# Patient Record
Sex: MALE | Race: WHITE | NOT HISPANIC OR LATINO | Employment: OTHER | URBAN - METROPOLITAN AREA
[De-identification: names, ages, dates, MRNs, and addresses within clinical notes are randomized per-mention and may not be internally consistent; named-entity substitution may affect disease eponyms.]

---

## 2017-01-11 RX ORDER — LISINOPRIL 10 MG/1
10 TABLET ORAL DAILY
Qty: 90 TABLET | Refills: 2 | Status: SHIPPED | OUTPATIENT
Start: 2017-01-11 | End: 2017-10-24 | Stop reason: SDUPTHER

## 2017-01-17 ENCOUNTER — TRANSCRIBE ORDERS (OUTPATIENT)
Dept: ADMINISTRATIVE | Facility: HOSPITAL | Age: 77
End: 2017-01-17

## 2017-01-17 ENCOUNTER — HOSPITAL ENCOUNTER (OUTPATIENT)
Dept: CARDIOLOGY | Facility: HOSPITAL | Age: 77
Discharge: HOME | End: 2017-01-17
Attending: INTERNAL MEDICINE

## 2017-01-17 ENCOUNTER — HOSPITAL ENCOUNTER (OUTPATIENT)
Dept: CARDIOLOGY | Facility: HOSPITAL | Age: 77
Discharge: HOME | End: 2017-01-17
Attending: INTERNAL MEDICINE | Admitting: INTERNAL MEDICINE

## 2017-01-17 VITALS — BODY MASS INDEX: 22.35 KG/M2 | WEIGHT: 165 LBS | HEIGHT: 72 IN

## 2017-01-17 DIAGNOSIS — Z91.09: Primary | ICD-10-CM

## 2017-01-17 DIAGNOSIS — R07.2 PRECORDIAL PAIN: ICD-10-CM

## 2017-01-17 LAB
BH CV STRESS BP STAGE 1: NORMAL
BH CV STRESS BP STAGE 2: NORMAL
BH CV STRESS BP STAGE 4: NORMAL
BH CV STRESS COMMENTS STAGE 1: NORMAL
BH CV STRESS DOSE REGADENOSON STAGE 1: 0.4
BH CV STRESS DURATION MIN STAGE 1: 0
BH CV STRESS DURATION MIN STAGE 2: 1
BH CV STRESS DURATION MIN STAGE 3: 1
BH CV STRESS DURATION MIN STAGE 4: 1
BH CV STRESS DURATION SEC STAGE 1: 15
BH CV STRESS DURATION SEC STAGE 2: 0
BH CV STRESS HR STAGE 1: 46
BH CV STRESS HR STAGE 2: 64
BH CV STRESS HR STAGE 3: 62
BH CV STRESS PROTOCOL 1: NORMAL
BH CV STRESS RECOVERY BP: NORMAL MMHG
BH CV STRESS RECOVERY HR: 57 BPM
BH CV STRESS STAGE 1: 1
BH CV STRESS STAGE 2: 2
BH CV STRESS STAGE 3: 3
BH CV STRESS STAGE 4: 4
LV EF NUC BP: 58 %
MAXIMAL PREDICTED HEART RATE: 142 BPM
PERCENT MAX PREDICTED HR: 47.18 %
STRESS BASELINE BP: NORMAL MMHG
STRESS BASELINE HR: 45 BPM
STRESS PERCENT HR: 56 %
STRESS POST PEAK BP: NORMAL MMHG
STRESS POST PEAK HR: 67 BPM
STRESS TARGET HR: 121 BPM

## 2017-01-17 PROCEDURE — 0 TECHNETIUM SESTAMIBI: Performed by: INTERNAL MEDICINE

## 2017-01-17 PROCEDURE — A9500 TC99M SESTAMIBI: HCPCS | Performed by: INTERNAL MEDICINE

## 2017-01-17 PROCEDURE — 78452 HT MUSCLE IMAGE SPECT MULT: CPT

## 2017-01-17 PROCEDURE — 25010000002 REGADENOSON 0.4 MG/5ML SOLUTION: Performed by: INTERNAL MEDICINE

## 2017-01-17 PROCEDURE — 78452 HT MUSCLE IMAGE SPECT MULT: CPT | Performed by: INTERNAL MEDICINE

## 2017-01-17 PROCEDURE — 93017 CV STRESS TEST TRACING ONLY: CPT

## 2017-01-17 PROCEDURE — 93018 CV STRESS TEST I&R ONLY: CPT | Performed by: INTERNAL MEDICINE

## 2017-01-17 RX ADMIN — Medication 1 DOSE: at 11:00

## 2017-01-17 RX ADMIN — Medication 1 DOSE: at 12:35

## 2017-01-17 RX ADMIN — REGADENOSON 0.4 MG: 0.08 INJECTION, SOLUTION INTRAVENOUS at 12:37

## 2017-01-30 ENCOUNTER — TELEPHONE (OUTPATIENT)
Dept: CARDIOLOGY | Facility: CLINIC | Age: 77
End: 2017-01-30

## 2017-01-30 NOTE — TELEPHONE ENCOUNTER
----- Message from Yahaira Carson MD sent at 1/27/2017  4:23 PM EST -----  Please notify him that his stress test looks normal with normal function of the heart.  Please continue the same treatment and keep appointments for future follow-up.

## 2017-03-27 ENCOUNTER — OFFICE VISIT (OUTPATIENT)
Dept: INTERNAL MEDICINE | Facility: CLINIC | Age: 77
End: 2017-03-27

## 2017-03-27 VITALS
HEIGHT: 72 IN | HEART RATE: 56 BPM | BODY MASS INDEX: 23.03 KG/M2 | WEIGHT: 170 LBS | SYSTOLIC BLOOD PRESSURE: 140 MMHG | DIASTOLIC BLOOD PRESSURE: 60 MMHG

## 2017-03-27 DIAGNOSIS — I48.0 PAROXYSMAL ATRIAL FIBRILLATION (HCC): Primary | ICD-10-CM

## 2017-03-27 DIAGNOSIS — E78.2 MIXED HYPERLIPIDEMIA: ICD-10-CM

## 2017-03-27 DIAGNOSIS — N18.2 CKD (CHRONIC KIDNEY DISEASE), STAGE 2 (MILD): ICD-10-CM

## 2017-03-27 DIAGNOSIS — I48.92 ATRIAL FLUTTER, UNSPECIFIED TYPE (HCC): ICD-10-CM

## 2017-03-27 DIAGNOSIS — Z12.5 SCREENING FOR PROSTATE CANCER: ICD-10-CM

## 2017-03-27 DIAGNOSIS — I10 ESSENTIAL HYPERTENSION: ICD-10-CM

## 2017-03-27 LAB
ALBUMIN SERPL-MCNC: 4.3 G/DL (ref 3.2–4.8)
ALBUMIN/GLOB SERPL: 1.7 G/DL (ref 1.5–2.5)
ALP SERPL-CCNC: 98 U/L (ref 25–100)
ALT SERPL-CCNC: 23 U/L (ref 7–40)
AST SERPL-CCNC: 26 U/L (ref 0–33)
BILIRUB SERPL-MCNC: 0.7 MG/DL (ref 0.3–1.2)
BUN SERPL-MCNC: 24 MG/DL (ref 9–23)
BUN/CREAT SERPL: 20 (ref 7–25)
CALCIUM SERPL-MCNC: 9.9 MG/DL (ref 8.7–10.4)
CHLORIDE SERPL-SCNC: 106 MMOL/L (ref 99–109)
CHOLEST SERPL-MCNC: 150 MG/DL (ref 0–200)
CO2 SERPL-SCNC: 33 MMOL/L (ref 20–31)
CREAT SERPL-MCNC: 1.2 MG/DL (ref 0.6–1.3)
ERYTHROCYTE [DISTWIDTH] IN BLOOD BY AUTOMATED COUNT: 13.1 % (ref 11.3–14.5)
GLOBULIN SER CALC-MCNC: 2.5 GM/DL
GLUCOSE SERPL-MCNC: 87 MG/DL (ref 70–100)
HCT VFR BLD AUTO: 45.8 % (ref 38.9–50.9)
HDLC SERPL-MCNC: 57 MG/DL (ref 40–60)
HGB BLD-MCNC: 15.5 G/DL (ref 13.1–17.5)
LDLC SERPL CALC-MCNC: 77 MG/DL (ref 0–100)
MCH RBC QN AUTO: 31.8 PG (ref 27–31)
MCHC RBC AUTO-ENTMCNC: 33.8 G/DL (ref 32–36)
MCV RBC AUTO: 94 FL (ref 80–99)
PLATELET # BLD AUTO: 149 10*3/MM3 (ref 150–450)
POTASSIUM SERPL-SCNC: 4.5 MMOL/L (ref 3.5–5.5)
PROT SERPL-MCNC: 6.8 G/DL (ref 5.7–8.2)
PSA SERPL-MCNC: 1.07 NG/ML (ref 0–4)
RBC # BLD AUTO: 4.87 10*6/MM3 (ref 4.2–5.76)
SODIUM SERPL-SCNC: 142 MMOL/L (ref 132–146)
TRIGL SERPL-MCNC: 80 MG/DL (ref 0–150)
TSH SERPL DL<=0.005 MIU/L-ACNC: 0.74 MIU/ML (ref 0.35–5.35)
VLDLC SERPL CALC-MCNC: 16 MG/DL
WBC # BLD AUTO: 8.4 10*3/MM3 (ref 3.5–10.8)

## 2017-03-27 PROCEDURE — 36415 COLL VENOUS BLD VENIPUNCTURE: CPT | Performed by: INTERNAL MEDICINE

## 2017-03-27 PROCEDURE — 99214 OFFICE O/P EST MOD 30 MIN: CPT | Performed by: INTERNAL MEDICINE

## 2017-03-27 NOTE — PROGRESS NOTES
Patient is a 77 y.o. male who is here for a follow up of chronic conditions.  Chief Complaint   Patient presents with   • Hyperlipidemia   • Atrial Fibrillation         HPI:  Here for f/u.  Energy level is good.  No palpitations.  No excessive bruising.  No dizziness or lightheadedness.  No nausea or emesis.  No HA's.  No abdominal pains.      History:    Patient Active Problem List   Diagnosis   • Hx-TIA (transient ischemic attack)   • Essential hypertension   • PFO (patent foramen ovale)   • Hyperlipidemia   • Tobacco chew use   • CKD (chronic kidney disease)   • Atrial fibrillation   • Atrial flutter   • Stroke   • MVP (mitral valve prolapse)   • Mitral valve regurgitation   • Screening for prostate cancer       Past Medical History:   Diagnosis Date   • Chronic kidney disease    • History of migraine headaches    • Hyperlipidemia    • Hypertension    • Mitral valve regurgitation 12/1/2016   • PFO (patent foramen ovale)    • Stroke        Past Surgical History:   Procedure Laterality Date   • TONSILLECTOMY AND ADENOIDECTOMY         Current Outpatient Prescriptions on File Prior to Visit   Medication Sig   • apixaban (ELIQUIS) 5 MG tablet tablet Take 1 tablet by mouth 2 (two) times a day.   • atorvastatin (LIPITOR) 40 MG tablet Take 1 tablet by mouth daily.   • glucosamine sulfate 500 MG capsule capsule Take 500 mg by mouth daily.   • lisinopril (PRINIVIL,ZESTRIL) 10 MG tablet Take 1 tablet by mouth Daily.   • metoprolol tartrate (LOPRESSOR) 25 MG tablet Take 12.5 mg by mouth 2 (two) times a day.   • vitamin B-12 (CYANOCOBALAMIN) 100 MCG tablet Take 50 mcg by mouth daily.     No current facility-administered medications on file prior to visit.        Family History   Problem Relation Age of Onset   • Arthritis Other    • Hyperlipidemia Other    • Stroke Other        Social History     Social History   • Marital status:      Spouse name: N/A   • Number of children: N/A   • Years of education: N/A  "    Occupational History   • Not on file.     Social History Main Topics   • Smoking status: Never Smoker   • Smokeless tobacco: Current User   • Alcohol use Yes      Comment: 1-2 drinks a week   • Drug use: No   • Sexual activity: Not on file     Other Topics Concern   • Not on file     Social History Narrative         ROS:    Review of Systems   Constitutional: Negative for chills, fatigue, fever and unexpected weight change.   HENT: Negative for congestion, ear pain, hearing loss, rhinorrhea, sinus pressure, sore throat and trouble swallowing.    Eyes: Negative for discharge and itching.   Respiratory: Negative for cough, chest tightness and shortness of breath.    Cardiovascular: Negative for chest pain, palpitations and leg swelling.   Gastrointestinal: Negative for abdominal pain, blood in stool, constipation, diarrhea and vomiting.   Endocrine: Negative for polydipsia and polyuria.   Genitourinary: Negative for difficulty urinating, dysuria, enuresis, frequency, hematuria and urgency.   Musculoskeletal: Negative for arthralgias, back pain, gait problem and joint swelling.   Skin: Negative for rash and wound.   Allergic/Immunologic: Negative for immunocompromised state.   Neurological: Negative for dizziness, syncope, weakness, light-headedness, numbness and headaches.   Hematological: Bruises/bleeds easily.   Psychiatric/Behavioral: Negative for behavioral problems, dysphoric mood and sleep disturbance. The patient is not nervous/anxious.        /60  Pulse 56  Ht 72\" (182.9 cm)  Wt 170 lb (77.1 kg)  BMI 23.06 kg/m2    Physical Exam:    Physical Exam   Constitutional: He is oriented to person, place, and time. He appears well-developed and well-nourished.   HENT:   Head: Normocephalic and atraumatic.   Right Ear: External ear normal.   Left Ear: External ear normal.   Mouth/Throat: Oropharynx is clear and moist.   Right TM perforated   Eyes: Conjunctivae and EOM are normal.   Neck: Normal range of " motion. Neck supple.   Cardiovascular: Normal rate, regular rhythm and normal heart sounds.    Pulmonary/Chest: Effort normal and breath sounds normal.   Abdominal: Soft. Bowel sounds are normal.   Musculoskeletal: Normal range of motion.   Lymphadenopathy:     He has no cervical adenopathy.   Neurological: He is alert and oriented to person, place, and time.   Skin: Skin is warm and dry.   Easy bruising   Psychiatric: He has a normal mood and affect. His behavior is normal. Thought content normal.       Procedure:      Discussion/Summary:  htn-stable  paf-cont rate control and eliquis  Hyperlipidemia- labs today, counseled on diet  djd-stable  Thrombocytopenia-cbc today  TIA-cont rf mod  High risk meds-labs today  Other-check psa today     Refusing flu shot  Labs noted and dw patient      Current Outpatient Prescriptions:   •  apixaban (ELIQUIS) 5 MG tablet tablet, Take 1 tablet by mouth 2 (two) times a day., Disp: 180 tablet, Rfl: 3  •  atorvastatin (LIPITOR) 40 MG tablet, Take 1 tablet by mouth daily., Disp: 90 tablet, Rfl: 3  •  glucosamine sulfate 500 MG capsule capsule, Take 500 mg by mouth daily., Disp: , Rfl:   •  lisinopril (PRINIVIL,ZESTRIL) 10 MG tablet, Take 1 tablet by mouth Daily., Disp: 90 tablet, Rfl: 2  •  metoprolol tartrate (LOPRESSOR) 25 MG tablet, Take 12.5 mg by mouth 2 (two) times a day., Disp: , Rfl:   •  vitamin B-12 (CYANOCOBALAMIN) 100 MCG tablet, Take 50 mcg by mouth daily., Disp: , Rfl:         Pio was seen today for hyperlipidemia and atrial fibrillation.    Diagnoses and all orders for this visit:    Paroxysmal atrial fibrillation    Atrial flutter, unspecified type    Essential hypertension  -     CBC (No Diff)    Mixed hyperlipidemia  -     Comprehensive Metabolic Panel  -     Lipid Panel  -     TSH    CKD (chronic kidney disease), stage 2 (mild)    Screening for prostate cancer  -     PSA Screen

## 2017-06-30 ENCOUNTER — OFFICE VISIT (OUTPATIENT)
Dept: CARDIOLOGY | Facility: CLINIC | Age: 77
End: 2017-06-30

## 2017-06-30 VITALS
SYSTOLIC BLOOD PRESSURE: 144 MMHG | HEIGHT: 71 IN | HEART RATE: 49 BPM | WEIGHT: 174 LBS | BODY MASS INDEX: 24.36 KG/M2 | DIASTOLIC BLOOD PRESSURE: 80 MMHG

## 2017-06-30 DIAGNOSIS — I48.0 PAROXYSMAL ATRIAL FIBRILLATION (HCC): Primary | ICD-10-CM

## 2017-06-30 DIAGNOSIS — I10 ESSENTIAL HYPERTENSION: ICD-10-CM

## 2017-06-30 PROCEDURE — 99213 OFFICE O/P EST LOW 20 MIN: CPT | Performed by: INTERNAL MEDICINE

## 2017-06-30 RX ORDER — VITAMIN B COMPLEX
1 TABLET ORAL DAILY
Status: ON HOLD | COMMUNITY
End: 2020-08-24

## 2017-06-30 NOTE — PROGRESS NOTES
Encounter Date:06/30/2017      Patient ID: Pio Baum is a 77 y.o. male.    Chief Complaint: No chief complaint on file.      PROBLEM LIST:  Paroxysmal atrial fibrillation:   Chadsvasc asked score of 5  TIA/CVA   Carotid duplex April 20, 2016 no significant disease    Echocardiogram April 20, 2016 showed normal LV function, positive bubble study.  Closure not considered due to need for chronic anticoagulation therapy.   Cardiac event monitor showed atrial fibrillation/flutter with intermittent RVR, aberrancy, IVCD/sinus rhythm with PVCs.  Hypertension  Dyslipidemia  Oral tobacco abuse  History of migraine headaches  Chronic kidney disease stage II  Status post tonsillectomy/adenoidectomy    History of Present Illness  Patient presents today for follow-up of paroxysmal atrial fibrillation. He has been doing great from a cardiac standpoint. Recently returned from a trip to [place].  After returning he had an episode of dizziness lightheadedness after chewing tobacco, states that he had felt nauseous at that time and appears that he had a vasovagal episode.  Since then he has avoided chewing tobacco.  Otherwise he is quite active and works in his yard and goes for games of golf without any problems.     Allergies   Allergen Reactions   • Flonase [Fluticasone] Irritability     Gets a nose bleed as soon as used   • Penicillins Rash     Rash all over body including mouth/throat          Current Outpatient Prescriptions:   •  apixaban (ELIQUIS) 5 MG tablet tablet, Take 1 tablet by mouth 2 (two) times a day., Disp: 180 tablet, Rfl: 3  •  atorvastatin (LIPITOR) 40 MG tablet, Take 1 tablet by mouth daily., Disp: 90 tablet, Rfl: 3  •  Coenzyme Q10 (COQ10 PO), Take  by mouth Daily., Disp: , Rfl:   •  glucosamine sulfate 500 MG capsule capsule, Take 500 mg by mouth daily., Disp: , Rfl:   •  lisinopril (PRINIVIL,ZESTRIL) 10 MG tablet, Take 1 tablet by mouth Daily., Disp: 90 tablet, Rfl: 2  •  metoprolol tartrate  "(LOPRESSOR) 25 MG tablet, Take 12.5 mg by mouth 2 (two) times a day., Disp: , Rfl:   •  Multiple Vitamin (MULTI VITAMIN DAILY PO), Take  by mouth Daily., Disp: , Rfl:   •  vitamin B-12 (CYANOCOBALAMIN) 100 MCG tablet, Take 50 mcg by mouth daily., Disp: , Rfl:     The following portions of the patient's history were reviewed and updated as appropriate: allergies, current medications, past family history, past medical history, past social history, past surgical history and problem list.    ROS  Review of Systems   Constitution: Negative for chills, fever, weight gain and weight loss.   Cardiovascular: Negative for chest pain, claudication, dyspnea on exertion, leg swelling, orthopnea, palpitations, paroxysmal nocturnal dyspnea and syncope.        No dizziness   Gastrointestinal: Negative for abdominal pain, constipation, diarrhea, nausea and vomiting.   Genitourinary:        No urinary symptoms   Neurological:        No symptoms of stroke.   All other systems reviewed and are negative.    Objective:     Blood pressure 144/80, pulse (!) 49, height 71\" (180.3 cm), weight 174 lb (78.9 kg).      Physical Exam  Constitutional: She appears well-developed and well-nourished.   HENT:   HEENT exam unremarkable.   Neck: Neck supple. No JVD present.   No carotid bruits.   Cardiovascular: Normal rate, regular rhythm and normal heart sounds.    No murmur heard.  2 plus symmetric pulses.   Pulmonary/Chest: Breath sounds normal. Does not exhibit tenderness.   Abdominal:   Abdomen benign.   Musculoskeletal: Does not exhibit edema.   Neurological:   Neurological exam unremarkable.   Vitals reviewed.    Lab Review:   Procedures       Assessment:      Diagnosis Plan   1. Paroxysmal atrial fibrillation, Mostly maintaining sinus rhythm/bradycardia, on chronic anticoagulation therapy.      2. Essential hypertension , Well controlled       Plan:   Patient advised to avoid chewing tobacco.  Continue current medications.   FU in 6 MO, sooner " as needed.  Thank you for allowing us to participate in the care of your patient.     Scribed for Yahaira Carson MD by Nixon Carson. 6/30/2017  3:42 PM    I, Yahaira Carson MD, personally performed the services described in this documentation as scribed by the above named individual in my presence, and it is both accurate and complete.  6/30/2017  3:43 PM        Please note that portions of this note may have been completed with a voice recognition program. Efforts were made to edit the dictations, but occasionally words are mistranscribed.

## 2017-08-22 DIAGNOSIS — Z00.00 HEALTHCARE MAINTENANCE: ICD-10-CM

## 2017-08-22 RX ORDER — ATORVASTATIN CALCIUM 40 MG/1
TABLET, FILM COATED ORAL
Qty: 90 TABLET | Refills: 3 | Status: SHIPPED | OUTPATIENT
Start: 2017-08-22 | End: 2018-09-02 | Stop reason: SDUPTHER

## 2017-09-05 ENCOUNTER — OFFICE VISIT (OUTPATIENT)
Dept: INTERNAL MEDICINE | Facility: CLINIC | Age: 77
End: 2017-09-05

## 2017-09-05 VITALS
HEART RATE: 68 BPM | BODY MASS INDEX: 24.64 KG/M2 | SYSTOLIC BLOOD PRESSURE: 134 MMHG | HEIGHT: 71 IN | DIASTOLIC BLOOD PRESSURE: 80 MMHG | WEIGHT: 176 LBS

## 2017-09-05 DIAGNOSIS — I10 ESSENTIAL HYPERTENSION: ICD-10-CM

## 2017-09-05 DIAGNOSIS — E78.2 MIXED HYPERLIPIDEMIA: ICD-10-CM

## 2017-09-05 DIAGNOSIS — N18.2 CKD (CHRONIC KIDNEY DISEASE), STAGE 2 (MILD): ICD-10-CM

## 2017-09-05 DIAGNOSIS — I48.92 ATRIAL FLUTTER, UNSPECIFIED TYPE (HCC): ICD-10-CM

## 2017-09-05 DIAGNOSIS — I48.0 PAROXYSMAL ATRIAL FIBRILLATION (HCC): Primary | ICD-10-CM

## 2017-09-05 PROCEDURE — G0438 PPPS, INITIAL VISIT: HCPCS | Performed by: INTERNAL MEDICINE

## 2017-09-05 PROCEDURE — 96160 PT-FOCUSED HLTH RISK ASSMT: CPT | Performed by: INTERNAL MEDICINE

## 2017-09-05 RX ORDER — TAMSULOSIN HYDROCHLORIDE 0.4 MG/1
1 CAPSULE ORAL NIGHTLY
COMMUNITY
Start: 2017-08-01 | End: 2020-01-10

## 2017-09-05 NOTE — PROGRESS NOTES
QUICK REFERENCE INFORMATION:  The ABCs of the Annual Wellness Visit    Initial Medicare Wellness Visit    HEALTH RISK ASSESSMENT    1940    Recent Hospitalizations:  No hospitalization(s) within the last year..        Current Medical Providers:  Patient Care Team:  Lupillo Hill MD as PCP - General        Smoking Status:  History   Smoking Status   • Never Smoker   Smokeless Tobacco   • Current User   • Types: Chew       Alcohol Consumption:  History   Alcohol Use   • Yes     Comment: 1-2 drinks a week       Depression Screen:   PHQ-2/PHQ-9 Depression Screening 9/5/2017   Little interest or pleasure in doing things 0   Feeling down, depressed, or hopeless 0   Trouble falling or staying asleep, or sleeping too much 0   Feeling tired or having little energy 0   Poor appetite or overeating 0   Feeling bad about yourself - or that you are a failure or have let yourself or your family down 0   Trouble concentrating on things, such as reading the newspaper or watching television 0   Moving or speaking so slowly that other people could have noticed. Or the opposite - being so fidgety or restless that you have been moving around a lot more than usual 0   Thoughts that you would be better off dead, or of hurting yourself in some way 0   Total Score 0       Health Habits and Functional and Cognitive Screening:  Functional & Cognitive Status 9/5/2017   Do you have difficulty preparing food and eating? No   Do you have difficulty bathing yourself? No   Do you have difficulty getting dressed? No   Do you have difficulty using the toilet? No   Do you have difficulty moving around from place to place? No   In the past year have you fallen or experienced a near fall? No   Do you need help using the phone?  No   Are you deaf or do you have serious difficulty hearing?  No   Do you need help with transportation? No   Do you need help shopping? No   Do you need help preparing meals?  No   Do you need help with housework?  No    Do you need help with laundry? No   Do you need help taking your medications? No   Do you need help managing money? No   Do you have difficulty concentrating, remembering or making decisions? No       Health Habits  Current Diet: Well Balanced Diet  Dental Exam: Up to date  Eye Exam: Not up to date  Exercise (times per week): 4 times per week          Does the patient have evidence of cognitive impairment? No    Asiprin use counseling: Does not need ASA (and currently is not on it)      Recent Lab Results:    Visual Acuity:  No exam data present    Age-appropriate Screening Schedule:  Refer to the list below for future screening recommendations based on patient's age, sex and/or medical conditions. Orders for these recommended tests are listed in the plan section. The patient has been provided with a written plan.    Health Maintenance   Topic Date Due   • TDAP/TD VACCINES (1 - Tdap) 01/21/1959   • PNEUMOCOCCAL VACCINES (65+ LOW/MEDIUM RISK) (1 of 2 - PCV13) 01/21/2003   • ZOSTER VACCINE  04/27/2016   • INFLUENZA VACCINE  09/01/2017   • LIPID PANEL  03/27/2018        Subjective   History of Present Illness    Pio Baum is a 77 y.o. male who presents for an Annual Wellness Visit.    The following portions of the patient's history were reviewed and updated as appropriate: current medications, past family history, past medical history, past social history, past surgical history and problem list.    Outpatient Medications Prior to Visit   Medication Sig Dispense Refill   • apixaban (ELIQUIS) 5 MG tablet tablet Take 1 tablet by mouth 2 (two) times a day. 180 tablet 3   • atorvastatin (LIPITOR) 40 MG tablet TAKE 1 TABLET EVERY DAY 90 tablet 3   • Coenzyme Q10 (COQ10 PO) Take  by mouth Daily.     • glucosamine sulfate 500 MG capsule capsule Take 500 mg by mouth daily.     • lisinopril (PRINIVIL,ZESTRIL) 10 MG tablet Take 1 tablet by mouth Daily. 90 tablet 2   • metoprolol tartrate (LOPRESSOR) 25 MG tablet Take  12.5 mg by mouth 2 (two) times a day.     • Multiple Vitamin (MULTI VITAMIN DAILY PO) Take  by mouth Daily.     • vitamin B-12 (CYANOCOBALAMIN) 100 MCG tablet Take 50 mcg by mouth daily.       No facility-administered medications prior to visit.        Patient Active Problem List   Diagnosis   • Hx-TIA (transient ischemic attack)   • Essential hypertension   • PFO (patent foramen ovale)   • Hyperlipidemia   • Tobacco chew use   • CKD (chronic kidney disease)   • Atrial fibrillation   • Atrial flutter   • Stroke   • MVP (mitral valve prolapse)   • Mitral valve regurgitation   • Screening for prostate cancer       Advance Care Planning:  has NO advance directive - not interested in additional information    Identification of Risk Factors:  Risk factors include: weight , cardiovascular risk, inactivity, caretaker stress and hearing limitations.    Review of Systems   Constitutional: Negative for chills, fatigue, fever and unexpected weight change.   HENT: Negative for congestion, ear pain, hearing loss, rhinorrhea, sinus pressure, sore throat and trouble swallowing.    Eyes: Negative for discharge and itching.   Respiratory: Negative for cough, chest tightness and shortness of breath.    Cardiovascular: Negative for chest pain, palpitations and leg swelling.   Gastrointestinal: Negative for abdominal pain, blood in stool, constipation, diarrhea and vomiting.        Colonoscopy by Dr Schmid   Endocrine: Negative for polydipsia and polyuria.   Genitourinary: Positive for difficulty urinating. Negative for dysuria, enuresis, frequency, hematuria and urgency.        3/17 psa   Musculoskeletal: Positive for arthralgias. Negative for back pain, gait problem and joint swelling.   Skin: Negative for rash and wound.   Allergic/Immunologic: Negative for immunocompromised state.   Neurological: Negative for dizziness, syncope, weakness, light-headedness, numbness and headaches.   Hematological: Bruises/bleeds easily.  "  Psychiatric/Behavioral: Negative for behavioral problems, dysphoric mood and sleep disturbance. The patient is not nervous/anxious.        Compared to one year ago, the patient feels his physical health is the same.  Compared to one year ago, the patient feels his mental health is the same.    Objective     Physical Exam   Constitutional: He is oriented to person, place, and time. He appears well-developed and well-nourished.   HENT:   Head: Normocephalic and atraumatic.   Right Ear: External ear normal.   Left Ear: External ear normal.   Mouth/Throat: Oropharynx is clear and moist.   Right TM perforated   Eyes: Conjunctivae and EOM are normal.   Neck: Normal range of motion. Neck supple.   Cardiovascular: Normal rate, regular rhythm and normal heart sounds.    Pulmonary/Chest: Effort normal and breath sounds normal.   Abdominal: Soft. Bowel sounds are normal.   Musculoskeletal: Normal range of motion.   Lymphadenopathy:     He has no cervical adenopathy.   Neurological: He is alert and oriented to person, place, and time.   Skin: Skin is warm and dry.   Easy bruising   Psychiatric: He has a normal mood and affect. His behavior is normal. Thought content normal.       Vitals:    09/05/17 1101   BP: 134/80   BP Location: Left arm   Patient Position: Sitting   Pulse: 68   Weight: 176 lb (79.8 kg)   Height: 71\" (180.3 cm)   PainSc: 0-No pain       Body mass index is 24.55 kg/(m^2).  Discussed the patient's BMI with him. The BMI is above average; no BMI management plan is appropriate..    Assessment/Plan   Patient Self-Management and Personalized Health Advice  The patient has been provided with information about: diet, exercise, weight management and fall prevention and preventive services including:   · Exercise counseling provided, Nutrition counseling provided.    Visit Diagnoses:    ICD-10-CM ICD-9-CM   1. Paroxysmal atrial fibrillation I48.0 427.31   2. Atrial flutter, unspecified type I48.92 427.32   3. " Essential hypertension I10 401.9   4. Mixed hyperlipidemia E78.2 272.2   5. CKD (chronic kidney disease), stage 2 (mild) N18.2 585.2       No orders of the defined types were placed in this encounter.      Outpatient Encounter Prescriptions as of 9/5/2017   Medication Sig Dispense Refill   • apixaban (ELIQUIS) 5 MG tablet tablet Take 1 tablet by mouth 2 (two) times a day. 180 tablet 3   • atorvastatin (LIPITOR) 40 MG tablet TAKE 1 TABLET EVERY DAY 90 tablet 3   • Coenzyme Q10 (COQ10 PO) Take  by mouth Daily.     • glucosamine sulfate 500 MG capsule capsule Take 500 mg by mouth daily.     • lisinopril (PRINIVIL,ZESTRIL) 10 MG tablet Take 1 tablet by mouth Daily. 90 tablet 2   • metoprolol tartrate (LOPRESSOR) 25 MG tablet Take 12.5 mg by mouth 2 (two) times a day.     • Multiple Vitamin (MULTI VITAMIN DAILY PO) Take  by mouth Daily.     • tamsulosin (FLOMAX) 0.4 MG capsule 24 hr capsule Every Night.     • vitamin B-12 (CYANOCOBALAMIN) 100 MCG tablet Take 50 mcg by mouth daily.       No facility-administered encounter medications on file as of 9/5/2017.        Reviewed use of high risk medication in the elderly: yes  Reviewed for potential of harmful drug interactions in the elderly: yes    Follow Up:  Return in about 6 months (around 3/5/2018) for Recheck and labs.     An After Visit Summary and PPPS with all of these plans were given to the patient.        htn-stable  paf-cont rate control and eliquis  Hyperlipidemia- labs soon, counseled on diet  djd-stable  Thrombocytopenia-cbc soon  TIA-cont rf mod  High risk meds-labs soon    Labs noted and dw patient, counseled on diet and exercise and hunting safety

## 2017-10-03 DIAGNOSIS — I48.91 ATRIAL FIBRILLATION, CONTROLLED (HCC): ICD-10-CM

## 2017-10-04 RX ORDER — APIXABAN 5 MG/1
TABLET, FILM COATED ORAL
Qty: 180 TABLET | Refills: 3 | Status: SHIPPED | OUTPATIENT
Start: 2017-10-04 | End: 2019-05-10 | Stop reason: SDUPTHER

## 2017-10-24 RX ORDER — LISINOPRIL 10 MG/1
TABLET ORAL
Qty: 90 TABLET | Refills: 2 | Status: SHIPPED | OUTPATIENT
Start: 2017-10-24 | End: 2018-07-24 | Stop reason: SDUPTHER

## 2018-01-19 ENCOUNTER — OFFICE VISIT (OUTPATIENT)
Dept: CARDIOLOGY | Facility: CLINIC | Age: 78
End: 2018-01-19

## 2018-01-19 VITALS
DIASTOLIC BLOOD PRESSURE: 64 MMHG | HEIGHT: 71 IN | HEART RATE: 53 BPM | SYSTOLIC BLOOD PRESSURE: 130 MMHG | BODY MASS INDEX: 25.34 KG/M2 | WEIGHT: 181 LBS

## 2018-01-19 DIAGNOSIS — E78.2 MIXED HYPERLIPIDEMIA: ICD-10-CM

## 2018-01-19 DIAGNOSIS — I48.0 PAROXYSMAL ATRIAL FIBRILLATION (HCC): Primary | ICD-10-CM

## 2018-01-19 DIAGNOSIS — I10 ESSENTIAL HYPERTENSION: ICD-10-CM

## 2018-01-19 PROCEDURE — 99214 OFFICE O/P EST MOD 30 MIN: CPT | Performed by: INTERNAL MEDICINE

## 2018-01-19 NOTE — PROGRESS NOTES
Encounter Date:01/19/2018      Patient ID: Pio Baum is a 77 y.o. male.    Chief Complaint: Hypertension; Hyperlipidemia; and Atrial Fibrillation    PROBLEM LIST:  1. PAF  a. Chadsvasc score of 5  b. Stress test 1/17/2017: EF 58%, negative, low risk study  2. TIA/CVA  a. Carotid duplex April 20, 2016 no significant disease  b. Echocardiogram April 20, 2016 showed normal LV function, positive bubble study. Closure not considered due to need for chronic anticoagulation therapy.  c. Cardiac event monitor showed atrial fibrillation/flutter with intermittent RVR, aberrancy, IVCD/sinus rhythm with PVCs  3. Hypertension  4. Dyslipidemia  5. Oral tobacco abuse  6. History of migraine headaches  7. Chronic kidney disease stage II  8. Status post tonsillectomy/adenoictomy    History of Present Illness  Patient presents today for follow-up with a history of PAF, TIA/CVA, and cardiac risk factors. Has been doing well from a cardiac standpoint. Denies chest pain, shortness of breath, leg swelling, palpitations, and syncope. Remains busy and active went deer hunting recently gets plenty of exercise with no limitations.    Allergies   Allergen Reactions   • Flonase [Fluticasone] Irritability     Gets a nose bleed as soon as used   • Penicillins Rash     Rash all over body including mouth/throat          Current Outpatient Prescriptions:   •  atorvastatin (LIPITOR) 40 MG tablet, TAKE 1 TABLET EVERY DAY, Disp: 90 tablet, Rfl: 3  •  Coenzyme Q10 (COQ10 PO), Take  by mouth Daily., Disp: , Rfl:   •  ELIQUIS 5 MG tablet tablet, TAKE 1 TABLET BY MOUTH 2 (TWO) TIMES A DAY., Disp: 180 tablet, Rfl: 3  •  glucosamine sulfate 500 MG capsule capsule, Take 500 mg by mouth daily., Disp: , Rfl:   •  lisinopril (PRINIVIL,ZESTRIL) 10 MG tablet, TAKE 1 TABLET EVERY DAY, Disp: 90 tablet, Rfl: 2  •  metoprolol tartrate (LOPRESSOR) 25 MG tablet, Take 12.5 mg by mouth 2 (two) times a day., Disp: , Rfl:   •  Multiple Vitamin (MULTI  "VITAMIN DAILY PO), Take  by mouth Daily., Disp: , Rfl:   •  tamsulosin (FLOMAX) 0.4 MG capsule 24 hr capsule, Every Night., Disp: , Rfl:   •  vitamin B-12 (CYANOCOBALAMIN) 100 MCG tablet, Take 50 mcg by mouth daily., Disp: , Rfl:     The following portions of the patient's history were reviewed and updated as appropriate: allergies, current medications, past family history, past medical history, past social history, past surgical history and problem list.    ROS  Review of Systems   Constitution: Negative for chills, fever, weight gain and weight loss.   Cardiovascular: Negative for chest pain, claudication, dyspnea on exertion, leg swelling, orthopnea, palpitations, paroxysmal nocturnal dyspnea and syncope.        No dizziness   Gastrointestinal: Negative for abdominal pain, constipation, diarrhea, nausea and vomiting.   Genitourinary:        No urinary symptoms   Neurological:        No symptoms of stroke.   All other systems reviewed and are negative.    Objective:     Blood pressure 130/64, pulse 53, height 180.3 cm (71\"), weight 82.1 kg (181 lb).      Physical Exam  Constitutional: She appears well-developed and well-nourished.   HENT:   HEENT exam unremarkable.   Neck: Neck supple. No JVD present.   No carotid bruits.   Cardiovascular: Normal rate, regular rhythm and normal heart sounds.    No murmur heard.  2 plus symmetric pulses.   Pulmonary/Chest: Breath sounds normal. Does not exhibit tenderness.   Abdominal:   Abdomen benign.   Musculoskeletal: Does not exhibit edema.   Neurological:   Neurological exam unremarkable.   Vitals reviewed.    Lab Review:   Procedures       Assessment:      Diagnosis Plan   1. Paroxysmal atrial fibrillation Maintaining sinus rhythm, continue metoprolol and Eliquis     2. Essential hypertension Well controlled, continue current medications.      3. Mixed hyperlipidemia , Continue Lipitor, monitored by PCP.        Plan:   Stablecardiac status.  Continue current medications. "   FU in 12 MO, sooner as needed.  Thank you for allowing us to participate in the care of your patient.     Scribed for Yahaira Carson MD by Loco Carson. 1/19/2018  2:32 PM    I, Yahaira Carson MD, personally performed the services described in this documentation as scribed by the above named individual in my presence, and it is both accurate and complete.  1/19/2018  2:37 PM        Please note that portions of this note may have been completed with a voice recognition program. Efforts were made to edit the dictations, but occasionally words are mistranscribed.

## 2018-03-06 ENCOUNTER — PRIOR AUTHORIZATION (OUTPATIENT)
Dept: CARDIOLOGY | Facility: CLINIC | Age: 78
End: 2018-03-06

## 2018-03-06 NOTE — TELEPHONE ENCOUNTER
Patient requested tier exception.  I faxed forms to [facility], awaiting response.

## 2018-03-09 ENCOUNTER — OFFICE VISIT (OUTPATIENT)
Dept: INTERNAL MEDICINE | Facility: CLINIC | Age: 78
End: 2018-03-09

## 2018-03-09 VITALS
SYSTOLIC BLOOD PRESSURE: 140 MMHG | DIASTOLIC BLOOD PRESSURE: 64 MMHG | BODY MASS INDEX: 25.28 KG/M2 | HEIGHT: 71 IN | HEART RATE: 64 BPM | WEIGHT: 180.6 LBS

## 2018-03-09 DIAGNOSIS — N18.2 STAGE 2 CHRONIC KIDNEY DISEASE: ICD-10-CM

## 2018-03-09 DIAGNOSIS — E78.2 MIXED HYPERLIPIDEMIA: ICD-10-CM

## 2018-03-09 DIAGNOSIS — I48.92 ATRIAL FLUTTER, UNSPECIFIED TYPE (HCC): ICD-10-CM

## 2018-03-09 DIAGNOSIS — I10 ESSENTIAL HYPERTENSION: ICD-10-CM

## 2018-03-09 DIAGNOSIS — I48.0 PAROXYSMAL ATRIAL FIBRILLATION (HCC): Primary | ICD-10-CM

## 2018-03-09 LAB
ALBUMIN SERPL-MCNC: 4.3 G/DL (ref 3.2–4.8)
ALBUMIN/GLOB SERPL: 1.6 G/DL (ref 1.5–2.5)
ALP SERPL-CCNC: 116 U/L (ref 25–100)
ALT SERPL-CCNC: 13 U/L (ref 7–40)
AST SERPL-CCNC: 16 U/L (ref 0–33)
BILIRUB SERPL-MCNC: 0.3 MG/DL (ref 0.3–1.2)
BUN SERPL-MCNC: 16 MG/DL (ref 9–23)
BUN/CREAT SERPL: 22.9 (ref 7–25)
CALCIUM SERPL-MCNC: 9.2 MG/DL (ref 8.7–10.4)
CHLORIDE SERPL-SCNC: 109 MMOL/L (ref 99–109)
CHOLEST SERPL-MCNC: 181 MG/DL (ref 0–200)
CO2 SERPL-SCNC: 27 MMOL/L (ref 20–31)
CREAT SERPL-MCNC: 0.7 MG/DL (ref 0.6–1.3)
ERYTHROCYTE [DISTWIDTH] IN BLOOD BY AUTOMATED COUNT: 13.5 % (ref 11.3–14.5)
GFR SERPLBLD CREATININE-BSD FMLA CKD-EPI: 109 ML/MIN/1.73
GFR SERPLBLD CREATININE-BSD FMLA CKD-EPI: 132 ML/MIN/1.73
GLOBULIN SER CALC-MCNC: 2.7 GM/DL
GLUCOSE SERPL-MCNC: 86 MG/DL (ref 70–100)
HCT VFR BLD AUTO: 38.6 % (ref 38.9–50.9)
HDLC SERPL-MCNC: 62 MG/DL (ref 40–60)
HGB BLD-MCNC: 11.9 G/DL (ref 13.1–17.5)
LDLC SERPL CALC-MCNC: 92 MG/DL (ref 0–100)
MCH RBC QN AUTO: 27.6 PG (ref 27–31)
MCHC RBC AUTO-ENTMCNC: 30.8 G/DL (ref 32–36)
MCV RBC AUTO: 89.6 FL (ref 80–99)
PLATELET # BLD AUTO: 295 10*3/MM3 (ref 150–450)
POTASSIUM SERPL-SCNC: 4.1 MMOL/L (ref 3.5–5.5)
PROT SERPL-MCNC: 7 G/DL (ref 5.7–8.2)
RBC # BLD AUTO: 4.31 10*6/MM3 (ref 4.2–5.76)
SODIUM SERPL-SCNC: 144 MMOL/L (ref 132–146)
TRIGL SERPL-MCNC: 136 MG/DL (ref 0–150)
TSH SERPL DL<=0.005 MIU/L-ACNC: 0.32 MIU/ML (ref 0.35–5.35)
VLDLC SERPL CALC-MCNC: 27.2 MG/DL
WBC # BLD AUTO: 7.51 10*3/MM3 (ref 3.5–10.8)

## 2018-03-09 PROCEDURE — 99214 OFFICE O/P EST MOD 30 MIN: CPT | Performed by: INTERNAL MEDICINE

## 2018-03-09 NOTE — PROGRESS NOTES
Patient is a 78 y.o. male who is here for a follow up of chronic conditions.  Chief Complaint   Patient presents with   • Hypertension   • Hyperlipidemia         HPI:  Here for f/u.  Doing ok.  No palpitations.  Good uop.  No excessive bruising.  Continues on eliquis.  No dizziness or lightheadedness.  No HAs.  No abdominal pains.  No CP.     History:    Patient Active Problem List   Diagnosis   • Hx-TIA (transient ischemic attack)   • Essential hypertension   • PFO (patent foramen ovale)   • Hyperlipidemia   • Tobacco chew use   • CKD (chronic kidney disease)   • Atrial fibrillation   • Atrial flutter   • Stroke   • MVP (mitral valve prolapse)   • Mitral valve regurgitation   • Screening for prostate cancer       Past Medical History:   Diagnosis Date   • Chronic kidney disease    • History of migraine headaches    • Hyperlipidemia    • Hypertension    • Mitral valve regurgitation 12/1/2016   • PFO (patent foramen ovale)    • Stroke        Past Surgical History:   Procedure Laterality Date   • TONSILLECTOMY AND ADENOIDECTOMY         Current Outpatient Prescriptions on File Prior to Visit   Medication Sig   • atorvastatin (LIPITOR) 40 MG tablet TAKE 1 TABLET EVERY DAY   • Coenzyme Q10 (COQ10 PO) Take  by mouth Daily.   • ELIQUIS 5 MG tablet tablet TAKE 1 TABLET BY MOUTH 2 (TWO) TIMES A DAY.   • glucosamine sulfate 500 MG capsule capsule Take 500 mg by mouth daily.   • lisinopril (PRINIVIL,ZESTRIL) 10 MG tablet TAKE 1 TABLET EVERY DAY   • metoprolol tartrate (LOPRESSOR) 25 MG tablet Take 12.5 mg by mouth 2 (two) times a day.   • Multiple Vitamin (MULTI VITAMIN DAILY PO) Take  by mouth Daily.   • tamsulosin (FLOMAX) 0.4 MG capsule 24 hr capsule Every Night.   • vitamin B-12 (CYANOCOBALAMIN) 100 MCG tablet Take 50 mcg by mouth daily.     No current facility-administered medications on file prior to visit.        Family History   Problem Relation Age of Onset   • Arthritis Other    • Hyperlipidemia Other    • Stroke  "Other        Social History     Social History   • Marital status:      Spouse name: N/A   • Number of children: N/A   • Years of education: N/A     Occupational History   • Not on file.     Social History Main Topics   • Smoking status: Never Smoker   • Smokeless tobacco: Current User     Types: Chew   • Alcohol use Yes      Comment: 1-2 drinks a week   • Drug use: No   • Sexual activity: Defer     Other Topics Concern   • Not on file     Social History Narrative         ROS:    Review of Systems   Constitutional: Negative for chills, fatigue, fever and unexpected weight change.   HENT: Negative for congestion, ear pain, hearing loss, rhinorrhea, sinus pressure, sore throat and trouble swallowing.    Eyes: Negative for discharge and itching.   Respiratory: Negative for cough, chest tightness and shortness of breath.    Cardiovascular: Negative for chest pain, palpitations and leg swelling.   Gastrointestinal: Negative for abdominal pain, blood in stool, constipation, diarrhea and vomiting.        Colonoscopy by Dr Schmid   Endocrine: Negative for polydipsia and polyuria.   Genitourinary: Positive for difficulty urinating. Negative for dysuria, enuresis, frequency, hematuria and urgency.        Followed by Dr Segal   Musculoskeletal: Positive for arthralgias. Negative for back pain, gait problem and joint swelling.   Skin: Negative for rash and wound.   Allergic/Immunologic: Negative for immunocompromised state.   Neurological: Negative for dizziness, syncope, weakness, light-headedness, numbness and headaches.   Hematological: Bruises/bleeds easily.   Psychiatric/Behavioral: Negative for behavioral problems, dysphoric mood and sleep disturbance. The patient is not nervous/anxious.        /64  Pulse 64  Ht 180.3 cm (70.98\")  Wt 81.9 kg (180 lb 9.6 oz)  BMI 25.2 kg/m2    Physical Exam:    Physical Exam   Constitutional: He is oriented to person, place, and time. He appears well-developed and " well-nourished.   HENT:   Head: Normocephalic and atraumatic.   Right Ear: External ear normal.   Left Ear: External ear normal.   Mouth/Throat: Oropharynx is clear and moist.   Right TM perforated   Eyes: Conjunctivae and EOM are normal.   Neck: Normal range of motion. Neck supple.   Cardiovascular: Normal rate, regular rhythm and normal heart sounds.    Pulmonary/Chest: Effort normal and breath sounds normal.   Abdominal: Soft. Bowel sounds are normal.   Musculoskeletal: Normal range of motion.   Lymphadenopathy:     He has no cervical adenopathy.   Neurological: He is alert and oriented to person, place, and time.   Skin: Skin is warm and dry.   Easy bruising   Psychiatric: He has a normal mood and affect. His behavior is normal. Thought content normal.       Procedure:      Discussion/Summary:    htn-stable  paf-cont rate control and eliquis  Hyperlipidemia- labs today, counseled on diet  djd-stable  Thrombocytopenia-cbc today  TIA-cont rf mod  High risk meds-labs today     Labs noted and dw patient, counseled on diet and exercise and hunting safety    Recheck cbc on rtc    Current Outpatient Prescriptions:   •  atorvastatin (LIPITOR) 40 MG tablet, TAKE 1 TABLET EVERY DAY, Disp: 90 tablet, Rfl: 3  •  Coenzyme Q10 (COQ10 PO), Take  by mouth Daily., Disp: , Rfl:   •  ELIQUIS 5 MG tablet tablet, TAKE 1 TABLET BY MOUTH 2 (TWO) TIMES A DAY., Disp: 180 tablet, Rfl: 3  •  glucosamine sulfate 500 MG capsule capsule, Take 500 mg by mouth daily., Disp: , Rfl:   •  lisinopril (PRINIVIL,ZESTRIL) 10 MG tablet, TAKE 1 TABLET EVERY DAY, Disp: 90 tablet, Rfl: 2  •  metoprolol tartrate (LOPRESSOR) 25 MG tablet, Take 12.5 mg by mouth 2 (two) times a day., Disp: , Rfl:   •  Multiple Vitamin (MULTI VITAMIN DAILY PO), Take  by mouth Daily., Disp: , Rfl:   •  tamsulosin (FLOMAX) 0.4 MG capsule 24 hr capsule, Every Night., Disp: , Rfl:   •  vitamin B-12 (CYANOCOBALAMIN) 100 MCG tablet, Take 50 mcg by mouth daily., Disp: , Rfl:          Pio was seen today for hypertension and hyperlipidemia.    Diagnoses and all orders for this visit:    Paroxysmal atrial fibrillation    Atrial flutter, unspecified type    Essential hypertension  -     CBC (No Diff)    Mixed hyperlipidemia  -     Comprehensive Metabolic Panel  -     Lipid Panel  -     TSH    Stage 2 chronic kidney disease

## 2018-03-12 LAB
Lab: NORMAL
Lab: NORMAL

## 2018-03-13 LAB
FOLATE SERPL-MCNC: 11.33 NG/ML (ref 3.2–20)
IRON SATN MFR SERPL: 36 % (ref 20–50)
IRON SERPL-MCNC: 108 MCG/DL (ref 50–175)
TIBC SERPL-MCNC: 303 MCG/DL (ref 250–450)
UIBC SERPL-MCNC: 195 MCG/DL
VIT B12 SERPL-MCNC: 515 PG/ML (ref 211–911)
WRITTEN AUTHORIZATION: NORMAL

## 2018-07-24 RX ORDER — LISINOPRIL 10 MG/1
TABLET ORAL
Qty: 90 TABLET | Refills: 2 | Status: SHIPPED | OUTPATIENT
Start: 2018-07-24 | End: 2019-03-17 | Stop reason: SDUPTHER

## 2018-08-28 ENCOUNTER — OFFICE VISIT (OUTPATIENT)
Dept: INTERNAL MEDICINE | Facility: CLINIC | Age: 78
End: 2018-08-28

## 2018-08-28 VITALS
HEIGHT: 71 IN | WEIGHT: 183 LBS | SYSTOLIC BLOOD PRESSURE: 130 MMHG | BODY MASS INDEX: 25.62 KG/M2 | DIASTOLIC BLOOD PRESSURE: 70 MMHG | HEART RATE: 60 BPM

## 2018-08-28 DIAGNOSIS — I34.0 NON-RHEUMATIC MITRAL REGURGITATION: ICD-10-CM

## 2018-08-28 DIAGNOSIS — E78.2 MIXED HYPERLIPIDEMIA: ICD-10-CM

## 2018-08-28 DIAGNOSIS — Q21.12 PFO (PATENT FORAMEN OVALE): ICD-10-CM

## 2018-08-28 DIAGNOSIS — I48.0 PAROXYSMAL ATRIAL FIBRILLATION (HCC): Primary | ICD-10-CM

## 2018-08-28 DIAGNOSIS — I10 ESSENTIAL HYPERTENSION: ICD-10-CM

## 2018-08-28 DIAGNOSIS — I48.92 ATRIAL FLUTTER, UNSPECIFIED TYPE (HCC): ICD-10-CM

## 2018-08-28 DIAGNOSIS — N18.9 CHRONIC KIDNEY DISEASE, UNSPECIFIED CKD STAGE: ICD-10-CM

## 2018-08-28 DIAGNOSIS — I34.1 MVP (MITRAL VALVE PROLAPSE): ICD-10-CM

## 2018-08-28 PROCEDURE — 93000 ELECTROCARDIOGRAM COMPLETE: CPT | Performed by: INTERNAL MEDICINE

## 2018-08-28 PROCEDURE — 99214 OFFICE O/P EST MOD 30 MIN: CPT | Performed by: INTERNAL MEDICINE

## 2018-08-28 NOTE — PROGRESS NOTES
Patient is a 78 y.o. male who is here for a pre op clearance for cataract surgery.  Chief Complaint   Patient presents with   • Pre-op Exam     cataract         HPI:    Here for preop Bilateral cataract.  Will do the LEFT eye initially then RIGHT.  No problems with anesthesia.  Has easy bruising.  No CP.  No cough.  No PND or orthopnea.  No edema.    History:    Patient Active Problem List   Diagnosis   • Hx-TIA (transient ischemic attack)   • Essential hypertension   • PFO (patent foramen ovale)   • Hyperlipidemia   • Tobacco chew use   • CKD (chronic kidney disease)   • Atrial fibrillation (CMS/HCC)   • Atrial flutter (CMS/HCC)   • Stroke (CMS/HCC)   • MVP (mitral valve prolapse)   • Mitral valve regurgitation   • Screening for prostate cancer       Past Medical History:   Diagnosis Date   • Chronic kidney disease    • History of migraine headaches    • Hyperlipidemia    • Hypertension    • Mitral valve regurgitation 12/1/2016   • PFO (patent foramen ovale)    • Stroke (CMS/HCC)        Past Surgical History:   Procedure Laterality Date   • TONSILLECTOMY AND ADENOIDECTOMY         Current Outpatient Prescriptions on File Prior to Visit   Medication Sig   • atorvastatin (LIPITOR) 40 MG tablet TAKE 1 TABLET EVERY DAY   • Coenzyme Q10 (COQ10 PO) Take  by mouth Daily.   • ELIQUIS 5 MG tablet tablet TAKE 1 TABLET BY MOUTH 2 (TWO) TIMES A DAY.   • glucosamine sulfate 500 MG capsule capsule Take 500 mg by mouth daily.   • lisinopril (PRINIVIL,ZESTRIL) 10 MG tablet TAKE 1 TABLET EVERY DAY   • metoprolol tartrate (LOPRESSOR) 25 MG tablet Take 0.5 tablets by mouth Every 12 (Twelve) Hours.   • Multiple Vitamin (MULTI VITAMIN DAILY PO) Take  by mouth Daily.   • tamsulosin (FLOMAX) 0.4 MG capsule 24 hr capsule Every Night.   • vitamin B-12 (CYANOCOBALAMIN) 100 MCG tablet Take 50 mcg by mouth daily.     No current facility-administered medications on file prior to visit.        Family History   Problem Relation Age of Onset   •  "Arthritis Other    • Hyperlipidemia Other    • Stroke Other        Social History     Social History   • Marital status:      Spouse name: N/A   • Number of children: N/A   • Years of education: N/A     Occupational History   • Not on file.     Social History Main Topics   • Smoking status: Never Smoker   • Smokeless tobacco: Current User     Types: Chew   • Alcohol use Yes      Comment: 1-2 drinks a week   • Drug use: No   • Sexual activity: Defer     Other Topics Concern   • Not on file     Social History Narrative   • No narrative on file         Review of Systems   Constitutional: Negative for chills, fatigue, fever and unexpected weight change.   HENT: Negative for congestion, ear pain, hearing loss, rhinorrhea, sinus pressure, sore throat and trouble swallowing.    Eyes: Negative for discharge and itching.   Respiratory: Negative for cough, chest tightness and shortness of breath.    Cardiovascular: Negative for chest pain, palpitations and leg swelling.   Gastrointestinal: Negative for abdominal pain, blood in stool, constipation, diarrhea and vomiting.        Colonoscopy by Dr Schmid   Endocrine: Negative for polydipsia and polyuria.   Genitourinary: Positive for difficulty urinating. Negative for dysuria, enuresis, frequency, hematuria and urgency.        Followed by Dr Segal   Musculoskeletal: Positive for arthralgias. Negative for back pain, gait problem and joint swelling.   Skin: Negative for rash and wound.   Allergic/Immunologic: Negative for immunocompromised state.   Neurological: Negative for dizziness, syncope, weakness, light-headedness, numbness and headaches.   Hematological: Bruises/bleeds easily.   Psychiatric/Behavioral: Negative for behavioral problems, dysphoric mood and sleep disturbance. The patient is not nervous/anxious.        /70   Pulse 60   Ht 180.3 cm (70.98\")   Wt 83 kg (183 lb)   BMI 25.53 kg/m²       Physical Exam   Constitutional: He is oriented to person, " place, and time. He appears well-developed and well-nourished.   HENT:   Head: Normocephalic and atraumatic.   Right Ear: External ear normal.   Left Ear: External ear normal.   Mouth/Throat: Oropharynx is clear and moist.   Right TM perforated   Eyes: Conjunctivae and EOM are normal.   Neck: Normal range of motion. Neck supple.   Cardiovascular: Normal rate, regular rhythm and normal heart sounds.    Pulmonary/Chest: Effort normal and breath sounds normal.   Abdominal: Soft. Bowel sounds are normal.   Musculoskeletal: Normal range of motion.   Lymphadenopathy:     He has no cervical adenopathy.   Neurological: He is alert and oriented to person, place, and time.   Skin: Skin is warm and dry.   Easy bruising   Psychiatric: He has a normal mood and affect. His behavior is normal. Thought content normal.       Procedure:      ECG 12 Lead  Date/Time: 8/28/2018 9:57 AM  Performed by: OMAR CASEY  Authorized by: OMAR CASEY   Comparison: not compared with previous ECG   Rhythm: sinus bradycardia  Rate: bradycardic  ST Segments: ST segments normal  T Waves: T waves normal  QRS axis: normal  Other: no other findings  Clinical impression: non-specific ECG  Comments: Chronic bradycardia        septal Q waves are chronic    Discussion/Summary:    htn-stable  paf-cont rate control and eliquis  Hyperlipidemia- labs soon, counseled on diet  djd-stable  Thrombocytopenia-cbc soon  TIA-cont rf mod  High risk meds-labs soon     Labs noted and dw patient, counseled on diet and exercise and hunting safety     WILL NEED TO HOLD ELIQUIS FOR 4 DAYS PRIOR TO PLANNED SURGERY    Current Outpatient Prescriptions:   •  atorvastatin (LIPITOR) 40 MG tablet, TAKE 1 TABLET EVERY DAY, Disp: 90 tablet, Rfl: 3  •  Coenzyme Q10 (COQ10 PO), Take  by mouth Daily., Disp: , Rfl:   •  ELIQUIS 5 MG tablet tablet, TAKE 1 TABLET BY MOUTH 2 (TWO) TIMES A DAY., Disp: 180 tablet, Rfl: 3  •  glucosamine sulfate 500 MG capsule capsule, Take 500 mg by  mouth daily., Disp: , Rfl:   •  lisinopril (PRINIVIL,ZESTRIL) 10 MG tablet, TAKE 1 TABLET EVERY DAY, Disp: 90 tablet, Rfl: 2  •  metoprolol tartrate (LOPRESSOR) 25 MG tablet, Take 0.5 tablets by mouth Every 12 (Twelve) Hours., Disp: 90 tablet, Rfl: 3  •  Multiple Vitamin (MULTI VITAMIN DAILY PO), Take  by mouth Daily., Disp: , Rfl:   •  tamsulosin (FLOMAX) 0.4 MG capsule 24 hr capsule, Every Night., Disp: , Rfl:   •  vitamin B-12 (CYANOCOBALAMIN) 100 MCG tablet, Take 50 mcg by mouth daily., Disp: , Rfl:         Pio was seen today for pre-op exam.    Diagnoses and all orders for this visit:    Paroxysmal atrial fibrillation (CMS/HCC)    Atrial flutter, unspecified type (CMS/HCC)    Essential hypertension    Mixed hyperlipidemia    Non-rheumatic mitral regurgitation    MVP (mitral valve prolapse)    PFO (patent foramen ovale)    Chronic kidney disease, unspecified CKD stage    Other orders  -     ECG 12 Lead

## 2018-08-30 ENCOUNTER — TELEPHONE (OUTPATIENT)
Dept: INTERNAL MEDICINE | Facility: CLINIC | Age: 78
End: 2018-08-30

## 2018-08-30 DIAGNOSIS — I48.0 PAROXYSMAL ATRIAL FIBRILLATION (HCC): ICD-10-CM

## 2018-08-30 DIAGNOSIS — R05.9 COUGH: Primary | ICD-10-CM

## 2018-08-30 DIAGNOSIS — N18.9 CHRONIC KIDNEY DISEASE, UNSPECIFIED CKD STAGE: ICD-10-CM

## 2018-08-30 DIAGNOSIS — I10 ESSENTIAL HYPERTENSION: Primary | ICD-10-CM

## 2018-08-30 NOTE — TELEPHONE ENCOUNTER
PT WOULD LIKE FOR YOU TO GIVE HIM A CALL, FIRST HE MENTIONED IT BEING ABOUT A MEDICATION THAT DR CASEY PUT HIM ON, THEN HE SAID IT WAS ABOUT BLOOD WORK THAT DR CASEY WANTS HIM TO GET DONE.

## 2018-08-31 ENCOUNTER — LAB REQUISITION (OUTPATIENT)
Dept: LAB | Facility: HOSPITAL | Age: 78
End: 2018-08-31

## 2018-08-31 ENCOUNTER — HOSPITAL ENCOUNTER (OUTPATIENT)
Dept: GENERAL RADIOLOGY | Facility: HOSPITAL | Age: 78
Discharge: HOME | End: 2018-08-31
Attending: INTERNAL MEDICINE | Admitting: INTERNAL MEDICINE

## 2018-08-31 DIAGNOSIS — I48.0 PAROXYSMAL ATRIAL FIBRILLATION (HCC): ICD-10-CM

## 2018-08-31 DIAGNOSIS — I10 ESSENTIAL (PRIMARY) HYPERTENSION: ICD-10-CM

## 2018-08-31 DIAGNOSIS — N18.9 CHRONIC KIDNEY DISEASE: ICD-10-CM

## 2018-08-31 DIAGNOSIS — Z00.00 ROUTINE GENERAL MEDICAL EXAMINATION AT A HEALTH CARE FACILITY: ICD-10-CM

## 2018-08-31 LAB
ANION GAP SERPL CALCULATED.3IONS-SCNC: 7 MMOL/L (ref 3–11)
APTT PPP: 31.7 SECONDS (ref 24–31)
ARTICHOKE IGE QN: 76 MG/DL (ref 0–130)
BUN BLD-MCNC: 16 MG/DL (ref 9–23)
BUN/CREAT SERPL: 10.1 (ref 7–25)
CALCIUM SPEC-SCNC: 9.2 MG/DL (ref 8.7–10.4)
CHLORIDE SERPL-SCNC: 109 MMOL/L (ref 99–109)
CHOLEST SERPL-MCNC: 138 MG/DL (ref 0–200)
CO2 SERPL-SCNC: 25 MMOL/L (ref 20–31)
CREAT BLD-MCNC: 1.58 MG/DL (ref 0.6–1.3)
DEPRECATED RDW RBC AUTO: 45.9 FL (ref 37–54)
ERYTHROCYTE [DISTWIDTH] IN BLOOD BY AUTOMATED COUNT: 13.3 % (ref 11.3–14.5)
GFR SERPL CREATININE-BSD FRML MDRD: 42 ML/MIN/1.73
GLUCOSE BLD-MCNC: 99 MG/DL (ref 70–100)
HCT VFR BLD AUTO: 44.8 % (ref 38.9–50.9)
HDLC SERPL-MCNC: 44 MG/DL (ref 40–60)
HGB BLD-MCNC: 14.9 G/DL (ref 13.1–17.5)
INR PPP: 1.08 (ref 0.91–1.09)
MCH RBC QN AUTO: 31.2 PG (ref 27–31)
MCHC RBC AUTO-ENTMCNC: 33.3 G/DL (ref 32–36)
MCV RBC AUTO: 93.7 FL (ref 80–99)
PLATELET # BLD AUTO: 158 10*3/MM3 (ref 150–450)
PMV BLD AUTO: 12.2 FL (ref 6–12)
POTASSIUM BLD-SCNC: 4.3 MMOL/L (ref 3.5–5.5)
PROTHROMBIN TIME: 11.3 SECONDS (ref 9.6–11.5)
RBC # BLD AUTO: 4.78 10*6/MM3 (ref 4.2–5.76)
SODIUM BLD-SCNC: 141 MMOL/L (ref 132–146)
TRIGL SERPL-MCNC: 105 MG/DL (ref 0–150)
TSH SERPL DL<=0.05 MIU/L-ACNC: 1.25 MIU/ML (ref 0.35–5.35)
WBC NRBC COR # BLD: 9.07 10*3/MM3 (ref 3.5–10.8)

## 2018-08-31 PROCEDURE — 85027 COMPLETE CBC AUTOMATED: CPT | Performed by: INTERNAL MEDICINE

## 2018-08-31 PROCEDURE — 85730 THROMBOPLASTIN TIME PARTIAL: CPT | Performed by: INTERNAL MEDICINE

## 2018-08-31 PROCEDURE — 71046 X-RAY EXAM CHEST 2 VIEWS: CPT

## 2018-08-31 PROCEDURE — 80048 BASIC METABOLIC PNL TOTAL CA: CPT | Performed by: INTERNAL MEDICINE

## 2018-08-31 PROCEDURE — 85610 PROTHROMBIN TIME: CPT | Performed by: INTERNAL MEDICINE

## 2018-08-31 PROCEDURE — 80061 LIPID PANEL: CPT | Performed by: INTERNAL MEDICINE

## 2018-08-31 PROCEDURE — 84443 ASSAY THYROID STIM HORMONE: CPT | Performed by: INTERNAL MEDICINE

## 2018-09-02 DIAGNOSIS — Z00.00 HEALTHCARE MAINTENANCE: ICD-10-CM

## 2018-09-04 RX ORDER — ATORVASTATIN CALCIUM 40 MG/1
TABLET, FILM COATED ORAL
Qty: 90 TABLET | Refills: 3 | Status: SHIPPED | OUTPATIENT
Start: 2018-09-04 | End: 2019-09-16 | Stop reason: SDUPTHER

## 2018-09-06 LAB
APTT PPP: 31.7 SECONDS (ref 24–31)
BUN SERPL-MCNC: 16 MG/DL (ref 9–23)
BUN/CREAT SERPL: 10.1 (ref 7–25)
CALCIUM SERPL-MCNC: 9.2 MG/DL (ref 8.7–10.4)
CHLORIDE SERPL-SCNC: 109 MMOL/L (ref 99–109)
CO2 SERPL-SCNC: 25 MMOL/L (ref 20–31)
CREAT SERPL-MCNC: 1.58 MG/DL (ref 0.6–1.3)
ERYTHROCYTE [DISTWIDTH] IN BLOOD BY AUTOMATED COUNT: 13.3 % (ref 11.3–14.5)
GLUCOSE SERPL-MCNC: 99 MG/DL (ref 70–100)
HCT VFR BLD AUTO: 44.8 % (ref 38.9–50.9)
HGB BLD-MCNC: 14.9 G/DL (ref 13.1–17.5)
INR PPP: 1.08
MCH RBC QN AUTO: 31.2 PG (ref 27–31)
MCHC RBC AUTO-ENTMCNC: 33.3 G/DL (ref 32–36)
MCV RBC AUTO: 93.7 FL (ref 80–99)
PLATELET # BLD AUTO: 158 10E3/MM3 (ref 150–450)
POTASSIUM SERPL-SCNC: 4.3 MMOL/L (ref 3.5–5.5)
PROTHROMBIN TIME: 11.3 SECONDS (ref 9.6–11.5)
RBC # BLD AUTO: 4.78 10E6/MM3 (ref 4.2–5.76)
SODIUM SERPL-SCNC: 141 MMOL/L (ref 132–146)
WBC # BLD AUTO: 9.07 10E3/MM3 (ref 3.5–10.8)

## 2019-01-25 ENCOUNTER — OFFICE VISIT (OUTPATIENT)
Dept: INTERNAL MEDICINE | Facility: CLINIC | Age: 79
End: 2019-01-25

## 2019-01-25 VITALS
BODY MASS INDEX: 26.1 KG/M2 | SYSTOLIC BLOOD PRESSURE: 130 MMHG | HEART RATE: 60 BPM | DIASTOLIC BLOOD PRESSURE: 60 MMHG | HEIGHT: 71 IN | WEIGHT: 186.4 LBS

## 2019-01-25 DIAGNOSIS — Z12.5 SCREENING FOR PROSTATE CANCER: ICD-10-CM

## 2019-01-25 DIAGNOSIS — I48.92 ATRIAL FLUTTER, UNSPECIFIED TYPE (HCC): ICD-10-CM

## 2019-01-25 DIAGNOSIS — Z23 NEED FOR PROPHYLACTIC VACCINATION AGAINST STREPTOCOCCUS PNEUMONIAE (PNEUMOCOCCUS): ICD-10-CM

## 2019-01-25 DIAGNOSIS — I10 ESSENTIAL HYPERTENSION: Primary | ICD-10-CM

## 2019-01-25 DIAGNOSIS — E78.2 MIXED HYPERLIPIDEMIA: ICD-10-CM

## 2019-01-25 DIAGNOSIS — N18.9 CHRONIC KIDNEY DISEASE, UNSPECIFIED CKD STAGE: ICD-10-CM

## 2019-01-25 DIAGNOSIS — Q21.12 PFO (PATENT FORAMEN OVALE): ICD-10-CM

## 2019-01-25 DIAGNOSIS — Z00.00 ROUTINE GENERAL MEDICAL EXAMINATION AT A HEALTH CARE FACILITY: ICD-10-CM

## 2019-01-25 DIAGNOSIS — I48.0 PAROXYSMAL ATRIAL FIBRILLATION (HCC): ICD-10-CM

## 2019-01-25 LAB
ALBUMIN SERPL-MCNC: 4.18 G/DL (ref 3.2–4.8)
ALBUMIN/GLOB SERPL: 1.9 G/DL (ref 1.5–2.5)
ALP SERPL-CCNC: 94 U/L (ref 25–100)
ALT SERPL-CCNC: 24 U/L (ref 7–40)
AST SERPL-CCNC: 25 U/L (ref 0–33)
BILIRUB SERPL-MCNC: 0.8 MG/DL (ref 0.3–1.2)
BUN SERPL-MCNC: 27 MG/DL (ref 9–23)
BUN/CREAT SERPL: 19.1 (ref 7–25)
CALCIUM SERPL-MCNC: 9.1 MG/DL (ref 8.7–10.4)
CHLORIDE SERPL-SCNC: 109 MMOL/L (ref 99–109)
CHOLEST SERPL-MCNC: 139 MG/DL (ref 0–200)
CO2 SERPL-SCNC: 25 MMOL/L (ref 20–31)
CREAT SERPL-MCNC: 1.41 MG/DL (ref 0.6–1.3)
ERYTHROCYTE [DISTWIDTH] IN BLOOD BY AUTOMATED COUNT: 13.3 % (ref 11.3–14.5)
GLOBULIN SER CALC-MCNC: 2.2 GM/DL
GLUCOSE SERPL-MCNC: 87 MG/DL (ref 70–100)
HCT VFR BLD AUTO: 46.6 % (ref 38.9–50.9)
HDLC SERPL-MCNC: 50 MG/DL (ref 40–60)
HGB BLD-MCNC: 15.2 G/DL (ref 13.1–17.5)
LDLC SERPL CALC-MCNC: 75 MG/DL (ref 0–100)
MCH RBC QN AUTO: 30.8 PG (ref 27–31)
MCHC RBC AUTO-ENTMCNC: 32.6 G/DL (ref 32–36)
MCV RBC AUTO: 94.5 FL (ref 80–99)
PLATELET # BLD AUTO: 177 10*3/MM3 (ref 150–450)
POTASSIUM SERPL-SCNC: 4.5 MMOL/L (ref 3.5–5.5)
PROT SERPL-MCNC: 6.4 G/DL (ref 5.7–8.2)
PSA SERPL-MCNC: 1.29 NG/ML (ref 0–4)
RBC # BLD AUTO: 4.93 10*6/MM3 (ref 4.2–5.76)
SODIUM SERPL-SCNC: 143 MMOL/L (ref 132–146)
TRIGL SERPL-MCNC: 68 MG/DL (ref 0–150)
TSH SERPL DL<=0.005 MIU/L-ACNC: 1.06 MIU/ML (ref 0.35–5.35)
VIT B12 SERPL-MCNC: 584 PG/ML (ref 211–911)
VLDLC SERPL CALC-MCNC: 13.6 MG/DL
WBC # BLD AUTO: 8.49 10*3/MM3 (ref 3.5–10.8)

## 2019-01-25 PROCEDURE — 90670 PCV13 VACCINE IM: CPT | Performed by: INTERNAL MEDICINE

## 2019-01-25 PROCEDURE — G0009 ADMIN PNEUMOCOCCAL VACCINE: HCPCS | Performed by: INTERNAL MEDICINE

## 2019-01-25 PROCEDURE — 99214 OFFICE O/P EST MOD 30 MIN: CPT | Performed by: INTERNAL MEDICINE

## 2019-01-25 NOTE — PROGRESS NOTES
Patient is a 79 y.o. male who is here for a follow up of chronic conditions.  Chief Complaint   Patient presents with   • Hypertension   • Hyperlipidemia         HPI:    Here for f/u.  Doing ok.  No dizziness or lightheadedness.  No abdominal pains.  BP has been good.  No falls.  Sleeping well.  No HAs.     History:    Patient Active Problem List   Diagnosis   • Hx-TIA (transient ischemic attack)   • Essential hypertension   • PFO (patent foramen ovale)   • Hyperlipidemia   • Tobacco chew use   • CKD (chronic kidney disease)   • Atrial fibrillation (CMS/HCC)   • Atrial flutter (CMS/HCC)   • Stroke (CMS/HCC)   • MVP (mitral valve prolapse)   • Mitral valve regurgitation   • Screening for prostate cancer       Past Medical History:   Diagnosis Date   • Chronic kidney disease    • History of migraine headaches    • Hyperlipidemia    • Hypertension    • Mitral valve regurgitation 12/1/2016   • PFO (patent foramen ovale)    • Stroke (CMS/HCC)        Past Surgical History:   Procedure Laterality Date   • TONSILLECTOMY AND ADENOIDECTOMY         Current Outpatient Medications on File Prior to Visit   Medication Sig   • atorvastatin (LIPITOR) 40 MG tablet TAKE 1 TABLET EVERY DAY   • Coenzyme Q10 (COQ10 PO) Take  by mouth Daily.   • ELIQUIS 5 MG tablet tablet TAKE 1 TABLET BY MOUTH 2 (TWO) TIMES A DAY.   • glucosamine sulfate 500 MG capsule capsule Take 500 mg by mouth daily.   • lisinopril (PRINIVIL,ZESTRIL) 10 MG tablet TAKE 1 TABLET EVERY DAY   • metoprolol tartrate (LOPRESSOR) 25 MG tablet Take 0.5 tablets by mouth Every 12 (Twelve) Hours.   • Multiple Vitamin (MULTI VITAMIN DAILY PO) Take  by mouth Daily.   • tamsulosin (FLOMAX) 0.4 MG capsule 24 hr capsule Every Night.   • vitamin B-12 (CYANOCOBALAMIN) 100 MCG tablet Take 50 mcg by mouth daily.     No current facility-administered medications on file prior to visit.        Family History   Problem Relation Age of Onset   • Arthritis Other    • Hyperlipidemia Other    •  Stroke Other        Social History     Socioeconomic History   • Marital status:      Spouse name: Not on file   • Number of children: Not on file   • Years of education: Not on file   • Highest education level: Not on file   Social Needs   • Financial resource strain: Not on file   • Food insecurity - worry: Not on file   • Food insecurity - inability: Not on file   • Transportation needs - medical: Not on file   • Transportation needs - non-medical: Not on file   Occupational History   • Not on file   Tobacco Use   • Smoking status: Never Smoker   • Smokeless tobacco: Current User     Types: Chew   Substance and Sexual Activity   • Alcohol use: Yes     Comment: 1-2 drinks a week   • Drug use: No   • Sexual activity: Defer   Other Topics Concern   • Not on file   Social History Narrative   • Not on file         Review of Systems   Constitutional: Negative for chills, fatigue, fever and unexpected weight change.   HENT: Negative for congestion, ear pain, hearing loss, rhinorrhea, sinus pressure, sore throat and trouble swallowing.    Eyes: Negative for discharge and itching.   Respiratory: Negative for cough, chest tightness and shortness of breath.    Cardiovascular: Negative for chest pain, palpitations and leg swelling.   Gastrointestinal: Negative for abdominal pain, blood in stool, constipation, diarrhea and vomiting.        Colonoscopy by Dr Schmid   Endocrine: Negative for polydipsia and polyuria.   Genitourinary: Positive for difficulty urinating. Negative for dysuria, enuresis, frequency, hematuria and urgency.        Followed by Dr Segal   Musculoskeletal: Positive for arthralgias. Negative for back pain, gait problem and joint swelling.   Skin: Negative for rash and wound.   Allergic/Immunologic: Negative for immunocompromised state.   Neurological: Negative for dizziness, syncope, weakness, light-headedness, numbness and headaches.   Hematological: Bruises/bleeds easily.   Psychiatric/Behavioral:  "Negative for behavioral problems, dysphoric mood and sleep disturbance. The patient is not nervous/anxious.        /60   Pulse 60   Ht 180.3 cm (70.98\")   Wt 84.6 kg (186 lb 6.4 oz)   BMI 26.01 kg/m²       Physical Exam   Constitutional: He is oriented to person, place, and time. He appears well-developed and well-nourished.   HENT:   Head: Normocephalic and atraumatic.   Right Ear: External ear normal.   Left Ear: External ear normal.   Mouth/Throat: Oropharynx is clear and moist.   Right TM perforated   Eyes: Conjunctivae and EOM are normal.   Neck: Normal range of motion. Neck supple.   Cardiovascular: Normal rate, regular rhythm and normal heart sounds.   Pulmonary/Chest: Effort normal and breath sounds normal.   Abdominal: Soft. Bowel sounds are normal.   Musculoskeletal: Normal range of motion.   Lymphadenopathy:     He has no cervical adenopathy.   Neurological: He is alert and oriented to person, place, and time.   Skin: Skin is warm and dry.   Easy bruising   Psychiatric: He has a normal mood and affect. His behavior is normal. Thought content normal.       Procedure:      Discussion/Summary:    htn-stable  paf-cont rate control and eliquis  Hyperlipidemia- labs today, counseled on diet  djd-stable  Thrombocytopenia-cbc today  TIA-cont rf mod  High risk meds-labs today     Labs noted and dw patient, counseled on diet and exercise and hunting safety, advised to increase fluids    prevnar 13 today        Current Outpatient Medications:   •  atorvastatin (LIPITOR) 40 MG tablet, TAKE 1 TABLET EVERY DAY, Disp: 90 tablet, Rfl: 3  •  Coenzyme Q10 (COQ10 PO), Take  by mouth Daily., Disp: , Rfl:   •  ELIQUIS 5 MG tablet tablet, TAKE 1 TABLET BY MOUTH 2 (TWO) TIMES A DAY., Disp: 180 tablet, Rfl: 3  •  glucosamine sulfate 500 MG capsule capsule, Take 500 mg by mouth daily., Disp: , Rfl:   •  lisinopril (PRINIVIL,ZESTRIL) 10 MG tablet, TAKE 1 TABLET EVERY DAY, Disp: 90 tablet, Rfl: 2  •  metoprolol tartrate " (LOPRESSOR) 25 MG tablet, Take 0.5 tablets by mouth Every 12 (Twelve) Hours., Disp: 90 tablet, Rfl: 3  •  Multiple Vitamin (MULTI VITAMIN DAILY PO), Take  by mouth Daily., Disp: , Rfl:   •  tamsulosin (FLOMAX) 0.4 MG capsule 24 hr capsule, Every Night., Disp: , Rfl:   •  vitamin B-12 (CYANOCOBALAMIN) 100 MCG tablet, Take 50 mcg by mouth daily., Disp: , Rfl:         Pio was seen today for hypertension and hyperlipidemia.    Diagnoses and all orders for this visit:    Essential hypertension  -     CBC (No Diff)  -     Comprehensive Metabolic Panel  -     Lipid Panel  -     TSH  -     PSA SCREENING  -     Vitamin B12    Mixed hyperlipidemia  -     CBC (No Diff)  -     Comprehensive Metabolic Panel  -     Lipid Panel  -     TSH  -     PSA SCREENING  -     Vitamin B12    Screening for prostate cancer  -     CBC (No Diff)  -     Comprehensive Metabolic Panel  -     Lipid Panel  -     TSH  -     PSA SCREENING  -     Vitamin B12    Routine general medical examination at a health care facility  -     CBC (No Diff)  -     Comprehensive Metabolic Panel  -     Lipid Panel  -     TSH  -     PSA SCREENING  -     Vitamin B12    PFO (patent foramen ovale)    Atrial flutter, unspecified type (CMS/HCC)    Paroxysmal atrial fibrillation (CMS/HCC)    Chronic kidney disease, unspecified CKD stage    Need for prophylactic vaccination against Streptococcus pneumoniae (pneumococcus)  -     Pneumococcal Conjugate Vaccine 13-Valent All

## 2019-02-01 ENCOUNTER — OFFICE VISIT (OUTPATIENT)
Dept: CARDIOLOGY | Facility: CLINIC | Age: 79
End: 2019-02-01

## 2019-02-01 VITALS
HEIGHT: 71 IN | BODY MASS INDEX: 26.18 KG/M2 | SYSTOLIC BLOOD PRESSURE: 140 MMHG | HEART RATE: 52 BPM | WEIGHT: 187 LBS | OXYGEN SATURATION: 98 % | DIASTOLIC BLOOD PRESSURE: 78 MMHG

## 2019-02-01 DIAGNOSIS — I48.0 PAROXYSMAL ATRIAL FIBRILLATION (HCC): Primary | ICD-10-CM

## 2019-02-01 DIAGNOSIS — I10 ESSENTIAL HYPERTENSION: ICD-10-CM

## 2019-02-01 DIAGNOSIS — E78.2 MIXED HYPERLIPIDEMIA: ICD-10-CM

## 2019-02-01 PROCEDURE — 99214 OFFICE O/P EST MOD 30 MIN: CPT | Performed by: INTERNAL MEDICINE

## 2019-02-01 NOTE — PROGRESS NOTES
"        Encounter Date:02/01/2019      Patient ID: Pio Baum is a 79 y.o. male.    Chief Complaint: Atrial Fibrillation and Hypertension      PROBLEM LIST:  1. Paroxysmal atrial fibrillation:  a. CHADS-VASc = 5 (HTN, Age > 75, h/o Stroke)  b. MPS, 01/17/2017: EF 58%, negative, low risk study  2. TIA/CVA:  a. Carotid duplex 04/20/2016 no significant disease  b. Echocardiogram 04/20/2016 showed normal LV function, positive bubble study. Closure not considered due to need for chronic anticoagulation therapy.  c. Cardiac event monitor showed atrial fibrillation/flutter with intermittent RVR, aberrancy, IVCD/sinus rhythm with PVCs  3. Hypertension  4. Dyslipidemia  5. Oral tobacco abuse  6. History of migraine headaches  7. Chronic kidney disease stage II  8. Surgical history:  a. Tonsillectomy/adenoictomy    History of Present Illness  Patient presents today for annual follow-up with a history of PAF, TIA, and cardiac risk factors. Since last visit, he has had \"maybe two\" episodes of fluttering, the last one about 3 months ago. These were self-limiting, and did not last long. Denies chest pain, shortness of breath, leg swelling, and syncope. Remains busy and active with walking, golf, and exercising with 20-lb weights.    Allergies   Allergen Reactions   • Flonase [Fluticasone] Irritability     Gets a nose bleed as soon as used   • Penicillins Rash     Rash all over body including mouth/throat          Current Outpatient Medications:   •  atorvastatin (LIPITOR) 40 MG tablet, TAKE 1 TABLET EVERY DAY, Disp: 90 tablet, Rfl: 3  •  Coenzyme Q10 (COQ10 PO), Take 1 tablet by mouth Daily., Disp: , Rfl:   •  ELIQUIS 5 MG tablet tablet, TAKE 1 TABLET BY MOUTH 2 (TWO) TIMES A DAY., Disp: 180 tablet, Rfl: 3  •  glucosamine sulfate 500 MG capsule capsule, Take 500 mg by mouth daily., Disp: , Rfl:   •  lisinopril (PRINIVIL,ZESTRIL) 10 MG tablet, TAKE 1 TABLET EVERY DAY, Disp: 90 tablet, Rfl: 2  •  metoprolol tartrate " "(LOPRESSOR) 25 MG tablet, Take 0.5 tablets by mouth Every 12 (Twelve) Hours., Disp: 90 tablet, Rfl: 3  •  Multiple Vitamin (MULTI VITAMIN DAILY PO), Take 1 tablet by mouth Daily., Disp: , Rfl:   •  tamsulosin (FLOMAX) 0.4 MG capsule 24 hr capsule, Take 1 capsule by mouth Every Night., Disp: , Rfl:     The following portions of the patient's history were reviewed and updated as appropriate: allergies, current medications, past family history, past medical history, past social history, past surgical history and problem list.    ROS  Review of Systems   Constitution: Negative for chills, fever, weight gain and weight loss.   Cardiovascular: Negative for chest pain, claudication, dyspnea on exertion, leg swelling, orthopnea, palpitations, paroxysmal nocturnal dyspnea and syncope.        No dizziness   Gastrointestinal: Negative for abdominal pain, constipation, diarrhea, nausea and vomiting.   Genitourinary:        No urinary symptoms   Neurological:        No symptoms of stroke.   All other systems reviewed and are negative.    Objective:     Blood pressure 140/78, pulse 52, height 180.3 cm (70.98\"), weight 84.8 kg (187 lb), SpO2 98 %.      Physical Exam  Constitutional: She appears well-developed and well-nourished.   HENT:   HEENT exam unremarkable.   Neck: Neck supple. No JVD present.   No carotid bruits.   Cardiovascular: Normal rate, regular rhythm and normal heart sounds.    No murmur heard.  2 plus symmetric pulses.   Pulmonary/Chest: Breath sounds normal. Does not exhibit tenderness.   Abdominal:   Abdomen benign.   Musculoskeletal: Does not exhibit edema.   Neurological:   Neurological exam unremarkable.   Vitals reviewed.    Lab Review:   Lab Results   Component Value Date    GLUCOSE 99 08/31/2018    BUN 27 (H) 01/25/2019    CREATININE 1.41 (H) 01/25/2019    EGFRIFNONA 48 (L) 01/25/2019    EGFRIFAFRI 58 (L) 01/25/2019    BCR 19.1 01/25/2019    K 4.5 01/25/2019    CO2 25.0 01/25/2019    CALCIUM 9.1 01/25/2019 "    PROTENTOTREF 6.4 01/25/2019    ALBUMIN 4.18 01/25/2019    LABIL2 1.9 01/25/2019    AST 25 01/25/2019    ALT 24 01/25/2019     Lab Results   Component Value Date    CHOL 138 08/31/2018    TRIG 68 01/25/2019    HDL 50 01/25/2019    LDL 75 01/25/2019     Lab Results   Component Value Date    WBC 8.49 01/25/2019    HGB 15.2 01/25/2019    HCT 46.6 01/25/2019    MCV 94.5 01/25/2019     01/25/2019     Lab Results   Component Value Date    TSH 1.061 01/25/2019       Procedures       Assessment:      Diagnosis Plan   1. Paroxysmal atrial fibrillation (CMS/HCC)  Stable, continue Eliquis 5 mg for stroke prophylaxis.    2. Essential hypertension  Well-controlled, continue current medications.   3. Mixed hyperlipidemia  Well-controlled, continue atorvastatin 40 mg.     Plan:   Stable cardiac status.  Continue current medications.   FU in 12 MO, sooner as needed.  Thank you for allowing us to participate in the care of your patient.     Scribed for Yahaira Carson MD by Erna Benson. 2/1/2019  2:11 PM      I, Yahaira Carson MD, personally performed the services described in this documentation as scribed by the above named individual in my presence, and it is both accurate and complete.  2/1/2019  3:19 PM        Please note that portions of this note may have been completed with a voice recognition program. Efforts were made to edit the dictations, but occasionally words are mistranscribed.

## 2019-02-19 ENCOUNTER — TRANSCRIBE ORDERS (OUTPATIENT)
Dept: ADMINISTRATIVE | Facility: HOSPITAL | Age: 79
End: 2019-02-19

## 2019-02-19 DIAGNOSIS — N28.9 GENERAL CONDITIONS OF THE KIDNEY AND URETER: Primary | ICD-10-CM

## 2019-02-26 ENCOUNTER — HOSPITAL ENCOUNTER (OUTPATIENT)
Dept: ULTRASOUND IMAGING | Facility: HOSPITAL | Age: 79
Discharge: HOME | End: 2019-02-26
Admitting: UROLOGY

## 2019-02-26 DIAGNOSIS — N28.9 GENERAL CONDITIONS OF THE KIDNEY AND URETER: ICD-10-CM

## 2019-02-26 PROCEDURE — 76775 US EXAM ABDO BACK WALL LIM: CPT

## 2019-03-18 RX ORDER — LISINOPRIL 10 MG/1
TABLET ORAL
Qty: 90 TABLET | Refills: 2 | Status: SHIPPED | OUTPATIENT
Start: 2019-03-18 | End: 2019-12-16 | Stop reason: SDUPTHER

## 2019-05-03 RX ORDER — DOXYCYCLINE HYCLATE 100 MG/1
100 CAPSULE ORAL 2 TIMES DAILY
Qty: 14 CAPSULE | Refills: 0 | Status: SHIPPED | OUTPATIENT
Start: 2019-05-03 | End: 2019-05-15

## 2019-05-07 ENCOUNTER — HOSPITAL ENCOUNTER (OUTPATIENT)
Dept: GENERAL RADIOLOGY | Facility: HOSPITAL | Age: 79
Discharge: HOME | End: 2019-05-07
Admitting: INTERNAL MEDICINE

## 2019-05-07 DIAGNOSIS — R05.9 COUGH: Primary | ICD-10-CM

## 2019-05-07 PROCEDURE — 71046 X-RAY EXAM CHEST 2 VIEWS: CPT

## 2019-05-10 DIAGNOSIS — I48.91 ATRIAL FIBRILLATION, CONTROLLED (HCC): ICD-10-CM

## 2019-05-15 ENCOUNTER — HOSPITAL ENCOUNTER (OUTPATIENT)
Facility: HOSPITAL | Age: 79
Setting detail: OBSERVATION
Discharge: HOME | End: 2019-05-16
Attending: EMERGENCY MEDICINE | Admitting: EMERGENCY MEDICINE

## 2019-05-15 ENCOUNTER — APPOINTMENT (OUTPATIENT)
Dept: CT IMAGING | Facility: HOSPITAL | Age: 79
End: 2019-05-15

## 2019-05-15 DIAGNOSIS — I48.0 PAROXYSMAL A-FIB (HCC): ICD-10-CM

## 2019-05-15 DIAGNOSIS — S09.90XA INJURY OF HEAD, INITIAL ENCOUNTER: ICD-10-CM

## 2019-05-15 DIAGNOSIS — Z74.09 IMPAIRED FUNCTIONAL MOBILITY, BALANCE, GAIT, AND ENDURANCE: ICD-10-CM

## 2019-05-15 DIAGNOSIS — R55 SYNCOPE AND COLLAPSE: Primary | ICD-10-CM

## 2019-05-15 DIAGNOSIS — S01.01XA SCALP LACERATION, INITIAL ENCOUNTER: ICD-10-CM

## 2019-05-15 PROBLEM — D72.829 LEUKOCYTOSIS: Status: ACTIVE | Noted: 2019-05-15

## 2019-05-15 LAB
ALBUMIN SERPL-MCNC: 4.2 G/DL (ref 3.5–5.2)
ALBUMIN/GLOB SERPL: 1.3 G/DL
ALP SERPL-CCNC: 91 U/L (ref 39–117)
ALT SERPL W P-5'-P-CCNC: 18 U/L (ref 1–41)
ANION GAP SERPL CALCULATED.3IONS-SCNC: 11 MMOL/L
AST SERPL-CCNC: 22 U/L (ref 1–40)
BASOPHILS # BLD AUTO: 0.04 10*3/MM3 (ref 0–0.2)
BASOPHILS NFR BLD AUTO: 0.3 % (ref 0–1.5)
BILIRUB SERPL-MCNC: 0.7 MG/DL (ref 0.2–1.2)
BUN BLD-MCNC: 18 MG/DL (ref 8–23)
BUN/CREAT SERPL: 11.5 (ref 7–25)
CALCIUM SPEC-SCNC: 9.2 MG/DL (ref 8.6–10.5)
CHLORIDE SERPL-SCNC: 103 MMOL/L (ref 98–107)
CO2 SERPL-SCNC: 24 MMOL/L (ref 22–29)
CREAT BLD-MCNC: 1.57 MG/DL (ref 0.76–1.27)
DEPRECATED RDW RBC AUTO: 44.1 FL (ref 37–54)
EOSINOPHIL # BLD AUTO: 0.07 10*3/MM3 (ref 0–0.4)
EOSINOPHIL NFR BLD AUTO: 0.6 % (ref 0.3–6.2)
ERYTHROCYTE [DISTWIDTH] IN BLOOD BY AUTOMATED COUNT: 12.9 % (ref 12.3–15.4)
GFR SERPL CREATININE-BSD FRML MDRD: 43 ML/MIN/1.73
GLOBULIN UR ELPH-MCNC: 3.2 GM/DL
GLUCOSE BLD-MCNC: 104 MG/DL (ref 65–99)
GLUCOSE BLDC GLUCOMTR-MCNC: 111 MG/DL (ref 70–130)
HCT VFR BLD AUTO: 43 % (ref 37.5–51)
HGB BLD-MCNC: 14.5 G/DL (ref 13–17.7)
HOLD SPECIMEN: NORMAL
HOLD SPECIMEN: NORMAL
IMM GRANULOCYTES # BLD AUTO: 0.03 10*3/MM3 (ref 0–0.05)
IMM GRANULOCYTES NFR BLD AUTO: 0.2 % (ref 0–0.5)
LYMPHOCYTES # BLD AUTO: 1.44 10*3/MM3 (ref 0.7–3.1)
LYMPHOCYTES NFR BLD AUTO: 11.3 % (ref 19.6–45.3)
MAGNESIUM SERPL-MCNC: 2.1 MG/DL (ref 1.6–2.4)
MCH RBC QN AUTO: 31.5 PG (ref 26.6–33)
MCHC RBC AUTO-ENTMCNC: 33.7 G/DL (ref 31.5–35.7)
MCV RBC AUTO: 93.3 FL (ref 79–97)
MONOCYTES # BLD AUTO: 0.76 10*3/MM3 (ref 0.1–0.9)
MONOCYTES NFR BLD AUTO: 6 % (ref 5–12)
NEUTROPHILS # BLD AUTO: 10.38 10*3/MM3 (ref 1.7–7)
NEUTROPHILS NFR BLD AUTO: 81.8 % (ref 42.7–76)
NT-PROBNP SERPL-MCNC: 222.8 PG/ML (ref 5–1800)
PLATELET # BLD AUTO: 174 10*3/MM3 (ref 140–450)
PMV BLD AUTO: 11.9 FL (ref 6–12)
POTASSIUM BLD-SCNC: 4.3 MMOL/L (ref 3.5–5.2)
PROT SERPL-MCNC: 7.4 G/DL (ref 6–8.5)
RBC # BLD AUTO: 4.61 10*6/MM3 (ref 4.14–5.8)
SODIUM BLD-SCNC: 138 MMOL/L (ref 136–145)
TROPONIN T SERPL-MCNC: <0.01 NG/ML (ref 0–0.03)
TROPONIN T SERPL-MCNC: <0.01 NG/ML (ref 0–0.03)
WBC NRBC COR # BLD: 12.69 10*3/MM3 (ref 3.4–10.8)
WHOLE BLOOD HOLD SPECIMEN: NORMAL
WHOLE BLOOD HOLD SPECIMEN: NORMAL

## 2019-05-15 PROCEDURE — 83880 ASSAY OF NATRIURETIC PEPTIDE: CPT | Performed by: NURSE PRACTITIONER

## 2019-05-15 PROCEDURE — G0378 HOSPITAL OBSERVATION PER HR: HCPCS

## 2019-05-15 PROCEDURE — 96360 HYDRATION IV INFUSION INIT: CPT

## 2019-05-15 PROCEDURE — 99284 EMERGENCY DEPT VISIT MOD MDM: CPT

## 2019-05-15 PROCEDURE — 84484 ASSAY OF TROPONIN QUANT: CPT | Performed by: EMERGENCY MEDICINE

## 2019-05-15 PROCEDURE — 96361 HYDRATE IV INFUSION ADD-ON: CPT

## 2019-05-15 PROCEDURE — 99220 PR INITIAL OBSERVATION CARE/DAY 70 MINUTES: CPT | Performed by: INTERNAL MEDICINE

## 2019-05-15 PROCEDURE — 82962 GLUCOSE BLOOD TEST: CPT

## 2019-05-15 PROCEDURE — 83735 ASSAY OF MAGNESIUM: CPT | Performed by: EMERGENCY MEDICINE

## 2019-05-15 PROCEDURE — 70450 CT HEAD/BRAIN W/O DYE: CPT

## 2019-05-15 PROCEDURE — 80053 COMPREHEN METABOLIC PANEL: CPT | Performed by: EMERGENCY MEDICINE

## 2019-05-15 PROCEDURE — 93005 ELECTROCARDIOGRAM TRACING: CPT | Performed by: EMERGENCY MEDICINE

## 2019-05-15 PROCEDURE — 85025 COMPLETE CBC W/AUTO DIFF WBC: CPT | Performed by: EMERGENCY MEDICINE

## 2019-05-15 RX ORDER — SODIUM CHLORIDE 9 MG/ML
100 INJECTION, SOLUTION INTRAVENOUS CONTINUOUS
Status: ACTIVE | OUTPATIENT
Start: 2019-05-15 | End: 2019-05-16

## 2019-05-15 RX ORDER — SODIUM CHLORIDE 0.9 % (FLUSH) 0.9 %
10 SYRINGE (ML) INJECTION AS NEEDED
Status: DISCONTINUED | OUTPATIENT
Start: 2019-05-15 | End: 2019-05-15

## 2019-05-15 RX ORDER — SODIUM CHLORIDE 0.9 % (FLUSH) 0.9 %
3 SYRINGE (ML) INJECTION EVERY 12 HOURS SCHEDULED
Status: DISCONTINUED | OUTPATIENT
Start: 2019-05-15 | End: 2019-05-16 | Stop reason: HOSPADM

## 2019-05-15 RX ORDER — LIDOCAINE HYDROCHLORIDE 10 MG/ML
INJECTION, SOLUTION EPIDURAL; INFILTRATION; INTRACAUDAL; PERINEURAL
Status: COMPLETED
Start: 2019-05-15 | End: 2019-05-15

## 2019-05-15 RX ORDER — LIDOCAINE HYDROCHLORIDE 10 MG/ML
10 INJECTION, SOLUTION EPIDURAL; INFILTRATION; INTRACAUDAL; PERINEURAL ONCE
Status: COMPLETED | OUTPATIENT
Start: 2019-05-15 | End: 2019-05-15

## 2019-05-15 RX ORDER — CETIRIZINE HYDROCHLORIDE 10 MG/1
10 TABLET ORAL DAILY
COMMUNITY
End: 2020-01-10

## 2019-05-15 RX ORDER — GUAIFENESIN 600 MG/1
1200 TABLET, EXTENDED RELEASE ORAL AS NEEDED
Status: ON HOLD | COMMUNITY
End: 2020-08-24

## 2019-05-15 RX ORDER — ACETAMINOPHEN 325 MG/1
650 TABLET ORAL EVERY 4 HOURS PRN
Status: DISCONTINUED | OUTPATIENT
Start: 2019-05-15 | End: 2019-05-16 | Stop reason: HOSPADM

## 2019-05-15 RX ORDER — SODIUM CHLORIDE 0.9 % (FLUSH) 0.9 %
3-10 SYRINGE (ML) INJECTION AS NEEDED
Status: DISCONTINUED | OUTPATIENT
Start: 2019-05-15 | End: 2019-05-16 | Stop reason: HOSPADM

## 2019-05-15 RX ORDER — ATORVASTATIN CALCIUM 40 MG/1
40 TABLET, FILM COATED ORAL NIGHTLY
Status: DISCONTINUED | OUTPATIENT
Start: 2019-05-15 | End: 2019-05-16 | Stop reason: HOSPADM

## 2019-05-15 RX ORDER — ONDANSETRON 4 MG/1
4 TABLET, FILM COATED ORAL EVERY 6 HOURS PRN
Status: DISCONTINUED | OUTPATIENT
Start: 2019-05-15 | End: 2019-05-16 | Stop reason: HOSPADM

## 2019-05-15 RX ORDER — TAMSULOSIN HYDROCHLORIDE 0.4 MG/1
0.4 CAPSULE ORAL NIGHTLY
Status: DISCONTINUED | OUTPATIENT
Start: 2019-05-15 | End: 2019-05-16 | Stop reason: HOSPADM

## 2019-05-15 RX ORDER — BISACODYL 5 MG/1
5 TABLET, DELAYED RELEASE ORAL DAILY PRN
Status: DISCONTINUED | OUTPATIENT
Start: 2019-05-15 | End: 2019-05-16 | Stop reason: HOSPADM

## 2019-05-15 RX ORDER — LISINOPRIL 10 MG/1
10 TABLET ORAL DAILY
Status: DISCONTINUED | OUTPATIENT
Start: 2019-05-16 | End: 2019-05-16 | Stop reason: HOSPADM

## 2019-05-15 RX ADMIN — SODIUM CHLORIDE 100 ML/HR: 9 INJECTION, SOLUTION INTRAVENOUS at 18:48

## 2019-05-15 RX ADMIN — TAMSULOSIN HYDROCHLORIDE 0.4 MG: 0.4 CAPSULE ORAL at 21:02

## 2019-05-15 RX ADMIN — LIDOCAINE HYDROCHLORIDE 10 ML: 10 INJECTION, SOLUTION EPIDURAL; INFILTRATION; INTRACAUDAL; PERINEURAL at 13:45

## 2019-05-15 RX ADMIN — SODIUM CHLORIDE 1000 ML: 9 INJECTION, SOLUTION INTRAVENOUS at 13:43

## 2019-05-15 RX ADMIN — SODIUM CHLORIDE, PRESERVATIVE FREE 3 ML: 5 INJECTION INTRAVENOUS at 21:02

## 2019-05-15 RX ADMIN — METOPROLOL TARTRATE 12.5 MG: 25 TABLET, FILM COATED ORAL at 21:02

## 2019-05-15 RX ADMIN — APIXABAN 5 MG: 5 TABLET, FILM COATED ORAL at 21:02

## 2019-05-15 RX ADMIN — ATORVASTATIN CALCIUM 40 MG: 40 TABLET, FILM COATED ORAL at 21:02

## 2019-05-15 NOTE — H&P
[facility] Medicine Services  HISTORY AND PHYSICAL    Patient Name: Pio Baum  : 1940  MRN: [id number]  Primary Care Physician: Lupillo Hill MD  Date of admission: 5/15/2019      Subjective   Subjective     Chief Complaint:  Syncope     HPI:  Pio Baum is a 79 y.o. male with history of afib, TIA, PFO, HTN, HLD, CKD who presents after a syncopal episode today. States he was working in his garden today and became hot and felt weak. Walked about 1/4 mile to his barn to sit down and drink water. He then came to in the drive way and was bleeding from his head. Denies CP/palpitations/SOA prior to the event. Denied HA, vision changes. He did lose his bladder during episode. Denied post ictal confusion. States he is currently feeling improved but is hungry.     Recent URI and completed doxycycline on  and had been feeling better. States he had felt more weak and fatigued over the last few months. CT head with no acute abnormalities. Laceration was repaired in the ED.       Review of Systems   Gen- No fevers, chills  CV- No chest pain, palpitations  Resp- No cough, dyspnea  GI- No N/V/D, abd pain    Otherwise complete ROS reviewed and is negative except as mentioned in the HPI.    Personal History     Past Medical History:   Diagnosis Date   • A-fib (CMS/HCC)    • Chronic kidney disease    • History of migraine headaches    • Hyperlipidemia    • Hypertension    • Mitral valve regurgitation 2016   • PFO (patent foramen ovale)    • Stroke (CMS/HCC)        Past Surgical History:   Procedure Laterality Date   • TONSILLECTOMY AND ADENOIDECTOMY         Family History: family history includes Arthritis in his other; Hyperlipidemia in his other; Stroke in his other. Otherwise pertinent FHx was reviewed and unremarkable.     Social History:  reports that he has never smoked. His smokeless tobacco use includes chew. He reports that he drinks alcohol. He reports that  he does not use drugs.  Social History     Social History Narrative   • Not on file       Medications:    Available home medication information reviewed.    (Not in a hospital admission)    Allergies   Allergen Reactions   • Flonase [Fluticasone] Irritability     Gets a nose bleed as soon as used   • Penicillins Rash     Rash all over body including mouth/throat        Objective   Objective     Vital Signs:   Temp:  [97.5 °F (36.4 °C)-97.7 °F (36.5 °C)] 97.7 °F (36.5 °C)  Heart Rate:  [48-54] 53  Resp:  [16] 16  BP: (121-177)/() 141/74        Physical Exam   Constitutional: Awake, alert  Eyes: PERRLA, sclerae anicteric, no conjunctival injection  HENT: NCAT, mucous membranes moist  Neck: Supple, no thyromegaly, no lymphadenopathy, trachea midline  Respiratory: Clear to auscultation bilaterally, nonlabored respirations   Cardiovascular: RRR, II/VI murmurs, no rubs, or gallops, palpable pedal pulses bilaterally  Gastrointestinal: Positive bowel sounds, soft, nontender, nondistended  Musculoskeletal: No bilateral ankle edema, no clubbing or cyanosis to extremities  Psychiatric: Appropriate affect, cooperative  Neurologic: Oriented x 3, strength symmetric in all extremities, Cranial Nerves grossly intact to confrontation, speech clear  Skin: No rashes      Results Reviewed:  I have personally reviewed current lab, radiology, and data and agree.    Results from last 7 days   Lab Units 05/15/19  1302   WBC 10*3/mm3 12.69*   HEMOGLOBIN g/dL 14.5   HEMATOCRIT % 43.0   PLATELETS 10*3/mm3 174     Results from last 7 days   Lab Units 05/15/19  1517 05/15/19  1302   SODIUM mmol/L  --  138   POTASSIUM mmol/L  --  4.3   CHLORIDE mmol/L  --  103   CO2 mmol/L  --  24.0   BUN mg/dL  --  18   CREATININE mg/dL  --  1.57*   GLUCOSE mg/dL  --  104*   CALCIUM mg/dL  --  9.2   ALT (SGPT) U/L  --  18   AST (SGOT) U/L  --  22   TROPONIN T ng/mL <0.010 <0.010   PROBNP pg/mL  --  222.8     Estimated Creatinine Clearance: 40.2 mL/min (A)  (by C-G formula based on SCr of 1.57 mg/dL (H)).  Brief Urine Lab Results     None        Imaging Results (last 24 hours)     Procedure Component Value Units Date/Time    CT Head Without Contrast [[id number]] Collected:  05/15/19 1324     Updated:  05/15/19 1412    Narrative:       EXAMINATION: CT HEAD WO CONTRAST- 05/15/2019      INDICATION: Fall, on blood thinners; head injury     TECHNIQUE: Multiple axial CT imaging was obtained of the head from skull  base to skull vertex without the administration of intravenous contrast.     The radiation dose reduction device was turned on for each scan per the  ALARA (As Low as Reasonably Achievable) protocol.     COMPARISON: MRI 04/20/2016     FINDINGS: There is mild atrophy identified of the brain with some  low-density area seen in the periventricular and subcortical white  matter. Physiologic calcifications seen within the basal ganglia. There  is an old insult seen within the right basal ganglia. There is no  hemorrhage or hydrocephalus. No mass, mass effect, or midline shift. No  abnormal extra-axial fluid collection is identified. The bony structures  reveal air-fluid level seen in the maxillary sinuses bilaterally  suggesting an acute on chronic sinusitis. The mastoid air cells are  patent.       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 There is no  acute intracranial abnormality identified.     D:  05/15/2019  E:  05/15/2019        This report was finalized on 5/15/2019 2:09 PM by Dr. Lauren Diez MD.           Results for orders placed in visit on 04/20/16   SCANNED - ECHOCARDIOGRAM       Assessment/Plan   Assessment / Plan     Active Hospital Problems    Diagnosis POA   • Syncope [R55] Yes   • Leukocytosis [D72.829] Unknown   • Atrial fibrillation (CMS/HCC) [I48.91] Yes   • PFO (patent foramen ovale) [Q21.1] Not Applicable     Echocardiogram 04/20/2016: Normal LV systolic function, left atrium 3.6 cm, positive bubble study. Mild  mitral valve prolapse, Mild MR.     • CKD (chronic kidney disease) [N18.9] Yes     Stage 2     • Essential hypertension [I10] Yes       Mr. Baum is an 79 year old man with history of afib, PFO, TIA, HTN, CKD who presents after a syncopal episode.      Syncope   - Likely vasovagal   - EKG reviewed; no acute changes, kavon; trop x 2 negative.  - Obtain orthostatics   - Gentle fluids   - PT/OT consult  - Consider cards consult     Scalp laceration  - Repaired in the ER 5/15 with 5 sutures     Leukocytosis   - Likely reactive; monitor     Afib   - Currently sinus kavon  - Follows with Dr. Carson   - Continue eliquis and metoprolol - consider decrease     History of TIA/PFO   - continue statin - was taken off ASA when eliquis started     HTN   - Continue metoprolol and lisinopril     CKD   - Cr stable; avoid nephrotoxins     DVT prophylaxis:  eliquis     CODE STATUS:    Code Status and Medical Interventions:   Ordered at: 05/15/19 1610     Level Of Support Discussed With:    Patient     Code Status:    CPR     Medical Interventions (Level of Support Prior to Arrest):    Full       Admission Status:  I believe this patient meets OBSERVATION status, however if further evaluation or treatment plans warrant, status may change.  Based upon current information, I predict patient's care encounter to be less than or equal to 2 midnights.      Electronically signed by Mary Lee DO, 05/15/19, 4:10 PM.

## 2019-05-15 NOTE — ED PROVIDER NOTES
"Subjective   Patient presents to the emergency department in the care of his wife who drove him here today after the patient apparently had a syncopal event accompanied with head injury and scalp laceration.  The patient is now an excellent historian and conveys that he recalls very well going outdoors around 11:00 this morning to attend to his flower garden recall specifically feeling weak and very hot.  Patient states this prompted him to walk 1 acre across the property and take his seat in his garage where he began to sip water.  He has no other recollection after this except to find himself getting off of the concrete driveway where he was lying and a bleeding laceration on his head.  \"I think I passed out\".  He approached his wife who was indoors, seeking help and his wife describes him as \"being very ashen colored for a long time\".  The patient denies any chest pain or shortness of breath or palpitations.  He has no neurosensory complaints or focal weakness.  Bleeding is controlled to the laceration of his right parietal region.    Patient has a past medical history of paroxysmal atrial fibrillation.  He states that the last time he recalls going into atrial fib was about 2 weeks ago.  He states he was outdoors and felt his heart rate suddenly go to 130 bpm.  This was temporary and he was otherwise asymptomatic.  He states that today's event was not like that.  Patient's last visit with Dr. Ford was in December 2018 to his recollection.        Syncope   Episode history:  Single  Most recent episode:  Today  Duration: unknown.  Chronicity:  New  Context: normal activity    Context: not blood draw, not bowel movement and not dehydration    Witnessed: no    Relieved by:  Nothing (First-aid, administered by his wife to the scalp laceration.)  Worsened by:  Nothing  Ineffective treatments:  None tried  Associated symptoms: diaphoresis, dizziness, malaise/fatigue and nausea    Associated symptoms: no chest pain, no " confusion, no fever, no focal sensory loss, no focal weakness, no headaches, no palpitations, no rectal bleeding, no seizures and no vomiting    Risk factors: coronary artery disease        Review of Systems   Constitutional: Positive for diaphoresis and malaise/fatigue. Negative for fever.   Eyes: Negative.    Respiratory: Negative.    Cardiovascular: Positive for syncope. Negative for chest pain and palpitations.   Gastrointestinal: Positive for nausea. Negative for vomiting.   Endocrine: Negative.    Genitourinary: Negative.    Musculoskeletal: Negative.    Skin: Positive for color change (description by his wife prior to arrival ) and pallor.   Neurological: Positive for dizziness. Negative for focal weakness, seizures, speech difficulty, numbness and headaches.   Hematological: Bruises/bleeds easily (on eliquis and aspirin).   Psychiatric/Behavioral: Negative for confusion.   All other systems reviewed and are negative.      Past Medical History:   Diagnosis Date   • A-fib (CMS/[facility])    • Chronic kidney disease    • History of migraine headaches    • Hyperlipidemia    • Hypertension    • Mitral valve regurgitation 12/1/2016   • PFO (patent foramen ovale)    • Stroke (CMS/[facility])        Allergies   Allergen Reactions   • Flonase [Fluticasone] Irritability     Gets a nose bleed as soon as used   • Penicillins Rash     Rash all over body including mouth/throat        Past Surgical History:   Procedure Laterality Date   • TONSILLECTOMY AND ADENOIDECTOMY         Family History   Problem Relation Age of Onset   • Arthritis Other    • Hyperlipidemia Other    • Stroke Other        Social History     Socioeconomic History   • Marital status:      Spouse name: Not on file   • Number of children: Not on file   • Years of education: Not on file   • Highest education level: Not on file   Tobacco Use   • Smoking status: Never Smoker   • Smokeless tobacco: Current User     Types: Chew   • Tobacco comment: chews once every  4-5 months   Substance and Sexual Activity   • Alcohol use: Yes     Comment: 1-2 drinks a week   • Drug use: No   • Sexual activity: Defer           Objective   Physical Exam   Constitutional: He is oriented to person, place, and time. He appears well-developed and well-nourished. No distress.   HENT:   Head: Normocephalic.   Patient has a 4 cm stellate laceration to the right parietal area.  Bleeding is controlled.   Eyes: Conjunctivae are normal. Pupils are equal, round, and reactive to light. No scleral icterus.   Neck: Normal range of motion. No JVD present.   Cardiovascular: Normal rate and regular rhythm.   Pulmonary/Chest: Effort normal and breath sounds normal. No respiratory distress. He has no wheezes. He has no rales.   Abdominal: Soft. Bowel sounds are normal. He exhibits no distension. There is no rebound and no guarding.   Musculoskeletal: Normal range of motion. He exhibits no edema.   Neurological: He is alert and oriented to person, place, and time. No cranial nerve deficit or sensory deficit. Coordination normal.   Skin: Skin is warm and dry. Capillary refill takes less than 2 seconds. No rash noted. He is not diaphoretic. No erythema. No pallor.   Psychiatric: He has a normal mood and affect. His behavior is normal. Judgment and thought content normal.   Nursing note and vitals reviewed.      Laceration Repair  Date/Time: 5/15/2019 3:37 PM  Performed by: Mariajose Garnett APRN  Authorized by: Mary Lee DO     Consent:     Consent obtained:  Verbal    Consent given by:  Patient  Anesthesia (see MAR for exact dosages):     Anesthesia method:  Local infiltration    Local anesthetic:  Lidocaine 1% w/o epi  Laceration details:     Location:  Scalp    Scalp location:  R parietal    Length (cm):  4  Repair type:     Repair type:  Simple  Pre-procedure details:     Preparation:  Patient was prepped and draped in usual sterile fashion  Exploration:     Hemostasis achieved with:  Direct pressure     Contaminated: no    Treatment:     Area cleansed with:  Betadine    Amount of cleaning:  Standard    Irrigation solution:  Sterile saline    Irrigation volume:  500  Skin repair:     Repair method:  Sutures    Suture size:  3-0    Suture material:  Nylon    Suture technique:  Simple interrupted    Number of sutures:  4  Approximation:     Approximation:  Close    Vermilion border: well-aligned    Post-procedure details:     Dressing:  Sterile dressing               ED Course  ED Course as of May 15 1539   Wed May 15, 2019   1323 WBC: (!) 12.69 [ML]   1404 Creatinine: (!) 1.57 [ML]   1526 I have conferred with Dr. Waite as well as Dr. Arcos, hospitalist, who concurs with hospitalization.  Patient understands and concurs.    [ML]      ED Course User Index  [ML] Mariajose Garnett, HIMA      Recent Results (from the past 24 hour(s))   Comprehensive Metabolic Panel    Collection Time: 05/15/19  1:02 PM   Result Value Ref Range    Glucose 104 (H) 65 - 99 mg/dL    BUN 18 8 - 23 mg/dL    Creatinine 1.57 (H) 0.76 - 1.27 mg/dL    Sodium 138 136 - 145 mmol/L    Potassium 4.3 3.5 - 5.2 mmol/L    Chloride 103 98 - 107 mmol/L    CO2 24.0 22.0 - 29.0 mmol/L    Calcium 9.2 8.6 - 10.5 mg/dL    Total Protein 7.4 6.0 - 8.5 g/dL    Albumin 4.20 3.50 - 5.20 g/dL    ALT (SGPT) 18 1 - 41 U/L    AST (SGOT) 22 1 - 40 U/L    Alkaline Phosphatase 91 39 - 117 U/L    Total Bilirubin 0.7 0.2 - 1.2 mg/dL    eGFR Non African Amer 43 (L) >60 mL/min/1.73    Globulin 3.2 gm/dL    A/G Ratio 1.3 g/dL    BUN/Creatinine Ratio 11.5 7.0 - 25.0    Anion Gap 11.0 mmol/L   Magnesium    Collection Time: 05/15/19  1:02 PM   Result Value Ref Range    Magnesium 2.1 1.6 - 2.4 mg/dL   Troponin    Collection Time: 05/15/19  1:02 PM   Result Value Ref Range    Troponin T <0.010 0.000 - 0.030 ng/mL   Light Blue Top    Collection Time: 05/15/19  1:02 PM   Result Value Ref Range    Extra Tube hold for add-on    Green Top (Gel)    Collection Time: 05/15/19  1:02 PM    Result Value Ref Range    Extra Tube Hold for add-ons.    Lavender Top    Collection Time: 05/15/19  1:02 PM   Result Value Ref Range    Extra Tube hold for add-on    Gold Top - SST    Collection Time: 05/15/19  1:02 PM   Result Value Ref Range    Extra Tube Hold for add-ons.    CBC Auto Differential    Collection Time: 05/15/19  1:02 PM   Result Value Ref Range    WBC 12.69 (H) 3.40 - 10.80 10*3/mm3    RBC 4.61 4.14 - 5.80 10*6/mm3    Hemoglobin 14.5 13.0 - 17.7 g/dL    Hematocrit 43.0 37.5 - 51.0 %    MCV 93.3 79.0 - 97.0 fL    MCH 31.5 26.6 - 33.0 pg    MCHC 33.7 31.5 - 35.7 g/dL    RDW 12.9 12.3 - 15.4 %    RDW-SD 44.1 37.0 - 54.0 fl    MPV 11.9 6.0 - 12.0 fL    Platelets 174 140 - 450 10*3/mm3    Neutrophil % 81.8 (H) 42.7 - 76.0 %    Lymphocyte % 11.3 (L) 19.6 - 45.3 %    Monocyte % 6.0 5.0 - 12.0 %    Eosinophil % 0.6 0.3 - 6.2 %    Basophil % 0.3 0.0 - 1.5 %    Immature Grans % 0.2 0.0 - 0.5 %    Neutrophils, Absolute 10.38 (H) 1.70 - 7.00 10*3/mm3    Lymphocytes, Absolute 1.44 0.70 - 3.10 10*3/mm3    Monocytes, Absolute 0.76 0.10 - 0.90 10*3/mm3    Eosinophils, Absolute 0.07 0.00 - 0.40 10*3/mm3    Basophils, Absolute 0.04 0.00 - 0.20 10*3/mm3    Immature Grans, Absolute 0.03 0.00 - 0.05 10*3/mm3   BNP    Collection Time: 05/15/19  1:02 PM   Result Value Ref Range    proBNP 222.8 5.0-1,800.0 pg/mL     Note: In addition to lab results from this visit, the labs listed above may include labs taken at another facility or during a different encounter within the last 24 hours. Please correlate lab times with ED admission and discharge times for further clarification of the services performed during this visit.    CT Head Without Contrast   Final Result   Acute on chronic bilateral maxillary sinusitis. There is no   acute intracranial abnormality identified.       D:  05/15/2019   E:  05/15/2019           This report was finalized on 5/15/2019 2:09 PM by Dr. Lauren Diez MD.            Vitals:     05/15/19 1338 05/15/19 1345 05/15/19 1429 05/15/19 1430   BP: 121/59 177/74  141/74   BP Location:  Left arm     Patient Position: Standing Sitting     Pulse: (!) 48 51 53    Resp:       Temp:       TempSrc:       SpO2:  100% 99%    Weight:       Height:         Medications   sodium chloride 0.9 % flush 10 mL (not administered)   sodium chloride 0.9 % flush 10 mL (not administered)   sodium chloride 0.9 % bolus 1,000 mL (0 mL Intravenous Stopped 5/15/19 1436)   lidocaine PF 1% (XYLOCAINE) injection 10 mL (10 mL Infiltration Given 5/15/19 1345)     ECG/EMG Results (last 24 hours)     Procedure Component Value Units Date/Time    ECG 12 Lead [[id number]] Collected:  05/15/19 1250     Updated:  05/15/19 1302    ECG 12 Lead [[id number]] Collected:  05/15/19 1512     Updated:  05/15/19 1529        ECG 12 Lead         ECG 12 Lead                       MDM      Final diagnoses:   Syncope and collapse   Injury of head, initial encounter   Scalp laceration, initial encounter   Paroxysmal A-fib (CMS/[facility])            Mariajose Garnett, HIMA  05/15/19 6548

## 2019-05-16 ENCOUNTER — APPOINTMENT (OUTPATIENT)
Dept: CARDIOLOGY | Facility: HOSPITAL | Age: 79
End: 2019-05-16

## 2019-05-16 PROBLEM — E86.0 DEHYDRATION: Status: RESOLVED | Noted: 2019-05-16 | Resolved: 2019-05-16

## 2019-05-16 PROBLEM — E86.0 DEHYDRATION: Status: ACTIVE | Noted: 2019-05-16

## 2019-05-16 PROBLEM — D72.829 LEUKOCYTOSIS: Status: RESOLVED | Noted: 2019-05-15 | Resolved: 2019-05-16

## 2019-05-16 PROBLEM — S00.03XA SCALP CONTUSION: Status: ACTIVE | Noted: 2019-05-16

## 2019-05-16 LAB
ANION GAP SERPL CALCULATED.3IONS-SCNC: 10 MMOL/L
BUN BLD-MCNC: 20 MG/DL (ref 8–23)
BUN/CREAT SERPL: 15.7 (ref 7–25)
CALCIUM SPEC-SCNC: 8.4 MG/DL (ref 8.6–10.5)
CHLORIDE SERPL-SCNC: 109 MMOL/L (ref 98–107)
CO2 SERPL-SCNC: 21 MMOL/L (ref 22–29)
CREAT BLD-MCNC: 1.27 MG/DL (ref 0.76–1.27)
DEPRECATED RDW RBC AUTO: 43.7 FL (ref 37–54)
ERYTHROCYTE [DISTWIDTH] IN BLOOD BY AUTOMATED COUNT: 12.9 % (ref 12.3–15.4)
GFR SERPL CREATININE-BSD FRML MDRD: 54 ML/MIN/1.73
GLUCOSE BLD-MCNC: 97 MG/DL (ref 65–99)
GLUCOSE BLDC GLUCOMTR-MCNC: 159 MG/DL (ref 70–130)
GLUCOSE BLDC GLUCOMTR-MCNC: 86 MG/DL (ref 70–130)
HCT VFR BLD AUTO: 38.8 % (ref 37.5–51)
HGB BLD-MCNC: 12.9 G/DL (ref 13–17.7)
MAGNESIUM SERPL-MCNC: 1.8 MG/DL (ref 1.6–2.4)
MCH RBC QN AUTO: 30.9 PG (ref 26.6–33)
MCHC RBC AUTO-ENTMCNC: 33.2 G/DL (ref 31.5–35.7)
MCV RBC AUTO: 93 FL (ref 79–97)
PLATELET # BLD AUTO: 151 10*3/MM3 (ref 140–450)
PMV BLD AUTO: 11.6 FL (ref 6–12)
POTASSIUM BLD-SCNC: 4.2 MMOL/L (ref 3.5–5.2)
RBC # BLD AUTO: 4.17 10*6/MM3 (ref 4.14–5.8)
SODIUM BLD-SCNC: 140 MMOL/L (ref 136–145)
WBC NRBC COR # BLD: 9.8 10*3/MM3 (ref 3.4–10.8)

## 2019-05-16 PROCEDURE — 85027 COMPLETE CBC AUTOMATED: CPT | Performed by: INTERNAL MEDICINE

## 2019-05-16 PROCEDURE — 93306 TTE W/DOPPLER COMPLETE: CPT

## 2019-05-16 PROCEDURE — 96361 HYDRATE IV INFUSION ADD-ON: CPT

## 2019-05-16 PROCEDURE — 99217 PR OBSERVATION CARE DISCHARGE MANAGEMENT: CPT | Performed by: INTERNAL MEDICINE

## 2019-05-16 PROCEDURE — 97530 THERAPEUTIC ACTIVITIES: CPT

## 2019-05-16 PROCEDURE — 97161 PT EVAL LOW COMPLEX 20 MIN: CPT

## 2019-05-16 PROCEDURE — G0378 HOSPITAL OBSERVATION PER HR: HCPCS

## 2019-05-16 PROCEDURE — 93306 TTE W/DOPPLER COMPLETE: CPT | Performed by: INTERNAL MEDICINE

## 2019-05-16 PROCEDURE — 97535 SELF CARE MNGMENT TRAINING: CPT

## 2019-05-16 PROCEDURE — 97165 OT EVAL LOW COMPLEX 30 MIN: CPT

## 2019-05-16 PROCEDURE — 80048 BASIC METABOLIC PNL TOTAL CA: CPT | Performed by: INTERNAL MEDICINE

## 2019-05-16 PROCEDURE — 99214 OFFICE O/P EST MOD 30 MIN: CPT | Performed by: INTERNAL MEDICINE

## 2019-05-16 PROCEDURE — 82962 GLUCOSE BLOOD TEST: CPT

## 2019-05-16 PROCEDURE — 83735 ASSAY OF MAGNESIUM: CPT | Performed by: INTERNAL MEDICINE

## 2019-05-16 RX ADMIN — LISINOPRIL 10 MG: 10 TABLET ORAL at 09:33

## 2019-05-16 RX ADMIN — APIXABAN 5 MG: 5 TABLET, FILM COATED ORAL at 09:33

## 2019-05-16 RX ADMIN — SODIUM CHLORIDE, PRESERVATIVE FREE 3 ML: 5 INJECTION INTRAVENOUS at 09:34

## 2019-05-16 RX ADMIN — METOPROLOL TARTRATE 12.5 MG: 25 TABLET, FILM COATED ORAL at 09:33

## 2019-05-16 NOTE — DISCHARGE SUMMARY
[facility] Medicine Services  DISCHARGE SUMMARY    Patient Name: Pio Baum  : 1940  MRN: [id number]    Date of Admission: 5/15/2019  Date of Discharge:  2019  Primary Care Physician: Lupillo Hill MD    Consults     Date and Time Order Name Status Description    2019 0030 Inpatient Cardiology Consult Completed           Hospital Course     Presenting Problem:   Syncope [R55]    Active Hospital Problems    Diagnosis  POA   • Scalp contusion [S00.03XA]  Yes   • Syncope [R55]  Yes   • Scalp laceration [S01.01XA]  Yes   • Atrial fibrillation (CMS/HCC) [I48.91]  Yes   • PFO (patent foramen ovale) [Q21.1]  Not Applicable   • Stage 2 chronic kidney disease [N18.2]  Yes   • Essential hypertension [I10]  Yes      Resolved Hospital Problems    Diagnosis Date Resolved POA   • Dehydration [E86.0] 2019 Yes   • Leukocytosis [D72.829] 2019 Yes          Hospital Course:  Pio Baum is a 79 y.o. male with past medical history of atrial fibrillation on chronic anticoagulation and PFO who was working in his garden and feels he got dehydrated and overheated.  He also states he was squatting and standing frequently and he had syncopal event with scalp laceration and scalp contusion.  He was placed in observation for further medical management.     Syncope: Story appears consistent with dehydration causing either orthostatic syncope or vasovagal syncope.  Symptoms now resolved with IV fluid hydration.  No current concerning findings.  Cardiology performed an echocardiogram that was essentially normal, see below.  They have also ordered a ZIO patch.     Recent bronchitis: Patient notes he just completed a course of doxycycline.  Leukocytosis now normalized.  Initial minimal leukocytosis probably reactive and resolved.  Afebrile.     Dehydration: Resolved.     Chronic kidney disease borderline stage II to stage III: Currently at baseline.     Scalp laceration  and contusion: Received stitches in the emergency department.  Plan is to follow-up with primary care for stitch removal.  Pain control.  CT of the head reviewed and not abnormal.     Paroxysmal atrial fibrillation: Currently sinus rhythm.  Continue beta-blocker and anticoagulation.     From my standpoint patient can may be discharged and cardiology agrees with this plan.  They will follow-up ZIO Patch and arrange for follow-up they stated per my conversation with their team over the phone.    ECHO___  · Left ventricular systolic function is normal. Estimated EF = 55%.  · Borderline right-sided chamber enlargement.  · Mild mitral valve regurgitation is present  · Mild tricuspid valve regurgitation is present. RVSP within normal limits.       Zio patch    At the time of discharge patient was told to take all medications as prescribed, keep all follow-up appointments, and call their doctor or return to the hospital with any worsening or concerning symptoms.    Please note that dragon voice recognition software was used to create this note and that transcription errors are possible.        Day of Discharge     HPI:  Denies any further lightheadedness today.  States he has been up out of bed and feels back to normal.  He believes he was dehydrated yesterday and thinks the IV fluid helped significantly.  He also notes he was squatting and standing quite frequently when he was working in the garden and thinks this contributed to his syncope.  He says head pain is well controlled.  Patient eager to go home.     Review of Systems  No current fevers or chills  No current shortness of breath or cough  No current nausea, vomiting, or diarrhea  No current chest pain or palpitations      Vital Signs:   Temp:  [97.4 °F (36.3 °C)-98.6 °F (37 °C)] 97.4 °F (36.3 °C)  Heart Rate:  [48-61] 61  Resp:  [16-18] 16  BP: (121-177)/() 135/71     Physical Exam:  Constitutional:Awake, alert  HENT: NCAT, mucous membranes moist, neck  supple  Respiratory: Clear to auscultation bilaterally, respiratory effort normal, nonlabored breathing   Cardiovascular: RRR, S1, S2, normal radial pulses  Gastrointestinal: Positive bowel sounds, soft, nontender, nondistended  Musculoskeletal: Normal musculature for age, no lower extremity edema, BMI  Psychiatric: Appropriate affect, cooperative, conversational  Neurologic: No slurred speech or facial droop, follows commands, not confused   Skin: No rashes or jaundice, warm    Pertinent  and/or Most Recent Results     Results from last 7 days   Lab Units 05/16/19  0441 05/15/19  1302   WBC 10*3/mm3 9.80 12.69*   HEMOGLOBIN g/dL 12.9* 14.5   HEMATOCRIT % 38.8 43.0   PLATELETS 10*3/mm3 151 174   SODIUM mmol/L 140 138   POTASSIUM mmol/L 4.2 4.3   CHLORIDE mmol/L 109* 103   CO2 mmol/L 21.0* 24.0   BUN mg/dL 20 18   CREATININE mg/dL 1.27 1.57*   GLUCOSE mg/dL 97 104*   CALCIUM mg/dL 8.4* 9.2     Results from last 7 days   Lab Units 05/15/19  1302   BILIRUBIN mg/dL 0.7   ALK PHOS U/L 91   ALT (SGPT) U/L 18   AST (SGOT) U/L 22           Invalid input(s): TG, LDLCALC, LDLREALC  Results from last 7 days   Lab Units 05/15/19  1517 05/15/19  1302   PROBNP pg/mL  --  222.8   TROPONIN T ng/mL <0.010 <0.010       Brief Urine Lab Results     None          Microbiology Results Abnormal     None          Imaging Results (all)     Procedure Component Value Units Date/Time    CT Head Without Contrast [[id number]] Collected:  05/15/19 1324     Updated:  05/15/19 1412    Narrative:       EXAMINATION: CT HEAD WO CONTRAST- 05/15/2019      INDICATION: Fall, on blood thinners; head injury     TECHNIQUE: Multiple axial CT imaging was obtained of the head from skull  base to skull vertex without the administration of intravenous contrast.     The radiation dose reduction device was turned on for each scan per the  ALARA (As Low as Reasonably Achievable) protocol.     COMPARISON: MRI 04/20/2016     FINDINGS: There is mild atrophy identified  of the brain with some  low-density area seen in the periventricular and subcortical white  matter. Physiologic calcifications seen within the basal ganglia. There  is an old insult seen within the right basal ganglia. There is no  hemorrhage or hydrocephalus. No mass, mass effect, or midline shift. No  abnormal extra-axial fluid collection is identified. The bony structures  reveal air-fluid level seen in the maxillary sinuses bilaterally  suggesting an acute on chronic sinusitis. The mastoid air cells are  patent.       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 There is no  acute intracranial abnormality identified.     D:  05/15/2019  E:  05/15/2019        This report was finalized on 5/15/2019 2:09 PM by Dr. Lauren Diez MD.                       Results for orders placed in visit on 04/20/16   SCANNED - ECHOCARDIOGRAM         Discharge Details        Discharge Medications      Continue These Medications      Instructions Start Date   apixaban 5 MG tablet tablet  Commonly known as:  ELIQUIS   5 mg, Oral, Every 12 Hours Scheduled      atorvastatin 40 MG tablet  Commonly known as:  LIPITOR   TAKE 1 TABLET EVERY DAY      cetirizine 10 MG tablet  Commonly known as:  zyrTEC   10 mg, Oral, Daily      COQ10 PO   1 tablet, Oral, Daily      glucosamine sulfate 500 MG capsule capsule   500 mg, Oral, Daily      guaiFENesin 600 MG 12 hr tablet  Commonly known as:  MUCINEX   1,200 mg, Oral, 2 Times Daily      lisinopril 10 MG tablet  Commonly known as:  PRINIVIL,ZESTRIL   TAKE 1 TABLET EVERY DAY      metoprolol tartrate 25 MG tablet  Commonly known as:  LOPRESSOR   12.5 mg, Oral, Every 12 Hours Scheduled      MULTI VITAMIN DAILY PO   1 tablet, Oral, Daily      tamsulosin 0.4 MG capsule 24 hr capsule  Commonly known as:  FLOMAX   1 capsule, Oral, Nightly             Allergies   Allergen Reactions   • Flonase [Fluticasone] Irritability     Gets a nose bleed as soon as used   • Penicillins Rash      Rash all over body including mouth/throat          Discharge Disposition:  Home or Self Care    Discharge Diet:  Diet Order   Procedures   • Diet Regular; Cardiac         Discharge Activity:   Activity Instructions     Activity as Tolerated              CODE STATUS:    Code Status and Medical Interventions:   Ordered at: 05/15/19 1610     Level Of Support Discussed With:    Patient     Code Status:    CPR     Medical Interventions (Level of Support Prior to Arrest):    Full         Future Appointments   Date Time Provider Department Center   6/13/2019 11:15 AM Lupillo Hill MD [facility] None   2/7/2020 11:30 AM Yahaira Carson MD [facility] None       Additional Instructions for the Follow-ups that You Need to Schedule     Discharge Follow-up with PCP   As directed       Currently Documented PCP:    Lupillo Hill MD    PCP Phone Number:    [phone]     Follow Up Details:  1 week for follow up and removal of stiches         Discharge Follow-up with Specified Provider: Cardiology per their recs for Zio results.   As directed      To:  Cardiology per their recs for Zio results.               Time Spent on Discharge: 28 minutes    Electronically signed by Chase Rodriguez MD, 05/16/19, 12:07 PM.

## 2019-05-16 NOTE — CONSULTS
[facility] at [facility]        Date of Hospital Visit: 19      Place of Service: [facility]    Patient Name: Pio Baum  :1940    Referral Provider: Mary Lee DO  Primary Care Provider: Lupillo Hill MD    Chief complaint/Reason for Consultation:  atrial fibrillation and syncope    Problem List:  Patient Active Problem List    Diagnosis    • Syncope      Priority: High   • Atrial fibrillation (CMS/HCC)      Priority: High     Note Last Updated: 2019     a. CHADS-VASc = 5 (HTN, Age > 75, h/o Stroke)  b. MPS, 2017: EF 58%, negative, low risk study   • PFO (patent foramen ovale)      Priority: Medium     Note Last Updated: 2016     Echocardiogram 2016: Normal LV systolic function, left atrium 3.6 cm, positive bubble study. Mild mitral valve prolapse, Mild MR.   • Hyperlipidemia      Priority: Medium   • Essential hypertension      Priority: Medium   • History of TIA (transient ischemic attack) and stroke      Priority: Low     Note Last Updated: 2019     a. Carotid duplex 2016 no significant disease  b. Echocardiogram 2016 showed normal LV function, positive bubble study. Closure not considered due to need for chronic anticoagulation therapy.  c. Cardiac event monitor showed atrial fibrillation/flutter with intermittent RVR, aberrancy, IVCD/sinus rhythm with PVCs   • Tobacco chew use      Priority: Low   • Leukocytosis    • Scalp laceration    • Stage 2 chronic kidney disease      Note Last Updated: 2016     Stage 2     History of Present Illness:  This is an 79-year-old hypertensive dyslipidemic male with a history of PFO, CVA and paroxysmal atrial fibrillation.  He is chronically anticoagulated.  Yesterday he was working in his garden when he felt ill.  He went to his shed to sit down and drink water.  He woke up sometime later on his driveway with a head laceration.  He is brought to the [facility] emergency department for evaluation.  His head laceration was closed in the emergency department.  He is admitted to the hospitalist service for further evaluation.  He reported to have orthostasis and was treated with fluid replacement.  States he had no awareness of tachyarrhythmias prior to his syncopal episode.  States he drinks plenty of water daily.  Blood pressure has been well managed.  He has been on his current dose of tamsulosin for approximately 4 months.  He has had no recent exertional chest pain or dyspnea no orthopnea no PND no claudication no lower extremity edema.  He has no prior history of syncope.  A CT of the head without contrast shows acute on chronic bilateral maxillary sinusitis with no acute intracranial defects.    Past Medical History:   Diagnosis Date   • A-fib (CMS/[facility])    • Chronic kidney disease    • History of migraine headaches    • Hyperlipidemia    • Hypertension    • Mitral valve regurgitation 12/1/2016   • PFO (patent foramen ovale)    • Stroke (CMS/[facility])        Past Surgical History:   Procedure Laterality Date   • TONSILLECTOMY AND ADENOIDECTOMY         Allergies   Allergen Reactions   • Flonase [Fluticasone] Irritability     Gets a nose bleed as soon as used   • Penicillins Rash     Rash all over body including mouth/throat        Medications Prior to Admission   Medication Sig Dispense Refill Last Dose   • cetirizine (zyrTEC) 10 MG tablet Take 10 mg by mouth Daily.      • guaiFENesin (MUCINEX) 600 MG 12 hr tablet Take 1,200 mg by mouth 2 (Two) Times a Day.      • apixaban (ELIQUIS) 5 MG tablet tablet Take 1 tablet by mouth Every 12 (Twelve) Hours. 180 tablet 3    • atorvastatin (LIPITOR) 40 MG tablet TAKE 1 TABLET EVERY DAY 90 tablet 3 Taking   • Coenzyme Q10 (COQ10 PO) Take 1 tablet by mouth Daily.   Taking   • glucosamine sulfate 500 MG capsule capsule Take 500 mg by mouth daily.   Taking   • lisinopril (PRINIVIL,ZESTRIL) 10 MG tablet TAKE 1 TABLET EVERY  DAY 90 tablet 2    • metoprolol tartrate (LOPRESSOR) 25 MG tablet Take 0.5 tablets by mouth Every 12 (Twelve) Hours. 90 tablet 3 Taking   • Multiple Vitamin (MULTI VITAMIN DAILY PO) Take 1 tablet by mouth Daily.   Taking   • tamsulosin (FLOMAX) 0.4 MG capsule 24 hr capsule Take 1 capsule by mouth Every Night.   Taking         Current Facility-Administered Medications:   •  acetaminophen (TYLENOL) tablet 650 mg, 650 mg, Oral, Q4H PRN, Mary Lee, DO  •  apixaban (ELIQUIS) tablet 5 mg, 5 mg, Oral, Q12H, Mary Lee, DO, 5 mg at 05/16/19 0933  •  atorvastatin (LIPITOR) tablet 40 mg, 40 mg, Oral, Nightly, Mary Lee, DO, 40 mg at 05/15/19 2102  •  bisacodyl (DULCOLAX) EC tablet 5 mg, 5 mg, Oral, Daily PRN, Mary Lee, DO  •  lisinopril (PRINIVIL,ZESTRIL) tablet 10 mg, 10 mg, Oral, Daily, Mary Lee, DO, 10 mg at 05/16/19 0933  •  metoprolol tartrate (LOPRESSOR) half tablet 12.5 mg, 12.5 mg, Oral, Q12H, Mary Lee, DO, 12.5 mg at 05/16/19 0933  •  ondansetron (ZOFRAN) tablet 4 mg, 4 mg, Oral, Q6H PRN, Mary Lee, DO  •  sodium chloride 0.9 % flush 3 mL, 3 mL, Intravenous, Q12H, Mary Lee, DO, 3 mL at 05/15/19 2102  •  sodium chloride 0.9 % flush 3-10 mL, 3-10 mL, Intravenous, PRN, Mary Lee, DO  •  tamsulosin (FLOMAX) 24 hr capsule 0.4 mg, 0.4 mg, Oral, Nightly, Mary Lee, DO, 0.4 mg at 05/15/19 2102      Social History     Socioeconomic History   • Marital status:      Spouse name: Not on file   • Number of children: Not on file   • Years of education: Not on file   • Highest education level: Not on file   Tobacco Use   • Smoking status: Never Smoker   • Smokeless tobacco: Current User     Types: Chew   • Tobacco comment: chews once every 4-5 months   Substance and Sexual Activity   • Alcohol use: Yes     Comment: 1-2 drinks a week   • Drug use: No   • Sexual activity: Defer       Family History   Problem Relation Age of Onset   • Arthritis Other    •  "Hyperlipidemia Other    • Stroke Other        REVIEW OF SYSTEMS:   Review of Systems   Constitution: Negative.   HENT: Negative.    Eyes: Negative.    Cardiovascular: Positive for syncope.   Respiratory: Negative.    Endocrine: Negative.    Hematologic/Lymphatic: Negative.    Skin: Negative.    Musculoskeletal: Negative.    Gastrointestinal: Negative.    Genitourinary: Negative.    Psychiatric/Behavioral: Negative.    Allergic/Immunologic: Negative.    All other systems reviewed and are negative.           Objective:  Vitals:    05/15/19 2102 05/15/19 2350 05/16/19 0642 05/16/19 0930   BP: 157/83 136/68 135/63 139/67   BP Location:  Left arm Left arm Left arm   Patient Position:  Lying Lying Lying   Pulse: 60 58 57 61   Resp:  16 16 16   Temp:  98.6 °F (37 °C) 98.4 °F (36.9 °C) 97.4 °F (36.3 °C)   TempSrc:  Oral Oral Oral   SpO2:  96% 96% 96%   Weight:       Height:         Orthostatic blood pressures today at 10 AM:    Lying 135/65  Sitting 135/72  Standing 135/61    Body mass index is 23.63 kg/m².  Flowsheet Rows      First Filed Value   Admission Height  182.9 cm (72\") Documented at 05/15/2019 1243   Admission Weight  77.1 kg (170 lb) Documented at 05/15/2019 1243          Intake/Output Summary (Last 24 hours) at 5/16/2019 0934  Last data filed at 5/16/2019 0454  Gross per 24 hour   Intake 2224 ml   Output 200 ml   Net 2024 ml       Physical Exam   Constitutional: He is oriented to person, place, and time. He appears well-developed and well-nourished.   HENT:   Head: Normocephalic and atraumatic.   Mouth/Throat: Oropharynx is clear and moist.   Eyes: EOM are normal. Pupils are equal, round, and reactive to light. No scleral icterus.   Neck: Neck supple. No JVD present. No thyromegaly present.   Cardiovascular: Normal rate, regular rhythm and normal heart sounds. Exam reveals no gallop and no friction rub.   No murmur heard.  Pulmonary/Chest: Breath sounds normal. No stridor. He has no wheezes. He has no rales. "   Abdominal: Soft. Bowel sounds are normal. He exhibits no distension and no mass. There is no tenderness.   Musculoskeletal: Normal range of motion. He exhibits no edema, tenderness or deformity.   Lymphadenopathy:     He has no cervical adenopathy.   Neurological: He is alert and oriented to person, place, and time. No cranial nerve deficit. Coordination normal.   Skin: Skin is warm and dry. No rash noted. No erythema.   Psychiatric: He has a normal mood and affect.   Vitals reviewed.                 Lab Review:                Results from last 7 days   Lab Units 05/16/19  0441   SODIUM mmol/L 140   POTASSIUM mmol/L 4.2   CHLORIDE mmol/L 109*   CO2 mmol/L 21.0*   BUN mg/dL 20   CREATININE mg/dL 1.27   GLUCOSE mg/dL 97   CALCIUM mg/dL 8.4*     Results from last 7 days   Lab Units 05/15/19  1517 05/15/19  1302   TROPONIN T ng/mL <0.010 <0.010     proBNP 5.0-1,800.0 pg/mL 222.8        Results from last 7 days   Lab Units 05/16/19  0441   WBC 10*3/mm3 9.80   HEMOGLOBIN g/dL 12.9*   HEMATOCRIT % 38.8   PLATELETS 10*3/mm3 151         Results from last 7 days   Lab Units 05/16/19  0441   MAGNESIUM mg/dL 1.8     Lab Results   Component Value Date    CHOL 138 08/31/2018    CHLPL 139 01/25/2019    CHLPL 181 03/09/2018    CHLPL 150 03/27/2017     Lab Results   Component Value Date    TRIG 68 01/25/2019    TRIG 105 08/31/2018    TRIG 136 03/09/2018     Lab Results   Component Value Date    HDL 50 01/25/2019    HDL 44 08/31/2018    HDL 62 (H) 03/09/2018     Lab Results   Component Value Date    LDL 75 01/25/2019    LDL 76 08/31/2018    LDL 92 03/09/2018         Lab Results   Component Value Date    HGBA1C 5.3 04/20/2016         EKG: Sinus bradycardia, no acute changes.        Assessment:   1. Syncope, likely vasovagal triggered by working in warm weather, possibly dehydration with superadded component of his underlying bradycardia.  His orthostasis were negative.  2. Paroxysmal atrial fibrillation, currently maintaining sinus  rhythm and chronically anticoagulated  3. Hypertension well managed  4. Dyslipidemia on statin therapy  5. PFO, stable, chronically anticoagulated    Plan:   1. Echocardiogram ordered and will be reviewed.  2. Discontinue low-dose metoprolol due to bradycardia.  3. Patient advised to exercise caution and in the event of future episodes of dizziness he is to sit down to avoid fall and injury.  4. Discharge to home with 2-week long cardiac monitor.  5. Follow-up with me in 4 to 6 weeks.  6. Thank you for this consultation.    Scribed for Yahaira Carson MD. by Electronically signed by KENN Mccann, 05/16/19, 10:10 AM.    I, Yahaira Carson MD, personally performed the services described in this documentation as scribed by the above named individual in my presence, and it is both accurate and complete.  5/16/2019  11:16 AM

## 2019-05-16 NOTE — PROGRESS NOTES
Discharge Planning Assessment  [facility]     Patient Name: Pio Baum  MRN: [id number]  Today's Date: 5/16/2019    Admit Date: 5/15/2019    Discharge Needs Assessment     Row Name 05/16/19 0939       Living Environment    Lives With  spouse    Current Living Arrangements  home/apartment/condo    Primary Care Provided by  self    Provides Primary Care For  no one    Family Caregiver if Needed  spouse    Quality of Family Relationships  helpful;involved;supportive    Able to Return to Prior Arrangements  yes       Resource/Environmental Concerns    Resource/Environmental Concerns  none    Transportation Concerns  car, none       Transition Planning    Patient/Family Anticipates Transition to  home with family    Patient/Family Anticipated Services at Transition  none    Transportation Anticipated  family or friend will provide       Discharge Needs Assessment    Readmission Within the Last 30 Days  no previous admission in last 30 days    Concerns to be Addressed  discharge planning    Equipment Currently Used at Home  none    Anticipated Changes Related to Illness  none    Equipment Needed After Discharge  walker, rolling        Discharge Plan     Row Name 05/16/19 0940       Plan    Plan  IDP    Patient/Family in Agreement with Plan  yes    Plan Comments  Pt lives in [facility] w wife who will transport him home at d/c. Pt uses no DME, HH, or PT/OT. Pt may need a walker at d/c and HH (based on diagnosis) - has not worked w PT/OT yet during this stay.  Will wait to see what is recommended before ordering. There is not a company preference for DME.  Pt PCP is Lupillo Hill and his insurance covers his medications through [employer].  Pt goal is to return home at d/c w wife and poss DME as needed. CM will continue to follow.    Final Discharge Disposition Code  01 - home or self-care        Destination      No service coordination in this encounter.      Durable Medical Equipment      No service  coordination in this encounter.      Dialysis/Infusion      No service coordination in this encounter.      Home Medical Care      No service coordination in this encounter.      Therapy      No service coordination in this encounter.      Community Resources      No service coordination in this encounter.          Demographic Summary     Row Name 05/16/19 0938       General Information    Admission Type  inpatient    Arrived From  emergency department    Referral Source  admission list    Reason for Consult  discharge planning    Preferred Language  English     Used During This Interaction  no       Contact Information    Permission Granted to Share Info With  ;family/designee    Contact Information Comments  [place] (sp/EC) [phone]        Functional Status     Row Name 05/16/19 0939       Functional Status    Usual Activity Tolerance  good       Functional Status, IADL    Medications  independent    Meal Preparation  independent    Housekeeping  independent    Laundry  independent    Shopping  independent        Psychosocial    No documentation.       Abuse/Neglect    No documentation.       Legal    No documentation.       Substance Abuse    No documentation.       Patient Forms    No documentation.           Amy Peraza RN

## 2019-05-16 NOTE — PROGRESS NOTES
[facility] Medicine Services  PROGRESS NOTE    Patient Name: Pio Baum  : 1940  MRN: [id number]    Date of Admission: 5/15/2019  Length of Stay: 0  Primary Care Physician: Lupillo Hill MD    Subjective   Subjective     CC:  Follow-up syncope    HPI:  Denies any further lightheadedness today.  States he has been up out of bed and feels back to normal.  He believes he was dehydrated yesterday and thinks the IV fluid helped significantly.  He also notes he was squatting and standing quite frequently when he was working in the garden and thinks this contributed to his syncope.  He says head pain is well controlled.    Review of Systems  No current fevers or chills  No current shortness of breath or cough  No current nausea, vomiting, or diarrhea  No current chest pain or palpitations    Objective   Objective     Vital Signs:   Temp:  [97.4 °F (36.3 °C)-98.6 °F (37 °C)] 97.4 °F (36.3 °C)  Heart Rate:  [48-61] 61  Resp:  [16-18] 16  BP: (121-177)/() 139/67        Physical Exam:  Constitutional:Awake, alert  HENT: NCAT, mucous membranes moist, neck supple  Respiratory: Clear to auscultation bilaterally, respiratory effort normal, nonlabored breathing   Cardiovascular: RRR, S1, S2, normal radial pulses  Gastrointestinal: Positive bowel sounds, soft, nontender, nondistended  Musculoskeletal: Normal musculature for age, no lower extremity edema, BMI  Psychiatric: Appropriate affect, cooperative, conversational  Neurologic: No slurred speech or facial droop, follows commands, not confused   Skin: No rashes or jaundice, warm    Results Reviewed:  I have personally reviewed current lab, radiology, and data and agree.    Results from last 7 days   Lab Units 19  0441 05/15/19  1302   WBC 10*3/mm3 9.80 12.69*   HEMOGLOBIN g/dL 12.9* 14.5   HEMATOCRIT % 38.8 43.0   PLATELETS 10*3/mm3 151 174     Results from last 7 days   Lab Units 19  0441 05/15/19  1280  05/15/19  1302   SODIUM mmol/L 140  --  138   POTASSIUM mmol/L 4.2  --  4.3   CHLORIDE mmol/L 109*  --  103   CO2 mmol/L 21.0*  --  24.0   BUN mg/dL 20  --  18   CREATININE mg/dL 1.27  --  1.57*   GLUCOSE mg/dL 97  --  104*   CALCIUM mg/dL 8.4*  --  9.2   ALT (SGPT) U/L  --   --  18   AST (SGOT) U/L  --   --  22   TROPONIN T ng/mL  --  <0.010 <0.010   PROBNP pg/mL  --   --  222.8     Estimated Creatinine Clearance: 51 mL/min (by C-G formula based on SCr of 1.27 mg/dL).    Microbiology Results Abnormal     None          Imaging Results (last 24 hours)     Procedure Component Value Units Date/Time    CT Head Without Contrast [[id number]] Collected:  05/15/19 1324     Updated:  05/15/19 1412    Narrative:       EXAMINATION: CT HEAD WO CONTRAST- 05/15/2019      INDICATION: Fall, on blood thinners; head injury     TECHNIQUE: Multiple axial CT imaging was obtained of the head from skull  base to skull vertex without the administration of intravenous contrast.     The radiation dose reduction device was turned on for each scan per the  ALARA (As Low as Reasonably Achievable) protocol.     COMPARISON: MRI 04/20/2016     FINDINGS: There is mild atrophy identified of the brain with some  low-density area seen in the periventricular and subcortical white  matter. Physiologic calcifications seen within the basal ganglia. There  is an old insult seen within the right basal ganglia. There is no  hemorrhage or hydrocephalus. No mass, mass effect, or midline shift. No  abnormal extra-axial fluid collection is identified. The bony structures  reveal air-fluid level seen in the maxillary sinuses bilaterally  suggesting an acute on chronic sinusitis. The mastoid air cells are  patent.       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 There is no  acute intracranial abnormality identified.     D:  05/15/2019  E:  05/15/2019        This report was finalized on 5/15/2019 2:09 PM by Dr. Lauren Diez MD.              Results for orders placed in visit on 04/20/16   SCANNED - ECHOCARDIOGRAM       I have reviewed the medications:    Current Facility-Administered Medications:   •  acetaminophen (TYLENOL) tablet 650 mg, 650 mg, Oral, Q4H PRN, Mary Lee DO  •  apixaban (ELIQUIS) tablet 5 mg, 5 mg, Oral, Q12H, Mary Lee, DO, 5 mg at 05/16/19 0933  •  atorvastatin (LIPITOR) tablet 40 mg, 40 mg, Oral, Nightly, Mary Lee, DO, 40 mg at 05/15/19 2102  •  bisacodyl (DULCOLAX) EC tablet 5 mg, 5 mg, Oral, Daily PRN, Mary Lee DO  •  lisinopril (PRINIVIL,ZESTRIL) tablet 10 mg, 10 mg, Oral, Daily, Mary Lee, , 10 mg at 05/16/19 0933  •  metoprolol tartrate (LOPRESSOR) half tablet 12.5 mg, 12.5 mg, Oral, Q12H, Mary Lee, DO, 12.5 mg at 05/16/19 0933  •  ondansetron (ZOFRAN) tablet 4 mg, 4 mg, Oral, Q6H PRN, Mary Lee,   •  sodium chloride 0.9 % flush 3 mL, 3 mL, Intravenous, Q12H, Mary Lee, DO, 3 mL at 05/16/19 0934  •  sodium chloride 0.9 % flush 3-10 mL, 3-10 mL, Intravenous, PRN, Mary Lee, DO  •  tamsulosin (FLOMAX) 24 hr capsule 0.4 mg, 0.4 mg, Oral, Nightly, Mary Lee, DO, 0.4 mg at 05/15/19 2102      Assessment/Plan   Assessment / Plan     Active Hospital Problems    Diagnosis POA   • Scalp contusion [S00.03XA] Yes   • Dehydration [E86.0] Yes   • Syncope [R55] Yes   • Scalp laceration [S01.01XA] Yes   • Atrial fibrillation (CMS/HCC) [I48.91] Yes     a. CHADS-VASc = 5 (HTN, Age > 75, h/o Stroke)  janelle MPS, 01/17/2017: EF 58%, negative, low risk study           • PFO (patent foramen ovale) [Q21.1] Not Applicable     Echocardiogram 04/20/2016: Normal LV systolic function, left atrium 3.6 cm, positive bubble study. Mild mitral valve prolapse, Mild MR.     • Stage 2 chronic kidney disease [N18.2] Yes     Stage 2     • Essential hypertension [I10] Yes          Brief Hospital Course to date:  Pio Baum is a 79 y.o. male with past medical history of atrial  fibrillation on chronic anticoagulation and PFO who was working in his garden and feels he got dehydrated and overheated.  He also states he was squatting and standing frequently and he had syncopal event with scalp laceration and scalp contusion.  He was placed in observation for further medical management.    Syncope: Story appears consistent with dehydration causing either orthostatic syncope or vasovagal syncope.  Symptoms now resolved with IV fluid hydration.  No current concerning findings.    Recent bronchitis: Patient notes he just completed a course of doxycycline.  Leukocytosis now normalized.  Initial minimal leukocytosis probably reactive.  Afebrile.    Dehydration: Resolved.    Chronic kidney disease borderline stage II to stage III: Currently at baseline.    Scalp laceration and contusion: Received stitches in the emergency department.  Plan is to follow-up with primary care for stitch removal.  Pain control.  CT of the head reviewed and not abnormal.    Paroxysmal atrial fibrillation: Currently sinus rhythm.  Continue beta-blocker and anticoagulation.    From my standpoint patient can may be discharged later today pending cardiology recommendations    DVT Prophylaxis:  AC    Disposition: I expect the patient to be discharged home.    CODE STATUS:   Code Status and Medical Interventions:   Ordered at: 05/15/19 1610     Level Of Support Discussed With:    Patient     Code Status:    CPR     Medical Interventions (Level of Support Prior to Arrest):    Full         Electronically signed by Chase Rodriguez MD, 05/16/19, 9:42 AM.

## 2019-05-16 NOTE — THERAPY DISCHARGE NOTE
Acute Care - Occupational Therapy Initial Eval/Discharge  [facility]     Patient Name: Pio Baum  : 1940  MRN: [id number]  Today's Date: 2019  Onset of Illness/Injury or Date of Surgery: 05/15/19  Date of Referral to OT: 05/15/19  Referring Physician: DO Jesus      Admit Date: 5/15/2019       ICD-10-CM ICD-9-CM   1. Syncope and collapse R55 780.2   2. Injury of head, initial encounter S09.90XA 959.01   3. Scalp laceration, initial encounter S01.01XA 873.0   4. Paroxysmal A-fib (CMS/HCC) I48.0 427.31     Patient Active Problem List   Diagnosis   • Essential hypertension   • PFO (patent foramen ovale)   • Hyperlipidemia   • Tobacco chew use   • Stage 2 chronic kidney disease   • Atrial fibrillation (CMS/HCC)   • History of TIA (transient ischemic attack) and stroke   • Syncope   • Scalp laceration   • Scalp contusion   • Dehydration     Past Medical History:   Diagnosis Date   • A-fib (CMS/HCC)    • Chronic kidney disease    • History of migraine headaches    • Hyperlipidemia    • Hypertension    • Mitral valve regurgitation 2016   • PFO (patent foramen ovale)    • Stroke (CMS/HCC)      Past Surgical History:   Procedure Laterality Date   • TONSILLECTOMY AND ADENOIDECTOMY            OT ASSESSMENT FLOWSHEET (last 12 hours)      Occupational Therapy Evaluation     Row Name 19 1100                   OT Evaluation Time/Intention    Subjective Information  no complaints  -KF        Document Type  evaluation;discharge evaluation/summary  -KF        Mode of Treatment  individual therapy;occupational therapy  -KF        Patient Effort  excellent  -KF        Symptoms Noted During/After Treatment  none  -KF        Comment  vitals stable throughout   -KF           General Information    Patient Profile Reviewed?  yes  -KF        Onset of Illness/Injury or Date of Surgery  05/15/19  -KF        Referring Physician  DO Jesus  -KF        Patient Observations  alert;cooperative;agree to therapy   -KF        Patient/Family Observations  wife present throughout   -KF        General Observations of Patient  pt standing in room upon entry, preparing to get dressed, tele, heplocked IV R UE  -KF        Prior Level of Function  independent:;all household mobility;community mobility;gait;transfer;bed mobility;ADL's;home management;using stairs  -KF        Equipment Currently Used at Home  none  -KF        Existing Precautions/Restrictions  no known precautions/restrictions  -KF        Risks Reviewed  patient and family:;LOB;nausea/vomiting;dizziness;increased discomfort;change in vital signs  -KF        Benefits Reviewed  patient and family:;improve function;increase independence;increase strength;increase balance;improve skin integrity;increase knowledge  -KF        Barriers to Rehab  none identified  -KF           Relationship/Environment    Primary Source of Support/Comfort  spouse  -KF        Lives With  spouse  -KF        Family Caregiver if Needed  spouse  -KF           Resource/Environmental Concerns    Current Living Arrangements  home/apartment/condo  -KF           Home Main Entrance    Number of Stairs, Main Entrance  three;one 3 garage, 1 from front  -KF        Stair Railings, Main Entrance  railings on both sides of stairs;other (see comments) grab bar on L   -KF        Stairs Comment, Main Entrance  primarily uses garage entrance   -KF           Cognitive Assessment/Interventions    Additional Documentation  Cognitive Assessment/Intervention (Group)  -KF           Cognitive Assessment/Intervention- PT/OT    Affect/Mental Status (Cognitive)  WNL  -KF        Orientation Status (Cognition)  oriented x 4  -KF        Follows Commands (Cognition)  WNL  -KF        Cognitive Function (Cognitive)  WNL  -KF        Safety Deficit (Cognitive)  mild deficit;safety precautions awareness  -KF        Cognitive Interventions (Cognitive)  occupation/activity based interventions  -KF        Personal Safety Interventions   fall prevention program maintained;muscle strengthening facilitated;nonskid shoes/slippers when out of bed;gait belt  -KF        Cognitive Assessment/Intervention Comment  Pt no concerns, ed on safer LBD with incorporated sitting instead of donning pants standing   -KF           Safety Issues, Functional Mobility    Safety Issues Affecting Function (Mobility)  safety precaution awareness  -KF        Impairments Affecting Function (Mobility)  balance  -KF        Comment, Safety Issues/Impairments (Mobility)  no LOB without AD no LOB with turns or challenges in standing   -KF           Bed Mobility Assessment/Treatment    Comment (Bed Mobility)  OOB upon arrival   -KF           Functional Mobility    Functional Mobility- Ind. Level  independent  -KF        Functional Mobility-Distance (Feet)  120  -KF        Functional Mobility- Safety Issues  other (see comments) none  -KF        Functional Mobility- Comment  no LOB, no AD required good safety   -KF           Transfer Assessment/Treatment    Transfer Assessment/Treatment  sit-stand transfer;stand-sit transfer  -KF        Comment (Transfers)  good safety  -KF           Sit-Stand Transfer    Sit-Stand [place] (Transfers)  independent  -KF           Stand-Sit Transfer    Stand-Sit [place] (Transfers)  independent  -KF           ADL Assessment/Intervention    BADL Assessment/Intervention  grooming;upper body dressing;lower body dressing;clothing fastener management  -KF           Upper Body Dressing Assessment/Training    Upper Body Dressing [place] Level  doff;front opening garment;don;pull-over garment;supervision;verbal cues  -KF        Upper Body Dressing Position  unsupported standing  -KF        Comment (Upper Body Dressing)  cues for safety with sitting during tasks to improve safety awareness  -KF           Lower Body Dressing Assessment/Training    Lower Body Dressing [place] Level  don;doff;socks;independent;pants/bottoms;set up  -KF         Lower Body Dressing Position  unsupported standing  -KF        Comment (Lower Body Dressing)  able to don pants standing, education provided on safer seq from sitting, no LOB wife present as well  -KF           Clothes Fastener Management    [place] Level (Clothes Fastener Management)  clothes fastener management;independent belt   -KF        Position (Clothes Fastener Management)  unsupported standing  -KF        Comment (Clothes Fastener Management)  able to insert belt into pant loops and fasten belt no assist required  -KF           Grooming Assessment/Training    [place] Level (Grooming)  oral care regimen;independent  -KF        Grooming Position  sink side;unsupported standing  -KF        Comment (Grooming)  able to brush teeth no LOB, sink side no required use of AD for balance or cues   -KF           BADL Safety/Performance    Impairments, BADL Safety/Performance  balance  -KF        Skilled BADL Treatment/Intervention  BADL process/adaptation training;cognitive/safety deficit modifications  -KF        Progress in BADL Status  improvement noted  -KF           General ROM    GENERAL ROM COMMENTS  BUE WNL  -KF           MMT (Manual Muscle Testing)    General MMT Comments  BUE grossly 4+/5  -KF           Motor Assessment/Interventions    Additional Documentation  Balance (Group);Balance Interventions (Group);Fine Motor Testing & Training (Group);Gross Motor Coordination (Group)  -KF           Gross Motor Coordination    Gross Motor Skill, Impairments Detail  BUE gross motor WNL  -KF           Balance    Balance  static sitting balance;static standing balance;dynamic sitting balance;dynamic standing balance  -KF           Static Sitting Balance    Level of [place] (Unsupported Sitting, Static Balance)  independent  -KF        Sitting Position (Unsupported Sitting, Static Balance)  sitting in chair  -KF           Dynamic Sitting Balance    Level of [place], Reaches Outside Midline (Sitting,  Dynamic Balance)  independent  -KF        Sitting Position, Reaches Outside Midline (Sitting, Dynamic Balance)  sitting in chair  -KF        Comment, Reaches Outside Midline (Sitting, Dynamic Balance)  LBD  -KF           Static Standing Balance    Level of [place] (Unsupported Standing, Static Balance)  independent  -KF        Time Able to Maintain Position (Unsupported Standing, Static Balance)  more than 5 minutes  -KF        Comment (Unsupported Standing, Static Balance)  no LOB  -KF           Dynamic Standing Balance    Level of [place], Reaches Outside Midline (Standing, Dynamic Balance)  independent  -KF        Time Able to Maintain Position, Reaches Outside Midline (Standing, Dynamic Balance)  more than 5 minutes  -KF        Assistive Device Utilized (Supported Standing, Dynamic Balance)  other (see comments) none  -KF        Comment, Reaches Outside Midline (Standing, Dynamic Balance)  no LOB LBD, UBD in standing   -KF           Fine Motor Testing & Training    Training Activity, Fine Motor Coordination  manipulation of zippers and rachelle;manipulation of grooming tools  -KF        Comment, Fine Motor Coordination  fine motor WNL  -KF           Sensory Assessment/Intervention    Sensory General Assessment  no sensation deficits identified  -KF           Positioning and Restraints    Pre-Treatment Position  standing in room  -KF        Post Treatment Position  chair  -KF        In Chair  with PT;with family/caregiver;sitting  -KF           Pain Assessment    Additional Documentation  Pain Scale: Numbers Pre/Post-Treatment (Group)  -KF           Pain Scale: Numbers Pre/Post-Treatment    Pain Scale: Numbers, Pretreatment  0/10 - no pain  -KF        Pain Scale: Numbers, Post-Treatment  0/10 - no pain  -KF        Pain Location - Side  Right  -KF        Pain Location - Orientation  lower  -KF        Pain Location  extremity  -KF        Pre/Post Treatment Pain Comment  soreness reported as well in R  shoulder, tolerated   -KF        Pain Intervention(s)  Repositioned;Ambulation/increased activity  -KF           Wound 05/15/19 2102 Right lateral;upper scalp unspecified    Wound - Properties Group Date first assessed: 05/15/19  -SH Time first assessed: 2102  -SH Present On Admission : yes  -SH Side: Right  -SH Orientation: lateral;upper  -SH Location: scalp  -SH Type: unspecified  -SH Stage, Pressure Injury: other (see comments)  -SH, injury from fall        Plan of Care Review    Plan of Care Reviewed With  patient;spouse  -KF           Clinical Impression (OT)    Date of Referral to OT  05/15/19  -KF        OT Diagnosis  ADL decline  -KF        Patient/Family Goals Statement (OT Eval)  return home, PLOF  -KF        Criteria for Skilled Therapeutic Interventions Met (OT Eval)  no;treatment indicated;current level of function same as previous level of function;no problems identified which require skilled intervention  -KF        Therapy Frequency (OT Eval)  evaluation only  -KF        Care Plan Review (OT)  evaluation/treatment results reviewed;care plan/treatment goals reviewed;risks/benefits reviewed;current/potential barriers reviewed;patient/other agree to care plan  -KF        Care Plan Review, Other Participant (OT Eval)  spouse  -KF        Anticipated Equipment Needs at Discharge (OT)  -- none  -KF           Vital Signs    Pre Systolic BP Rehab  133  -KF        Pre Treatment Diastolic BP  69 no dizziness, RN cleared   -KF        Pretreatment Heart Rate (beats/min)  59  -KF        Posttreatment Heart Rate (beats/min)  60  -KF        O2 Delivery Pre Treatment  room air  -KF        Pre Patient Position  Standing  -KF        Intra Patient Position  Standing  -KF        Post Patient Position  Sitting  -KF           OT Goals    Dressing Goal Selection (OT)  dressing, OT goal 1  -KF        Balance Goal Selection (OT)  balance, OT goal 1  -KF        Additional Documentation  Balance Goal Selection (OT) (Row)  -KF            Dressing Goal 1 (OT)    Activity/Assistive Device (Dressing Goal 1, OT)  upper body dressing;lower body dressing  -KF        [place]/Cues Needed (Dressing Goal 1, OT)  set-up required  -KF        Time Frame (Dressing Goal 1, OT)  long term goal (LTG);by discharge  -KF        Progress/Outcome (Dressing Goal 1, OT)  goal met  -KF           Balance Goal 1 (OT)    Activity/Assistive Device (Balance Goal 1, OT)  standing, dynamic  -KF        [place] Level/Cues Needed (Balance Goal 1, OT)  independent  -KF        Time Frame (Balance Goal 1, OT)  long term goal (LTG);by discharge  -KF        Progress/Outcomes (Balance Goal 1, OT)  goal met  -KF           Discharge Summary (Occupational Therapy)    Additional Documentation  Discharge Summary, OT Eval (Group)  -KF           Discharge Summary, OT Eval    Reason for Discharge (OT Discharge Summary)  no further needs identified  -KF           Living Environment    Home Accessibility  stairs to enter home walk in shower  -KF          User Key  (r) = Recorded By, (t) = Taken By, (c) = Cosigned By    Initials Name Effective Dates     Iza Fish RN 03/16/18 -     Barbi Aleman OT 04/03/18 -           Occupational Therapy Education     Title: PT OT SLP Therapies (Done)     Topic: Occupational Therapy (Done)     Point: ADL training (Done)     Description: Instruct learner(s) on proper safety adaptation and remediation techniques during self care or transfers.   Instruct in proper use of assistive devices.    Learning Progress Summary           Patient Acceptance, E,TB,D, VU,DU by  at 5/16/2019 11:00 AM    Comment:  safer LBD and UBD techniques to prevent falls, purpose of skilled OT services   Significant Other Acceptance, E,TB,D, VU,DU by  at 5/16/2019 11:00 AM    Comment:  safer LBD and UBD techniques to prevent falls, purpose of skilled OT services                   Point: Precautions (Done)     Description: Instruct learner(s) on prescribed  precautions during self-care and functional transfers.    Learning Progress Summary           Patient Acceptance, E,TB,D, VU,DU by  at 5/16/2019 11:00 AM    Comment:  safer LBD and UBD techniques to prevent falls, purpose of skilled OT services   Significant Other Acceptance, E,TB,D, VU,DU by KF at 5/16/2019 11:00 AM    Comment:  safer LBD and UBD techniques to prevent falls, purpose of skilled OT services                   Point: Body mechanics (Done)     Description: Instruct learner(s) on proper positioning and spine alignment during self-care, functional mobility activities and/or exercises.    Learning Progress Summary           Patient Acceptance, E,TB,D, VU,DU by KF at 5/16/2019 11:00 AM    Comment:  safer LBD and UBD techniques to prevent falls, purpose of skilled OT services   Significant Other Acceptance, E,TB,D, VU,DU by KF at 5/16/2019 11:00 AM    Comment:  safer LBD and UBD techniques to prevent falls, purpose of skilled OT services                               User Key     Initials Effective Dates Name Provider Type Discipline     04/03/18 -  Barbi Woo, OT Occupational Therapist OT                OT Recommendation and Plan  Outcome Summary/Treatment Plan (OT)  Anticipated Equipment Needs at Discharge (OT): (none)  Anticipated Discharge Disposition (OT): home with assist  Reason for Discharge (OT Discharge Summary): no further needs identified  Therapy Frequency (OT Eval): evaluation only  Plan of Care Review  Plan of Care Reviewed With: patient, spouse  Plan of Care Reviewed With: patient, spouse  Outcome Summary: OT eval completed Pt presents at baseline I in ADLs, no dizziness or LOB in standing, education provided on safer ADL completion, able to don/doff LBD with setup and UBD don/doff with set up, I grooming tasks and functional mobility demonstrated; no further IPOT recom at this time d/c home with assist      Rehab Goal Summary     Row Name 05/16/19 1100             Occupational  Therapy Goals    Dressing Goal Selection (OT)  dressing, OT goal 1  -KF      Balance Goal Selection (OT)  balance, OT goal 1  -KF         Dressing Goal 1 (OT)    Activity/Assistive Device (Dressing Goal 1, OT)  upper body dressing;lower body dressing  -KF      [place]/Cues Needed (Dressing Goal 1, OT)  set-up required  -KF      Time Frame (Dressing Goal 1, OT)  long term goal (LTG);by discharge  -KF      Progress/Outcome (Dressing Goal 1, OT)  goal met  -KF         Balance Goal 1 (OT)    Activity/Assistive Device (Balance Goal 1, OT)  standing, dynamic  -KF      [place] Level/Cues Needed (Balance Goal 1, OT)  independent  -KF      Time Frame (Balance Goal 1, OT)  long term goal (LTG);by discharge  -KF      Progress/Outcomes (Balance Goal 1, OT)  goal met  -KF        User Key  (r) = Recorded By, (t) = Taken By, (c) = Cosigned By    Initials Name Provider Type Discipline    Barbi Aleman OT Occupational Therapist OT          Outcome Measures     Row Name 05/16/19 1100             How much help from another is currently needed...    Putting on and taking off regular lower body clothing?  4  -KF      Bathing (including washing, rinsing, and drying)  4  -KF      Toileting (which includes using toilet bed pan or urinal)  4  -KF      Putting on and taking off regular upper body clothing  4  -KF      Taking care of personal grooming (such as brushing teeth)  4  -KF      Eating meals  4  -KF      Score  24  -KF         Functional Assessment    Outcome Measure Options  AM-PAC 6 Clicks Daily Activity (OT)  -KF        User Key  (r) = Recorded By, (t) = Taken By, (c) = Cosigned By    Initials Name Provider Type    Barbi Aleman OT Occupational Therapist          Time Calculation:   Time Calculation- OT     Row Name 05/16/19 1100             Time Calculation- OT    OT Start Time  1100  -KF      Total Timed Code Minutes- OT  10 minute(s)  -KF      OT Received On  05/16/19  -KF         Timed Charges     24816 - OT Self Care/Mgmt Minutes  10  -KF        User Key  (r) = Recorded By, (t) = Taken By, (c) = Cosigned By    Initials Name Provider Type    KF Barbi Woo, OT Occupational Therapist        Therapy Suggested Charges     Code   Minutes Charges    67664 (CPT®) Hc Ot Neuromusc Re Education Ea 15 Min      21338 (CPT®) Hc Ot Ther Proc Ea 15 Min      35250 (CPT®) Hc Ot Therapeutic Act Ea 15 Min      53208 (CPT®) Hc Ot Manual Therapy Ea 15 Min      54661 (CPT®) Hc Ot Iontophoresis Ea 15 Min      01424 (CPT®) Hc Ot Elec Stim Ea-Per 15 Min      79223 (CPT®) Hc Ot Ultrasound Ea 15 Min      34053 (CPT®) Hc Ot Self Care/Mgmt/Train Ea 15 Min 10 1    Total  10 1        Therapy Charges for Today     Code Description Service Date Service Provider Modifiers Qty    76590238395 HC OT EVAL LOW COMPLEXITY 3 5/16/2019 Barbi Woo, OT GO 1    22455538637 HC OT SELF CARE/MGMT/TRAIN EA 15 MIN 5/16/2019 Barbi Woo OT GO 1               OT Discharge Summary  Anticipated Discharge Disposition (OT): home with assist  Reason for Discharge: At baseline function, Independent  Outcomes Achieved: Refer to plan of care for updates on goals achieved  Discharge Destination: Home with assist    Barbi Woo OT  5/16/2019

## 2019-05-16 NOTE — THERAPY DISCHARGE NOTE
Acute Care - Physical Therapy Initial Eval/Discharge  [facility]     Patient Name: Pio Baum  : 1940  MRN: [id number]  Today's Date: 2019   Onset of Illness/Injury or Date of Surgery: 05/15/19  Date of Referral to PT: 05/15/19  Referring Physician: DO Jesus      Admit Date: 5/15/2019    Visit Dx:    ICD-10-CM ICD-9-CM   1. Syncope and collapse R55 780.2   2. Injury of head, initial encounter S09.90XA 959.01   3. Scalp laceration, initial encounter S01.01XA 873.0   4. Paroxysmal A-fib (CMS/HCC) I48.0 427.31   5. Impaired functional mobility, balance, gait, and endurance Z74.09 V49.89     Patient Active Problem List   Diagnosis   • Essential hypertension   • PFO (patent foramen ovale)   • Hyperlipidemia   • Tobacco chew use   • Stage 2 chronic kidney disease   • Atrial fibrillation (CMS/HCC)   • History of TIA (transient ischemic attack) and stroke   • Syncope   • Scalp laceration   • Scalp contusion     Past Medical History:   Diagnosis Date   • A-fib (CMS/HCC)    • Chronic kidney disease    • History of migraine headaches    • Hyperlipidemia    • Hypertension    • Mitral valve regurgitation 2016   • PFO (patent foramen ovale)    • Stroke (CMS/HCC)      Past Surgical History:   Procedure Laterality Date   • TONSILLECTOMY AND ADENOIDECTOMY            PT ASSESSMENT (last 12 hours)      Physical Therapy Evaluation     Row Name 19 1130          PT Evaluation Time/Intention    Subjective Information  no complaints  -EJ     Document Type  evaluation;discharge evaluation/summary  -EJ     Mode of Treatment  individual therapy;physical therapy  -EJ     Patient Effort  excellent  -EJ     Symptoms Noted During/After Treatment  none  -EJ     Comment  vitals stable  -EJ     Row Name 19 1130          General Information    Patient Profile Reviewed?  yes  -EJ     Onset of Illness/Injury or Date of Surgery  05/15/19  -EJ     Referring Physician  DO Jesus  -EJ     Patient Observations   alert;cooperative;agree to therapy  -EJ     Patient/Family Observations  wife present throughout  -EJ     General Observations of Patient  pt standing in hunt with OT, OT reports pt has stairs, PT starts assessment with stairs  -EJ     Prior Level of Function  independent:;all household mobility;community mobility;gait;transfer;bed mobility;ADL's;home management;using stairs  -EJ     Equipment Currently Used at Home  none  -EJ     Pertinent History of Current Functional Problem  Pt had fall at home with laceration on head, now bandaged. Pt reports it was in [place] out side in cool weather. Pt sat down in chair with water 2/2 slight dizziness, then awoke to find self in driveway away from chair,  -EJ     Existing Precautions/Restrictions  no known precautions/restrictions  -EJ     Risks Reviewed  patient and family:;LOB;increased discomfort;dizziness;change in vital signs  -EJ     Benefits Reviewed  patient and family:;improve function;increase strength;increase independence;increase balance;decrease risk of DVT;decrease pain;improve skin integrity  -EJ     Barriers to Rehab  none identified  -EJ     Row Name 05/16/19 1130          Relationship/Environment    Primary Source of Support/Comfort  spouse  -EJ     Row Name 05/16/19 1130          Resource/Environmental Concerns    Current Living Arrangements  home/apartment/condo  -EJ     Row Name 05/16/19 1130          Home Main Entrance    Number of Stairs, Main Entrance  three  -EJ     Stair Railings, Main Entrance  railing on left side (ascending)  -EJ     Row Name 05/16/19 1130          Cognitive Assessment/Intervention- PT/OT    Affect/Mental Status (Cognitive)  WNL  -EJ     Orientation Status (Cognition)  oriented x 4  -EJ     Follows Commands (Cognition)  WNL  -EJ     Cognitive Function (Cognitive)  WNL  -EJ     Personal Safety Interventions  fall prevention program maintained;nonskid shoes/slippers when out of bed  -EJ     Row Name 05/16/19 1130          Safety  Issues, Functional Mobility    Safety Issues Affecting Function (Mobility)  safety precautions follow-through/compliance;safety precaution awareness;insight into deficits/self awareness;awareness of need for assistance  -     Impairments Affecting Function (Mobility)  balance  -EJ     Comment, Safety Issues/Impairments (Mobility)  no LOB, no AD needeed  -EJ     Row Name 05/16/19 1130          Bed Mobility Assessment/Treatment    Bed Mobility Assessment/Treatment  --  -EJ     Row Name 05/16/19 1130          Transfer Assessment/Treatment    Transfer Assessment/Treatment  toilet transfer  -EJ     [place] Level (Toilet Transfer)  independent  -EJ     Row Name 05/16/19 1130          Gait/Stairs Assessment/Training    [place] Level (Gait)  independent  -     Distance in Feet (Gait)  300  -EJ     Pattern (Gait)  step-through  -EJ     Handrail Location (Stairs)  none;left side (ascending)  -EJ     Number of Steps (Stairs)  13  -EJ     Ascending Technique (Stairs)  step-over-step  -EJ     Descending Technique (Stairs)  step-over-step  -EJ     Comment (Gait/Stairs)  no LOB.  -EJ     Row Name 05/16/19 1130          General ROM    GENERAL ROM COMMENTS  BLE WNL  -EJ     Row Name 05/16/19 1130          MMT (Manual Muscle Testing)    General MMT Comments  BLE hip flexion 4+/5, remaining 5/5.  -EJ     Row Name 05/16/19 1130          Static Standing Balance    Level of [place] (Unsupported Standing, Static Balance)  independent  -EJ     Row Name 05/16/19 1130          Dynamic Standing Balance    Level of [place], Reaches Outside Midline (Standing, Dynamic Balance)  independent  -EJ     Row Name 05/16/19 1130          Pain Scale: Numbers Pre/Post-Treatment    Pain Scale: Numbers, Pretreatment  0/10 - no pain  -EJ     Pain Scale: Numbers, Post-Treatment  0/10 - no pain  -EJ     Pain Intervention(s)  Repositioned;Ambulation/increased activity  -     Row Name             Wound 05/15/19 2102 Right lateral;upper  scalp unspecified    Wound - Properties Group Date first assessed: 05/15/19  -SH Time first assessed: 2102  -SH Present On Admission : yes  -SH Side: Right  -SH Orientation: lateral;upper  -SH Location: scalp  -SH Type: unspecified  -SH Stage, Pressure Injury: other (see comments)  -SH, injury from fall     Row Name 05/16/19 1130          Coping    Observed Emotional State  accepting;calm;cooperative  -EJ     Verbalized Emotional State  acceptance  -EJ     Row Name 05/16/19 1130          Plan of Care Review    Plan of Care Reviewed With  patient;spouse  -     Row Name 05/16/19 1130          Physical Therapy Clinical Impression    Date of Referral to PT  05/15/19  -EJ     PT Diagnosis (PT Clinical Impression)  pt with continued independence  -EJ     Criteria for Skilled Interventions Met (PT Clinical Impression)  no  -EJ     Row Name 05/16/19 1130          Physical Therapy Goals    Bed Mobility Goal Selection (PT)  --  -EJ     Transfer Goal Selection (PT)  --  -EJ     Gait Training Goal Selection (PT)  --  -EJ     Row Name 05/16/19 1130          Positioning and Restraints    Pre-Treatment Position  other (comment)  -EJ     Post Treatment Position  chair  -EJ     In Chair  with family/caregiver  -     Row Name 05/16/19 1130          Physical Therapy Discharge Summary    Additional Documentation  Discharge Summary, PT Eval (Group)  -     Row Name 05/16/19 1130          Discharge Summary, PT Eval    Outcomes Achieved Upon Discharge (PT Discharge Summary)  able to achieve all goals within established timeline  -     Row Name 05/16/19 1130          Living Environment    Home Accessibility  stairs to enter home  -EJ       User Key  (r) = Recorded By, (t) = Taken By, (c) = Cosigned By    Initials Name Provider Type    Yarelis Koehler PT Physical Therapist     Iza Fish RN Registered Nurse          Physical Therapy Education     Title: PT OT SLP Therapies (Done)     Topic: Physical Therapy (Done)      Point: Mobility training (Done)     Learning Progress Summary           Patient Acceptance, E,TB, VU,DU by  at 5/16/2019 11:30 AM   Significant Other Acceptance, E,TB, VU,DU by  at 5/16/2019 11:30 AM                   Point: Precautions (Done)     Learning Progress Summary           Patient Acceptance, E,TB, VU,DU by  at 5/16/2019 11:30 AM   Significant Other Acceptance, E,TB, VU,DU by  at 5/16/2019 11:30 AM                               User Key     Initials Effective Dates Name Provider Type Discipline     11/20/18 -  Yarelis Ayala, PT Physical Therapist PT                PT Recommendation and Plan  Anticipated Discharge Disposition (PT): home with assist  Therapy Frequency (PT Clinical Impression): evaluation only  Outcome Summary/Treatment Plan (PT)  Anticipated Discharge Disposition (PT): home with assist  Plan of Care Reviewed With: patient, spouse  Progress: improving  Outcome Summary: Pt ambulates independently in hunt and on stairs x 13. Pt has no LOB. Education performed on safety. Pt and wife report understanding.    Outcome Measures     Row Name 05/16/19 1130 05/16/19 1100          How much help from another person do you currently need...    Turning from your back to your side while in flat bed without using bedrails?  4  -EJ  --     Moving from lying on back to sitting on the side of a flat bed without bedrails?  4  -EJ  --     Moving to and from a bed to a chair (including a wheelchair)?  4  -EJ  --     Standing up from a chair using your arms (e.g., wheelchair, bedside chair)?  4  -EJ  --     Climbing 3-5 steps with a railing?  4  -EJ  --     To walk in hospital room?  4  -EJ  --     AM-PAC 6 Clicks Score  24  -EJ  --        How much help from another is currently needed...    Putting on and taking off regular lower body clothing?  --  4  -KF     Bathing (including washing, rinsing, and drying)  --  4  -KF     Toileting (which includes using toilet bed pan or urinal)  --  4  -KF      Putting on and taking off regular upper body clothing  --  4  -KF     Taking care of personal grooming (such as brushing teeth)  --  4  -KF     Eating meals  --  4  -KF     Score  --  24  -KF        Functional Assessment    Outcome Measure Options  AM-PAC 6 Clicks Basic Mobility (PT)  -EJ  AM-PAC 6 Clicks Daily Activity (OT)  -KF       User Key  (r) = Recorded By, (t) = Taken By, (c) = Cosigned By    Initials Name Provider Type    EJ Yarelis Ayala, PT Physical Therapist    KF Barbi Woo, OT Occupational Therapist           Time Calculation:     Therapy Charges for Today     Code Description Service Date Service Provider Modifiers Qty    72693817452 HC PT EVAL LOW COMPLEXITY 2 5/16/2019 Yarelis Ayala, PT GP 1          PT G-Codes  Outcome Measure Options: AM-PAC 6 Clicks Basic Mobility (PT)  AM-PAC 6 Clicks Score: 24  Score: 24    PT Discharge Summary  Anticipated Discharge Disposition (PT): home with assist    Yarelis Clark, PT  5/16/2019

## 2019-05-16 NOTE — PLAN OF CARE
Problem: Patient Care Overview  Goal: Plan of Care Review  Outcome: Ongoing (interventions implemented as appropriate)   05/16/19 0540   Coping/Psychosocial   Plan of Care Reviewed With patient   Plan of Care Review   Progress improving   OTHER   Outcome Summary VSS, A&o. No c/o pain. Up w/standby. Spouse at bedside. No c/o dizziness. RA. Head bandage dry and intact. Continue to montior

## 2019-05-16 NOTE — PLAN OF CARE
Problem: Patient Care Overview  Goal: Plan of Care Review  Outcome: Ongoing (interventions implemented as appropriate)   05/16/19 1100 05/16/19 3499   Coping/Psychosocial   Plan of Care Reviewed With patient;spouse --    Plan of Care Review   Progress --  improving   OTHER   Outcome Summary --  OT eval completed Pt presents at baseline I in ADLs, no dizziness or LOB in standing, education provided on safer ADL completion, able to don/doff LBD with setup and UBD don/doff with set up, I grooming tasks and functional mobility demonstrated; no further IPOT recom at this time d/c home with assist

## 2019-05-16 NOTE — PLAN OF CARE
Problem: Patient Care Overview  Goal: Plan of Care Review  Outcome: Outcome(s) achieved Date Met: 05/16/19 05/16/19 1130   Coping/Psychosocial   Plan of Care Reviewed With patient;spouse   Plan of Care Review   Progress improving   OTHER   Outcome Summary Pt ambulates independently in hunt and on stairs x 13. Pt has no LOB. Education performed on safety. Pt and wife report understanding.

## 2019-05-17 ENCOUNTER — TRANSITIONAL CARE MANAGEMENT TELEPHONE ENCOUNTER (OUTPATIENT)
Dept: INTERNAL MEDICINE | Facility: CLINIC | Age: 79
End: 2019-05-17

## 2019-05-17 VITALS
OXYGEN SATURATION: 96 % | TEMPERATURE: 97.4 F | SYSTOLIC BLOOD PRESSURE: 135 MMHG | WEIGHT: 174 LBS | DIASTOLIC BLOOD PRESSURE: 71 MMHG | HEIGHT: 72 IN | BODY MASS INDEX: 23.57 KG/M2 | HEART RATE: 61 BPM | RESPIRATION RATE: 16 BRPM

## 2019-05-17 LAB
BH CV ECHO MEAS - AO MAX PG (FULL): 3.4 MMHG
BH CV ECHO MEAS - AO MAX PG: 7.6 MMHG
BH CV ECHO MEAS - AO ROOT AREA (BSA CORRECTED): 1.8
BH CV ECHO MEAS - AO ROOT AREA: 10.1 CM^2
BH CV ECHO MEAS - AO ROOT DIAM: 3.6 CM
BH CV ECHO MEAS - AO V2 MAX: 137.9 CM/SEC
BH CV ECHO MEAS - AVA(V,A): 2.8 CM^2
BH CV ECHO MEAS - AVA(V,D): 2.8 CM^2
BH CV ECHO MEAS - BSA(HAYCOCK): 2 M^2
BH CV ECHO MEAS - BSA: 2 M^2
BH CV ECHO MEAS - BZI_BMI: 23.6 KILOGRAMS/M^2
BH CV ECHO MEAS - BZI_METRIC_HEIGHT: 182.9 CM
BH CV ECHO MEAS - BZI_METRIC_WEIGHT: 78.9 KG
BH CV ECHO MEAS - EDV(CUBED): 142.2 ML
BH CV ECHO MEAS - EDV(TEICH): 130.6 ML
BH CV ECHO MEAS - EF(CUBED): 84.6 %
BH CV ECHO MEAS - EF(TEICH): 77.5 %
BH CV ECHO MEAS - ESV(CUBED): 21.8 ML
BH CV ECHO MEAS - ESV(TEICH): 29.4 ML
BH CV ECHO MEAS - FS: 46.5 %
BH CV ECHO MEAS - IVS/LVPW: 1.3
BH CV ECHO MEAS - IVSD: 0.98 CM
BH CV ECHO MEAS - LA DIMENSION: 3.4 CM
BH CV ECHO MEAS - LA/AO: 0.94
BH CV ECHO MEAS - LAD MAJOR: 6.5 CM
BH CV ECHO MEAS - LAT PEAK E' VEL: 12 CM/SEC
BH CV ECHO MEAS - LATERAL E/E' RATIO: 5.6
BH CV ECHO MEAS - LV MASS(C)D: 166 GRAMS
BH CV ECHO MEAS - LV MASS(C)DI: 82.7 GRAMS/M^2
BH CV ECHO MEAS - LV MAX PG: 4.2 MMHG
BH CV ECHO MEAS - LV MEAN PG: 2.1 MMHG
BH CV ECHO MEAS - LV V1 MAX: 102.7 CM/SEC
BH CV ECHO MEAS - LV V1 MEAN: 66 CM/SEC
BH CV ECHO MEAS - LV V1 VTI: 19.5 CM
BH CV ECHO MEAS - LVIDD: 5.2 CM
BH CV ECHO MEAS - LVIDS: 2.8 CM
BH CV ECHO MEAS - LVOT AREA (M): 3.8 CM^2
BH CV ECHO MEAS - LVOT AREA: 3.7 CM^2
BH CV ECHO MEAS - LVOT DIAM: 2.2 CM
BH CV ECHO MEAS - LVPWD: 0.78 CM
BH CV ECHO MEAS - MED PEAK E' VEL: 9.1 CM/SEC
BH CV ECHO MEAS - MEDIAL E/E' RATIO: 7.3
BH CV ECHO MEAS - MV A MAX VEL: 47.4 CM/SEC
BH CV ECHO MEAS - MV DEC TIME: 0.38 SEC
BH CV ECHO MEAS - MV E MAX VEL: 67.7 CM/SEC
BH CV ECHO MEAS - MV E/A: 1.4
BH CV ECHO MEAS - MV MAX PG: 1.8 MMHG
BH CV ECHO MEAS - MV MEAN PG: 0.74 MMHG
BH CV ECHO MEAS - MV V2 MAX: 66.2 CM/SEC
BH CV ECHO MEAS - MV V2 MEAN: 39.3 CM/SEC
BH CV ECHO MEAS - MV V2 VTI: 27.7 CM
BH CV ECHO MEAS - MVA(VTI): 2.6 CM^2
BH CV ECHO MEAS - PA ACC SLOPE: 313.3 CM/SEC^2
BH CV ECHO MEAS - PA ACC TIME: 0.17 SEC
BH CV ECHO MEAS - PA MAX PG: 3.2 MMHG
BH CV ECHO MEAS - PA PR(ACCEL): 4.5 MMHG
BH CV ECHO MEAS - PA V2 MAX: 89.6 CM/SEC
BH CV ECHO MEAS - RAP SYSTOLE: 3 MMHG
BH CV ECHO MEAS - RVSP: 26 MMHG
BH CV ECHO MEAS - SI(CUBED): 59.9 ML/M^2
BH CV ECHO MEAS - SI(LVOT): 36.1 ML/M^2
BH CV ECHO MEAS - SI(TEICH): 50.4 ML/M^2
BH CV ECHO MEAS - SV(CUBED): 120.4 ML
BH CV ECHO MEAS - SV(LVOT): 72.5 ML
BH CV ECHO MEAS - SV(TEICH): 101.2 ML
BH CV ECHO MEAS - TAPSE (>1.6): 3 CM2
BH CV ECHO MEAS - TR MAX PG: 23 MMHG
BH CV ECHO MEAS - TR MAX VEL: 238.4 CM/SEC
BH CV ECHO MEASUREMENTS AVERAGE E/E' RATIO: 6.42
BH CV VAS BP LEFT ARM: NORMAL MMHG
BH CV XLRA - RV BASE: 4.2 CM
BH CV XLRA - RV LENGTH: 8.1 CM
BH CV XLRA - RV MID: 3.1 CM
LV EF 2D ECHO EST: 55 %
MAXIMAL PREDICTED HEART RATE: 139 BPM
STRESS TARGET HR: 118 BPM

## 2019-05-20 NOTE — OUTREACH NOTE
FABI[facility]N call completed.  Please refer to TCM call flowsheet for call documentation.    The pt states he is feeling better. States scalp laceration sore but otherwise doing well. States rec'd 5 stitches which he needs removed at PCP appt. Eating well and trying to stay well hydrated. Pt denies any chest pain, SOB, dizziness, lightheadedness, tachycardia, or palps. He is wearing zio patch x 2 weeks. PCP f/u 5/23/19. Cardiology f/u next month. Pt denies any questions, concerns, or needs at time of call.

## 2019-05-23 ENCOUNTER — OFFICE VISIT (OUTPATIENT)
Dept: INTERNAL MEDICINE | Facility: CLINIC | Age: 79
End: 2019-05-23

## 2019-05-23 VITALS
HEART RATE: 50 BPM | OXYGEN SATURATION: 99 % | DIASTOLIC BLOOD PRESSURE: 70 MMHG | SYSTOLIC BLOOD PRESSURE: 122 MMHG | TEMPERATURE: 98.2 F | BODY MASS INDEX: 23.57 KG/M2 | WEIGHT: 174 LBS | HEIGHT: 72 IN | RESPIRATION RATE: 16 BRPM

## 2019-05-23 DIAGNOSIS — R55 SYNCOPE, UNSPECIFIED SYNCOPE TYPE: Primary | ICD-10-CM

## 2019-05-23 DIAGNOSIS — S01.01XA LACERATION OF SCALP, INITIAL ENCOUNTER: ICD-10-CM

## 2019-05-23 DIAGNOSIS — S00.03XA CONTUSION OF SCALP, INITIAL ENCOUNTER: ICD-10-CM

## 2019-05-23 PROCEDURE — 99213 OFFICE O/P EST LOW 20 MIN: CPT | Performed by: NURSE PRACTITIONER

## 2019-05-23 RX ORDER — TAMSULOSIN HYDROCHLORIDE 0.4 MG/1
CAPSULE ORAL EVERY 12 HOURS SCHEDULED
COMMUNITY
End: 2019-05-23

## 2019-05-23 NOTE — PROGRESS NOTES
Transitional Care Follow Up Visit  Subjective     Pio Baum is a 79 y.o. male who presents for a transitional care management visit.    Within 48 business hours after discharge our office contacted him via telephone to coordinate his care and needs.      I reviewed and discussed the details of that call along with the discharge summary, hospital problems, inpatient lab results, inpatient diagnostic studies, and consultation reports with Pio.     Current outpatient and discharge medications have been reconciled for the patient.    Date of TCM Phone Call 5/20/2019   [facility]   Date of Admission 5/15/2019   Date of Discharge 5/16/2019   Discharge Disposition Home or Self Care     Risk for Readmission (LACE) Score: 3 (5/16/2019  6:00 AM)      History of Present Illness   Course During Hospital Stay: Pio presented to [facility] the ER after having been working in his garden and had a syncopal event with scalp laceration and scalp contusion.  He was felt to be overheated and dehydrated.  His symptoms resolved with IV hydration.  A JOLIE patch was ordered-she does have a history of A. fib.  CT of head was performed.    Interim history: He reports he is feeling much better.  He denies any further episodes.  He denies any headache or dizziness.  He is staying hydrated.   He needs his stitches removed.  He has been putting Vaseline on it.  There is no oozing or drainage from the area.  He denies any fever or chills.     The following portions of the patient's history were reviewed and updated as appropriate: allergies, current medications, past family history, past medical history, past social history, past surgical history and problem list.    Review of Systems   Constitutional: Negative for activity change, appetite change, fatigue, fever and unexpected weight change.   HENT: Negative for rhinorrhea, sinus pain and trouble swallowing.    Eyes: Negative for photophobia and pain.    Respiratory: Negative for chest tightness, shortness of breath and wheezing.    Cardiovascular: Negative for chest pain, palpitations and leg swelling.   Gastrointestinal: Negative for abdominal distention, abdominal pain, blood in stool, constipation and diarrhea.   Endocrine: Negative for cold intolerance, heat intolerance, polydipsia, polyphagia and polyuria.   Genitourinary: Negative for difficulty urinating, enuresis, frequency, genital sores and urgency.   Musculoskeletal: Positive for arthralgias. Negative for back pain, gait problem, joint swelling and myalgias.   Skin: Negative for pallor, rash and wound.   Allergic/Immunologic: Negative for immunocompromised state.   Neurological: Negative for dizziness, tremors, seizures, syncope, speech difficulty, weakness and headaches.   Hematological: Negative for adenopathy.   Psychiatric/Behavioral: Negative for dysphoric mood, sleep disturbance and suicidal ideas. The patient is not nervous/anxious.        Objective     Physical Exam   Constitutional: He is oriented to person, place, and time. He appears well-developed and well-nourished. No distress.   HENT:   Head: Normocephalic.   Eyes: Pupils are equal, round, and reactive to light.   Neck: Neck supple. No thyromegaly present.   Cardiovascular: Normal rate, regular rhythm and intact distal pulses.   Pulmonary/Chest: Effort normal and breath sounds normal.   Abdominal: Soft. He exhibits no distension.   Musculoskeletal: He exhibits no edema or deformity.   Neurological: He is alert and oriented to person, place, and time.   Skin: Skin is warm and dry. Capillary refill takes 2 to 3 seconds. He is not diaphoretic.        Psychiatric: He has a normal mood and affect. His behavior is normal. Judgment and thought content normal.   Nursing note and vitals reviewed.    Suture Removal  Date/Time: 5/23/2019 3:23 PM  Performed by: Elizabet Easley APRN  Authorized by: Elizabet Easley APRN   Consent: Verbal consent  obtained.  Consent given by: patient  Patient understanding: patient states understanding of the procedure being performed  Patient identity confirmed: verbally with patient and provided demographic data  Body area: head/neck  Location details: scalp  Wound Appearance: clean  Sutures Removed: 4  Patient tolerance: Patient tolerated the procedure well with no immediate complications        Assessment/Plan   Pio was seen today for suture / staple removal.    Diagnoses and all orders for this visit:    Syncope, unspecified syncope type  Patient has not had any more episodes.  He is staying hydrated.  Contusion of scalp, initial encounter    Laceration of scalp, initial encounter  Sutures removed, area clean dry and intact.  Advised to leave open to air.  Let warm soapy water wash over once daily.  Keep area clean and dry.  Other orders  -     Suture Removal    Follow up at next scheduled FU with cardiology and Dr. Hill.      Elizabet RABAGO

## 2019-06-13 ENCOUNTER — OFFICE VISIT (OUTPATIENT)
Dept: INTERNAL MEDICINE | Facility: CLINIC | Age: 79
End: 2019-06-13

## 2019-06-13 VITALS
SYSTOLIC BLOOD PRESSURE: 110 MMHG | HEIGHT: 72 IN | OXYGEN SATURATION: 99 % | HEART RATE: 44 BPM | RESPIRATION RATE: 16 BRPM | WEIGHT: 178 LBS | DIASTOLIC BLOOD PRESSURE: 60 MMHG | BODY MASS INDEX: 24.11 KG/M2

## 2019-06-13 DIAGNOSIS — I48.0 PAROXYSMAL ATRIAL FIBRILLATION (HCC): ICD-10-CM

## 2019-06-13 DIAGNOSIS — E78.2 MIXED HYPERLIPIDEMIA: ICD-10-CM

## 2019-06-13 DIAGNOSIS — I10 ESSENTIAL HYPERTENSION: ICD-10-CM

## 2019-06-13 DIAGNOSIS — Q21.12 PFO (PATENT FORAMEN OVALE): Primary | ICD-10-CM

## 2019-06-13 DIAGNOSIS — Z86.73 HISTORY OF TIA (TRANSIENT ISCHEMIC ATTACK) AND STROKE: ICD-10-CM

## 2019-06-13 PROBLEM — S00.03XA SCALP CONTUSION: Status: RESOLVED | Noted: 2019-05-16 | Resolved: 2019-06-13

## 2019-06-13 PROCEDURE — 99214 OFFICE O/P EST MOD 30 MIN: CPT | Performed by: INTERNAL MEDICINE

## 2019-06-13 NOTE — PROGRESS NOTES
Patient is a 79 y.o. male who is here for a follow up of   Chief Complaint   Patient presents with   • Hypertension   • Back Pain     pt has ruptured disc in back and bent over in yard the other day and now has strained something.         HPI:    Here for f/u.  Patient was recently in hospital for syncope, felt to be sec to dehydration.  No further recurrent episodes.  Occasional palpitations.  No HAs.  Some nasal congestion.  His back has been bothering him.     History:    Patient Active Problem List   Diagnosis   • Essential hypertension   • PFO (patent foramen ovale)   • Hyperlipidemia   • Tobacco chew use   • Stage 2 chronic kidney disease   • Atrial fibrillation (CMS/HCC)   • History of TIA (transient ischemic attack) and stroke   • Syncope   • Scalp laceration       Past Medical History:   Diagnosis Date   • A-fib (CMS/HCC)    • Chronic kidney disease    • History of migraine headaches    • Hyperlipidemia    • Hypertension    • Mitral valve regurgitation 12/1/2016   • PFO (patent foramen ovale)    • Stroke (CMS/HCC)        Past Surgical History:   Procedure Laterality Date   • TONSILLECTOMY AND ADENOIDECTOMY         Current Outpatient Medications on File Prior to Visit   Medication Sig   • apixaban (ELIQUIS) 5 MG tablet tablet Take 1 tablet by mouth Every 12 (Twelve) Hours.   • atorvastatin (LIPITOR) 40 MG tablet TAKE 1 TABLET EVERY DAY   • cetirizine (zyrTEC) 10 MG tablet Take 10 mg by mouth Daily.   • Coenzyme Q10 (COQ10 PO) Take 1 tablet by mouth Daily.   • glucosamine sulfate 500 MG capsule capsule Take 500 mg by mouth daily.   • guaiFENesin (MUCINEX) 600 MG 12 hr tablet Take 1,200 mg by mouth 2 (Two) Times a Day.   • lisinopril (PRINIVIL,ZESTRIL) 10 MG tablet TAKE 1 TABLET EVERY DAY   • metoprolol tartrate (LOPRESSOR) 25 MG tablet Take 0.5 tablets by mouth Every 12 (Twelve) Hours.   • Multiple Vitamin (MULTI VITAMIN DAILY PO) Take 1 tablet by mouth Daily.   • tamsulosin (FLOMAX) 0.4 MG capsule 24 hr  capsule Take 1 capsule by mouth Every Night.     No current facility-administered medications on file prior to visit.        Family History   Problem Relation Age of Onset   • Arthritis Other    • Hyperlipidemia Other    • Stroke Other        Social History     Socioeconomic History   • Marital status:      Spouse name: Not on file   • Number of children: Not on file   • Years of education: Not on file   • Highest education level: Not on file   Tobacco Use   • Smoking status: Never Smoker   • Smokeless tobacco: Current User     Types: Chew   • Tobacco comment: chews once every 4-5 months   Substance and Sexual Activity   • Alcohol use: Yes     Comment: 1-2 drinks a week   • Drug use: No   • Sexual activity: Defer         Review of Systems   Constitutional: Negative for activity change, appetite change, chills, fatigue, fever and unexpected weight change.   HENT: Negative for congestion, ear pain, hearing loss, rhinorrhea, sinus pressure, sinus pain, sore throat and trouble swallowing.    Eyes: Negative for photophobia, pain, discharge and itching.   Respiratory: Negative for cough, chest tightness, shortness of breath and wheezing.    Cardiovascular: Negative for chest pain, palpitations and leg swelling.   Gastrointestinal: Negative for abdominal distention, abdominal pain, blood in stool, constipation, diarrhea and vomiting.        Colonoscopy by Dr Schmid   Endocrine: Negative for cold intolerance, heat intolerance, polydipsia, polyphagia and polyuria.   Genitourinary: Negative for difficulty urinating, dysuria, enuresis, frequency, genital sores, hematuria and urgency.        Followed by Dr Segal   Musculoskeletal: Positive for arthralgias and back pain. Negative for gait problem, joint swelling and myalgias.   Skin: Negative for pallor, rash and wound.   Allergic/Immunologic: Negative for immunocompromised state.   Neurological: Negative for dizziness, tremors, seizures, syncope, speech difficulty,  "weakness, light-headedness, numbness and headaches.   Hematological: Negative for adenopathy. Bruises/bleeds easily.   Psychiatric/Behavioral: Negative for behavioral problems, dysphoric mood, sleep disturbance and suicidal ideas. The patient is not nervous/anxious.        /60   Pulse (!) 44   Resp 16   Ht 182.9 cm (72\")   Wt 80.7 kg (178 lb)   SpO2 99%   BMI 24.14 kg/m²       Physical Exam   Constitutional: He is oriented to person, place, and time. He appears well-developed and well-nourished.   HENT:   Head: Normocephalic and atraumatic.   Right Ear: External ear normal.   Left Ear: External ear normal.   Mouth/Throat: Oropharynx is clear and moist.   Right TM perforated   Eyes: Conjunctivae and EOM are normal.   Neck: Normal range of motion. Neck supple.   Cardiovascular: Normal rate, regular rhythm and normal heart sounds.   Pulmonary/Chest: Effort normal and breath sounds normal.   Abdominal: Soft. Bowel sounds are normal.   Musculoskeletal: Normal range of motion.   Lymphadenopathy:     He has no cervical adenopathy.   Neurological: He is alert and oriented to person, place, and time.   Skin: Skin is warm and dry.   Easy bruising   Psychiatric: He has a normal mood and affect. His behavior is normal. Thought content normal.       Procedure:      Discussion/Summary:    htn-stable  paf-cont rate control and eliquis  Hyperlipidemia- labs today, counseled on diet  djd-stable  Thrombocytopenia-cbc today  TIA-cont rf mod  Syncope-advised slow position changes and adequate hydration  High risk meds-labs today     Labs noted and dw patient, advised to increase fluids     Pneumovax 23 on 1/20        Current Outpatient Medications:   •  apixaban (ELIQUIS) 5 MG tablet tablet, Take 1 tablet by mouth Every 12 (Twelve) Hours., Disp: 180 tablet, Rfl: 3  •  atorvastatin (LIPITOR) 40 MG tablet, TAKE 1 TABLET EVERY DAY, Disp: 90 tablet, Rfl: 3  •  cetirizine (zyrTEC) 10 MG tablet, Take 10 mg by mouth Daily., Disp: " , Rfl:   •  Coenzyme Q10 (COQ10 PO), Take 1 tablet by mouth Daily., Disp: , Rfl:   •  glucosamine sulfate 500 MG capsule capsule, Take 500 mg by mouth daily., Disp: , Rfl:   •  guaiFENesin (MUCINEX) 600 MG 12 hr tablet, Take 1,200 mg by mouth 2 (Two) Times a Day., Disp: , Rfl:   •  lisinopril (PRINIVIL,ZESTRIL) 10 MG tablet, TAKE 1 TABLET EVERY DAY, Disp: 90 tablet, Rfl: 2  •  metoprolol tartrate (LOPRESSOR) 25 MG tablet, Take 0.5 tablets by mouth Every 12 (Twelve) Hours., Disp: 90 tablet, Rfl: 3  •  Multiple Vitamin (MULTI VITAMIN DAILY PO), Take 1 tablet by mouth Daily., Disp: , Rfl:   •  tamsulosin (FLOMAX) 0.4 MG capsule 24 hr capsule, Take 1 capsule by mouth Every Night., Disp: , Rfl:

## 2019-06-14 LAB
ALBUMIN SERPL-MCNC: 4.3 G/DL (ref 3.5–5.2)
ALBUMIN/GLOB SERPL: 1.7 G/DL
ALP SERPL-CCNC: 91 U/L (ref 39–117)
ALT SERPL-CCNC: 10 U/L (ref 1–41)
AST SERPL-CCNC: 15 U/L (ref 1–40)
BILIRUB SERPL-MCNC: 0.8 MG/DL (ref 0.2–1.2)
BUN SERPL-MCNC: 14 MG/DL (ref 8–23)
BUN/CREAT SERPL: 10 (ref 7–25)
CALCIUM SERPL-MCNC: 9.5 MG/DL (ref 8.6–10.5)
CHLORIDE SERPL-SCNC: 105 MMOL/L (ref 98–107)
CHOLEST SERPL-MCNC: 115 MG/DL (ref 0–200)
CO2 SERPL-SCNC: 27.8 MMOL/L (ref 22–29)
CREAT SERPL-MCNC: 1.4 MG/DL (ref 0.76–1.27)
ERYTHROCYTE [DISTWIDTH] IN BLOOD BY AUTOMATED COUNT: 13.1 % (ref 12.3–15.4)
GLOBULIN SER CALC-MCNC: 2.6 GM/DL
GLUCOSE SERPL-MCNC: 94 MG/DL (ref 65–99)
HCT VFR BLD AUTO: 45 % (ref 37.5–51)
HDLC SERPL-MCNC: 50 MG/DL (ref 40–60)
HGB BLD-MCNC: 14.4 G/DL (ref 13–17.7)
LDLC SERPL CALC-MCNC: 51 MG/DL (ref 0–100)
MCH RBC QN AUTO: 31.4 PG (ref 26.6–33)
MCHC RBC AUTO-ENTMCNC: 32 G/DL (ref 31.5–35.7)
MCV RBC AUTO: 98 FL (ref 79–97)
PLATELET # BLD AUTO: 161 10*3/MM3 (ref 140–450)
POTASSIUM SERPL-SCNC: 4.7 MMOL/L (ref 3.5–5.2)
PROT SERPL-MCNC: 6.9 G/DL (ref 6–8.5)
RBC # BLD AUTO: 4.59 10*6/MM3 (ref 4.14–5.8)
SODIUM SERPL-SCNC: 143 MMOL/L (ref 136–145)
TRIGL SERPL-MCNC: 70 MG/DL (ref 0–150)
VLDLC SERPL CALC-MCNC: 14 MG/DL
WBC # BLD AUTO: 9.08 10*3/MM3 (ref 3.4–10.8)

## 2019-06-21 ENCOUNTER — OFFICE VISIT (OUTPATIENT)
Dept: CARDIOLOGY | Facility: CLINIC | Age: 79
End: 2019-06-21

## 2019-06-21 VITALS
SYSTOLIC BLOOD PRESSURE: 134 MMHG | WEIGHT: 172.4 LBS | HEIGHT: 72 IN | BODY MASS INDEX: 23.35 KG/M2 | HEART RATE: 51 BPM | DIASTOLIC BLOOD PRESSURE: 68 MMHG

## 2019-06-21 DIAGNOSIS — I48.0 PAROXYSMAL ATRIAL FIBRILLATION (HCC): ICD-10-CM

## 2019-06-21 DIAGNOSIS — I10 ESSENTIAL HYPERTENSION: ICD-10-CM

## 2019-06-21 DIAGNOSIS — E78.2 MIXED HYPERLIPIDEMIA: ICD-10-CM

## 2019-06-21 DIAGNOSIS — R55 SYNCOPE, UNSPECIFIED SYNCOPE TYPE: Primary | ICD-10-CM

## 2019-06-21 PROCEDURE — 99214 OFFICE O/P EST MOD 30 MIN: CPT | Performed by: INTERNAL MEDICINE

## 2019-06-21 NOTE — PROGRESS NOTES
Encounter Date:06/21/2019      Patient ID: Pio Baum is a 79 y.o. male.    PCP: Lupillo Hill MD     Chief Complaint: Atrial Fibrillation      PROBLEM LIST:  1. Paroxysmal atrial fibrillation:  a. CHADS-VASc = 5 (HTN, Age > 75, h/o Stroke), on Eliquis 5 mg BID.   b. MPS, 01/17/2017: EF 58%, negative, low risk study  2. TIA/CVA:  a. Carotid duplex, 04/20/2016: No significant disease  b. Echocardiogram, 04/20/2016: Normal LV function, positive bubble study. Closure not considered due to need for chronic anticoagulation therapy.  c. Cardiac event monitor showed atrial fibrillation/flutter with intermittent RVR, aberrancy, IVCD/sinus rhythm with PVCs  3. Syncope:  a. 2 week Zio, 05/16/2019: SR, occasional PAC's and PVC's. Occasional short SVT/PAT, 21 runs at 3-20 beats.  b. Echocardiogram, 05/16/2019: EF 55%. Borderline right-sided chamber enlargement. Mild MR/TR, normal RVSP.  4. Hypertension  5. Dyslipidemia  6. Oral tobacco abuse  7. History of migraine headaches  8. Chronic kidney disease stage II  9. Surgical history:  a. Tonsillectomy/adenoictomy    History of Present Illness  Patient presents today for hospital follow-up of syncope. He has a history of PAF, TIA, and cardiac risk factors. Since last visit he was hospitalized for syncope which was felt to be vasovagal in the setting of dehydration, he has not had any recurrent syncopal episodes. He has been doing well overall from a cardiovascular standpoint, and denies chest pain, shortness of breath, leg swelling, palpitations, and syncope. Remains busy and active with no significant cardiac limitations.    Allergies   Allergen Reactions   • Flonase [Fluticasone] Irritability     Gets a nose bleed as soon as used   • Penicillins Rash     Rash all over body including mouth/throat          Current Outpatient Medications:   •  apixaban (ELIQUIS) 5 MG tablet tablet, Take 1 tablet by mouth Every 12 (Twelve) Hours., Disp: 180 tablet, Rfl: 3  •   atorvastatin (LIPITOR) 40 MG tablet, TAKE 1 TABLET EVERY DAY, Disp: 90 tablet, Rfl: 3  •  cetirizine (zyrTEC) 10 MG tablet, Take 10 mg by mouth Daily., Disp: , Rfl:   •  Coenzyme Q10 (COQ10 PO), Take 1 tablet by mouth Daily., Disp: , Rfl:   •  glucosamine sulfate 500 MG capsule capsule, Take 500 mg by mouth daily., Disp: , Rfl:   •  guaiFENesin (MUCINEX) 600 MG 12 hr tablet, Take 1,200 mg by mouth As Needed., Disp: , Rfl:   •  lisinopril (PRINIVIL,ZESTRIL) 10 MG tablet, TAKE 1 TABLET EVERY DAY, Disp: 90 tablet, Rfl: 2  •  metoprolol tartrate (LOPRESSOR) 25 MG tablet, Take 0.5 tablets by mouth Every 12 (Twelve) Hours., Disp: 90 tablet, Rfl: 3  •  Multiple Vitamin (MULTI VITAMIN DAILY PO), Take 1 tablet by mouth Daily., Disp: , Rfl:   •  tamsulosin (FLOMAX) 0.4 MG capsule 24 hr capsule, Take 1 capsule by mouth Every Night., Disp: , Rfl:     The following portions of the patient's history were reviewed and updated as appropriate: allergies, current medications, past family history, past medical history, past social history, past surgical history and problem list.    Review of Systems   Eyes: Positive for vision loss in left eye.     Review of Systems   Constitution: Negative for chills, fatigue, fever, generalized weakness, weight gain and weight loss.   Cardiovascular: Negative for chest pain, claudication, dyspnea on exertion, leg swelling, orthopnea, palpitations, paroxysmal nocturnal dyspnea and syncope.   Respiratory: Negative for cough, shortness of breath, snoring, and wheezing.  HENT: Negative for ear pain, nosebleeds, and tinnitus.  Gastrointestinal: Negative for abdominal pain, constipation, diarrhea, nausea and vomiting.   Genitourinary: No urinary symptoms   Neurological: Negative for dizziness, headaches, loss of balance, numbness, and symptoms of stroke.  Psychiatric: Normal mental status.     All other systems reviewed and are negative.    Objective:     /68 (BP Location: Right arm, Patient  "Position: Sitting)   Pulse 51   Ht 182.9 cm (72\")   Wt 78.2 kg (172 lb 6.4 oz)   BMI 23.38 kg/m²        Physical Exam  Constitutional: Patient appears well-developed and well-nourished.   HENT: HEENT exam unremarkable.   Neck: Neck supple. No JVD present. No carotid bruits.   Cardiovascular: Normal rate, regular rhythm and normal heart sounds. No murmur heard.   2+ symmetric pulses.   Pulmonary/Chest: Breath sounds normal. Does not exhibit tenderness.   Abdominal: Abdomen benign.   Musculoskeletal: Does not exhibit edema.   Neurological: Neurological exam unremarkable.   Vitals reviewed.    Lab Review:   Lab Results   Component Value Date    GLUCOSE 97 05/16/2019    BUN 14 06/13/2019    CREATININE 1.40 (H) 06/13/2019    EGFRIFNONA 49 (L) 06/13/2019    EGFRIFAFRI 59 (L) 06/13/2019    BCR 10.0 06/13/2019    K 4.7 06/13/2019    CO2 27.8 06/13/2019    CALCIUM 9.5 06/13/2019    PROTENTOTREF 6.9 06/13/2019    ALBUMIN 4.30 06/13/2019    LABIL2 1.7 06/13/2019    AST 15 06/13/2019    ALT 10 06/13/2019     Lab Results   Component Value Date    CHOL 138 08/31/2018    CHLPL 115 06/13/2019    TRIG 70 06/13/2019    HDL 50 06/13/2019    LDL 51 06/13/2019      Lab Results   Component Value Date    WBC 9.08 06/13/2019    HGB 14.4 06/13/2019    HCT 45.0 06/13/2019    MCV 98.0 (H) 06/13/2019     06/13/2019     Lab Results   Component Value Date    TSH 1.061 01/25/2019     Procedures       Assessment:      Diagnosis Plan   1. Syncope, unspecified syncope type, most probably vasovagal and related to dehydration No recurrence since discharge. Pt advised to remain hydrated, and to sit down if he begins to feel dizzy.   2. Paroxysmal atrial fibrillation (CMS/HCC)  Continue Eliquis 5 mg BID.   3. Mixed hyperlipidemia  Well-controlled, continue atorvastatin 40 mg.   4. Essential hypertension  Well-controlled, continue current medications.     Plan:   Stable cardiac status.  Continue current medications.   FU in 6 MO, sooner as " needed.  Thank you for allowing us to participate in the care of your patient.     Scribed for Yahaira Carson MD by Erna Benson. 6/21/2019  1:36 PM      I, Yahaira Carson MD, personally performed the services described in this documentation as scribed by the above named individual in my presence, and it is both accurate and complete.  6/21/2019  1:53 PM        Please note that portions of this note may have been completed with a voice recognition program. Efforts were made to edit the dictations, but occasionally words are mistranscribed.

## 2019-07-19 ENCOUNTER — TELEPHONE (OUTPATIENT)
Dept: CARDIOLOGY | Facility: CLINIC | Age: 79
End: 2019-07-19

## 2019-07-19 NOTE — TELEPHONE ENCOUNTER
Patient needs to hold eliquis for dental extraction and implant.  Procedure needs to be scheduled.    Per Dr. Carson - may hold two days prior.  Take lovenox 80mg subQ daily two day prior.  Nothing morning of.  Restart eliquis after the procedure.    Left a message with the dentist office.      Jacquelyn Price  P: [phone]  F: [phone]

## 2019-09-16 DIAGNOSIS — Z00.00 HEALTHCARE MAINTENANCE: ICD-10-CM

## 2019-09-17 RX ORDER — ATORVASTATIN CALCIUM 40 MG/1
TABLET, FILM COATED ORAL
Qty: 90 TABLET | Refills: 3 | Status: ON HOLD | OUTPATIENT
Start: 2019-09-17 | End: 2020-08-24

## 2019-10-16 ENCOUNTER — TELEPHONE (OUTPATIENT)
Dept: CASE MANAGEMENT | Facility: OTHER | Age: 79
End: 2019-10-16

## 2019-10-16 ENCOUNTER — OFFICE VISIT (OUTPATIENT)
Dept: INTERNAL MEDICINE | Facility: CLINIC | Age: 79
End: 2019-10-16

## 2019-10-16 VITALS
DIASTOLIC BLOOD PRESSURE: 70 MMHG | HEIGHT: 72 IN | SYSTOLIC BLOOD PRESSURE: 140 MMHG | WEIGHT: 172 LBS | BODY MASS INDEX: 23.3 KG/M2 | HEART RATE: 64 BPM

## 2019-10-16 DIAGNOSIS — E78.2 MIXED HYPERLIPIDEMIA: Primary | ICD-10-CM

## 2019-10-16 DIAGNOSIS — N18.2 STAGE 2 CHRONIC KIDNEY DISEASE: ICD-10-CM

## 2019-10-16 DIAGNOSIS — I10 ESSENTIAL HYPERTENSION: ICD-10-CM

## 2019-10-16 DIAGNOSIS — I48.0 PAROXYSMAL ATRIAL FIBRILLATION (HCC): ICD-10-CM

## 2019-10-16 PROBLEM — R55 SYNCOPE: Status: RESOLVED | Noted: 2019-05-15 | Resolved: 2019-10-16

## 2019-10-16 PROCEDURE — 99214 OFFICE O/P EST MOD 30 MIN: CPT | Performed by: INTERNAL MEDICINE

## 2019-10-16 NOTE — PROGRESS NOTES
Patient is a 79 y.o. male who is here for a follow up of hyperlipidemia,hypertension and atrial fib.  Chief Complaint   Patient presents with   • Hyperlipidemia   • Hypertension   • Atrial Fibrillation         HPI:    Here for mgmt of HTN.  Onset years.  No dizziness or lightheadedness.  No palpitations.  Energy level is good.  No presyncopal episodes.  Back pain is stable with PT.  No excessive bruising.  No CP.    History:     Patient Active Problem List   Diagnosis   • Essential hypertension   • PFO (patent foramen ovale)   • Hyperlipidemia   • Tobacco chew use   • Stage 2 chronic kidney disease   • Atrial fibrillation (CMS/HCC)   • History of TIA (transient ischemic attack) and stroke   • Scalp laceration       Past Medical History:   Diagnosis Date   • A-fib (CMS/HCC)    • Chronic kidney disease    • History of migraine headaches    • Hyperlipidemia    • Hypertension    • Mitral valve regurgitation 12/1/2016   • PFO (patent foramen ovale)    • Stroke (CMS/HCC)        Past Surgical History:   Procedure Laterality Date   • TONSILLECTOMY AND ADENOIDECTOMY         Current Outpatient Medications on File Prior to Visit   Medication Sig   • apixaban (ELIQUIS) 5 MG tablet tablet Take 1 tablet by mouth Every 12 (Twelve) Hours.   • atorvastatin (LIPITOR) 40 MG tablet TAKE 1 TABLET EVERY DAY   • cetirizine (zyrTEC) 10 MG tablet Take 10 mg by mouth Daily.   • Coenzyme Q10 (COQ10 PO) Take 1 tablet by mouth Daily.   • glucosamine sulfate 500 MG capsule capsule Take 500 mg by mouth daily.   • guaiFENesin (MUCINEX) 600 MG 12 hr tablet Take 1,200 mg by mouth As Needed.   • lisinopril (PRINIVIL,ZESTRIL) 10 MG tablet TAKE 1 TABLET EVERY DAY   • metoprolol tartrate (LOPRESSOR) 25 MG tablet TAKE 1/2 TABLET EVERY 12 HOURS   • Multiple Vitamin (MULTI VITAMIN DAILY PO) Take 1 tablet by mouth Daily.   • tamsulosin (FLOMAX) 0.4 MG capsule 24 hr capsule Take 1 capsule by mouth Every Night.     No current facility-administered medications  on file prior to visit.        Family History   Problem Relation Age of Onset   • Arthritis Other    • Hyperlipidemia Other    • Stroke Other        Social History     Socioeconomic History   • Marital status:      Spouse name: Not on file   • Number of children: Not on file   • Years of education: Not on file   • Highest education level: Not on file   Tobacco Use   • Smoking status: Never Smoker   • Smokeless tobacco: Current User     Types: Chew   • Tobacco comment: chews once every 4-5 months   Substance and Sexual Activity   • Alcohol use: Yes     Comment: 1-2 drinks a week   • Drug use: No   • Sexual activity: Defer         Review of Systems   Constitutional: Negative for activity change, appetite change, chills, fatigue, fever and unexpected weight change.   HENT: Negative for congestion, ear pain, hearing loss, rhinorrhea, sinus pressure, sinus pain, sore throat and trouble swallowing.    Eyes: Negative for photophobia, pain, discharge and itching.   Respiratory: Negative for cough, chest tightness, shortness of breath and wheezing.    Cardiovascular: Negative for chest pain, palpitations and leg swelling.   Gastrointestinal: Negative for abdominal distention, abdominal pain, blood in stool, constipation, diarrhea and vomiting.        Colonoscopy by Dr Schmid   Endocrine: Negative for cold intolerance, heat intolerance, polydipsia, polyphagia and polyuria.   Genitourinary: Negative for difficulty urinating, dysuria, enuresis, frequency, genital sores, hematuria and urgency.        Followed by Dr Segal   Musculoskeletal: Positive for arthralgias and back pain (better). Negative for gait problem, joint swelling and myalgias.   Skin: Negative for pallor, rash and wound.   Allergic/Immunologic: Negative for immunocompromised state.   Neurological: Negative for dizziness, tremors, seizures, syncope, speech difficulty, weakness, light-headedness, numbness and headaches.   Hematological: Negative for  "adenopathy.   Psychiatric/Behavioral: Negative for behavioral problems, dysphoric mood, sleep disturbance and suicidal ideas. The patient is not nervous/anxious.        /70   Pulse 64   Ht 182.9 cm (72.01\")   Wt 78 kg (172 lb)   BMI 23.32 kg/m²       Physical Exam   Constitutional: He is oriented to person, place, and time. He appears well-developed and well-nourished.   HENT:   Head: Normocephalic and atraumatic.   Right Ear: External ear normal.   Left Ear: External ear normal.   Mouth/Throat: Oropharynx is clear and moist.   Right TM perforated   Eyes: Conjunctivae and EOM are normal.   Neck: Normal range of motion. Neck supple.   Cardiovascular: Normal rate, regular rhythm and normal heart sounds.   Pulmonary/Chest: Effort normal and breath sounds normal.   Abdominal: Soft. Bowel sounds are normal.   Musculoskeletal: Normal range of motion.   Lymphadenopathy:     He has no cervical adenopathy.   Neurological: He is alert and oriented to person, place, and time.   Skin: Skin is warm and dry.   Easy bruising   Psychiatric: He has a normal mood and affect. His behavior is normal. Thought content normal.       Procedure:      Discussion/Summary:    htn-stable on ACE  paf-cont rate control and eliquis  Hyperlipidemia- labs 6/13 at goal, counseled on diet  djd-stable  Thrombocytopenia-cbc today, at goal  TIA-cont rf mod     10/16 Labs noted and dw patient, advised to increase fluids, refusing flu shot     Pneumovax 23 on 1/20    Current Outpatient Medications:   •  apixaban (ELIQUIS) 5 MG tablet tablet, Take 1 tablet by mouth Every 12 (Twelve) Hours., Disp: 180 tablet, Rfl: 3  •  atorvastatin (LIPITOR) 40 MG tablet, TAKE 1 TABLET EVERY DAY, Disp: 90 tablet, Rfl: 3  •  cetirizine (zyrTEC) 10 MG tablet, Take 10 mg by mouth Daily., Disp: , Rfl:   •  Coenzyme Q10 (COQ10 PO), Take 1 tablet by mouth Daily., Disp: , Rfl:   •  glucosamine sulfate 500 MG capsule capsule, Take 500 mg by mouth daily., Disp: , Rfl:   •  " guaiFENesin (MUCINEX) 600 MG 12 hr tablet, Take 1,200 mg by mouth As Needed., Disp: , Rfl:   •  lisinopril (PRINIVIL,ZESTRIL) 10 MG tablet, TAKE 1 TABLET EVERY DAY, Disp: 90 tablet, Rfl: 2  •  metoprolol tartrate (LOPRESSOR) 25 MG tablet, TAKE 1/2 TABLET EVERY 12 HOURS, Disp: 90 tablet, Rfl: 3  •  Multiple Vitamin (MULTI VITAMIN DAILY PO), Take 1 tablet by mouth Daily., Disp: , Rfl:   •  tamsulosin (FLOMAX) 0.4 MG capsule 24 hr capsule, Take 1 capsule by mouth Every Night., Disp: , Rfl:         Pio was seen today for hyperlipidemia, hypertension and atrial fibrillation.    Diagnoses and all orders for this visit:    Mixed hyperlipidemia    Essential hypertension  -     CBC (No Diff)  -     Basic Metabolic Panel    Paroxysmal atrial fibrillation (CMS/HCC)    Stage 2 chronic kidney disease

## 2019-10-17 LAB
BUN SERPL-MCNC: 23 MG/DL (ref 8–27)
BUN/CREAT SERPL: 16 (ref 10–24)
CALCIUM SERPL-MCNC: 9.5 MG/DL (ref 8.6–10.2)
CHLORIDE SERPL-SCNC: 106 MMOL/L (ref 96–106)
CO2 SERPL-SCNC: 22 MMOL/L (ref 20–29)
CREAT SERPL-MCNC: 1.46 MG/DL (ref 0.76–1.27)
ERYTHROCYTE [DISTWIDTH] IN BLOOD BY AUTOMATED COUNT: 13.6 % (ref 12.3–15.4)
GLUCOSE SERPL-MCNC: 91 MG/DL (ref 65–99)
HCT VFR BLD AUTO: 44.3 % (ref 37.5–51)
HGB BLD-MCNC: 15.4 G/DL (ref 13–17.7)
MCH RBC QN AUTO: 31.8 PG (ref 26.6–33)
MCHC RBC AUTO-ENTMCNC: 34.8 G/DL (ref 31.5–35.7)
MCV RBC AUTO: 92 FL (ref 79–97)
PLATELET # BLD AUTO: 179 X10E3/UL (ref 150–450)
POTASSIUM SERPL-SCNC: 4.6 MMOL/L (ref 3.5–5.2)
RBC # BLD AUTO: 4.84 X10E6/UL (ref 4.14–5.8)
SODIUM SERPL-SCNC: 143 MMOL/L (ref 134–144)
WBC # BLD AUTO: 10.6 X10E3/UL (ref 3.4–10.8)

## 2019-11-25 ENCOUNTER — TELEPHONE (OUTPATIENT)
Dept: CARDIOLOGY | Facility: CLINIC | Age: 79
End: 2019-11-25

## 2019-11-25 NOTE — TELEPHONE ENCOUNTER
"PT requesting \"ok\" to hold Eliquis 2-3 days prior to upcoming colonoscopy- he has Afib and a Hx TIA/CVA  "

## 2019-12-05 PROBLEM — K57.90 DIVERTICULOSIS: Status: ACTIVE | Noted: 2019-12-05

## 2019-12-17 RX ORDER — LISINOPRIL 10 MG/1
TABLET ORAL
Qty: 90 TABLET | Refills: 0 | Status: SHIPPED | OUTPATIENT
Start: 2019-12-17 | End: 2020-05-15

## 2020-01-10 ENCOUNTER — OFFICE VISIT (OUTPATIENT)
Dept: CARDIOLOGY | Facility: CLINIC | Age: 80
End: 2020-01-10

## 2020-01-10 VITALS
BODY MASS INDEX: 23.84 KG/M2 | OXYGEN SATURATION: 99 % | HEART RATE: 49 BPM | WEIGHT: 176 LBS | SYSTOLIC BLOOD PRESSURE: 146 MMHG | DIASTOLIC BLOOD PRESSURE: 68 MMHG | HEIGHT: 72 IN

## 2020-01-10 DIAGNOSIS — E78.2 MIXED HYPERLIPIDEMIA: ICD-10-CM

## 2020-01-10 DIAGNOSIS — Q21.12 PFO (PATENT FORAMEN OVALE): ICD-10-CM

## 2020-01-10 DIAGNOSIS — I10 ESSENTIAL HYPERTENSION: ICD-10-CM

## 2020-01-10 DIAGNOSIS — I48.0 PAROXYSMAL ATRIAL FIBRILLATION (HCC): Primary | ICD-10-CM

## 2020-01-10 PROCEDURE — 99214 OFFICE O/P EST MOD 30 MIN: CPT | Performed by: INTERNAL MEDICINE

## 2020-01-10 RX ORDER — TAMSULOSIN HYDROCHLORIDE 0.4 MG/1
1 CAPSULE ORAL 2 TIMES DAILY
Status: ON HOLD | COMMUNITY
End: 2020-08-24

## 2020-01-10 NOTE — PROGRESS NOTES
[facility] Cardiology    Encounter Date:01/10/2020        Patient ID: Pio Baum is a 79 y.o. male.  : 1940     PCP: Lupillo Hill MD     Chief Complaint: Atrial Fibrillation      PROBLEM LIST:  1. Paroxysmal atrial fibrillation:  a. CHADS-VASc = 5 (HTN, Age > 75, h/o Stroke), on Eliquis 5 mg BID.   b. MPS, 2017: EF 58%, negative, low risk study  2. TIA/CVA:  a. Carotid duplex, 2016: No significant disease  b. Echocardiogram, 2016: Normal LV function, positive bubble study. Closure not considered due to need for chronic anticoagulation therapy.  c. Cardiac event monitor showed atrial fibrillation/flutter with intermittent RVR, aberrancy, IVCD/sinus rhythm with PVCs  3. Syncope:  a. 2 week Zio, 2019: SR, occasional PAC's and PVC's. Occasional short SVT/PAT, 21 runs at 3-20 beats.  b. Echocardiogram, 2019: EF 55%. Borderline right-sided chamber enlargement. Mild MR/TR, normal RVSP.  4. Hypertension  5. Dyslipidemia  6. Oral tobacco abuse  7. History of migraine headaches  8. Chronic kidney disease stage II  9. Surgical history:  a. Tonsillectomy/adenoictomy    History of Present Illness  Patient presents today for 6 month follow-up with a history of PAF and cardiac risk factors. Since last visit, he has stopped taking his nighttime dose of Eliquis due to persistent nosebleeds for 2 weeks. This was in December. He has otherwise been compliant with his medications, and has been feeling well overall from a cardiovascular standpoint. Patient denies chest pain, palpitations, shortness of breath, edema, dizziness, and syncope.      Allergies   Allergen Reactions   • Flonase [Fluticasone] Irritability     Gets a nose bleed as soon as used   • Penicillins Rash     Rash all over body including mouth/throat          Current Outpatient Medications:   •  apixaban (ELIQUIS) 5 MG tablet tablet, Take 1 tablet by mouth Every 12 (Twelve) Hours. (Patient  "taking differently: Take 5 mg by mouth Daily.), Disp: 180 tablet, Rfl: 3  •  atorvastatin (LIPITOR) 40 MG tablet, TAKE 1 TABLET EVERY DAY, Disp: 90 tablet, Rfl: 3  •  Coenzyme Q10 (COQ10 PO), Take 1 tablet by mouth Daily., Disp: , Rfl:   •  glucosamine sulfate 500 MG capsule capsule, Take 500 mg by mouth daily., Disp: , Rfl:   •  guaiFENesin (MUCINEX) 600 MG 12 hr tablet, Take 1,200 mg by mouth As Needed., Disp: , Rfl:   •  lisinopril (PRINIVIL,ZESTRIL) 10 MG tablet, TAKE 1 TABLET EVERY DAY, Disp: 90 tablet, Rfl: 0  •  metoprolol tartrate (LOPRESSOR) 25 MG tablet, TAKE 1/2 TABLET EVERY 12 HOURS, Disp: 90 tablet, Rfl: 3  •  Multiple Vitamin (MULTI VITAMIN DAILY PO), Take 1 tablet by mouth Daily., Disp: , Rfl:   •  tamsulosin (FLOMAX) 0.4 MG capsule 24 hr capsule, Take 1 capsule by mouth Daily., Disp: , Rfl:     The following portions of the patient's history were reviewed and updated as appropriate: allergies, current medications, past family history, past medical history, past social history, past surgical history and problem list.    ROS  Review of Systems   Constitution: Negative for chills, fatigue, fever, generalized weakness, weight gain and weight loss.   Cardiovascular: Negative for chest pain, claudication, dyspnea on exertion, leg swelling, orthopnea, palpitations, paroxysmal nocturnal dyspnea and syncope.   Respiratory: Negative for cough, shortness of breath, and wheezing.  HENT: Negative for ear pain, nosebleeds, and tinnitus.  Gastrointestinal: Negative for abdominal pain, constipation, diarrhea, nausea and vomiting.   Genitourinary: No urinary symptoms   Musculoskeletal: Negative for muscle cramps.  Neurological: Negative for dizziness, headaches, loss of balance, numbness, and symptoms of stroke.  Psychiatric: Normal mental status.     All other systems reviewed and are negative.    Objective:     /68 (BP Location: Right arm, Patient Position: Sitting)   Pulse (!) 49   Ht 182.9 cm (72\")   Wt " 79.8 kg (176 lb)   SpO2 99%   BMI 23.87 kg/m²        Physical Exam  Constitutional: Patient appears well-developed and well-nourished.   HENT: HEENT exam unremarkable.   Neck: Neck supple. No JVD present. No carotid bruits.   Cardiovascular: Normal rate, regular rhythm and normal heart sounds. No murmur heard.   2+ symmetric pulses.   Pulmonary/Chest: Breath sounds normal. Does not exhibit tenderness.   Abdominal: Abdomen benign.   Musculoskeletal: Does not exhibit edema.   Neurological: Neurological exam unremarkable.   Vitals reviewed.    Lab Review:   Lab Results   Component Value Date    GLUCOSE 97 05/16/2019    BUN 23 10/16/2019    CREATININE 1.46 (H) 10/16/2019    EGFRIFNONA 44 (L) 10/16/2019    EGFRIFAFRI 51 (L) 10/16/2019    BCR 16 10/16/2019    K 4.6 10/16/2019    CO2 22 10/16/2019    CALCIUM 9.5 10/16/2019    PROTENTOTREF 6.9 06/13/2019    ALBUMIN 4.30 06/13/2019    LABIL2 1.7 06/13/2019    AST 15 06/13/2019    ALT 10 06/13/2019     Lab Results   Component Value Date    CHOL 138 08/31/2018    CHLPL 115 06/13/2019    TRIG 70 06/13/2019    HDL 50 06/13/2019    LDL 51 06/13/2019      Lab Results   Component Value Date    WBC 10.6 10/16/2019    HGB 15.4 10/16/2019    HCT 44.3 10/16/2019    MCV 92 10/16/2019     10/16/2019     Lab Results   Component Value Date    TSH 1.061 01/25/2019        Procedures       Assessment:      Diagnosis Plan   1. Paroxysmal atrial fibrillation (CMS/HCC)  Get back on Eliquis 5 mg BID for stroke prophylaxis.    2. Essential hypertension  Well-controlled, continue lisinopril and metoprolol. Pt was reassured that a HR of 45-50 is fine if this does not make him dizzy. If he begins to feel dizzy, he can cut back his metoprolol to once a day.   3. Mixed hyperlipidemia  Well-controlled, continue atorvastatin 40 mg.   4. PFO (patent foramen ovale)  Get back on Eliquis 5 mg BID for stroke prophylaxis.      Plan:   Get back on Eliquis 5 mg BID for stroke prophylaxis.   He is  concerned about his low heart rate however on cardiac monitor his average heart rate was in 50s, he has no current symptoms of dizziness lightheadedness or syncope.  I explained to him that in the absence of symptoms there is no need to adjust his medications.  Continue current medications.   FU in 6 MO, sooner as needed.  Thank you for allowing us to participate in the care of your patient.     Scribed for Yahaira Carson MD by Erna Benson. 1/10/2020  1:19 PM      I, Yahaira Carson MD, personally performed the services described in this documentation as scribed by the above named individual in my presence, and it is both accurate and complete.  1/10/2020  1:28 PM        Please note that portions of this note may have been completed with a voice recognition program. Efforts were made to edit the dictations, but occasionally words are mistranscribed.

## 2020-03-12 ENCOUNTER — TELEPHONE (OUTPATIENT)
Dept: INTERNAL MEDICINE | Facility: CLINIC | Age: 80
End: 2020-03-12

## 2020-03-12 DIAGNOSIS — I10 ESSENTIAL HYPERTENSION, MALIGNANT: Primary | ICD-10-CM

## 2020-03-12 DIAGNOSIS — E78.2 MIXED HYPERLIPIDEMIA: ICD-10-CM

## 2020-03-12 NOTE — TELEPHONE ENCOUNTER
PT. CALLED AND CANCELED APPT. BECAUSE HE DID NOT WANT TO COME IN WITH THE VIRUS.     HOWEVER PT. IS REQUESTING THAT HE CAN STILL GET HIS BLOOD DRAWN AT [facility].    PLEASE CONTACT PT. [phone]

## 2020-03-13 ENCOUNTER — LAB REQUISITION (OUTPATIENT)
Dept: LAB | Facility: HOSPITAL | Age: 80
End: 2020-03-13

## 2020-03-13 DIAGNOSIS — Z00.00 ROUTINE GENERAL MEDICAL EXAMINATION AT A HEALTH CARE FACILITY: ICD-10-CM

## 2020-03-13 LAB
ALBUMIN SERPL-MCNC: 4.2 G/DL (ref 3.5–5.2)
ALBUMIN/GLOB SERPL: 1.6 G/DL
ALP SERPL-CCNC: 99 U/L (ref 39–117)
ALT SERPL-CCNC: 14 U/L (ref 1–41)
AST SERPL-CCNC: 8 U/L (ref 1–40)
BASOPHILS # BLD AUTO: 0.09 10*3/MM3 (ref 0–0.2)
BASOPHILS NFR BLD AUTO: 1.1 % (ref 0–1.5)
BILIRUB SERPL-MCNC: 0.7 MG/DL (ref 0.2–1.2)
BUN SERPL-MCNC: 21 MG/DL (ref 8–23)
BUN/CREAT SERPL: 14.5 (ref 7–25)
CALCIUM SERPL-MCNC: 9.1 MG/DL (ref 8.6–10.5)
CHLORIDE SERPL-SCNC: 104 MMOL/L (ref 98–107)
CHOLEST SERPL-MCNC: 125 MG/DL (ref 0–200)
CO2 SERPL-SCNC: 23.4 MMOL/L (ref 22–29)
CREAT SERPL-MCNC: 1.45 MG/DL (ref 0.76–1.27)
EOSINOPHIL # BLD AUTO: 0.11 10*3/MM3 (ref 0–0.4)
EOSINOPHIL NFR BLD AUTO: 1.4 % (ref 0.3–6.2)
ERYTHROCYTE [DISTWIDTH] IN BLOOD BY AUTOMATED COUNT: 12.7 % (ref 12.3–15.4)
GLOBULIN SER CALC-MCNC: 2.6 GM/DL
GLUCOSE SERPL-MCNC: 95 MG/DL (ref 65–99)
HCT VFR BLD AUTO: 44.2 % (ref 37.5–51)
HDLC SERPL-MCNC: 53 MG/DL (ref 40–60)
HGB BLD-MCNC: 14.7 G/DL (ref 13–17.7)
IMM GRANULOCYTES # BLD AUTO: 0.01 10*3/MM3 (ref 0–0.05)
IMM GRANULOCYTES NFR BLD AUTO: 0.1 % (ref 0–0.5)
LDLC SERPL CALC-MCNC: 59 MG/DL (ref 0–100)
LYMPHOCYTES # BLD AUTO: 2.46 10*3/MM3 (ref 0.7–3.1)
LYMPHOCYTES NFR BLD AUTO: 30.5 % (ref 19.6–45.3)
MCH RBC QN AUTO: 31.1 PG (ref 26.6–33)
MCHC RBC AUTO-ENTMCNC: 33.3 G/DL (ref 31.5–35.7)
MCV RBC AUTO: 93.6 FL (ref 79–97)
MONOCYTES # BLD AUTO: 0.49 10*3/MM3 (ref 0.1–0.9)
MONOCYTES NFR BLD AUTO: 6.1 % (ref 5–12)
NEUTROPHILS # BLD AUTO: 4.91 10*3/MM3 (ref 1.7–7)
NEUTROPHILS NFR BLD AUTO: 60.8 % (ref 42.7–76)
NRBC BLD AUTO-RTO: 0 /100 WBC (ref 0–0.2)
PLATELET # BLD AUTO: 152 10*3/MM3 (ref 140–450)
POTASSIUM SERPL-SCNC: 4.1 MMOL/L (ref 3.5–5.2)
PROT SERPL-MCNC: 6.8 G/DL (ref 6–8.5)
RBC # BLD AUTO: 4.72 10*6/MM3 (ref 4.14–5.8)
SODIUM SERPL-SCNC: 138 MMOL/L (ref 136–145)
TRIGL SERPL-MCNC: 67 MG/DL (ref 0–150)
TSH SERPL DL<=0.005 MIU/L-ACNC: 1.67 UIU/ML (ref 0.27–4.2)
VLDLC SERPL CALC-MCNC: 13.4 MG/DL
WBC # BLD AUTO: 8.07 10*3/MM3 (ref 3.4–10.8)

## 2020-03-13 PROCEDURE — 36415 COLL VENOUS BLD VENIPUNCTURE: CPT | Performed by: INTERNAL MEDICINE

## 2020-05-15 RX ORDER — LISINOPRIL 10 MG/1
TABLET ORAL
Qty: 90 TABLET | Refills: 0 | Status: SHIPPED | OUTPATIENT
Start: 2020-05-15 | End: 2020-08-07 | Stop reason: SDUPTHER

## 2020-06-02 DIAGNOSIS — I48.91 ATRIAL FIBRILLATION, CONTROLLED (HCC): ICD-10-CM

## 2020-06-02 RX ORDER — APIXABAN 5 MG/1
TABLET, FILM COATED ORAL
Qty: 180 TABLET | Refills: 3 | Status: ON HOLD | OUTPATIENT
Start: 2020-06-02 | End: 2020-08-24

## 2020-08-05 ENCOUNTER — TELEPHONE (OUTPATIENT)
Dept: INTERNAL MEDICINE | Facility: CLINIC | Age: 80
End: 2020-08-05

## 2020-08-05 DIAGNOSIS — I10 ESSENTIAL HYPERTENSION, MALIGNANT: ICD-10-CM

## 2020-08-05 DIAGNOSIS — E78.2 MIXED HYPERLIPIDEMIA: Primary | ICD-10-CM

## 2020-08-05 NOTE — TELEPHONE ENCOUNTER
WIFE IS REQUESTING AN ORDER FOR LABS FOR PT.    WIFE STATES THAT HE WILL GO TO [facility].    PLEASE CALL WHEN AVAILABLE.      PLEASE ADVISE  [phone]

## 2020-08-07 ENCOUNTER — OFFICE VISIT (OUTPATIENT)
Dept: CARDIOLOGY | Facility: CLINIC | Age: 80
End: 2020-08-07

## 2020-08-07 VITALS
BODY MASS INDEX: 24 KG/M2 | TEMPERATURE: 98.2 F | HEART RATE: 56 BPM | HEIGHT: 72 IN | WEIGHT: 177.2 LBS | OXYGEN SATURATION: 99 % | DIASTOLIC BLOOD PRESSURE: 78 MMHG | SYSTOLIC BLOOD PRESSURE: 122 MMHG

## 2020-08-07 DIAGNOSIS — I10 ESSENTIAL HYPERTENSION: ICD-10-CM

## 2020-08-07 DIAGNOSIS — E78.2 MIXED HYPERLIPIDEMIA: ICD-10-CM

## 2020-08-07 DIAGNOSIS — R55 PRE-SYNCOPE: ICD-10-CM

## 2020-08-07 DIAGNOSIS — I48.0 PAROXYSMAL ATRIAL FIBRILLATION (HCC): Primary | ICD-10-CM

## 2020-08-07 PROCEDURE — 99214 OFFICE O/P EST MOD 30 MIN: CPT | Performed by: INTERNAL MEDICINE

## 2020-08-07 RX ORDER — LISINOPRIL 10 MG/1
10 TABLET ORAL 2 TIMES DAILY
Qty: 180 TABLET | Refills: 3 | Status: ON HOLD | OUTPATIENT
Start: 2020-08-07 | End: 2020-08-24

## 2020-08-07 NOTE — PROGRESS NOTES
[facility] Cardiology    Encounter Date: 2020    Patient ID: Pio Baum is a 80 y.o. male.  : 1940     PCP: Lupillo Hill MD       Chief Complaint: Atrial Fibrillation      PROBLEM LIST:  1. Paroxysmal atrial fibrillation:  a. CHADS-VASc = 5 (HTN, Age > 75, h/o Stroke), on Eliquis 5 mg BID.   b. MPS, 2017: EF 58%, negative, low risk study  2. TIA/CVA:  a. Carotid duplex, 2016: No significant disease  b. Echocardiogram, 2016: Normal LV function, positive bubble study. Closure not considered due to need for chronic anticoagulation therapy.  c. Cardiac event monitor showed atrial fibrillation/flutter with intermittent RVR, aberrancy, IVCD/sinus rhythm with PVCs  3. Syncope:  a. 2 week Zio, 2019: SR, occasional PAC's and PVC's. Occasional short SVT/PAT, 21 runs at 3-20 beats.  b. Echocardiogram, 2019: EF 55%. Borderline right-sided chamber enlargement. Mild MR/TR, normal RVSP.  4. Hypertension  5. Dyslipidemia  6. Oral tobacco abuse  7. History of migraine headaches  8. Chronic kidney disease stage II  9. Surgical history:  a. Tonsillectomy/adenoictomy       History of Present Illness  Patient presents today for a 6 month follow-up with a history of PAF, syncope, TIA, and cardiac risk factors. Since last visit, he has had one episode of pre-syncope. This was on 07/15/2020 and occurred after he had been standing in the sun talking with friends. He started feeling dizzy and sat down. His dizziness persisted, and patient drank water and lied down. Patient reports his wife talked to an RN here at the office who told him to decrease his metoprolol from 25 to 12.5 mg BID due to a heart rate in the 40s. He subsequently stopped taking metoprolol altogether due to a HR to the 50's on this lower dose. Patient reports he was also instructed to increase his lisinopril to 10 mg BID to continue managing his blood pressure. His BP has been  well-controlled on this new medication regimen.       Allergies   Allergen Reactions   • Flonase [Fluticasone] Irritability     Gets a nose bleed as soon as used   • Penicillins Rash     Rash all over body including mouth/throat          Current Outpatient Medications:   •  atorvastatin (LIPITOR) 40 MG tablet, TAKE 1 TABLET EVERY DAY, Disp: 90 tablet, Rfl: 3  •  Coenzyme Q10 (COQ10 PO), Take 1 tablet by mouth Daily., Disp: , Rfl:   •  ELIQUIS 5 MG tablet tablet, TAKE 1 TABLET EVERY 12 HOURS, Disp: 180 tablet, Rfl: 3  •  glucosamine sulfate 500 MG capsule capsule, Take 500 mg by mouth daily., Disp: , Rfl:   •  guaiFENesin (MUCINEX) 600 MG 12 hr tablet, Take 1,200 mg by mouth As Needed., Disp: , Rfl:   •  lisinopril (PRINIVIL,ZESTRIL) 10 MG tablet, TAKE 1 TABLET EVERY DAY, Disp: 90 tablet, Rfl: 0  •  Multiple Vitamin (MULTI VITAMIN DAILY PO), Take 1 tablet by mouth Daily., Disp: , Rfl:   •  tamsulosin (FLOMAX) 0.4 MG capsule 24 hr capsule, Take 1 capsule by mouth Daily., Disp: , Rfl:   •  metoprolol tartrate (LOPRESSOR) 25 MG tablet, TAKE 1/2 TABLET EVERY 12 HOURS (Patient taking differently: Stopped on 7/24/2020), Disp: 90 tablet, Rfl: 3    The following portions of the patient's history were reviewed and updated as appropriate: allergies, current medications, past family history, past medical history, past social history, past surgical history and problem list.    ROS  Review of Systems   Constitution: Negative for chills, fever, fatigue, generalized weakness.   Cardiovascular: Negative for chest pain, dyspnea on exertion, leg swelling, palpitations, orthopnea, and syncope.   Respiratory: Negative for cough, shortness of breath, and wheezing.  HENT: Negative for ear pain, nosebleeds, and tinnitus.  Gastrointestinal: Negative for abdominal pain, constipation, diarrhea, nausea and vomiting.   Genitourinary: No urinary symptoms.  Musculoskeletal: Negative for muscle cramps.  Neurological: Negative for headaches, loss  "of balance, numbness, and symptoms of stroke. Positive for dizziness.  Psychiatric: Normal mental status.     All other systems reviewed and are negative.        Objective:     /78 (BP Location: Left arm, Patient Position: Sitting)   Pulse 56   Temp 98.2 °F (36.8 °C)   Ht 182.9 cm (72\")   Wt 80.4 kg (177 lb 3.2 oz)   SpO2 99%   BMI 24.03 kg/m²      Physical Exam  Constitutional: Patient appears well-developed and well-nourished.   HENT: HEENT exam unremarkable.   Neck: Neck supple. No JVD present. No carotid bruits.   Cardiovascular: Normal rate, regular rhythm and normal heart sounds. No murmur heard.   2+ symmetric pulses.   Pulmonary/Chest: Breath sounds normal. Does not exhibit tenderness.   Abdominal: Abdomen benign.   Musculoskeletal: Does not exhibit edema.   Neurological: Neurological exam unremarkable.   Vitals reviewed.    Data Review:   Lab Results   Component Value Date    GLUCOSE 97 05/16/2019    BUN 21 03/13/2020    CREATININE 1.45 (H) 03/13/2020    EGFRIFNONA 47 (L) 03/13/2020    EGFRIFAFRI 56 (L) 03/13/2020    BCR 14.5 03/13/2020    K 4.1 03/13/2020    CO2 23.4 03/13/2020    CALCIUM 9.1 03/13/2020    ALBUMIN 4.20 03/13/2020    AST 8 03/13/2020    ALT 14 03/13/2020     Lab Results   Component Value Date    CHOL 138 08/31/2018    CHLPL 125 03/13/2020    TRIG 67 03/13/2020    HDL 53 03/13/2020    LDL 59 03/13/2020      Lab Results   Component Value Date    WBC 8.07 03/13/2020    RBC 4.72 03/13/2020    HGB 14.7 03/13/2020    HCT 44.2 03/13/2020    MCV 93.6 03/13/2020     03/13/2020     Lab Results   Component Value Date    TSH 1.670 03/13/2020     Lab Results   Component Value Date    HGBA1C 5.3 04/20/2016        Procedures       Assessment:      Diagnosis Plan   1. Paroxysmal atrial fibrillation (CMS/HCC)  Discontinue metoprolol due to symptomatic bradycardia. Continue Eliquis for stroke prophylaxis.   2. Pre-syncope  Discontinue metoprolol due to symptomatic bradycardia. Will place a " heart monitor if patient continues to experience pre-syncope.   3. Essential hypertension  Discontinue metoprolol due to symptomatic bradycardia. New prescription sent for lisinopril 10 mg BID.    4. Mixed hyperlipidemia  Well-controlled, continue atorvastatin 40 mg daily.     Plan:   Discontinue metoprolol due to symptomatic bradycardia.  In case of recurrence of dizziness or near syncope he is advised to contact us as he may need repeat heart rhythm assessment and consideration of pacemaker implant.  New prescription sent for lisinopril 10 mg BID.  Pressure control is improved.  Continue all other current medications.   FU in 3 MO, sooner as needed.  Thank you for allowing us to participate in the care of your patient.     Scribed for Yahaira Carson MD by Erna Benson. 8/7/2020  10:35     I, Yahaira Carson MD, personally performed the services described in this documentation as scribed by the above named individual in my presence, and it is both accurate and complete.  8/7/2020  13:06        Please note that portions of this note may have been completed with a voice recognition program. Efforts were made to edit the dictations, but occasionally words are mistranscribed.

## 2020-08-11 ENCOUNTER — LAB REQUISITION (OUTPATIENT)
Dept: LAB | Facility: HOSPITAL | Age: 80
End: 2020-08-11

## 2020-08-11 DIAGNOSIS — R53.83 FATIGUE, UNSPECIFIED TYPE: Primary | ICD-10-CM

## 2020-08-11 DIAGNOSIS — E55.9 VITAMIN D DEFICIENCY, UNSPECIFIED: ICD-10-CM

## 2020-08-11 DIAGNOSIS — Z00.00 ROUTINE GENERAL MEDICAL EXAMINATION AT A HEALTH CARE FACILITY: ICD-10-CM

## 2020-08-11 PROCEDURE — 36415 COLL VENOUS BLD VENIPUNCTURE: CPT | Performed by: INTERNAL MEDICINE

## 2020-08-12 LAB
ALBUMIN SERPL-MCNC: 4.2 G/DL (ref 3.5–5.2)
ALBUMIN/GLOB SERPL: 2.5 G/DL
ALP SERPL-CCNC: 86 U/L (ref 39–117)
ALT SERPL-CCNC: 12 U/L (ref 1–41)
AST SERPL-CCNC: 10 U/L (ref 1–40)
BILIRUB SERPL-MCNC: 0.7 MG/DL (ref 0–1.2)
BUN SERPL-MCNC: 23 MG/DL (ref 8–23)
BUN/CREAT SERPL: 16.5 (ref 7–25)
CALCIUM SERPL-MCNC: 8.8 MG/DL (ref 8.6–10.5)
CHLORIDE SERPL-SCNC: 105 MMOL/L (ref 98–107)
CHOLEST SERPL-MCNC: 110 MG/DL (ref 0–200)
CO2 SERPL-SCNC: 24.7 MMOL/L (ref 22–29)
CREAT SERPL-MCNC: 1.39 MG/DL (ref 0.76–1.27)
ERYTHROCYTE [DISTWIDTH] IN BLOOD BY AUTOMATED COUNT: 12.7 % (ref 12.3–15.4)
GLOBULIN SER CALC-MCNC: 1.7 GM/DL
GLUCOSE SERPL-MCNC: 91 MG/DL (ref 65–99)
HCT VFR BLD AUTO: 41.2 % (ref 37.5–51)
HDLC SERPL-MCNC: 49 MG/DL (ref 40–60)
HGB BLD-MCNC: 14.1 G/DL (ref 13–17.7)
LDLC SERPL CALC-MCNC: 47 MG/DL (ref 0–100)
MCH RBC QN AUTO: 31.9 PG (ref 26.6–33)
MCHC RBC AUTO-ENTMCNC: 34.2 G/DL (ref 31.5–35.7)
MCV RBC AUTO: 93.2 FL (ref 79–97)
PLATELET # BLD AUTO: 152 10*3/MM3 (ref 140–450)
POTASSIUM SERPL-SCNC: 4.1 MMOL/L (ref 3.5–5.2)
PROT SERPL-MCNC: 5.9 G/DL (ref 6–8.5)
RBC # BLD AUTO: 4.42 10*6/MM3 (ref 4.14–5.8)
SODIUM SERPL-SCNC: 139 MMOL/L (ref 136–145)
TRIGL SERPL-MCNC: 70 MG/DL (ref 0–150)
VLDLC SERPL CALC-MCNC: 14 MG/DL
WBC # BLD AUTO: 7.57 10*3/MM3 (ref 3.4–10.8)

## 2020-08-21 ENCOUNTER — APPOINTMENT (OUTPATIENT)
Dept: GENERAL RADIOLOGY | Facility: HOSPITAL | Age: 80
End: 2020-08-21

## 2020-08-21 ENCOUNTER — HOSPITAL ENCOUNTER (INPATIENT)
Facility: HOSPITAL | Age: 80
LOS: 5 days | Discharge: HOME | End: 2020-08-26
Attending: EMERGENCY MEDICINE | Admitting: INTERNAL MEDICINE

## 2020-08-21 DIAGNOSIS — Z86.79 HISTORY OF ATRIAL FIBRILLATION: ICD-10-CM

## 2020-08-21 DIAGNOSIS — E86.0 DEHYDRATION: ICD-10-CM

## 2020-08-21 DIAGNOSIS — I31.39 PERICARDIAL EFFUSION: ICD-10-CM

## 2020-08-21 DIAGNOSIS — I48.0 PAROXYSMAL ATRIAL FIBRILLATION (HCC): ICD-10-CM

## 2020-08-21 DIAGNOSIS — N18.2 STAGE 2 CHRONIC KIDNEY DISEASE: ICD-10-CM

## 2020-08-21 DIAGNOSIS — R00.1 SYMPTOMATIC BRADYCARDIA: ICD-10-CM

## 2020-08-21 DIAGNOSIS — I49.5 SICK SINUS SYNDROME (HCC): ICD-10-CM

## 2020-08-21 DIAGNOSIS — R55 SYNCOPE AND COLLAPSE: Primary | ICD-10-CM

## 2020-08-21 LAB
ALBUMIN SERPL-MCNC: 3.8 G/DL (ref 3.5–5.2)
ALBUMIN/GLOB SERPL: 1.7 G/DL
ALP SERPL-CCNC: 81 U/L (ref 39–117)
ALT SERPL W P-5'-P-CCNC: 13 U/L (ref 1–41)
ANION GAP SERPL CALCULATED.3IONS-SCNC: 7 MMOL/L (ref 5–15)
AST SERPL-CCNC: 16 U/L (ref 1–40)
BASOPHILS # BLD AUTO: 0.1 10*3/MM3 (ref 0–0.2)
BASOPHILS NFR BLD AUTO: 1 % (ref 0–1.5)
BILIRUB SERPL-MCNC: 0.5 MG/DL (ref 0–1.2)
BILIRUB UR QL STRIP: NEGATIVE
BUN SERPL-MCNC: 21 MG/DL (ref 8–23)
BUN/CREAT SERPL: 13.8 (ref 7–25)
CALCIUM SPEC-SCNC: 8.7 MG/DL (ref 8.6–10.5)
CHLORIDE SERPL-SCNC: 108 MMOL/L (ref 98–107)
CLARITY UR: CLEAR
CO2 SERPL-SCNC: 23 MMOL/L (ref 22–29)
COLOR UR: YELLOW
CREAT SERPL-MCNC: 1.52 MG/DL (ref 0.76–1.27)
DEPRECATED RDW RBC AUTO: 44.3 FL (ref 37–54)
EOSINOPHIL # BLD AUTO: 0.05 10*3/MM3 (ref 0–0.4)
EOSINOPHIL NFR BLD AUTO: 0.5 % (ref 0.3–6.2)
ERYTHROCYTE [DISTWIDTH] IN BLOOD BY AUTOMATED COUNT: 12.8 % (ref 12.3–15.4)
GFR SERPL CREATININE-BSD FRML MDRD: 44 ML/MIN/1.73
GLOBULIN UR ELPH-MCNC: 2.3 GM/DL
GLUCOSE BLDC GLUCOMTR-MCNC: 142 MG/DL (ref 70–130)
GLUCOSE SERPL-MCNC: 114 MG/DL (ref 65–99)
GLUCOSE UR STRIP-MCNC: NEGATIVE MG/DL
HCT VFR BLD AUTO: 40.3 % (ref 37.5–51)
HGB BLD-MCNC: 13.2 G/DL (ref 13–17.7)
HGB UR QL STRIP.AUTO: NEGATIVE
HOLD SPECIMEN: NORMAL
HOLD SPECIMEN: NORMAL
IMM GRANULOCYTES # BLD AUTO: 0.03 10*3/MM3 (ref 0–0.05)
IMM GRANULOCYTES NFR BLD AUTO: 0.3 % (ref 0–0.5)
KETONES UR QL STRIP: NEGATIVE
LEUKOCYTE ESTERASE UR QL STRIP.AUTO: NEGATIVE
LYMPHOCYTES # BLD AUTO: 1.25 10*3/MM3 (ref 0.7–3.1)
LYMPHOCYTES NFR BLD AUTO: 12.8 % (ref 19.6–45.3)
MAGNESIUM SERPL-MCNC: 2.2 MG/DL (ref 1.6–2.4)
MCH RBC QN AUTO: 31.1 PG (ref 26.6–33)
MCHC RBC AUTO-ENTMCNC: 32.8 G/DL (ref 31.5–35.7)
MCV RBC AUTO: 95 FL (ref 79–97)
MONOCYTES # BLD AUTO: 0.59 10*3/MM3 (ref 0.1–0.9)
MONOCYTES NFR BLD AUTO: 6.1 % (ref 5–12)
NEUTROPHILS NFR BLD AUTO: 7.71 10*3/MM3 (ref 1.7–7)
NEUTROPHILS NFR BLD AUTO: 79.3 % (ref 42.7–76)
NITRITE UR QL STRIP: NEGATIVE
NRBC BLD AUTO-RTO: 0 /100 WBC (ref 0–0.2)
PH UR STRIP.AUTO: <=5 [PH] (ref 5–8)
PLATELET # BLD AUTO: 139 10*3/MM3 (ref 140–450)
PMV BLD AUTO: 11.6 FL (ref 6–12)
POTASSIUM SERPL-SCNC: 4.7 MMOL/L (ref 3.5–5.2)
PROT SERPL-MCNC: 6.1 G/DL (ref 6–8.5)
PROT UR QL STRIP: NEGATIVE
RBC # BLD AUTO: 4.24 10*6/MM3 (ref 4.14–5.8)
SODIUM SERPL-SCNC: 138 MMOL/L (ref 136–145)
SP GR UR STRIP: 1.01 (ref 1–1.03)
TROPONIN T SERPL-MCNC: <0.01 NG/ML (ref 0–0.03)
UROBILINOGEN UR QL STRIP: NORMAL
WBC # BLD AUTO: 9.73 10*3/MM3 (ref 3.4–10.8)
WHOLE BLOOD HOLD SPECIMEN: NORMAL
WHOLE BLOOD HOLD SPECIMEN: NORMAL

## 2020-08-21 PROCEDURE — 99285 EMERGENCY DEPT VISIT HI MDM: CPT

## 2020-08-21 PROCEDURE — 80053 COMPREHEN METABOLIC PANEL: CPT | Performed by: EMERGENCY MEDICINE

## 2020-08-21 PROCEDURE — G0378 HOSPITAL OBSERVATION PER HR: HCPCS

## 2020-08-21 PROCEDURE — 83735 ASSAY OF MAGNESIUM: CPT | Performed by: EMERGENCY MEDICINE

## 2020-08-21 PROCEDURE — 93005 ELECTROCARDIOGRAM TRACING: CPT | Performed by: EMERGENCY MEDICINE

## 2020-08-21 PROCEDURE — 71045 X-RAY EXAM CHEST 1 VIEW: CPT

## 2020-08-21 PROCEDURE — 85025 COMPLETE CBC W/AUTO DIFF WBC: CPT | Performed by: EMERGENCY MEDICINE

## 2020-08-21 PROCEDURE — 99222 1ST HOSP IP/OBS MODERATE 55: CPT | Performed by: INTERNAL MEDICINE

## 2020-08-21 PROCEDURE — 84484 ASSAY OF TROPONIN QUANT: CPT | Performed by: EMERGENCY MEDICINE

## 2020-08-21 PROCEDURE — 82962 GLUCOSE BLOOD TEST: CPT

## 2020-08-21 PROCEDURE — 81003 URINALYSIS AUTO W/O SCOPE: CPT | Performed by: EMERGENCY MEDICINE

## 2020-08-21 RX ORDER — GUAIFENESIN 600 MG/1
600 TABLET, EXTENDED RELEASE ORAL EVERY 12 HOURS SCHEDULED
Status: DISCONTINUED | OUTPATIENT
Start: 2020-08-21 | End: 2020-08-24

## 2020-08-21 RX ORDER — SODIUM CHLORIDE 450 MG/100ML
75 INJECTION, SOLUTION INTRAVENOUS CONTINUOUS
Status: DISCONTINUED | OUTPATIENT
Start: 2020-08-21 | End: 2020-08-22

## 2020-08-21 RX ORDER — CHOLECALCIFEROL (VITAMIN D3) 125 MCG
5 CAPSULE ORAL NIGHTLY PRN
Status: DISCONTINUED | OUTPATIENT
Start: 2020-08-21 | End: 2020-08-26 | Stop reason: HOSPADM

## 2020-08-21 RX ORDER — METOPROLOL SUCCINATE 25 MG/1
12.5 TABLET, EXTENDED RELEASE ORAL EVERY 12 HOURS
Status: ON HOLD | COMMUNITY
End: 2020-08-21

## 2020-08-21 RX ORDER — SODIUM CHLORIDE 0.9 % (FLUSH) 0.9 %
10 SYRINGE (ML) INJECTION AS NEEDED
Status: DISCONTINUED | OUTPATIENT
Start: 2020-08-21 | End: 2020-08-24

## 2020-08-21 RX ORDER — ONDANSETRON 2 MG/ML
2 INJECTION INTRAMUSCULAR; INTRAVENOUS EVERY 4 HOURS PRN
Status: DISCONTINUED | OUTPATIENT
Start: 2020-08-21 | End: 2020-08-24

## 2020-08-21 RX ORDER — ATORVASTATIN CALCIUM 40 MG/1
40 TABLET, FILM COATED ORAL NIGHTLY
Status: DISCONTINUED | OUTPATIENT
Start: 2020-08-21 | End: 2020-08-26 | Stop reason: HOSPADM

## 2020-08-21 RX ORDER — TAMSULOSIN HYDROCHLORIDE 0.4 MG/1
0.4 CAPSULE ORAL DAILY
Status: DISCONTINUED | OUTPATIENT
Start: 2020-08-21 | End: 2020-08-25

## 2020-08-21 RX ADMIN — SODIUM CHLORIDE 75 ML/HR: 4.5 INJECTION, SOLUTION INTRAVENOUS at 16:44

## 2020-08-21 RX ADMIN — ATORVASTATIN CALCIUM 40 MG: 40 TABLET, FILM COATED ORAL at 21:58

## 2020-08-21 NOTE — PROGRESS NOTES
Discharge Planning Assessment  [facility]     Patient Name: Pio Baum  MRN: [id number]  Today's Date: 8/21/2020    Admit Date: 8/21/2020    Discharge Needs Assessment     Row Name 08/21/20 1432       Living Environment    Lives With  spouse    Name(s) of Who Lives With Patient  Spouse Luther Sloan [phone]    Current Living Arrangements  home/apartment/condo    Primary Care Provided by  self    Provides Primary Care For  no one    Family Caregiver if Needed  spouse    Family Caregiver Names  Spouse - Willow Sloan [phone]    Quality of Family Relationships  helpful;involved;supportive    Able to Return to Prior Arrangements  yes       Resource/Environmental Concerns    Resource/Environmental Concerns  none    Transportation Concerns  car, none       Transition Planning    Patient/Family Anticipates Transition to  home with family    Transportation Anticipated  family or friend will provide       Discharge Needs Assessment    Readmission Within the Last 30 Days  no previous admission in last 30 days    Concerns to be Addressed  denies needs/concerns at this time    Equipment Currently Used at Home  none    Anticipated Changes Related to Illness  none        Discharge Plan     Row Name 08/21/20 1437       Plan    Plan  Initial    Plan Comments  CM spoke with patient via phone. Patient resides in [place] [facility] with his spouse. Patient states he is independent with ADL's. Patient states he uses no DME. Patient denies any current HH or OP services. Current discharge plan is to return home with spouse. CM following.     Final Discharge Disposition Code  30 - still a patient               Demographic Summary     Row Name 08/21/20 1430       General Information    Arrived From  home    Referral Source  emergency department    Reason for Consult  discharge planning    Preferred Language  English     Used During This Interaction  no    General Information Comments  PCP: Lupillo  Liz       Contact Information    Contact Information Comments  Spouse - Willow Baum - [phone]        Functional Status     Row Name 08/21/20 1432       Functional Status    Usual Activity Tolerance  good    Current Activity Tolerance  good       Functional Status, IADL    Medications  independent    Meal Preparation  independent    Housekeeping  independent    Laundry  independent    Shopping  independent       Mental Status    General Appearance WDL  WDL       Mental Status Summary    Recent Changes in Mental Status/Cognitive Functioning  no changes       Employment/    Employment Status  retired    Employment/ Comments  Patient states he is able to afford/obtain his medications without difficulty                   Vivian Mora RN

## 2020-08-21 NOTE — H&P
[facility] Consult    Chief Complaint: Syncope    HPI: 80-year-old male with a history of prior CVA, PAF, hyperlipidemia and hypertension presents after an episode of syncope.  In 2017 he had a negative nuclear perfusion scan.  He had prior echocardiogram 2019 that was negative.  He had a Holter monitor which showed short rounds PAF.  The patient has had 2 episodes of near syncope and an episode of syncope today in the last 6 weeks.  He states he has this feeling received dots in his vision his wife says he looks very gray when that happens.  The patient was out working in his yard today and he got him a bottle of water while talking to his neighbor again this came on him and he went out.  He was also in the hospital several weeks ago with stitches after falling and hitting his head.  He has had no dyspnea, no tightness having squeezing pressure chest jaw throat arms no palpitations no edema no claudication    ROS:     Cardiac: History of PAF.  Negative stress test 2017.  No chest pain no dyspnea and no edema     Respiratory: No hemoptysis no wheezing no COPD     Lower Extremities: No lesions     GI: No nausea vomiting diarrhea bright red blood per rectum or melena     Neuro: Prior CVA with complete recovery.  No radicular pain     Hematology: Takes anticoagulation therapy but no ecchymosis or petechia      atorvastatin 40 mg Oral Nightly   guaiFENesin 600 mg Oral Q12H   tamsulosin 0.4 mg Oral Daily           Cardiac risk factors #1 male sex #2 advanced age #3 hypertension #4 hyperlipidemia     History:  Family History   Problem Relation Age of Onset   • Arthritis Other    • Hyperlipidemia Other    • Stroke Other    • Heart attack Mother    • Heart attack Father      Past Surgical History:   Procedure Laterality Date   • TONSILLECTOMY AND ADENOIDECTOMY        Past Medical History:   Diagnosis Date   • A-fib (CMS/[facility])    • Chronic kidney disease    • History of migraine headaches    •  Hyperlipidemia    • Hypertension    • Mitral valve regurgitation 12/1/2016   • PFO (patent foramen ovale)    • Stroke (CMS/HCC)      Social History     Tobacco Use   Smoking Status Never Smoker   Smokeless Tobacco Current User   • Types: Chew   Tobacco Comment    chews once every 4-5 months     Social History     Substance and Sexual Activity   Alcohol Use Yes    Comment: 1-2 drinks a week          Physical Exam: General well-developed well-nourished male who looks younger than stated age not dyspneic not tachypneic current heart rate 48 systolic blood pressure 120       HEENT: No JVP or bruits, tongue midline, mucous membranes are are dry.  There is no icterus       Respiratory: Equal bilateral symmetrical expansion clear auscultation bilaterally       Cardiovascular: Bradycardic but regular without murmur gallop or click and no edema 2+ PT pulses bilaterally       GI: Positive bowel sounds nontender to palpation       Lower Extremities: No lesions       Neuro: Facial expressions are symmetrical moves all 4 extremities handgrip strength equal bilaterally 4/5       Skin: Warm and dry no edema to palpation       Psych: Pleasant affect oriented x3    Results Review: Troponin is negative.  GFR is 44 but it was 59 recently.    Cardiographics  ECG: Sinus bradycardia no ischemia  Echocardiogram: Last year showed EF 35%    Radiology Reports:  Imaging Results (Last 72 Hours)     Procedure Component Value Units Date/Time    XR Chest 1 View [[id number]] Collected:  08/21/20 1353     Updated:  08/21/20 1357    Narrative:       EXAMINATION: XR CHEST 1 VW-      INDICATION: Weak/Dizzy/AMS triage protocol      COMPARISON: May 7, 2019     FINDINGS: Mild cardiac enlargement. No pleural effusion or pneumothorax.  No focal consolidation.       Impression:       Mild cardiac enlargement without evidence of additional  acute cardiopulmonary abnormality.     This report was finalized on 8/21/2020 1:54 PM by Rex Covington.              Lab Results:  Lab Results   Component Value Date    TROPONINT <0.010 08/21/2020    TROPONINT <0.010 05/15/2019    TROPONINT <0.010 05/15/2019             Results from last 7 days   Lab Units 08/21/20  1252   SODIUM mmol/L 138   POTASSIUM mmol/L 4.7   CHLORIDE mmol/L 108*   CO2 mmol/L 23.0   BUN mg/dL 21   CREATININE mg/dL 1.52*   GLUCOSE mg/dL 114*   CALCIUM mg/dL 8.7           Assessment/Plan:  1 this patient presents with an episode of syncope.  He has had 2 other episodes of near syncope.  He saw Dr. Carson 2 weeks ago and he stopped his metoprolol.  The wife reports that when EMS arrived his heart rate was in the 30s.  Here his heart rates been 48-52.. Of note his GFR dropped from 59-44.  He was out in the heat doing a lot of yard work.  He is only urinated so far once today.  Dehydration is probably playing some role here.  I will admit the patient, give him IV fluids, and monitor him on telemetry watching for any significant bradycardia.  Will DC his Eliquis for now in case he needs to have a pacemaker placed.  We will do COVID testing  Fall precautions  I discussed the case with his primary cardiologist he is in agreement with this plan    Chad Moya MD  08/21/20  14:25

## 2020-08-21 NOTE — ED PROVIDER NOTES
EMERGENCY DEPARTMENT ENCOUNTER    Room Number:  03/03  Date of encounter:  8/21/2020  PCP: Lupillo Hill MD  Historian: Patient      HPI:  Chief Complaint: Syncopal episode    A complete HPI/ROS/PMH/PSH/SH/FH are unobtainable due to: NA    Context: Pio Baum is a 80 y.o. male who presents to the ED c/o syncopal episode while working in his yard this morning.  Patient states he was working on his landscaping, shoveling raking and carrying mulch, when he sat down with his neighbor, states he saw black spots before his eyes and then passed out for a few seconds.  EMS was called, and while they were on scene they noted a heart rate in the 30s.  According to EMS records, patient was given atropine.  He arrives with heart rate in the 50's.  patient states he was sweating quite profusely while he was working, but he has been performing the same activity almost every morning for the past 2 weeks.  He ate a normal breakfast, cup of coffee, 2 fig bars, and while working he had a 12 ounce bottle of water to 12 ounce bottle of boost.  Is only urinated once today at around 9 AM.  He has no prior history of hypoglycemic episodes, but does have a history of atrial fibrillation, PFO, hypertension, history of TIA, and a history of presyncope secondary to symptomatic bradycardia.  He is followed by Dr. Carson, whom he saw approximately 11/2 weeks ago, and is being considered for pacemaker placement.  He denies thunderclap headache, blurred vision, loss of visual field, photophobia, stiff neck, irregular heartbeat or palpitations, chest arm neck jaw shoulder pain, shortness of breath, cough chest congestion sputum hemoptysis.  No abdominal pain or back pain.  No hematuria pyuria, flank pain.  No history of seizures.  No unilateral weakness paresis paresthesias.  Other than feeling thirsty, he states he feels well.    PAST MEDICAL HISTORY  Active Ambulatory Problems     Diagnosis Date Noted   • Essential hypertension  04/27/2016   • PFO (patent foramen ovale) 06/03/2016   • Hyperlipidemia 06/03/2016   • Tobacco chew use 06/03/2016   • Stage 2 chronic kidney disease 06/03/2016   • Atrial fibrillation (CMS/HCC) 07/14/2016   • History of TIA (transient ischemic attack) and stroke    • Scalp laceration 05/15/2019   • Diverticulosis 12/05/2019   • Pre-syncope 08/07/2020     Resolved Ambulatory Problems     Diagnosis Date Noted   • Syncope 05/15/2019   • Leukocytosis 05/15/2019   • Scalp contusion 05/16/2019   • Dehydration 05/16/2019     Past Medical History:   Diagnosis Date   • A-fib (CMS/[facility])    • Chronic kidney disease    • History of migraine headaches    • Hypertension    • Mitral valve regurgitation 12/1/2016   • Stroke (CMS/[facility])          PAST SURGICAL HISTORY  Past Surgical History:   Procedure Laterality Date   • TONSILLECTOMY AND ADENOIDECTOMY           FAMILY HISTORY  Family History   Problem Relation Age of Onset   • Arthritis Other    • Hyperlipidemia Other    • Stroke Other    • Heart attack Mother    • Heart attack Father          SOCIAL HISTORY  Social History     Socioeconomic History   • Marital status:      Spouse name: Not on file   • Number of children: Not on file   • Years of education: Not on file   • Highest education level: Not on file   Tobacco Use   • Smoking status: Never Smoker   • Smokeless tobacco: Current User     Types: Chew   • Tobacco comment: chews once every 4-5 months   Substance and Sexual Activity   • Alcohol use: Yes     Comment: 1-2 drinks a week   • Drug use: No   • Sexual activity: Defer         ALLERGIES  Flonase [fluticasone] and Penicillins        REVIEW OF SYSTEMS  Review of Systems     All systems reviewed and negative except for those discussed in HPI.       PHYSICAL EXAM    I have reviewed the triage vital signs and nursing notes.    ED Triage Vitals [08/21/20 1223]   Temp Heart Rate Resp BP SpO2   98.1 °F (36.7 °C) 54 18 128/72 98 %      Temp src Heart Rate Source Patient  Position BP Location FiO2 (%)   Oral Monitor Lying Right arm --       Physical Exam  GENERAL: Pleasant awake alert and oriented, not toxic appearing, resting comfortably, blood pressure 128/72, heart rate 50-55 with normal sinus rhythm on the monitor.  Respirations clear and equal, 18/min, O2 sats 98% on room air.  Well developed.  Appears in no acute distress.  HENT: Nares patent.  Neck is supple with no bruits.  EYES: No scleral icterus  CV: Regular rhythm, rate 50-55.  No murmurs rubs or gallops.  RESPIRATORY: Normal effort.  No audible wheezes, rales or rhonchi.  No tachypnea or respiratory distress or accessory muscle use.  ABDOMEN: Soft, nontender, no guarding or rebound tenderness.  No palpable organomegaly or bruits.  Sounds are normal.  MUSCULOSKELETAL: No deformities.   NEURO: Alert, moves all extremities, follows commands  SKIN: Warm, dry, no rash visualized.  Poor turgor noted.        LAB RESULTS  Recent Results (from the past 24 hour(s))   Comprehensive Metabolic Panel    Collection Time: 08/21/20 12:52 PM   Result Value Ref Range    Glucose 114 (H) 65 - 99 mg/dL    BUN 21 8 - 23 mg/dL    Creatinine 1.52 (H) 0.76 - 1.27 mg/dL    Sodium 138 136 - 145 mmol/L    Potassium 4.7 3.5 - 5.2 mmol/L    Chloride 108 (H) 98 - 107 mmol/L    CO2 23.0 22.0 - 29.0 mmol/L    Calcium 8.7 8.6 - 10.5 mg/dL    Total Protein 6.1 6.0 - 8.5 g/dL    Albumin 3.80 3.50 - 5.20 g/dL    ALT (SGPT) 13 1 - 41 U/L    AST (SGOT) 16 1 - 40 U/L    Alkaline Phosphatase 81 39 - 117 U/L    Total Bilirubin 0.5 0.0 - 1.2 mg/dL    eGFR Non African Amer 44 (L) >60 mL/min/1.73    Globulin 2.3 gm/dL    A/G Ratio 1.7 g/dL    BUN/Creatinine Ratio 13.8 7.0 - 25.0    Anion Gap 7.0 5.0 - 15.0 mmol/L   Troponin    Collection Time: 08/21/20 12:52 PM   Result Value Ref Range    Troponin T <0.010 0.000 - 0.030 ng/mL   Magnesium    Collection Time: 08/21/20 12:52 PM   Result Value Ref Range    Magnesium 2.2 1.6 - 2.4 mg/dL   Light Blue Top    Collection  Time: 08/21/20 12:52 PM   Result Value Ref Range    Extra Tube hold for add-on    Green Top (Gel)    Collection Time: 08/21/20 12:52 PM   Result Value Ref Range    Extra Tube Hold for add-ons.    Gold Top - SST    Collection Time: 08/21/20 12:52 PM   Result Value Ref Range    Extra Tube Hold for add-ons.    Lavender Top    Collection Time: 08/21/20 12:53 PM   Result Value Ref Range    Extra Tube hold for add-on    CBC Auto Differential    Collection Time: 08/21/20 12:53 PM   Result Value Ref Range    WBC 9.73 3.40 - 10.80 10*3/mm3    RBC 4.24 4.14 - 5.80 10*6/mm3    Hemoglobin 13.2 13.0 - 17.7 g/dL    Hematocrit 40.3 37.5 - 51.0 %    MCV 95.0 79.0 - 97.0 fL    MCH 31.1 26.6 - 33.0 pg    MCHC 32.8 31.5 - 35.7 g/dL    RDW 12.8 12.3 - 15.4 %    RDW-SD 44.3 37.0 - 54.0 fl    MPV 11.6 6.0 - 12.0 fL    Platelets 139 (L) 140 - 450 10*3/mm3    Neutrophil % 79.3 (H) 42.7 - 76.0 %    Lymphocyte % 12.8 (L) 19.6 - 45.3 %    Monocyte % 6.1 5.0 - 12.0 %    Eosinophil % 0.5 0.3 - 6.2 %    Basophil % 1.0 0.0 - 1.5 %    Immature Grans % 0.3 0.0 - 0.5 %    Neutrophils, Absolute 7.71 (H) 1.70 - 7.00 10*3/mm3    Lymphocytes, Absolute 1.25 0.70 - 3.10 10*3/mm3    Monocytes, Absolute 0.59 0.10 - 0.90 10*3/mm3    Eosinophils, Absolute 0.05 0.00 - 0.40 10*3/mm3    Basophils, Absolute 0.10 0.00 - 0.20 10*3/mm3    Immature Grans, Absolute 0.03 0.00 - 0.05 10*3/mm3    nRBC 0.0 0.0 - 0.2 /100 WBC       Ordered the above labs and independently reviewed the results.        RADIOLOGY  Xr Chest 1 View    Result Date: 8/21/2020  EXAMINATION: XR CHEST 1 VW-  INDICATION: Weak/Dizzy/AMS triage protocol  COMPARISON: May 7, 2019  FINDINGS: Mild cardiac enlargement. No pleural effusion or pneumothorax. No focal consolidation.      Mild cardiac enlargement without evidence of additional acute cardiopulmonary abnormality.  This report was finalized on 8/21/2020 1:54 PM by Rex Covington.              PROCEDURES    Procedures      MEDICATIONS GIVEN IN  ER    Medications   sodium chloride 0.9 % flush 10 mL (has no administration in time range)   sodium chloride 0.45 % infusion (has no administration in time range)   atorvastatin (LIPITOR) tablet 40 mg (has no administration in time range)   guaiFENesin (MUCINEX) 12 hr tablet 600 mg (has no administration in time range)   tamsulosin (FLOMAX) 24 hr capsule 0.4 mg (has no administration in time range)   ondansetron (ZOFRAN) injection 2 mg (has no administration in time range)   melatonin tablet 5 mg (has no administration in time range)         PROGRESS, DATA ANALYSIS, CONSULTS, AND MEDICAL DECISION MAKING    All labs have been independently reviewed by me.  All radiology studies have been reviewed by me and the radiologist dictating the report.   EKG's have been independently viewed and interpreted by me.            ED Course as of Aug 21 1454   Fri Aug 21, 2020   1449 Dr. Rosas evaluated patient here in the emergency department, admitted to his service for further evaluation and consideration of pacer.    [TG]   1449 Creatinine(!): 1.52 [TG]   1449 eGFR Non  Am(!): 44 [TG]      ED Course User Index  [TG] Sundar Santos PA-C       Patient remained stable throughout course of stay in emergency department.  His GFR has dropped and his creatinine has increased since his last blood work here.  Dehydration may play a role, however his symptomatic bradycardia combined with his recent falls and syncopal episodes warrant cardiology evaluation.  Dr. Moya evaluated patient here in the emergency department, and will be admitting the patient to his service.      AS OF 14:54 VITALS:    BP - 128/72  HR - 54  TEMP - 98.1 °F (36.7 °C) (Oral)  O2 SATS - 98%        DIAGNOSIS  Final diagnoses:   Syncope and collapse   Symptomatic bradycardia   Dehydration   History of atrial fibrillation   Stage 2 chronic kidney disease         DISPOSITION  Admit to cardiology    FOLLOW-UP  No follow-up provider specified.        Medication List      No changes were made to your prescriptions during this visit.                Sundar Santos PA-C  08/21/20 4233

## 2020-08-22 LAB
ANION GAP SERPL CALCULATED.3IONS-SCNC: 6 MMOL/L (ref 5–15)
BUN SERPL-MCNC: 21 MG/DL (ref 8–23)
BUN/CREAT SERPL: 14.8 (ref 7–25)
CALCIUM SPEC-SCNC: 8.6 MG/DL (ref 8.6–10.5)
CHLORIDE SERPL-SCNC: 109 MMOL/L (ref 98–107)
CO2 SERPL-SCNC: 25 MMOL/L (ref 22–29)
CREAT SERPL-MCNC: 1.42 MG/DL (ref 0.76–1.27)
GFR SERPL CREATININE-BSD FRML MDRD: 48 ML/MIN/1.73
GLUCOSE SERPL-MCNC: 91 MG/DL (ref 65–99)
POTASSIUM SERPL-SCNC: 4.2 MMOL/L (ref 3.5–5.2)
SODIUM SERPL-SCNC: 140 MMOL/L (ref 136–145)

## 2020-08-22 PROCEDURE — G0378 HOSPITAL OBSERVATION PER HR: HCPCS

## 2020-08-22 PROCEDURE — 99232 SBSQ HOSP IP/OBS MODERATE 35: CPT | Performed by: INTERNAL MEDICINE

## 2020-08-22 PROCEDURE — 25010000002 HEPARIN (PORCINE) PER 1000 UNITS: Performed by: INTERNAL MEDICINE

## 2020-08-22 PROCEDURE — 80048 BASIC METABOLIC PNL TOTAL CA: CPT | Performed by: INTERNAL MEDICINE

## 2020-08-22 RX ORDER — HEPARIN SODIUM 5000 [USP'U]/ML
5000 INJECTION, SOLUTION INTRAVENOUS; SUBCUTANEOUS EVERY 8 HOURS SCHEDULED
Status: DISCONTINUED | OUTPATIENT
Start: 2020-08-22 | End: 2020-08-24

## 2020-08-22 RX ADMIN — SODIUM CHLORIDE 75 ML/HR: 4.5 INJECTION, SOLUTION INTRAVENOUS at 06:40

## 2020-08-22 RX ADMIN — SODIUM CHLORIDE, PRESERVATIVE FREE 10 ML: 5 INJECTION INTRAVENOUS at 23:37

## 2020-08-22 RX ADMIN — ATORVASTATIN CALCIUM 40 MG: 40 TABLET, FILM COATED ORAL at 23:36

## 2020-08-22 RX ADMIN — HEPARIN SODIUM 5000 UNITS: 5000 INJECTION, SOLUTION INTRAVENOUS; SUBCUTANEOUS at 10:36

## 2020-08-22 RX ADMIN — HEPARIN SODIUM 5000 UNITS: 5000 INJECTION, SOLUTION INTRAVENOUS; SUBCUTANEOUS at 23:36

## 2020-08-22 RX ADMIN — TAMSULOSIN HYDROCHLORIDE 0.4 MG: 0.4 CAPSULE ORAL at 09:18

## 2020-08-22 NOTE — SIGNIFICANT NOTE
08/22/20 1344 08/22/20 1345 08/22/20 1347   Vital Signs   Heart Rate 54 50 57      08/22/20 1348   Vital Signs   Heart Rate 55       Walking heart rate(s)  (Patient walked in the hallway with standby assistance for 10 minutes)

## 2020-08-22 NOTE — PROGRESS NOTES
[facility] at [facility]  IP Progress Note      Chief Complaint/Reason for service: Syncope and collapse #2 bradycardia    Subjective   Subjective: The patient states he feels better today.  He has had a history of stroke and has a known PFO.  Again the patient reiterates the 2 episodes of near syncope and one episode of syncope    Past medical, surgical, social and family history reviewed in the patient's electronic medical record.    Objective     Vital Sign Min/Max for last 24 hours  Temp  Min: 97.6 °F (36.4 °C)  Max: 98.4 °F (36.9 °C)   BP  Min: 128/72  Max: 164/70   Pulse  Min: 42  Max: 58   Resp  Min: 14  Max: 18   SpO2  Min: 94 %  Max: 100 %   No data recorded      Intake/Output Summary (Last 24 hours) at 8/22/2020 0959  Last data filed at 8/22/2020 0600  Gross per 24 hour   Intake 740 ml   Output 525 ml   Net 215 ml             Current Facility-Administered Medications:   •  atorvastatin (LIPITOR) tablet 40 mg, 40 mg, Oral, Nightly, Chad Moya MD, 40 mg at 08/21/20 2158  •  guaiFENesin (MUCINEX) 12 hr tablet 600 mg, 600 mg, Oral, Q12H, Chad Moya MD, Stopped at 08/21/20 1628  •  melatonin tablet 5 mg, 5 mg, Oral, Nightly PRN, Chad Moya MD  •  ondansetron (ZOFRAN) injection 2 mg, 2 mg, Intravenous, Q4H PRN, Chad Moya MD  •  sodium chloride 0.45 % infusion, 75 mL/hr, Intravenous, Continuous, Chad Moya MD, Last Rate: 75 mL/hr at 08/22/20 0918, 75 mL/hr at 08/22/20 0918  •  sodium chloride 0.9 % flush 10 mL, 10 mL, Intravenous, PRN, Sourav Velasco MD  •  tamsulosin (FLOMAX) 24 hr capsule 0.4 mg, 0.4 mg, Oral, Daily, Chad Moya MD, 0.4 mg at 08/22/20 0918    Physical Exam: General well-developed well-nourished male looks younger than stated age current heart rate 44        HEENT: No JVP       Respiratory: Equal bilateral symmetrical expansion clear auscultation bilaterally         Cardiovascular: Bradycardic with a  soft systolic murmur no edema         Neuro: Facial expressions are symmetrical       Skin: Warm and dry no edema to palpation       Psych: Pleasant affect    Results Review: Overnight intake exceeds output.  Heart rates in the 40s.  Blood pressure is normal.  Creatinine is 1.4.  The GFR is 48 today was 44 yesterday.    Radiology Results:  Imaging Results (Last 72 Hours)     Procedure Component Value Units Date/Time    XR Chest 1 View [[id number]] Collected:  08/21/20 1353     Updated:  08/21/20 1357    Narrative:       EXAMINATION: XR CHEST 1 VW-      INDICATION: Weak/Dizzy/AMS triage protocol      COMPARISON: May 7, 2019     FINDINGS: Mild cardiac enlargement. No pleural effusion or pneumothorax.  No focal consolidation.       Impression:       Mild cardiac enlargement without evidence of additional  acute cardiopulmonary abnormality.     This report was finalized on 8/21/2020 1:54 PM by Rex Covington.             EKG: Sinus bradycardia no ischemia    ECHO: Previously known normal EF    Tele: Sinus bradycardia with heart rates in the 40s.  One short run of PSVT.  No ventricular arrhythmias    Assessment   Assessment/Plan: 1 syncope and collapse with bradycardia-the patient had 2 prior episodes of near syncope and in this recent syncopal episode.  I discussed the case with our EP service who recommend the patient go ahead and get a pacemaker implanted.  Pacemaker will be placed on Monday.  I will leave the patient off his Eliquis for the pacemaker insertion but I will go ahead and put him on subcutaneous heparin  We will DC IV fluids    Chad Moya MD  08/22/20  09:59

## 2020-08-23 LAB — SARS-COV-2 RDRP RESP QL NAA+PROBE: NOT DETECTED

## 2020-08-23 PROCEDURE — 99231 SBSQ HOSP IP/OBS SF/LOW 25: CPT | Performed by: INTERNAL MEDICINE

## 2020-08-23 PROCEDURE — G0378 HOSPITAL OBSERVATION PER HR: HCPCS

## 2020-08-23 PROCEDURE — 87635 SARS-COV-2 COVID-19 AMP PRB: CPT | Performed by: INTERNAL MEDICINE

## 2020-08-23 PROCEDURE — 25010000002 HEPARIN (PORCINE) PER 1000 UNITS: Performed by: INTERNAL MEDICINE

## 2020-08-23 RX ORDER — SODIUM CHLORIDE 0.9 % (FLUSH) 0.9 %
10 SYRINGE (ML) INJECTION EVERY 12 HOURS SCHEDULED
Status: DISCONTINUED | OUTPATIENT
Start: 2020-08-23 | End: 2020-08-26 | Stop reason: HOSPADM

## 2020-08-23 RX ORDER — CEFAZOLIN SODIUM 2 G/100ML
2 INJECTION, SOLUTION INTRAVENOUS ONCE
Status: DISCONTINUED | OUTPATIENT
Start: 2020-08-24 | End: 2020-08-24

## 2020-08-23 RX ORDER — SODIUM CHLORIDE 0.9 % (FLUSH) 0.9 %
10 SYRINGE (ML) INJECTION AS NEEDED
Status: DISCONTINUED | OUTPATIENT
Start: 2020-08-23 | End: 2020-08-24

## 2020-08-23 RX ADMIN — HEPARIN SODIUM 5000 UNITS: 5000 INJECTION, SOLUTION INTRAVENOUS; SUBCUTANEOUS at 22:43

## 2020-08-23 RX ADMIN — HEPARIN SODIUM 5000 UNITS: 5000 INJECTION, SOLUTION INTRAVENOUS; SUBCUTANEOUS at 05:49

## 2020-08-23 RX ADMIN — SODIUM CHLORIDE, PRESERVATIVE FREE 10 ML: 5 INJECTION INTRAVENOUS at 09:26

## 2020-08-23 RX ADMIN — HEPARIN SODIUM 5000 UNITS: 5000 INJECTION, SOLUTION INTRAVENOUS; SUBCUTANEOUS at 15:06

## 2020-08-23 RX ADMIN — SODIUM CHLORIDE, PRESERVATIVE FREE 10 ML: 5 INJECTION INTRAVENOUS at 22:43

## 2020-08-23 RX ADMIN — ATORVASTATIN CALCIUM 40 MG: 40 TABLET, FILM COATED ORAL at 22:43

## 2020-08-23 RX ADMIN — TAMSULOSIN HYDROCHLORIDE 0.4 MG: 0.4 CAPSULE ORAL at 09:26

## 2020-08-23 NOTE — PLAN OF CARE
Pt A&Ox4, VSS on RA. Lowest HR this shift 39, mostly in 40's, remains asymptomatic from bradycardia. Ambulates very well, slept well throughout shift. Plans for potential pacemaker placement on Monday. Will continue to monitor.

## 2020-08-24 ENCOUNTER — APPOINTMENT (OUTPATIENT)
Dept: CARDIOLOGY | Facility: HOSPITAL | Age: 80
End: 2020-08-24

## 2020-08-24 PROBLEM — I31.39 PERICARDIAL EFFUSION: Status: ACTIVE | Noted: 2020-08-24

## 2020-08-24 PROBLEM — I49.5 SICK SINUS SYNDROME (HCC): Status: ACTIVE | Noted: 2020-08-24

## 2020-08-24 LAB
ANION GAP SERPL CALCULATED.3IONS-SCNC: 9 MMOL/L (ref 5–15)
BASOPHILS # BLD AUTO: 0.08 10*3/MM3 (ref 0–0.2)
BASOPHILS NFR BLD AUTO: 0.5 % (ref 0–1.5)
BH CV ECHO MEAS - AO ROOT DIAM: 3.2 CM
BH CV ECHO MEAS - EDV(CUBED): 85.6 ML
BH CV ECHO MEAS - EDV(MOD-SP2): 53.5 ML
BH CV ECHO MEAS - EDV(MOD-SP4): 88.3 ML
BH CV ECHO MEAS - EDV(TEICH): 88.1 ML
BH CV ECHO MEAS - EF(CUBED): 81.8 %
BH CV ECHO MEAS - EF(MOD-BP): 60 %
BH CV ECHO MEAS - EF(MOD-SP2): 59.1 %
BH CV ECHO MEAS - EF(MOD-SP4): 62.7 %
BH CV ECHO MEAS - EF(TEICH): 74.7 %
BH CV ECHO MEAS - ESV(CUBED): 15.6 ML
BH CV ECHO MEAS - ESV(MOD-SP2): 21.9 ML
BH CV ECHO MEAS - ESV(MOD-SP4): 32.9 ML
BH CV ECHO MEAS - ESV(TEICH): 22.3 ML
BH CV ECHO MEAS - FS: 43.3 %
BH CV ECHO MEAS - IVS/LVPW: 1
BH CV ECHO MEAS - IVSD: 1.2 CM
BH CV ECHO MEAS - LA DIMENSION: 2.8 CM
BH CV ECHO MEAS - LV MASS(C)D: 193.9 GRAMS
BH CV ECHO MEAS - LVIDD: 4.4 CM
BH CV ECHO MEAS - LVIDS: 2.5 CM
BH CV ECHO MEAS - LVLD AP2: 7.4 CM
BH CV ECHO MEAS - LVLD AP4: 7.5 CM
BH CV ECHO MEAS - LVLS AP2: 5.9 CM
BH CV ECHO MEAS - LVLS AP4: 6.4 CM
BH CV ECHO MEAS - LVOT AREA: 3.4 CM^2
BH CV ECHO MEAS - LVOT DIAM: 2.1 CM
BH CV ECHO MEAS - LVPWD: 1.2 CM
BH CV ECHO MEAS - RAP SYSTOLE: 8 MMHG
BH CV ECHO MEAS - RVSP: 32 MMHG
BH CV ECHO MEAS - SV(CUBED): 70.1 ML
BH CV ECHO MEAS - SV(MOD-SP2): 31.6 ML
BH CV ECHO MEAS - SV(MOD-SP4): 55.4 ML
BH CV ECHO MEAS - SV(TEICH): 65.8 ML
BH CV ECHO MEAS - TR MAX PG: 24 MMHG
BH CV ECHO MEAS - TR MAX VEL: 244.3 CM/SEC
BH CV VAS BP LEFT ARM: NORMAL MMHG
BH CV VAS BP LEFT ARM: NORMAL MMHG
BUN SERPL-MCNC: 23 MG/DL (ref 8–23)
BUN/CREAT SERPL: 12.8 (ref 7–25)
CALCIUM SPEC-SCNC: 8.4 MG/DL (ref 8.6–10.5)
CHLORIDE SERPL-SCNC: 108 MMOL/L (ref 98–107)
CO2 SERPL-SCNC: 20 MMOL/L (ref 22–29)
CREAT SERPL-MCNC: 1.8 MG/DL (ref 0.76–1.27)
DEPRECATED RDW RBC AUTO: 46.3 FL (ref 37–54)
EOSINOPHIL # BLD AUTO: 0.04 10*3/MM3 (ref 0–0.4)
EOSINOPHIL NFR BLD AUTO: 0.2 % (ref 0.3–6.2)
ERYTHROCYTE [DISTWIDTH] IN BLOOD BY AUTOMATED COUNT: 12.6 % (ref 12.3–15.4)
GFR SERPL CREATININE-BSD FRML MDRD: 36 ML/MIN/1.73
GLUCOSE SERPL-MCNC: 142 MG/DL (ref 65–99)
HCT VFR BLD AUTO: 44.3 % (ref 37.5–51)
HGB BLD-MCNC: 13.8 G/DL (ref 13–17.7)
IMM GRANULOCYTES # BLD AUTO: 0.08 10*3/MM3 (ref 0–0.05)
IMM GRANULOCYTES NFR BLD AUTO: 0.5 % (ref 0–0.5)
LYMPHOCYTES # BLD AUTO: 1.33 10*3/MM3 (ref 0.7–3.1)
LYMPHOCYTES NFR BLD AUTO: 7.9 % (ref 19.6–45.3)
MCH RBC QN AUTO: 31 PG (ref 26.6–33)
MCHC RBC AUTO-ENTMCNC: 31.2 G/DL (ref 31.5–35.7)
MCV RBC AUTO: 99.6 FL (ref 79–97)
MONOCYTES # BLD AUTO: 1.01 10*3/MM3 (ref 0.1–0.9)
MONOCYTES NFR BLD AUTO: 6 % (ref 5–12)
NEUTROPHILS NFR BLD AUTO: 14.36 10*3/MM3 (ref 1.7–7)
NEUTROPHILS NFR BLD AUTO: 84.9 % (ref 42.7–76)
NRBC BLD AUTO-RTO: 0 /100 WBC (ref 0–0.2)
PLATELET # BLD AUTO: 149 10*3/MM3 (ref 140–450)
PMV BLD AUTO: 11.4 FL (ref 6–12)
POTASSIUM SERPL-SCNC: 4.3 MMOL/L (ref 3.5–5.2)
RBC # BLD AUTO: 4.45 10*6/MM3 (ref 4.14–5.8)
SODIUM SERPL-SCNC: 137 MMOL/L (ref 136–145)
WBC # BLD AUTO: 16.9 10*3/MM3 (ref 3.4–10.8)

## 2020-08-24 PROCEDURE — 25010000003 CEFAZOLIN IN DEXTROSE 2-4 GM/100ML-% SOLUTION: Performed by: INTERNAL MEDICINE

## 2020-08-24 PROCEDURE — 25010000002 MIDAZOLAM PER 1 MG: Performed by: INTERNAL MEDICINE

## 2020-08-24 PROCEDURE — 33208 INSRT HEART PM ATRIAL & VENT: CPT | Performed by: INTERNAL MEDICINE

## 2020-08-24 PROCEDURE — 93308 TTE F-UP OR LMTD: CPT | Performed by: INTERNAL MEDICINE

## 2020-08-24 PROCEDURE — 25010000002 VANCOMYCIN PER 500 MG: Performed by: INTERNAL MEDICINE

## 2020-08-24 PROCEDURE — 02HK3JZ INSERTION OF PACEMAKER LEAD INTO RIGHT VENTRICLE, PERCUTANEOUS APPROACH: ICD-10-PCS | Performed by: INTERNAL MEDICINE

## 2020-08-24 PROCEDURE — C1898 LEAD, PMKR, OTHER THAN TRANS: HCPCS | Performed by: INTERNAL MEDICINE

## 2020-08-24 PROCEDURE — 92960 CARDIOVERSION ELECTRIC EXT: CPT | Performed by: INTERNAL MEDICINE

## 2020-08-24 PROCEDURE — 85025 COMPLETE CBC W/AUTO DIFF WBC: CPT | Performed by: INTERNAL MEDICINE

## 2020-08-24 PROCEDURE — 02H63JZ INSERTION OF PACEMAKER LEAD INTO RIGHT ATRIUM, PERCUTANEOUS APPROACH: ICD-10-PCS | Performed by: INTERNAL MEDICINE

## 2020-08-24 PROCEDURE — 99153 MOD SED SAME PHYS/QHP EA: CPT | Performed by: INTERNAL MEDICINE

## 2020-08-24 PROCEDURE — C1785 PMKR, DUAL, RATE-RESP: HCPCS | Performed by: INTERNAL MEDICINE

## 2020-08-24 PROCEDURE — 25010000002 DOPAMINE PER 40 MG: Performed by: INTERNAL MEDICINE

## 2020-08-24 PROCEDURE — 93308 TTE F-UP OR LMTD: CPT

## 2020-08-24 PROCEDURE — C1892 INTRO/SHEATH,FIXED,PEEL-AWAY: HCPCS | Performed by: INTERNAL MEDICINE

## 2020-08-24 PROCEDURE — 99152 MOD SED SAME PHYS/QHP 5/>YRS: CPT | Performed by: INTERNAL MEDICINE

## 2020-08-24 PROCEDURE — 93005 ELECTROCARDIOGRAM TRACING: CPT

## 2020-08-24 PROCEDURE — 80048 BASIC METABOLIC PNL TOTAL CA: CPT | Performed by: INTERNAL MEDICINE

## 2020-08-24 PROCEDURE — 0JH606Z INSERTION OF PACEMAKER, DUAL CHAMBER INTO CHEST SUBCUTANEOUS TISSUE AND FASCIA, OPEN APPROACH: ICD-10-PCS | Performed by: INTERNAL MEDICINE

## 2020-08-24 PROCEDURE — 25010000002 FENTANYL CITRATE (PF) 100 MCG/2ML SOLUTION: Performed by: INTERNAL MEDICINE

## 2020-08-24 PROCEDURE — C1769 GUIDE WIRE: HCPCS | Performed by: INTERNAL MEDICINE

## 2020-08-24 PROCEDURE — 25010000003 LIDOCAINE 1 % SOLUTION: Performed by: INTERNAL MEDICINE

## 2020-08-24 PROCEDURE — 99232 SBSQ HOSP IP/OBS MODERATE 35: CPT | Performed by: INTERNAL MEDICINE

## 2020-08-24 PROCEDURE — 93321 DOPPLER ECHO F-UP/LMTD STD: CPT | Performed by: INTERNAL MEDICINE

## 2020-08-24 PROCEDURE — C1729 CATH, DRAINAGE: HCPCS | Performed by: INTERNAL MEDICINE

## 2020-08-24 PROCEDURE — 93325 DOPPLER ECHO COLOR FLOW MAPG: CPT

## 2020-08-24 PROCEDURE — 33016 PERICARDIOCENTESIS W/IMAGING: CPT | Performed by: INTERNAL MEDICINE

## 2020-08-24 PROCEDURE — 93010 ELECTROCARDIOGRAM REPORT: CPT | Performed by: INTERNAL MEDICINE

## 2020-08-24 PROCEDURE — 93325 DOPPLER ECHO COLOR FLOW MAPG: CPT | Performed by: INTERNAL MEDICINE

## 2020-08-24 PROCEDURE — 93321 DOPPLER ECHO F-UP/LMTD STD: CPT

## 2020-08-24 PROCEDURE — 99291 CRITICAL CARE FIRST HOUR: CPT | Performed by: INTERNAL MEDICINE

## 2020-08-24 DEVICE — PACE/SENSE LEAD
Type: IMPLANTABLE DEVICE | Status: FUNCTIONAL
Brand: INGEVITY™+

## 2020-08-24 DEVICE — PACEMAKER
Type: IMPLANTABLE DEVICE | Status: FUNCTIONAL
Brand: ACCOLADE™ MRI DR

## 2020-08-24 RX ORDER — CEFAZOLIN SODIUM 2 G/100ML
2 INJECTION, SOLUTION INTRAVENOUS
Status: DISCONTINUED | OUTPATIENT
Start: 2020-08-24 | End: 2020-08-24

## 2020-08-24 RX ORDER — DOPAMINE HYDROCHLORIDE 160 MG/100ML
INJECTION, SOLUTION INTRAVENOUS CONTINUOUS PRN
Status: DISCONTINUED | OUTPATIENT
Start: 2020-08-24 | End: 2020-08-24 | Stop reason: HOSPADM

## 2020-08-24 RX ORDER — NOREPINEPHRINE BIT/0.9 % NACL 8 MG/250ML
.02-.3 INFUSION BOTTLE (ML) INTRAVENOUS
Status: DISCONTINUED | OUTPATIENT
Start: 2020-08-24 | End: 2020-08-25

## 2020-08-24 RX ORDER — VANCOMYCIN HYDROCHLORIDE 1 G/200ML
1 INJECTION, SOLUTION INTRAVENOUS ONCE
Status: COMPLETED | OUTPATIENT
Start: 2020-08-24 | End: 2020-08-24

## 2020-08-24 RX ORDER — ACETAMINOPHEN 650 MG/1
650 SUPPOSITORY RECTAL EVERY 4 HOURS PRN
Status: DISCONTINUED | OUTPATIENT
Start: 2020-08-24 | End: 2020-08-26 | Stop reason: HOSPADM

## 2020-08-24 RX ORDER — SODIUM CHLORIDE 0.9 % (FLUSH) 0.9 %
3 SYRINGE (ML) INJECTION EVERY 12 HOURS SCHEDULED
Status: DISCONTINUED | OUTPATIENT
Start: 2020-08-24 | End: 2020-08-24

## 2020-08-24 RX ORDER — MIDAZOLAM HYDROCHLORIDE 1 MG/ML
INJECTION INTRAMUSCULAR; INTRAVENOUS AS NEEDED
Status: DISCONTINUED | OUTPATIENT
Start: 2020-08-24 | End: 2020-08-24 | Stop reason: HOSPADM

## 2020-08-24 RX ORDER — SODIUM CHLORIDE 0.9 % (FLUSH) 0.9 %
10 SYRINGE (ML) INJECTION AS NEEDED
Status: DISCONTINUED | OUTPATIENT
Start: 2020-08-24 | End: 2020-08-24

## 2020-08-24 RX ORDER — HYDROCODONE BITARTRATE AND ACETAMINOPHEN 5; 325 MG/1; MG/1
1 TABLET ORAL EVERY 4 HOURS PRN
Status: DISCONTINUED | OUTPATIENT
Start: 2020-08-24 | End: 2020-08-26 | Stop reason: HOSPADM

## 2020-08-24 RX ORDER — NALOXONE HYDROCHLORIDE 0.4 MG/ML
INJECTION, SOLUTION INTRAMUSCULAR; INTRAVENOUS; SUBCUTANEOUS
Status: DISCONTINUED
Start: 2020-08-24 | End: 2020-08-24 | Stop reason: WASHOUT

## 2020-08-24 RX ORDER — SODIUM CHLORIDE 9 MG/ML
100 INJECTION, SOLUTION INTRAVENOUS CONTINUOUS
Status: DISCONTINUED | OUTPATIENT
Start: 2020-08-24 | End: 2020-08-25

## 2020-08-24 RX ORDER — LIDOCAINE HYDROCHLORIDE 10 MG/ML
INJECTION, SOLUTION INFILTRATION; PERINEURAL AS NEEDED
Status: DISCONTINUED | OUTPATIENT
Start: 2020-08-24 | End: 2020-08-24 | Stop reason: HOSPADM

## 2020-08-24 RX ORDER — FLUMAZENIL 0.1 MG/ML
INJECTION INTRAVENOUS
Status: DISCONTINUED
Start: 2020-08-24 | End: 2020-08-24 | Stop reason: WASHOUT

## 2020-08-24 RX ORDER — BUPIVACAINE HYDROCHLORIDE 5 MG/ML
INJECTION, SOLUTION EPIDURAL; INTRACAUDAL AS NEEDED
Status: DISCONTINUED | OUTPATIENT
Start: 2020-08-24 | End: 2020-08-24 | Stop reason: HOSPADM

## 2020-08-24 RX ORDER — FENTANYL CITRATE 50 UG/ML
INJECTION, SOLUTION INTRAMUSCULAR; INTRAVENOUS AS NEEDED
Status: DISCONTINUED | OUTPATIENT
Start: 2020-08-24 | End: 2020-08-24 | Stop reason: HOSPADM

## 2020-08-24 RX ORDER — ACETAMINOPHEN 325 MG/1
650 TABLET ORAL EVERY 4 HOURS PRN
Status: DISCONTINUED | OUTPATIENT
Start: 2020-08-24 | End: 2020-08-26 | Stop reason: HOSPADM

## 2020-08-24 RX ORDER — DOPAMINE HYDROCHLORIDE 160 MG/100ML
2-20 INJECTION, SOLUTION INTRAVENOUS
Status: DISCONTINUED | OUTPATIENT
Start: 2020-08-24 | End: 2020-08-25

## 2020-08-24 RX ORDER — MORPHINE SULFATE 2 MG/ML
2 INJECTION, SOLUTION INTRAMUSCULAR; INTRAVENOUS EVERY 4 HOURS PRN
Status: DISCONTINUED | OUTPATIENT
Start: 2020-08-24 | End: 2020-08-26 | Stop reason: HOSPADM

## 2020-08-24 RX ORDER — ONDANSETRON 2 MG/ML
4 INJECTION INTRAMUSCULAR; INTRAVENOUS EVERY 6 HOURS PRN
Status: DISCONTINUED | OUTPATIENT
Start: 2020-08-24 | End: 2020-08-26 | Stop reason: HOSPADM

## 2020-08-24 RX ADMIN — ATORVASTATIN CALCIUM 40 MG: 40 TABLET, FILM COATED ORAL at 20:20

## 2020-08-24 RX ADMIN — SODIUM CHLORIDE 250 ML: 9 INJECTION, SOLUTION INTRAVENOUS at 17:48

## 2020-08-24 RX ADMIN — SODIUM CHLORIDE, PRESERVATIVE FREE 10 ML: 5 INJECTION INTRAVENOUS at 08:31

## 2020-08-24 RX ADMIN — Medication 0.02 MCG/KG/MIN: at 16:53

## 2020-08-24 RX ADMIN — SODIUM CHLORIDE 100 ML/HR: 9 INJECTION, SOLUTION INTRAVENOUS at 16:40

## 2020-08-24 RX ADMIN — VANCOMYCIN HYDROCHLORIDE 1 G: 1 INJECTION, SOLUTION INTRAVENOUS at 13:32

## 2020-08-24 RX ADMIN — TAMSULOSIN HYDROCHLORIDE 0.4 MG: 0.4 CAPSULE ORAL at 08:31

## 2020-08-24 NOTE — PROGRESS NOTES
"[facility] at [facility]  IP Progress Note      Chief Complaint: Follow-up of symptomatic bradycardia/sick sinus syndrome/syncope.    Subjective   Feels well, no chest pain or shortness of breath.  Going for pacemaker today.    Objective     Blood pressure 165/80, pulse (!) 46, temperature 97.5 °F (36.4 °C), temperature source Oral, resp. rate 18, height 182.9 cm (72\"), weight 77.1 kg (170 lb), SpO2 98 %.   No intake or output data in the 24 hours ending 08/24/20 1259    Physical Exam:  General: No acute distress.   Neck: no JVD.  Chest:No respiratory distress, breath sounds are normal. No wheezes,  rhonchi or rales.  Cardiovascular: Normal S1 and S2, no murmer, gallop or rub.    Extremities: No edema.    Results Review:     I reviewed the patient's new clinical results.    Results from last 7 days   Lab Units 08/21/20  1253   WBC 10*3/mm3 9.73   HEMOGLOBIN g/dL 13.2   HEMATOCRIT % 40.3   PLATELETS 10*3/mm3 139*     Results from last 7 days   Lab Units 08/22/20  0706 08/21/20  1252   SODIUM mmol/L 140 138   POTASSIUM mmol/L 4.2 4.7   CHLORIDE mmol/L 109* 108*   CO2 mmol/L 25.0 23.0   BUN mg/dL 21 21   CREATININE mg/dL 1.42* 1.52*   CALCIUM mg/dL 8.6 8.7   BILIRUBIN mg/dL  --  0.5   ALK PHOS U/L  --  81   ALT (SGPT) U/L  --  13   AST (SGOT) U/L  --  16   GLUCOSE mg/dL 91 114*     Results from last 7 days   Lab Units 08/22/20  0706   SODIUM mmol/L 140   POTASSIUM mmol/L 4.2   CHLORIDE mmol/L 109*   CO2 mmol/L 25.0   BUN mg/dL 21   CREATININE mg/dL 1.42*   GLUCOSE mg/dL 91   CALCIUM mg/dL 8.6         Lab Results   Component Value Date    TROPONINT <0.010 08/21/2020                   Tele: Sinus bradycardia    Assessment:  1. Patient with sick sinus syndrome with paroxysmal atrial fibrillation, symptomatic bradycardia/pauses and episodes of near syncope/syncope.  2. Hypertension.  3. Dyslipidemia.    Plan:  1. Permanent dual-chamber pacemaker implant today.  2. Resume Eliquis after the pacemaker " implant.  3. Continue current medical management.    Yahaira Carson MD, FACC, [facility]

## 2020-08-24 NOTE — PLAN OF CARE
Pt went to cath lab for pacemaker placement, went to CVOU, blood pressure dropped to 60's/40's, echo done, pericardial centesis done and 285 mls drained, drain removed. Pt transferred to 2H ICU, A-paced. Pt dropped pressure again to 60/46, levo started, complaining of chest pain, Elijah BURGER stat paged, did echo at the bedside, no pericardial effusion noted, another limited echo done. Pt getting 250 ml bolus NS.

## 2020-08-24 NOTE — PROGRESS NOTES
Pio RICARDO Bautista  [id number]    1940      Patient going for permanent pacemaker today, Dr. Nava for syncopal episode associated with bradycardia.  Risks and benefits were reviewed with patient and he's willing to proceed.  Heparin was discontinued at [facility].  Eliquis for CHADS2 Vasc = 5, is on hold.       Vitals:    08/24/20 0700   BP: 174/91   Pulse: (!) 44   Resp: 18   Temp: 97.6 °F (36.4 °C)   SpO2: 97%       No new labs today.    Covid-19 pre screening is negative.      Tele:  NSR/sinus bradycardia.      Further recommendations based on outcomes.  Erika Asher PA-C

## 2020-08-24 NOTE — PLAN OF CARE
Pt A&Ox4, VSS on RA, Sinus kavon in 40's an 50's on monitor, no complaints of pain. NPO since midnight r/t pacemaker placement today. Consent signed. Rested well throughout shift. Will continue to monitor.

## 2020-08-24 NOTE — PROGRESS NOTES
Intensive Care Note     S/p PPM complicated by pericardial effusion    History of Present Illness     80-year-old male who was recently admitted for further work-up of recent episode of syncopal episode.  Patient has previous history of CVA, paroxysmal atrial fibrillation, dyslipidemia, chronic kidney disease, oral tobacco use and hypertension.  Patient previously had syncopal episodes and had a ZIO patch done last year which showed occasional PACs and PVCs with short runs of SVT/PAT.  Echocardiogram at that time was normal.    Patient apparently was working in his yard on August 21.  He saw some black spots before his eyes and then passed out.  EMS was called and they noted bradycardia with heart rate into 30s.  He was given atropine and his heart rate was in 50s when he arrived.  Patient was admitted to cardiology service.  Further work-up included troponin levels which are negative.  Patient creatinine is around 1.42.  Complete blood count was normal.  COVID-19 test was negative.    Patient was monitored in the hospital and given his history of recurrent syncopal episodes with recurrent bradycardia decision was made to place permanent pacemaker in this patient by EP service.  His Eliquis was held prior to the procedure.  Today he underwent permanent pacemaker placement.  Postprocedure in recovery room patient was found to be hypotensive.  Echocardiogram apparently revealed pericardial effusion which was drained and about 270 cc of bloody fluid removed per Dr. Nava.  Post procedure patient's blood pressure improved apparently and echocardiogram showed trivial pericardial effusion.  Patient is being admitted to intensive care unit for closer hemodynamic monitoring overnight.    Patient seen in ICU.  Complains of being feeling cold.  Denies any chest pain or shortness of breath.  States that he was nauseous earlier but that has improved.  Denies any dizziness.  Denies any headache.  Saturating well on nasal  cannula oxygen.  Not on any pressors currently and systolic blood pressure is 95 currently.    Problem List, Surgical History, Family, Social History, and ROS     Patient Active Problem List    Diagnosis   • Sick sinus syndrome (CMS/HCC) [I49.5]   • Pericardial effusion [I31.3]   • Syncope and collapse [R55]   • Pre-syncope [R55]   • Diverticulosis [K57.90]   • Scalp laceration [S01.01XA]   • Atrial fibrillation (CMS/HCC) [I48.91]   • History of TIA (transient ischemic attack) and stroke [Z86.73]   • PFO (patent foramen ovale) [Q21.1]   • Hyperlipidemia [E78.5]   • Tobacco chew use [Z72.0]   • Stage 2 chronic kidney disease [N18.2]   • Essential hypertension [I10]     Past Surgical History:   Procedure Laterality Date   • TONSILLECTOMY AND ADENOIDECTOMY         Allergies   Allergen Reactions   • Flonase [Fluticasone] Irritability     Gets a nose bleed as soon as used   • Penicillins Rash     Rash all over body including mouth/throat      No current facility-administered medications on file prior to encounter.      Current Outpatient Medications on File Prior to Encounter   Medication Sig   • tamsulosin (FLOMAX) 0.4 MG capsule 24 hr capsule Take 1 capsule by mouth 2 (two) times a day.   • [DISCONTINUED] atorvastatin (LIPITOR) 40 MG tablet TAKE 1 TABLET EVERY DAY   • [DISCONTINUED] Coenzyme Q10 (COQ10) 100 MG capsule Take 1 tablet by mouth Daily.   • [DISCONTINUED] ELIQUIS 5 MG tablet tablet TAKE 1 TABLET EVERY 12 HOURS   • [DISCONTINUED] glucosamine sulfate 500 MG capsule capsule Take 500 mg by mouth daily.   • [DISCONTINUED] guaiFENesin (MUCINEX) 600 MG 12 hr tablet Take 1,200 mg by mouth As Needed.   • [DISCONTINUED] lisinopril (PRINIVIL,ZESTRIL) 10 MG tablet Take 1 tablet by mouth 2 (two) times a day.   • [DISCONTINUED] Multiple Vitamin (MULTI VITAMIN DAILY PO) Take 1 tablet by mouth Daily.     MEDICATION LIST AND ALLERGIES REVIEWED.    Family History   Problem Relation Age of Onset   • Arthritis Other    •  "Hyperlipidemia Other    • Stroke Other    • Heart attack Mother    • Heart attack Father      Social History     Tobacco Use   • Smoking status: Never Smoker   • Smokeless tobacco: Current User     Types: Chew   • Tobacco comment: chews once every 4-5 months   Substance Use Topics   • Alcohol use: Yes     Comment: 1-2 drinks a week   • Drug use: No     Social History     Social History Narrative   • Not on file     FAMILY AND SOCIAL HISTORY REVIEWED.    Review of Systems  ALL OTHER SYSTEMS REVIEWED AND ARE NEGATIVE.    Physical Exam and Clinical Information   /75   Pulse (!) 135   Temp 97.5 °F (36.4 °C) (Oral)   Resp 17   Ht 182.9 cm (72\")   Wt 77.1 kg (170 lb)   SpO2 93%   BMI 23.06 kg/m²   Physical Exam    General Appearance: Awake, alert, in no acute distress  Head:    Atraumatic, Normocephalic, without obvious abnormality.   Lungs:   B/L Breath sounds present with decreased breath sounds on bases, no wheezing heard, no crackles.   Heart: S1 and S2 present, no murmur, paced rhythm, dressing intact to right upper chest  Abdomen: Soft, non-tender, no guarding or rigidity, bowel sounds positive.  Extremities: Atraumatic, no cyanosis or clubbing,  no edema, warm to touch.  Pulses: Positive and symmetric.  Neurologic:  Moving all four extremities.      Results from last 7 days   Lab Units 08/21/20  1253   WBC 10*3/mm3 9.73   HEMOGLOBIN g/dL 13.2   PLATELETS 10*3/mm3 139*     Results from last 7 days   Lab Units 08/22/20  0706 08/21/20  1252   SODIUM mmol/L 140 138   POTASSIUM mmol/L 4.2 4.7   CO2 mmol/L 25.0 23.0   BUN mg/dL 21 21   CREATININE mg/dL 1.42* 1.52*   MAGNESIUM mg/dL  --  2.2   GLUCOSE mg/dL 91 114*     Estimated Creatinine Clearance: 43.7 mL/min (A) (by C-G formula based on SCr of 1.42 mg/dL (H)).          No results found for: LACTATE     Images:   Echo report pending. Verbal per Dr Nava residual trivial pericardial effusion post pericardiocentesis.     I reviewed the patient's results " and images.     Impression     Syncope and collapse    Sick sinus syndrome (CMS/HCC)    Pericardial effusion    Plan/Recommendations     1.  Patient had periprocedural pericardial effusion which is drained.  Hemodynamically more stable.  Continue management per cardiology and EP service.  If drops pressure will consider pressors with dopamine or norepinephrine.  2.  Monitor hemodynamics closely and pressors as needed.  High risk of recurrence of pericardial effusion in case of cardiac injury during lead placement.  3.  Continue to hold anticoagulants at this time.  Resume level anticoagulant agents when okay with cardiology and EP service.  4.  Continue atorvastatin and tamsulosin patient's home medications.  5.  Pain control as needed.  6.  SCDs for DVT prophylaxis.  7.  We will check labs.  8.  Normal saline at 100 mL/h for 1 L and then reassess.    High level of risk due to:  illness with threat to life or bodily function.    Time spent 30 min (exclusive of procedure time)  including high complexity decision making to assess, manipulate, and support vital organ system failure in this individual who has impairment of one or more vital organ systems such that there is a high probability of imminent or life threatening deterioration in the patient’s condition.      Julio Wright MD, [facility]  Pulmonary and Critical Care Medicine  08/24/20 16:14     CC: Lupillo Hill MD

## 2020-08-25 ENCOUNTER — APPOINTMENT (OUTPATIENT)
Dept: GENERAL RADIOLOGY | Facility: HOSPITAL | Age: 80
End: 2020-08-25

## 2020-08-25 ENCOUNTER — APPOINTMENT (OUTPATIENT)
Dept: CARDIOLOGY | Facility: HOSPITAL | Age: 80
End: 2020-08-25

## 2020-08-25 LAB
ANION GAP SERPL CALCULATED.3IONS-SCNC: 8 MMOL/L (ref 5–15)
ANION GAP SERPL CALCULATED.3IONS-SCNC: 9 MMOL/L (ref 5–15)
BASOPHILS # BLD AUTO: 0.07 10*3/MM3 (ref 0–0.2)
BASOPHILS NFR BLD AUTO: 0.5 % (ref 0–1.5)
BH CV ECHO MEAS - BSA(HAYCOCK): 2 M^2
BH CV ECHO MEAS - BSA: 2 M^2
BH CV ECHO MEAS - BZI_BMI: 23.1 KILOGRAMS/M^2
BH CV ECHO MEAS - BZI_METRIC_HEIGHT: 182.9 CM
BH CV ECHO MEAS - BZI_METRIC_WEIGHT: 77.1 KG
BH CV VAS BP RIGHT ARM: NORMAL MMHG
BUN SERPL-MCNC: 25 MG/DL (ref 8–23)
BUN SERPL-MCNC: 31 MG/DL (ref 8–23)
BUN/CREAT SERPL: 11.5 (ref 7–25)
BUN/CREAT SERPL: 13.4 (ref 7–25)
CALCIUM SPEC-SCNC: 8.5 MG/DL (ref 8.6–10.5)
CALCIUM SPEC-SCNC: 8.6 MG/DL (ref 8.6–10.5)
CHLORIDE SERPL-SCNC: 104 MMOL/L (ref 98–107)
CHLORIDE SERPL-SCNC: 107 MMOL/L (ref 98–107)
CO2 SERPL-SCNC: 24 MMOL/L (ref 22–29)
CO2 SERPL-SCNC: 26 MMOL/L (ref 22–29)
CREAT SERPL-MCNC: 2.17 MG/DL (ref 0.76–1.27)
CREAT SERPL-MCNC: 2.31 MG/DL (ref 0.76–1.27)
DEPRECATED RDW RBC AUTO: 45.1 FL (ref 37–54)
EOSINOPHIL # BLD AUTO: 0.04 10*3/MM3 (ref 0–0.4)
EOSINOPHIL NFR BLD AUTO: 0.3 % (ref 0.3–6.2)
ERYTHROCYTE [DISTWIDTH] IN BLOOD BY AUTOMATED COUNT: 12.8 % (ref 12.3–15.4)
GFR SERPL CREATININE-BSD FRML MDRD: 27 ML/MIN/1.73
GFR SERPL CREATININE-BSD FRML MDRD: 29 ML/MIN/1.73
GLUCOSE SERPL-MCNC: 112 MG/DL (ref 65–99)
GLUCOSE SERPL-MCNC: 96 MG/DL (ref 65–99)
HCT VFR BLD AUTO: 39 % (ref 37.5–51)
HGB BLD-MCNC: 12.8 G/DL (ref 13–17.7)
IMM GRANULOCYTES # BLD AUTO: 0.04 10*3/MM3 (ref 0–0.05)
IMM GRANULOCYTES NFR BLD AUTO: 0.3 % (ref 0–0.5)
LV EF 2D ECHO EST: 65 %
LYMPHOCYTES # BLD AUTO: 2.05 10*3/MM3 (ref 0.7–3.1)
LYMPHOCYTES NFR BLD AUTO: 15.4 % (ref 19.6–45.3)
MAXIMAL PREDICTED HEART RATE: 138 BPM
MCH RBC QN AUTO: 31.4 PG (ref 26.6–33)
MCHC RBC AUTO-ENTMCNC: 32.8 G/DL (ref 31.5–35.7)
MCV RBC AUTO: 95.8 FL (ref 79–97)
MONOCYTES # BLD AUTO: 1.15 10*3/MM3 (ref 0.1–0.9)
MONOCYTES NFR BLD AUTO: 8.7 % (ref 5–12)
NEUTROPHILS NFR BLD AUTO: 74.8 % (ref 42.7–76)
NEUTROPHILS NFR BLD AUTO: 9.94 10*3/MM3 (ref 1.7–7)
NRBC BLD AUTO-RTO: 0 /100 WBC (ref 0–0.2)
PLATELET # BLD AUTO: 145 10*3/MM3 (ref 140–450)
PMV BLD AUTO: 11.6 FL (ref 6–12)
POTASSIUM SERPL-SCNC: 4.5 MMOL/L (ref 3.5–5.2)
POTASSIUM SERPL-SCNC: 4.6 MMOL/L (ref 3.5–5.2)
RBC # BLD AUTO: 4.07 10*6/MM3 (ref 4.14–5.8)
SODIUM SERPL-SCNC: 139 MMOL/L (ref 136–145)
SODIUM SERPL-SCNC: 139 MMOL/L (ref 136–145)
STRESS TARGET HR: 117 BPM
WBC # BLD AUTO: 13.29 10*3/MM3 (ref 3.4–10.8)

## 2020-08-25 PROCEDURE — 93308 TTE F-UP OR LMTD: CPT | Performed by: INTERNAL MEDICINE

## 2020-08-25 PROCEDURE — 85025 COMPLETE CBC W/AUTO DIFF WBC: CPT | Performed by: INTERNAL MEDICINE

## 2020-08-25 PROCEDURE — 99024 POSTOP FOLLOW-UP VISIT: CPT | Performed by: INTERNAL MEDICINE

## 2020-08-25 PROCEDURE — 71046 X-RAY EXAM CHEST 2 VIEWS: CPT

## 2020-08-25 PROCEDURE — 80048 BASIC METABOLIC PNL TOTAL CA: CPT | Performed by: INTERNAL MEDICINE

## 2020-08-25 PROCEDURE — 99232 SBSQ HOSP IP/OBS MODERATE 35: CPT | Performed by: INTERNAL MEDICINE

## 2020-08-25 PROCEDURE — 93308 TTE F-UP OR LMTD: CPT

## 2020-08-25 RX ORDER — ATORVASTATIN CALCIUM 40 MG/1
40 TABLET, FILM COATED ORAL DAILY
Status: ON HOLD | COMMUNITY
End: 2020-08-25

## 2020-08-25 RX ORDER — TAMSULOSIN HYDROCHLORIDE 0.4 MG/1
1 CAPSULE ORAL 2 TIMES DAILY
Status: ON HOLD | COMMUNITY
End: 2020-08-25

## 2020-08-25 RX ORDER — UBIDECARENONE 100 MG
100 CAPSULE ORAL DAILY
Status: ON HOLD | COMMUNITY
End: 2020-08-25

## 2020-08-25 RX ORDER — LISINOPRIL 10 MG/1
10 TABLET ORAL 2 TIMES DAILY
Status: ON HOLD | COMMUNITY
End: 2020-08-25

## 2020-08-25 RX ORDER — GUAIFENESIN 600 MG/1
1200 TABLET, EXTENDED RELEASE ORAL 2 TIMES DAILY PRN
Status: ON HOLD | COMMUNITY
End: 2020-08-25

## 2020-08-25 RX ORDER — GUAIFENESIN 600 MG/1
1200 TABLET, EXTENDED RELEASE ORAL DAILY PRN
Status: ON HOLD | COMMUNITY
End: 2020-08-25

## 2020-08-25 RX ORDER — METOPROLOL SUCCINATE 25 MG/1
12.5 TABLET, EXTENDED RELEASE ORAL 2 TIMES DAILY
Status: ON HOLD | COMMUNITY
End: 2020-08-25

## 2020-08-25 RX ORDER — SODIUM PHOSPHATE,MONO-DIBASIC 19G-7G/118
500 ENEMA (ML) RECTAL DAILY
Status: ON HOLD | COMMUNITY
End: 2020-08-25

## 2020-08-25 RX ORDER — TAMSULOSIN HYDROCHLORIDE 0.4 MG/1
0.4 CAPSULE ORAL 2 TIMES DAILY
Status: DISCONTINUED | OUTPATIENT
Start: 2020-08-25 | End: 2020-08-26 | Stop reason: HOSPADM

## 2020-08-25 RX ADMIN — ATORVASTATIN CALCIUM 40 MG: 40 TABLET, FILM COATED ORAL at 20:20

## 2020-08-25 RX ADMIN — HYDROCODONE BITARTRATE AND ACETAMINOPHEN 1 TABLET: 5; 325 TABLET ORAL at 00:11

## 2020-08-25 RX ADMIN — SODIUM CHLORIDE, PRESERVATIVE FREE 10 ML: 5 INJECTION INTRAVENOUS at 08:22

## 2020-08-25 RX ADMIN — TAMSULOSIN HYDROCHLORIDE 0.4 MG: 0.4 CAPSULE ORAL at 08:22

## 2020-08-25 RX ADMIN — HYDROCODONE BITARTRATE AND ACETAMINOPHEN 1 TABLET: 5; 325 TABLET ORAL at 23:00

## 2020-08-25 RX ADMIN — SODIUM CHLORIDE, PRESERVATIVE FREE 10 ML: 5 INJECTION INTRAVENOUS at 20:21

## 2020-08-25 RX ADMIN — TAMSULOSIN HYDROCHLORIDE 0.4 MG: 0.4 CAPSULE ORAL at 20:20

## 2020-08-25 NOTE — PLAN OF CARE
Problem: Patient Care Overview  Goal: Plan of Care Review  Outcome: Ongoing (interventions implemented as appropriate)  Flowsheets  Taken 8/25/2020 0441  Progress: improving  Taken 8/25/2020 0400  Plan of Care Reviewed With: patient  Note:   Neurologically intact. HR 70-80. -120. On 1LNC. Complained of chest pain once, saying it felt like the bottom of his heart was being poked, PRN norco given and pain resolved. Minimal UOP. KVO fluids running at this time. Will continue to monitor

## 2020-08-25 NOTE — PROGRESS NOTES
Nutrition Services    Patient Name:  Pio Baum  YOB: 1940  MRN: [id number]  Admit Date:  8/21/2020                      Clinical Nutrition     Multidisciplinary Rounds      Patient Name: Pio Baum  Date of Encounter: 08/25/20 13:09  MRN: [id number]  Admission date: 8/21/2020      Reason for visit: MDR. RD to continue to follow per protocol.     Additional information obtained during MDR: s/p PPM pericardial effusion drained. Pt rpt doing fine w food. Gives menu preference.     Current diet: Diet Regular  No active supplement orders    Intake: 100% of 1 meal.    EMR reviewed     Intervention:  Follow treatment plan  Care plan reviewed  Menu provided  Menu adjusted    Follow up:   Per protocol      Morena Clifton RD  1:09 PM  Time: 20 min        Electronically signed by:  Morena Clifton RD  08/25/20 13:09

## 2020-08-25 NOTE — PROGRESS NOTES
Intensive Care Note     S/p PPM complicated by pericardial effusion    History of Present Illness     80-year-old male who was recently admitted for further work-up of recent episode of syncopal episode.  Patient has previous history of CVA, paroxysmal atrial fibrillation, dyslipidemia, chronic kidney disease, oral tobacco use and hypertension.  Patient previously had syncopal episodes and had a ZIO patch done last year which showed occasional PACs and PVCs with short runs of SVT/PAT.  Echocardiogram at that time was normal.    Patient apparently was working in his yard on August 21.  He saw some black spots before his eyes and then passed out.  EMS was called and they noted bradycardia with heart rate into 30s.  He was given atropine and his heart rate was in 50s when he arrived.  Patient was admitted to cardiology service.  Further work-up included troponin levels which are negative.  Patient creatinine is around 1.42.  Complete blood count was normal.  COVID-19 test was negative.    Patient was monitored in the hospital and given his history of recurrent syncopal episodes with recurrent bradycardia decision was made to place permanent pacemaker in this patient by EP service.  His Eliquis was held prior to the procedure.  Today he underwent permanent pacemaker placement.  Postprocedure in recovery room patient was found to be hypotensive.  Echocardiogram apparently revealed pericardial effusion which was drained and about 270 cc of bloody fluid removed per Dr. Nava.  Post procedure patient's blood pressure improved apparently and echocardiogram showed trivial pericardial effusion.  Patient is being admitted to intensive care unit for closer hemodynamic monitoring overnight.    Interval history: Patient states that he had a good night overnight.  Today morning around 230 he had episode of chest discomfort and received some pain medications.  Since then he said he slept well and has no chest pain or shortness  of breath today.  Denies any dizziness.  Denies any headaches.  Starting to take oral diet as well.  No other acute issues.    Problem List, Surgical History, Family, Social History, and ROS     Patient Active Problem List    Diagnosis   • Sick sinus syndrome (CMS/HCC) [I49.5]   • Pericardial effusion [I31.3]   • Syncope and collapse [R55]   • Pre-syncope [R55]   • Diverticulosis [K57.90]   • Scalp laceration [S01.01XA]   • Atrial fibrillation (CMS/HCC) [I48.91]   • History of TIA (transient ischemic attack) and stroke [Z86.73]   • PFO (patent foramen ovale) [Q21.1]   • Hyperlipidemia [E78.5]   • Tobacco chew use [Z72.0]   • Stage 2 chronic kidney disease [N18.2]   • Essential hypertension [I10]     Past Surgical History:   Procedure Laterality Date   • CARDIAC ELECTROPHYSIOLOGY PROCEDURE N/A 8/24/2020    Procedure: PACEMAKER IMPLANTATION- DC;  Surgeon: Dain Nava MD;  Location: [facility] INVASIVE LOCATION;  Service: Cardiology;  Laterality: N/A;   • TONSILLECTOMY AND ADENOIDECTOMY         Allergies   Allergen Reactions   • Flonase [Fluticasone] Irritability     Gets a nose bleed as soon as used   • Penicillins Rash     Rash all over body including mouth/throat      No current facility-administered medications on file prior to encounter.      Current Outpatient Medications on File Prior to Encounter   Medication Sig   • [DISCONTINUED] apixaban (ELIQUIS) 5 MG tablet tablet Take 5 mg by mouth 2 (Two) Times a Day.   • [DISCONTINUED] apixaban (ELIQUIS) 5 MG tablet tablet Take 5 mg by mouth 2 (Two) Times a Day.   • [DISCONTINUED] atorvastatin (LIPITOR) 40 MG tablet Take 40 mg by mouth Daily.   • [DISCONTINUED] atorvastatin (LIPITOR) 40 MG tablet Take 40 mg by mouth Daily.   • [DISCONTINUED] coenzyme Q10 100 MG capsule Take 100 mg by mouth Daily.   • [DISCONTINUED] coenzyme Q10 100 MG capsule Take 100 mg by mouth Daily.   • [DISCONTINUED] glucosamine sulfate 500 MG capsule capsule Take 500 mg by mouth Daily.   •  "[DISCONTINUED] glucosamine sulfate 500 MG capsule capsule Take 500 mg by mouth Daily.   • [DISCONTINUED] guaiFENesin (MUCINEX) 600 MG 12 hr tablet Take 1,200 mg by mouth 2 (Two) Times a Day As Needed for Cough or Congestion.   • [DISCONTINUED] guaiFENesin (MUCINEX) 600 MG 12 hr tablet Take 1,200 mg by mouth Daily As Needed for Cough or Congestion.   • [DISCONTINUED] lisinopril (PRINIVIL,ZESTRIL) 10 MG tablet Take 10 mg by mouth 2 (two) times a day.   • [DISCONTINUED] lisinopril (PRINIVIL,ZESTRIL) 10 MG tablet Take 10 mg by mouth 2 (two) times a day.   • [DISCONTINUED] metoprolol succinate XL (TOPROL-XL) 25 MG 24 hr tablet Take 12.5 mg by mouth 2 (two) times a day.   • [DISCONTINUED] metoprolol succinate XL (TOPROL-XL) 25 MG 24 hr tablet Take 12.5 mg by mouth 2 (two) times a day.   • [DISCONTINUED] MULTIPLE VITAMIN PO Take 1 tablet by mouth Daily.   • [DISCONTINUED] MULTIPLE VITAMIN PO Take 1 tablet by mouth Daily.   • [DISCONTINUED] tamsulosin (FLOMAX) 0.4 MG capsule 24 hr capsule Take 1 capsule by mouth 2 (two) times a day.   • [DISCONTINUED] tamsulosin (FLOMAX) 0.4 MG capsule 24 hr capsule Take 1 capsule by mouth 2 (two) times a day.     MEDICATION LIST AND ALLERGIES REVIEWED.    Family History   Problem Relation Age of Onset   • Arthritis Other    • Hyperlipidemia Other    • Stroke Other    • Heart attack Mother    • Heart attack Father      Social History     Tobacco Use   • Smoking status: Never Smoker   • Smokeless tobacco: Current User     Types: Chew   • Tobacco comment: chews once every 4-5 months   Substance Use Topics   • Alcohol use: Yes     Comment: 1-2 drinks a week   • Drug use: No     Social History     Social History Narrative   • Not on file     FAMILY AND SOCIAL HISTORY REVIEWED.    Review of Systems  ALL OTHER SYSTEMS REVIEWED AND ARE NEGATIVE.    Physical Exam and Clinical Information   /84   Pulse 71   Temp 98.2 °F (36.8 °C) (Oral)   Resp 16   Ht 182.9 cm (72.01\")   Wt 77.1 kg (170 " lb)   SpO2 95%   BMI 23.05 kg/m²   Physical Exam    General Appearance: Awake, alert, in no acute distress   Lungs: Mild bruising noted right infraclavicular region at the pacemaker site without any obvious hematoma, B/L Breath sounds present with decreased breath sounds on bases, no wheezing heard, no crackles.   Heart: S1 and S2 present, no murmur, paced rhythm, dressing intact to right upper chest  Abdomen: Soft, non-tender, no guarding or rigidity, bowel sounds positive.  Extremities:no edema, warm to touch.  Neurologic:  Moving all four extremities.      Results from last 7 days   Lab Units 08/25/20  0343 08/24/20  1731 08/21/20  1253   WBC 10*3/mm3 13.29* 16.90* 9.73   HEMOGLOBIN g/dL 12.8* 13.8 13.2   PLATELETS 10*3/mm3 145 149 139*     Results from last 7 days   Lab Units 08/25/20  0343 08/24/20  1731 08/22/20  0706 08/21/20  1252   SODIUM mmol/L 139 137 140 138   POTASSIUM mmol/L 4.6 4.3 4.2 4.7   CO2 mmol/L 24.0 20.0* 25.0 23.0   BUN mg/dL 25* 23 21 21   CREATININE mg/dL 2.17* 1.80* 1.42* 1.52*   MAGNESIUM mg/dL  --   --   --  2.2   GLUCOSE mg/dL 96 142* 91 114*     Estimated Creatinine Clearance: 28.6 mL/min (A) (by C-G formula based on SCr of 2.17 mg/dL (H)).          No results found for: LACTATE     Images:    Limited echo pending    Chest x-ray reviewed and showed pacemaker leads in good position.  No obvious pneumothorax noted.    I reviewed the patient's results and images.     Impression     Syncope and collapse    Sick sinus syndrome (CMS/HCC)    Pericardial effusion    Plan/Recommendations     1.  Patient had periprocedural pericardial effusion which was drained.  Patient required norepinephrine for a little while overnight but currently hemodynamically stable.  Denies any acute issues.  Limited echo ordered per cardiology and they will follow back up.  Patient will be okay to go back to floor.  2.  Continue to hold anticoagulants at this time.  Resume anticoagulant agents when okay with  cardiology and EP service.  3.  Continue atorvastatin and tamsulosin patient's home medications.  4.  SCDs for DVT prophylaxis.  5.  Creatinine did go up to 2.17 but other electrolytes are stable.  Urine output is acceptable.  Patient does have chronic kidney disease.  Probably slightly worse due to hypotension.  Recheck levels again in the morning.    High level of risk due to:  illness with threat to life or bodily function.    Time spent 25 min (exclusive of procedure time)  including high complexity decision making to assess, manipulate, and support vital organ system failure in this individual who has impairment of one or more vital organ systems such that there is a high probability of imminent or life threatening deterioration in the patient’s condition.      Julio Wright MD, [facility]  Pulmonary and Critical Care Medicine  08/25/20 11:41     CC: Lupillo Hill MD

## 2020-08-25 NOTE — NURSING NOTE
Patient sedated upon arrival to [facility]. First blood pressure taken was stable. Second blood pressure was low (SBP 40s to 50s) and patient awakened and started complaining of chest pressure. He was also having trouble hearing which his wife said was not normal for him. Dr. Nava was paged and he decided to take the patient back to the EP Lab to assess for fluid build up around the heart. I, an EP lab nurse, and Dr. Nava rushed the patient to the EP Lab.

## 2020-08-25 NOTE — PROGRESS NOTES
[facility] at [facility]  Progress Note       LOS: 2 days   Patient Care Team:  Lupillo Hill MD as PCP - General    Chief Complaint:  SSS/PAF    Subjective Feels well this morning.  No chest pain or shortness of breath.  No tachypalpitations.    Interval History: Underwent DDDR permanent pacemaker implantation yesterday complicated by pericardial effusion requiring pericardiocentesis.        Review of Systems:   Pertinent positives in HPI, all others reviewed and negative.      Objective       Current Facility-Administered Medications:   •  acetaminophen (TYLENOL) tablet 650 mg, 650 mg, Oral, Q4H PRN **OR** acetaminophen (TYLENOL) suppository 650 mg, 650 mg, Rectal, Q4H PRN, Dain Nava MD  •  atorvastatin (LIPITOR) tablet 40 mg, 40 mg, Oral, Nightly, Dain Nava MD, 40 mg at 08/24/20 2020  •  DOPamine 400 mg in 250 mL D5W (1.6 mg/mL) infusion, 2-20 mcg/kg/min, Intravenous, Titrated, Dain Nava MD, Stopped at 08/24/20 1748  •  HYDROcodone-acetaminophen (NORCO) 5-325 MG per tablet 1 tablet, 1 tablet, Oral, Q4H PRN, Dain Nava MD, 1 tablet at 08/25/20 0011  •  melatonin tablet 5 mg, 5 mg, Oral, Nightly PRN, Dain Nava MD  •  morphine injection 2 mg, 2 mg, Intravenous, Q4H PRN, Dain Nava MD  •  norepinephrine (LEVOPHED) 8 mg/250 mL (32 mcg/mL) in sodium chloride 0.9% infusion (premix), 0.02-0.3 mcg/kg/min, Intravenous, Titrated, Julio Wright MD, Stopped at 08/24/20 1845  •  ondansetron (ZOFRAN) injection 4 mg, 4 mg, Intravenous, Q6H PRN, Dain Nava MD  •  sodium chloride 0.9 % flush 10 mL, 10 mL, Intravenous, Q12H, Dain Nava MD, 10 mL at 08/25/20 0822  •  tamsulosin (FLOMAX) 24 hr capsule 0.4 mg, 0.4 mg, Oral, BID, Julio Wright MD    Vital Sign Min/Max for last 24 hours  Temp  Min: 96.9 °F (36.1 °C)  Max: 99 °F (37.2 °C)   BP  Min: 41/27  Max: 174/81   Pulse  Min: 46  Max: 135   Resp  Min: 15  Max: 22   SpO2  Min: 93  "%  Max: 100 %   Flow (L/min)  Min: 1  Max: 2   Weight  Min: 77.1 kg (170 lb)  Max: 77.1 kg (170 lb)     Flowsheet Rows      First Filed Value   Admission Height  182.9 cm (72\") Documented at 08/21/2020 1223   Admission Weight  77.1 kg (170 lb) Documented at 08/21/2020 1223            Intake/Output Summary (Last 24 hours) at 8/25/2020 1004  Last data filed at 8/25/2020 0800  Gross per 24 hour   Intake 1797 ml   Output 500 ml   Net 1297 ml       Physical Exam:     General Appearance:    Alert, cooperative, in no acute distress   Lungs:     Clear to auscultation,respirations regular, even and                  unlabored    Heart:    Regular rhythm and normal rate, normal S1 and S2, no            murmur, no gallop, no rub, no click   Chest Wall:    No abnormalities observed   Abdomen:     Normal bowel sounds, no masses, no organomegaly, soft        non-tender, non-distended, no guarding, no rebound                tenderness   Extremities:   Moves all extremities well, no edema, no cyanosis, no             redness   Pulses:   Pulses palpable and equal bilaterally   Skin:  Pacemaker site is stable with small amount ecchymosis but no hematoma.        Results Review:   Results from last 7 days   Lab Units 08/25/20  0343 08/24/20 1731 08/21/20  1253   WBC 10*3/mm3 13.29* 16.90* 9.73   HEMOGLOBIN g/dL 12.8* 13.8 13.2   HEMATOCRIT % 39.0 44.3 40.3   PLATELETS 10*3/mm3 145 149 139*     Results from last 7 days   Lab Units 08/25/20  0343 08/24/20  1731 08/22/20  0706   SODIUM mmol/L 139 137 140   POTASSIUM mmol/L 4.6 4.3 4.2   CHLORIDE mmol/L 107 108* 109*   CO2 mmol/L 24.0 20.0* 25.0   BUN mg/dL 25* 23 21   CREATININE mg/dL 2.17* 1.80* 1.42*   GLUCOSE mg/dL 96 142* 91                          Results from last 7 days   Lab Units 08/21/20  1252   TROPONIN T ng/mL <0.010       Intake/Output Summary (Last 24 hours) at 8/25/2020 1004  Last data filed at 8/25/2020 0800  Gross per 24 hour   Intake 1797 ml   Output 500 ml   Net 1297 " luc       I personally viewed and interpreted the patient's EKG/Telemetry data      Chest x-ray: Right atrial and RV leads in stable position.  No pneumothorax.      Telemetry: Normal sinus rhythm, atrial paced, PVCs noted.    Ejection Fraction  Lab Results   Component Value Date    EF 58 01/17/2017       Echo EF Estimated  Lab Results   Component Value Date    ECHOEFEST 55 05/16/2019         Present on Admission:  • Syncope and collapse    Assessment/Plan     1. Sick sinus syndrome with paroxysmal atrial fibrillation, symptomatic bradycardia/pauses and episodes of near syncope/syncope status post DDDR permanent pacemaker implantation yesterday.  Pacemaker function normal today.  2. Pericardial effusion status post pericardiocentesis with repeat limited echo this morning demonstrated no pericardial fusion.  3. Acute on chronic renal insufficiency secondary to transient hypotension.  Still with adequate urine output.    Maintain adequate blood pressure.     Will transfer to floor today.  Will repeat BMP later today.  Monitor creatinine.  Most likely discharge in a.m. tomorrow.    Dain Nava MD  08/25/20  10:04

## 2020-08-25 NOTE — PROGRESS NOTES
Continued Stay Note  [facility]     Patient Name: Pio Baum  MRN: [id number]  Today's Date: 8/25/2020    Admit Date: 8/21/2020    Discharge Plan     Row Name 08/25/20 1148       Plan    Plan  Home    Patient/Family in Agreement with Plan  yes    Plan Comments  Met with patient in the room.  Alert and oriented.  No O2.  PPM placed 8-24.  Denies pain.  Lives with spouse in [facility].  PCP is Lupillo Hill.  Plan is home.  Following for discharge needs.     Final Discharge Disposition Code  01 - home or self-care        Discharge Codes    No documentation.             Pau Santos RN

## 2020-08-26 ENCOUNTER — READMISSION MANAGEMENT (OUTPATIENT)
Dept: CALL CENTER | Facility: HOSPITAL | Age: 80
End: 2020-08-26

## 2020-08-26 VITALS
HEART RATE: 70 BPM | WEIGHT: 170 LBS | DIASTOLIC BLOOD PRESSURE: 76 MMHG | BODY MASS INDEX: 23.03 KG/M2 | OXYGEN SATURATION: 96 % | RESPIRATION RATE: 16 BRPM | HEIGHT: 72 IN | TEMPERATURE: 97.4 F | SYSTOLIC BLOOD PRESSURE: 108 MMHG

## 2020-08-26 LAB
ANION GAP SERPL CALCULATED.3IONS-SCNC: 8 MMOL/L (ref 5–15)
BASOPHILS # BLD AUTO: 0.1 10*3/MM3 (ref 0–0.2)
BASOPHILS NFR BLD AUTO: 0.9 % (ref 0–1.5)
BUN SERPL-MCNC: 32 MG/DL (ref 8–23)
BUN/CREAT SERPL: 14.3 (ref 7–25)
CALCIUM SPEC-SCNC: 8.1 MG/DL (ref 8.6–10.5)
CHLORIDE SERPL-SCNC: 106 MMOL/L (ref 98–107)
CO2 SERPL-SCNC: 23 MMOL/L (ref 22–29)
CREAT SERPL-MCNC: 2.24 MG/DL (ref 0.76–1.27)
DEPRECATED RDW RBC AUTO: 44.9 FL (ref 37–54)
EOSINOPHIL # BLD AUTO: 0.14 10*3/MM3 (ref 0–0.4)
EOSINOPHIL NFR BLD AUTO: 1.2 % (ref 0.3–6.2)
ERYTHROCYTE [DISTWIDTH] IN BLOOD BY AUTOMATED COUNT: 13 % (ref 12.3–15.4)
GFR SERPL CREATININE-BSD FRML MDRD: 28 ML/MIN/1.73
GLUCOSE SERPL-MCNC: 99 MG/DL (ref 65–99)
HCT VFR BLD AUTO: 36 % (ref 37.5–51)
HGB BLD-MCNC: 11.8 G/DL (ref 13–17.7)
IMM GRANULOCYTES # BLD AUTO: 0.03 10*3/MM3 (ref 0–0.05)
IMM GRANULOCYTES NFR BLD AUTO: 0.3 % (ref 0–0.5)
LYMPHOCYTES # BLD AUTO: 2.41 10*3/MM3 (ref 0.7–3.1)
LYMPHOCYTES NFR BLD AUTO: 21.2 % (ref 19.6–45.3)
MCH RBC QN AUTO: 31 PG (ref 26.6–33)
MCHC RBC AUTO-ENTMCNC: 32.8 G/DL (ref 31.5–35.7)
MCV RBC AUTO: 94.5 FL (ref 79–97)
MONOCYTES # BLD AUTO: 1.1 10*3/MM3 (ref 0.1–0.9)
MONOCYTES NFR BLD AUTO: 9.7 % (ref 5–12)
NEUTROPHILS NFR BLD AUTO: 66.7 % (ref 42.7–76)
NEUTROPHILS NFR BLD AUTO: 7.58 10*3/MM3 (ref 1.7–7)
NRBC BLD AUTO-RTO: 0 /100 WBC (ref 0–0.2)
PLATELET # BLD AUTO: 130 10*3/MM3 (ref 140–450)
PMV BLD AUTO: 12.2 FL (ref 6–12)
POTASSIUM SERPL-SCNC: 4 MMOL/L (ref 3.5–5.2)
RBC # BLD AUTO: 3.81 10*6/MM3 (ref 4.14–5.8)
SODIUM SERPL-SCNC: 137 MMOL/L (ref 136–145)
WBC # BLD AUTO: 11.36 10*3/MM3 (ref 3.4–10.8)

## 2020-08-26 PROCEDURE — 99024 POSTOP FOLLOW-UP VISIT: CPT | Performed by: INTERNAL MEDICINE

## 2020-08-26 PROCEDURE — 5A2204Z RESTORATION OF CARDIAC RHYTHM, SINGLE: ICD-10-PCS | Performed by: INTERNAL MEDICINE

## 2020-08-26 PROCEDURE — 85025 COMPLETE CBC W/AUTO DIFF WBC: CPT | Performed by: INTERNAL MEDICINE

## 2020-08-26 PROCEDURE — 0W9D3ZZ DRAINAGE OF PERICARDIAL CAVITY, PERCUTANEOUS APPROACH: ICD-10-PCS | Performed by: INTERNAL MEDICINE

## 2020-08-26 PROCEDURE — 80048 BASIC METABOLIC PNL TOTAL CA: CPT | Performed by: INTERNAL MEDICINE

## 2020-08-26 RX ORDER — UBIDECARENONE 100 MG
100 CAPSULE ORAL DAILY
COMMUNITY
End: 2020-10-24 | Stop reason: HOSPADM

## 2020-08-26 RX ORDER — ATORVASTATIN CALCIUM 40 MG/1
40 TABLET, FILM COATED ORAL DAILY
COMMUNITY
End: 2020-09-22

## 2020-08-26 RX ORDER — TAMSULOSIN HYDROCHLORIDE 0.4 MG/1
1 CAPSULE ORAL DAILY
COMMUNITY

## 2020-08-26 RX ORDER — METOPROLOL SUCCINATE 25 MG/1
12.5 TABLET, EXTENDED RELEASE ORAL 2 TIMES DAILY
COMMUNITY
End: 2020-08-26 | Stop reason: HOSPADM

## 2020-08-26 RX ORDER — SODIUM PHOSPHATE,MONO-DIBASIC 19G-7G/118
1500 ENEMA (ML) RECTAL DAILY
Status: ON HOLD | COMMUNITY
End: 2020-10-20

## 2020-08-26 RX ORDER — LISINOPRIL 10 MG/1
10 TABLET ORAL 2 TIMES DAILY
COMMUNITY
End: 2020-08-26 | Stop reason: HOSPADM

## 2020-08-26 RX ORDER — GUAIFENESIN 600 MG/1
1200 TABLET, EXTENDED RELEASE ORAL DAILY PRN
COMMUNITY
End: 2020-11-13

## 2020-08-26 RX ADMIN — TAMSULOSIN HYDROCHLORIDE 0.4 MG: 0.4 CAPSULE ORAL at 09:22

## 2020-08-26 NOTE — PROGRESS NOTES
Continued Stay Note  [facility]     Patient Name: Pio Baum  MRN: [id number]  Today's Date: 8/26/2020    Admit Date: 8/21/2020    Discharge Plan     Row Name 08/26/20 1047       Plan    Plan  Home    Patient/Family in Agreement with Plan  yes    Plan Comments  Pt d/c'ing home. No d/c needs. CM will cont to follow.         Discharge Codes    No documentation.       Expected Discharge Date and Time     Expected Discharge Date Expected Discharge Time    Aug 26, 2020             Macie Angeles RN

## 2020-08-26 NOTE — OUTREACH NOTE
Prep Survey      Responses   [facility] facility patient discharged from?  [facility]   Is LACE score < 7 ?  No   Eligibility  [facility]   Date of Admission  08/21/20   Date of Discharge  08/26/20   Discharge Disposition  Home or Self Care   Discharge diagnosis  Pacemaker implan, pericardiocentesis, cardioversion   COVID-19 Test Status  Negative   Does the patient have one of the following disease processes/diagnoses(primary or secondary)?  Cardiothoracic surgery   Does the patient have Home health ordered?  No   Is there a DME ordered?  No   Prep survey completed?  Yes          Leydi Loredo RN

## 2020-08-26 NOTE — DISCHARGE SUMMARY
Physician Discharge Summary     Patient ID:  Pio Baum  [id number]  80 y.o.  1940    Admit date: 8/21/2020    Discharge date and time: No discharge date for patient encounter.     Admitting Physician: Chad Moya MD     Primary Physician: Lupillo Hill MD    Discharge Physician: Dain Nava MD    Admission Diagnoses: Syncope and collapse [R55]  Syncope and collapse [R55]  Syncope and collapse [R55]  Pericardial effusion [I31.3]  Syncope and collapse [R55]  SSS (sick sinus syndrome) (CMS/HCC) [I49.5]  SSS (sick sinus syndrome) (CMS/HCC) [I49.5]    Discharge Diagnoses:   Patient Active Problem List    Diagnosis   • Sick sinus syndrome (CMS/HCC) [I49.5]   • Pericardial effusion [I31.3]   • Syncope and collapse [R55]   • Pre-syncope [R55]   • Diverticulosis [K57.90]   • Scalp laceration [S01.01XA]   • Atrial fibrillation (CMS/HCC) [I48.91]   • History of TIA (transient ischemic attack) and stroke [Z86.73]   • PFO (patent foramen ovale) [Q21.1]   • Hyperlipidemia [E78.5]   • Tobacco chew use [Z72.0]   • Stage 2 chronic kidney disease [N18.2]   • Essential hypertension [I10]       Cardiology Procedures this admission:    1. Dual Chamber Pacemaker Implant   2. Pericardiocentesis   3. External Cardioversion     HPI: 80-year-old male with a history of prior CVA, PAF, hyperlipidemia and hypertension presents after an episode of syncope.  In 2017 he had a negative nuclear perfusion scan.  He had prior echocardiogram 2019 that was negative.  He had a Holter monitor which showed short rounds PAF.  The patient has had 2 episodes of near syncope and an episode of syncope today in the last 6 weeks.  He states he has this feeling received dots in his vision his wife says he looks very gray when that happens.  The patient was out working in his yard today and he got him a bottle of water while talking to his neighbor again this came on him and he went out.  He was also in the hospital several weeks  ago with stitches after falling and hitting his head.  He has had no dyspnea, no tightness having squeezing pressure chest jaw throat arms no palpitations no edema no claudication      Hospital Course: Patient had right-sided dual chamber pacemaker placement for sinus node dysfunction on 8/24/20. Post procedure he developed chest pain and hypotension The procedure was complicated by a pericardial effusion/tamponade. He then underwent emergent pericardiocentesis with approximately 270cc of dark blood removed from the pericardium. He also underwent ECV of afib back to sinus rhythm at that time. Limited echo post pericardiocentesis revealed no effusion. He was monitored an additional night and limited echo on 8/25 revealed.  Today he is doing well with no complaints.  No chest pain or shortness of breath.  Wants to go home.    Discharge Exam:    Vitals:    08/26/20 0724   BP: 108/76   Pulse: 77   Resp: 16   Temp: 97.4 °F (36.3 °C)   SpO2: 95%      General-Well Nourished, Well developed  Eyes - PERRLA  Neck- supple, No mass  CV-regular rhythm normal S1-S2.  There is an S4.  PMI normal.  Lung-there are auscultation bilaterally.  Respiratory effort is normal.  Abd- soft, +BS  Musc/skel - Norm strength and range of motion  Skin- warm and dry, pacemaker site shows no erythema with small ecchymosis.  No hematoma  Neuro - Alert & Oriented x 3, appropriate mood.      Telemetry demonstrates normal sinus rhythm atrial paced rhythm.    Laboratory data today shows creatinine 2.2.      Disposition: Patient will be discharged home    Patient discharge medications:      Your medication list      CONTINUE taking these medications      Instructions Last Dose Given Next Dose Due   atorvastatin 40 MG tablet  Commonly known as:  LIPITOR      Take 40 mg by mouth Daily.       coenzyme Q10 100 MG capsule      Take 100 mg by mouth Daily.       glucosamine sulfate 500 MG capsule capsule      Take 500 mg by mouth Daily.       guaiFENesin 600 MG  12 hr tablet  Commonly known as:  MUCINEX      Take 1,200 mg by mouth Daily As Needed for Cough or Congestion.       MULTIVITAMIN ADULT PO      Take 1 tablet by mouth Daily.       tamsulosin 0.4 MG capsule 24 hr capsule  Commonly known as:  FLOMAX      Take 1 capsule by mouth 2 (two) times a day.          STOP taking these medications    apixaban 5 MG tablet tablet  Commonly known as:  ELIQUIS        lisinopril 10 MG tablet  Commonly known as:  PRINIVIL,ZESTRIL        metoprolol succinate XL 25 MG 24 hr tablet  Commonly known as:  TOPROL-XL               Referenced discharge instructions provided by nursing for diet and activity.    Follow-up with [facility] in 7 days for wound check.  Will repeat BMP in 5 days.  Will hold anticoagulation for 1 week.    Signed:  Dain Nava MD  8/26/2020  08:59      I, Dain Nava MD, personally performed the services face to face as described and documented by the above named individual. I have made any necessary edits and it is both accurate and complete 8/26/2020  08:59

## 2020-08-27 ENCOUNTER — NURSE TRIAGE (OUTPATIENT)
Dept: CALL CENTER | Facility: HOSPITAL | Age: 80
End: 2020-08-27

## 2020-08-27 ENCOUNTER — TRANSITIONAL CARE MANAGEMENT TELEPHONE ENCOUNTER (OUTPATIENT)
Dept: CALL CENTER | Facility: HOSPITAL | Age: 80
End: 2020-08-27

## 2020-08-27 ENCOUNTER — TELEPHONE (OUTPATIENT)
Dept: CARDIOLOGY | Facility: CLINIC | Age: 80
End: 2020-08-27

## 2020-08-27 RX ORDER — COLCHICINE 0.6 MG/1
0.6 TABLET ORAL 2 TIMES DAILY
Qty: 14 TABLET | Refills: 0 | Status: SHIPPED | OUTPATIENT
Start: 2020-08-27 | End: 2020-09-04

## 2020-08-27 NOTE — OUTREACH NOTE
Call Center TCM Note      Responses   [facility] patient discharged from?  [place]   Does the patient have one of the following disease processes/diagnoses(primary or secondary)?  Cardiothoracic surgery   TCM attempt successful?  Yes   Call start time  1416   Call end time  1421   Discharge diagnosis  Pacemaker implant, pericardiocentesis, cardioversion   Is patient permission given to speak with other caregiver?  Yes   List who call center can speak with  spouse- Willow   Person spoke with today (if not patient) and relationship  spouse- Willow   Meds reviewed with patient/caregiver?  Yes   Is the patient having any side effects they believe may be caused by any medication additions or changes?  No   Does the patient have all medications related to this admission filled (includes all antibiotics, pain medications, cardiac medications, etc.)  Yes   Is the patient taking all medications as directed (includes completed medication regime)?  Yes   Comments regarding appointments  f/u with Cardiology on 9/2   Does the patient have a primary care provider?   Yes   Does the patient have an appointment scheduled with their C/T surgeon?  No   Comments regarding PCP   f/u with Dr. Hill on 9/4   Has the patient kept scheduled appointments due by today?  N/A   Psychosocial issues?  No   Did the patient receive a copy of their discharge instructions?  Yes   What is the patient's perception of their health status since discharge?  Improving   Nursing interventions  Nurse provided patient education   Nursing interventions  Advised to call cardiologist   Is the patient/caregiver able to teach back the hierarchy of who to call/visit for symptoms/problems? PCP, Specialist, Home health nurse, Urgent Care, ED, 911  Yes   TCM call completed?  Yes   Wrap up additional comments  Per wife, pt is having a some chest pain and tightness that comes and goes, advised her to call cardiologist to discuss symptoms, wife verbalized  understanding.          Ольга Brower RN    8/27/2020, 14:21

## 2020-08-27 NOTE — TELEPHONE ENCOUNTER
"Reviewed the AVS with the wife    Reason for Disposition  • Caller has medication question only, adult not sick, and triager answers question    Additional Information  • Negative: Drug overdose and triager unable to answer question  • Negative: Caller requesting information unrelated to medicine  • Negative: Caller requesting a prescription for Strep throat and has a positive culture result  • Negative: Rash while taking a medication or within 3 days of stopping it  • Negative: Immunization reaction suspected  • Negative: [1] Asthma and [2] having symptoms of asthma (cough, wheezing, etc.)  • Negative: [1] Influenza symptoms AND [2] anti-viral med prescription request, such as Tamiflu  • Negative: [1] Symptom of illness (e.g., headache, abdominal pain, earache, vomiting) AND [2] more than mild  • Negative: MORE THAN A DOUBLE DOSE of a prescription or over-the-counter (OTC) drug  • Negative: [1] DOUBLE DOSE (an extra dose or lesser amount) of over-the-counter (OTC) drug AND [2] any symptoms (e.g., dizziness, nausea, pain, sleepiness)  • Negative: [1] DOUBLE DOSE (an extra dose or lesser amount) of prescription drug AND [2] any symptoms (e.g., dizziness, nausea, pain, sleepiness)  • Negative: Took another person's prescription drug  • Negative: [1] DOUBLE DOSE (an extra dose or lesser amount) of prescription drug AND [2] NO symptoms (Exception: a double dose of antibiotics)  • Negative: Diabetes drug error or overdose (e.g., took wrong type of insulin or took extra dose)  • Negative: [1] Request for URGENT new prescription or refill of \"essential\" medication (i.e., likelihood of harm to patient if not taken) AND [2] triager unable to fill per unit policy  • Negative: [1] Prescription not at pharmacy AND [2] was prescribed by PCP recently  • Negative: [1] Pharmacy calling with prescription questions AND [2] triager unable to answer question  • Negative: [1] Caller has URGENT medication question about med that PCP or " "specialist prescribed AND [2] triager unable to answer question  • Negative: [1] Caller has NON-URGENT medication question about med that PCP prescribed AND [2] triager unable to answer question  • Negative: [1] Caller requesting a NON-URGENT new prescription or refill AND [2] triager unable to refill per unit policy  • Negative: [1] Caller has medication question about med not prescribed by PCP AND [2] triager unable to answer question (e.g., compatibility with other med, storage)  • Negative: Caller requesting a CONTROLLED substance prescription refill (e.g., narcotics, ADHD medicines)  • Negative: Caller wants to use a complementary or alternative medicine  • Negative: [1] Prescription prescribed recently is not at pharmacy AND [2] triager has access to patient's EMR AND [3] prescription is recorded in the EMR  • Negative: [1] DOUBLE DOSE (an extra dose or lesser amount) of over-the-counter (OTC) drug AND [2] NO symptoms  • Negative: [1] DOUBLE DOSE (an extra dose or lesser amount) of antibiotic drug AND [2] NO symptoms    Answer Assessment - Initial Assessment Questions  1.   NAME of MEDICATION: \"What medicine are you calling about?\"      wanting to know which medications her  should take  2.   QUESTION: \"What is your question?\"    management  3.   PRESCRIBING HCP: \"Who prescribed it?\" Reason: if prescribed by specialist, call should be referred to that group.    na  4. SYMPTOMS: \"Do you have any symptoms?\"      none  5. SEVERITY: If symptoms are present, ask \"Are they mild, moderate or severe?\"      moderate  6.  PREGNANCY:  \"Is there any chance that you are pregnant?\" \"When was your last menstrual period?\"      na    Protocols used: MEDICATION QUESTION CALL-ADULT-AH      "

## 2020-08-27 NOTE — TELEPHONE ENCOUNTER
Per Darcie Guerrier PA-C call in colchicine 0.6mg 1 bid X7Days. She will call us tomorrow to let us know how he is doing. I also reminded her that he needs to get his BMP done in 5 days. Pt wife verbally repeated back to me the instructions and she wrote them done.          Wife calling to tell you her  had a PPM, ECV and pericardiocentesis and is now very weak with occasional chest tightness and occasional pain when his heart beats. It is better today than yesterday. He is also sleeping a lot. He had chills last night but none today and is afebrile. They want to know that all this is normal. I checked with the device clinic and they received a remote yesterday and everything checked out normal.

## 2020-08-28 ENCOUNTER — APPOINTMENT (OUTPATIENT)
Dept: GENERAL RADIOLOGY | Facility: HOSPITAL | Age: 80
End: 2020-08-28

## 2020-08-28 ENCOUNTER — HOSPITAL ENCOUNTER (EMERGENCY)
Facility: HOSPITAL | Age: 80
Discharge: HOME | End: 2020-08-28
Attending: EMERGENCY MEDICINE | Admitting: EMERGENCY MEDICINE

## 2020-08-28 VITALS
TEMPERATURE: 98.7 F | HEIGHT: 72 IN | WEIGHT: 170 LBS | SYSTOLIC BLOOD PRESSURE: 144 MMHG | RESPIRATION RATE: 18 BRPM | HEART RATE: 70 BPM | DIASTOLIC BLOOD PRESSURE: 75 MMHG | BODY MASS INDEX: 23.03 KG/M2 | OXYGEN SATURATION: 97 %

## 2020-08-28 DIAGNOSIS — R50.9 FEVER, UNSPECIFIED FEVER CAUSE: Primary | ICD-10-CM

## 2020-08-28 DIAGNOSIS — Z20.822 ENCOUNTER FOR LABORATORY TESTING FOR COVID-19 VIRUS: ICD-10-CM

## 2020-08-28 LAB
ALBUMIN SERPL-MCNC: 3.8 G/DL (ref 3.5–5.2)
ALBUMIN/GLOB SERPL: 1.5 G/DL
ALP SERPL-CCNC: 84 U/L (ref 39–117)
ALT SERPL W P-5'-P-CCNC: 34 U/L (ref 1–41)
ANION GAP SERPL CALCULATED.3IONS-SCNC: 9 MMOL/L (ref 5–15)
AST SERPL-CCNC: 30 U/L (ref 1–40)
BASOPHILS # BLD AUTO: 0.09 10*3/MM3 (ref 0–0.2)
BASOPHILS NFR BLD AUTO: 0.9 % (ref 0–1.5)
BILIRUB SERPL-MCNC: 0.6 MG/DL (ref 0–1.2)
BILIRUB UR QL STRIP: NEGATIVE
BUN SERPL-MCNC: 33 MG/DL (ref 8–23)
BUN/CREAT SERPL: 14.2 (ref 7–25)
CALCIUM SPEC-SCNC: 8.8 MG/DL (ref 8.6–10.5)
CHLORIDE SERPL-SCNC: 106 MMOL/L (ref 98–107)
CLARITY UR: CLEAR
CO2 SERPL-SCNC: 24 MMOL/L (ref 22–29)
COLOR UR: YELLOW
CREAT SERPL-MCNC: 2.32 MG/DL (ref 0.76–1.27)
D-LACTATE SERPL-SCNC: 1 MMOL/L (ref 0.5–2)
DEPRECATED RDW RBC AUTO: 45.4 FL (ref 37–54)
EOSINOPHIL # BLD AUTO: 0.19 10*3/MM3 (ref 0–0.4)
EOSINOPHIL NFR BLD AUTO: 2 % (ref 0.3–6.2)
ERYTHROCYTE [DISTWIDTH] IN BLOOD BY AUTOMATED COUNT: 13.1 % (ref 12.3–15.4)
GFR SERPL CREATININE-BSD FRML MDRD: 27 ML/MIN/1.73
GLOBULIN UR ELPH-MCNC: 2.6 GM/DL
GLUCOSE SERPL-MCNC: 103 MG/DL (ref 65–99)
GLUCOSE UR STRIP-MCNC: NEGATIVE MG/DL
HCT VFR BLD AUTO: 37.1 % (ref 37.5–51)
HGB BLD-MCNC: 12.4 G/DL (ref 13–17.7)
HGB UR QL STRIP.AUTO: NEGATIVE
IMM GRANULOCYTES # BLD AUTO: 0.03 10*3/MM3 (ref 0–0.05)
IMM GRANULOCYTES NFR BLD AUTO: 0.3 % (ref 0–0.5)
KETONES UR QL STRIP: NEGATIVE
LEUKOCYTE ESTERASE UR QL STRIP.AUTO: NEGATIVE
LIPASE SERPL-CCNC: 66 U/L (ref 13–60)
LYMPHOCYTES # BLD AUTO: 1.64 10*3/MM3 (ref 0.7–3.1)
LYMPHOCYTES NFR BLD AUTO: 17.1 % (ref 19.6–45.3)
MCH RBC QN AUTO: 31.7 PG (ref 26.6–33)
MCHC RBC AUTO-ENTMCNC: 33.4 G/DL (ref 31.5–35.7)
MCV RBC AUTO: 94.9 FL (ref 79–97)
MONOCYTES # BLD AUTO: 1.18 10*3/MM3 (ref 0.1–0.9)
MONOCYTES NFR BLD AUTO: 12.3 % (ref 5–12)
NEUTROPHILS NFR BLD AUTO: 6.48 10*3/MM3 (ref 1.7–7)
NEUTROPHILS NFR BLD AUTO: 67.4 % (ref 42.7–76)
NITRITE UR QL STRIP: NEGATIVE
NRBC BLD AUTO-RTO: 0 /100 WBC (ref 0–0.2)
PH UR STRIP.AUTO: <=5 [PH] (ref 5–8)
PLATELET # BLD AUTO: 140 10*3/MM3 (ref 140–450)
PMV BLD AUTO: 12 FL (ref 6–12)
POTASSIUM SERPL-SCNC: 4 MMOL/L (ref 3.5–5.2)
PROCALCITONIN SERPL-MCNC: 0.11 NG/ML (ref 0–0.25)
PROT SERPL-MCNC: 6.4 G/DL (ref 6–8.5)
PROT UR QL STRIP: NEGATIVE
RBC # BLD AUTO: 3.91 10*6/MM3 (ref 4.14–5.8)
REF LAB TEST METHOD: NORMAL
SARS-COV-2 RNA RESP QL NAA+PROBE: NOT DETECTED
SODIUM SERPL-SCNC: 139 MMOL/L (ref 136–145)
SP GR UR STRIP: 1.01 (ref 1–1.03)
TROPONIN T SERPL-MCNC: 0.11 NG/ML (ref 0–0.03)
UROBILINOGEN UR QL STRIP: NORMAL
WBC # BLD AUTO: 9.61 10*3/MM3 (ref 3.4–10.8)

## 2020-08-28 PROCEDURE — U0002 COVID-19 LAB TEST NON-CDC: HCPCS | Performed by: EMERGENCY MEDICINE

## 2020-08-28 PROCEDURE — U0004 COV-19 TEST NON-CDC HGH THRU: HCPCS | Performed by: EMERGENCY MEDICINE

## 2020-08-28 PROCEDURE — 93005 ELECTROCARDIOGRAM TRACING: CPT | Performed by: EMERGENCY MEDICINE

## 2020-08-28 PROCEDURE — 84145 PROCALCITONIN (PCT): CPT | Performed by: EMERGENCY MEDICINE

## 2020-08-28 PROCEDURE — 87040 BLOOD CULTURE FOR BACTERIA: CPT | Performed by: EMERGENCY MEDICINE

## 2020-08-28 PROCEDURE — 83605 ASSAY OF LACTIC ACID: CPT | Performed by: EMERGENCY MEDICINE

## 2020-08-28 PROCEDURE — 81003 URINALYSIS AUTO W/O SCOPE: CPT | Performed by: EMERGENCY MEDICINE

## 2020-08-28 PROCEDURE — 99284 EMERGENCY DEPT VISIT MOD MDM: CPT

## 2020-08-28 PROCEDURE — 71045 X-RAY EXAM CHEST 1 VIEW: CPT

## 2020-08-28 PROCEDURE — 84484 ASSAY OF TROPONIN QUANT: CPT | Performed by: EMERGENCY MEDICINE

## 2020-08-28 PROCEDURE — 85025 COMPLETE CBC W/AUTO DIFF WBC: CPT | Performed by: EMERGENCY MEDICINE

## 2020-08-28 PROCEDURE — 83690 ASSAY OF LIPASE: CPT | Performed by: EMERGENCY MEDICINE

## 2020-08-28 PROCEDURE — 80053 COMPREHEN METABOLIC PANEL: CPT | Performed by: EMERGENCY MEDICINE

## 2020-08-28 RX ORDER — ACETAMINOPHEN 500 MG
1000 TABLET ORAL ONCE
Status: COMPLETED | OUTPATIENT
Start: 2020-08-28 | End: 2020-08-28

## 2020-08-28 RX ADMIN — ACETAMINOPHEN 1000 MG: 500 TABLET, FILM COATED ORAL at 05:33

## 2020-08-28 NOTE — TELEPHONE ENCOUNTER
I spoke with the patient and his wife on the phone. They would like to know if he needs to have any f/u labs done next week?

## 2020-08-28 NOTE — TELEPHONE ENCOUNTER
Per Darcie Guerrier PAC, call the patient and instruct him to stop Colchicine and take Tylenol PRN.      Patient and his wife notified and aware.

## 2020-08-28 NOTE — TELEPHONE ENCOUNTER
"Caller states temperature 100.8 temporal and states had chills earlier. Caller did take oral and said was 99.8. Caller advised per guideline and states  reluctant to be seen in ER. Advised call back as needed.       Reason for Disposition  • Fever > 100.4 F (38.0 C)    Additional Information  • Negative: Sounds like a life-threatening emergency to the triager  • Negative: Chest pain  • Negative: Difficulty breathing  • Negative: Acting confused (e.g., disoriented, slurred speech) or excessively sleepy  • Negative: Surgical incision symptoms and questions  • Negative: [1] Discomfort (pain, burning or stinging) when passing urine AND [2] male  • Negative: [1] Discomfort (pain, burning or stinging) when passing urine AND [2] female  • Negative: Constipation  • Negative: New or worsening leg (calf, thigh) pain  • Negative: New or worsening leg swelling  • Negative: Dizziness is severe, or persists > 24 hours after surgery  • Negative: Pain, redness, swelling, or pus at IV Site  • Negative: Symptoms arising from use of a urinary catheter ([place] or [place])  • Negative: Cast problems or questions  • Negative: Medication question  • Negative: [1] Widespread rash AND [2] bright red, sunburn-like  • Negative: [1] SEVERE headache AND [2] after spinal (epidural) anesthesia  • Negative: [1] Vomiting AND [2] persists > 4 hours  • Negative: [1] Vomiting AND [2] abdomen looks much more swollen than usual  • Negative: [1] Drinking very little AND [2] dehydration suspected (e.g., no urine > 12 hours, very dry mouth, very lightheaded)  • Negative: Patient sounds very sick or weak to the triager  • Negative: Sounds like a serious complication to the triager    Answer Assessment - Initial Assessment Questions  1. SYMPTOM: \"What's the main symptom you're concerned about?\" (e.g., pain, fever, vomiting)      100.8 and temporal and then 99.8 oral   2. ONSET: \"When did fever start?\"       Tonight   3. SURGERY: \"What surgery was " "performed?\"      Pacemaker on Monday   4. DATE of SURGERY: \"When was surgery performed?\"       Monday this week 24 th   5. ANESTHESIA: \" What type of anesthesia did you have?\" (e.g., general, spinal, epidural, local)        6. PAIN: \"Is there any pain?\" If so, ask: \"How bad is it?\"  (Scale 1-10; or mild, moderate, severe)       No pain   7. FEVER: \"Do you have a fever?\" If so, ask: \"What is your temperature, how was it measured, and when did it start?\"      100.8  8. VOMITING: \"Is there any vomiting?\" If yes, ask: \"How many times?\"      Denies   9. BLEEDING: \"Is there any bleeding?\" If so, ask: \"How much?\" and \"Where?\"      Denies   10. OTHER SYMPTOMS: \"Do you have any other symptoms?\" (e.g., drainage from wound, painful urination, constipation)        Denies    Protocols used: POST-OP SYMPTOMS AND QUESTIONS-ADULT-AH      "

## 2020-08-30 ENCOUNTER — APPOINTMENT (OUTPATIENT)
Dept: GENERAL RADIOLOGY | Facility: HOSPITAL | Age: 80
End: 2020-08-30

## 2020-08-30 ENCOUNTER — APPOINTMENT (OUTPATIENT)
Dept: CT IMAGING | Facility: HOSPITAL | Age: 80
End: 2020-08-30

## 2020-08-30 LAB
ALBUMIN SERPL-MCNC: 3.9 G/DL (ref 3.5–5.2)
ALBUMIN/GLOB SERPL: 1.3 G/DL
ALP SERPL-CCNC: 96 U/L (ref 39–117)
ALT SERPL W P-5'-P-CCNC: 24 U/L (ref 1–41)
ANION GAP SERPL CALCULATED.3IONS-SCNC: 10 MMOL/L (ref 5–15)
AST SERPL-CCNC: 23 U/L (ref 1–40)
BASOPHILS # BLD AUTO: 0.11 10*3/MM3 (ref 0–0.2)
BASOPHILS NFR BLD AUTO: 1 % (ref 0–1.5)
BILIRUB SERPL-MCNC: 0.3 MG/DL (ref 0–1.2)
BUN SERPL-MCNC: 30 MG/DL (ref 8–23)
BUN/CREAT SERPL: 12.3 (ref 7–25)
CALCIUM SPEC-SCNC: 9 MG/DL (ref 8.6–10.5)
CHLORIDE SERPL-SCNC: 104 MMOL/L (ref 98–107)
CO2 SERPL-SCNC: 26 MMOL/L (ref 22–29)
CREAT SERPL-MCNC: 2.43 MG/DL (ref 0.76–1.27)
DEPRECATED RDW RBC AUTO: 45.6 FL (ref 37–54)
EOSINOPHIL # BLD AUTO: 0.3 10*3/MM3 (ref 0–0.4)
EOSINOPHIL NFR BLD AUTO: 2.8 % (ref 0.3–6.2)
ERYTHROCYTE [DISTWIDTH] IN BLOOD BY AUTOMATED COUNT: 13 % (ref 12.3–15.4)
GFR SERPL CREATININE-BSD FRML MDRD: 26 ML/MIN/1.73
GLOBULIN UR ELPH-MCNC: 3 GM/DL
GLUCOSE SERPL-MCNC: 122 MG/DL (ref 65–99)
HCT VFR BLD AUTO: 39.2 % (ref 37.5–51)
HGB BLD-MCNC: 12.7 G/DL (ref 13–17.7)
IMM GRANULOCYTES # BLD AUTO: 0.03 10*3/MM3 (ref 0–0.05)
IMM GRANULOCYTES NFR BLD AUTO: 0.3 % (ref 0–0.5)
LIPASE SERPL-CCNC: 69 U/L (ref 13–60)
LYMPHOCYTES # BLD AUTO: 2.65 10*3/MM3 (ref 0.7–3.1)
LYMPHOCYTES NFR BLD AUTO: 24.8 % (ref 19.6–45.3)
MCH RBC QN AUTO: 30.9 PG (ref 26.6–33)
MCHC RBC AUTO-ENTMCNC: 32.4 G/DL (ref 31.5–35.7)
MCV RBC AUTO: 95.4 FL (ref 79–97)
MONOCYTES # BLD AUTO: 1.01 10*3/MM3 (ref 0.1–0.9)
MONOCYTES NFR BLD AUTO: 9.5 % (ref 5–12)
NEUTROPHILS NFR BLD AUTO: 6.57 10*3/MM3 (ref 1.7–7)
NEUTROPHILS NFR BLD AUTO: 61.6 % (ref 42.7–76)
NRBC BLD AUTO-RTO: 0 /100 WBC (ref 0–0.2)
NT-PROBNP SERPL-MCNC: 542.2 PG/ML (ref 0–1800)
PLATELET # BLD AUTO: 168 10*3/MM3 (ref 140–450)
PMV BLD AUTO: 11 FL (ref 6–12)
POTASSIUM SERPL-SCNC: 3.9 MMOL/L (ref 3.5–5.2)
PROT SERPL-MCNC: 6.9 G/DL (ref 6–8.5)
RBC # BLD AUTO: 4.11 10*6/MM3 (ref 4.14–5.8)
SODIUM SERPL-SCNC: 140 MMOL/L (ref 136–145)
TROPONIN T SERPL-MCNC: <0.01 NG/ML (ref 0–0.03)
WBC # BLD AUTO: 10.67 10*3/MM3 (ref 3.4–10.8)

## 2020-08-30 PROCEDURE — 71250 CT THORAX DX C-: CPT

## 2020-08-30 PROCEDURE — 99284 EMERGENCY DEPT VISIT MOD MDM: CPT

## 2020-08-30 PROCEDURE — 84484 ASSAY OF TROPONIN QUANT: CPT

## 2020-08-30 PROCEDURE — 80053 COMPREHEN METABOLIC PANEL: CPT

## 2020-08-30 PROCEDURE — 83880 ASSAY OF NATRIURETIC PEPTIDE: CPT

## 2020-08-30 PROCEDURE — 83690 ASSAY OF LIPASE: CPT

## 2020-08-30 PROCEDURE — 85025 COMPLETE CBC W/AUTO DIFF WBC: CPT

## 2020-08-30 PROCEDURE — 71045 X-RAY EXAM CHEST 1 VIEW: CPT

## 2020-08-30 PROCEDURE — 93005 ELECTROCARDIOGRAM TRACING: CPT

## 2020-08-30 PROCEDURE — 93005 ELECTROCARDIOGRAM TRACING: CPT | Performed by: EMERGENCY MEDICINE

## 2020-08-30 RX ORDER — SODIUM CHLORIDE 0.9 % (FLUSH) 0.9 %
10 SYRINGE (ML) INJECTION AS NEEDED
Status: DISCONTINUED | OUTPATIENT
Start: 2020-08-30 | End: 2020-08-31 | Stop reason: HOSPADM

## 2020-08-31 ENCOUNTER — HOSPITAL ENCOUNTER (EMERGENCY)
Facility: HOSPITAL | Age: 80
Discharge: HOME | End: 2020-08-31
Attending: EMERGENCY MEDICINE | Admitting: EMERGENCY MEDICINE

## 2020-08-31 VITALS
HEART RATE: 70 BPM | WEIGHT: 127 LBS | RESPIRATION RATE: 16 BRPM | DIASTOLIC BLOOD PRESSURE: 75 MMHG | OXYGEN SATURATION: 100 % | TEMPERATURE: 97.7 F | HEIGHT: 72 IN | SYSTOLIC BLOOD PRESSURE: 153 MMHG | BODY MASS INDEX: 17.2 KG/M2

## 2020-08-31 DIAGNOSIS — R07.81 PLEURITIC CHEST PAIN: ICD-10-CM

## 2020-08-31 DIAGNOSIS — R07.89 ACUTE CHEST WALL PAIN: Primary | ICD-10-CM

## 2020-08-31 LAB
HOLD SPECIMEN: NORMAL
HOLD SPECIMEN: NORMAL
WHOLE BLOOD HOLD SPECIMEN: NORMAL
WHOLE BLOOD HOLD SPECIMEN: NORMAL

## 2020-08-31 RX ORDER — HYDROCODONE BITARTRATE AND ACETAMINOPHEN 5; 325 MG/1; MG/1
1 TABLET ORAL ONCE
Status: COMPLETED | OUTPATIENT
Start: 2020-08-31 | End: 2020-08-31

## 2020-08-31 RX ORDER — HYDROCODONE BITARTRATE AND ACETAMINOPHEN 5; 325 MG/1; MG/1
1 TABLET ORAL EVERY 6 HOURS PRN
Qty: 12 TABLET | Refills: 0 | Status: ON HOLD | OUTPATIENT
Start: 2020-08-31 | End: 2020-10-20

## 2020-08-31 RX ADMIN — HYDROCODONE BITARTRATE AND ACETAMINOPHEN 1 TABLET: 5; 325 TABLET ORAL at 02:10

## 2020-08-31 NOTE — TELEPHONE ENCOUNTER
Patient notified and aware. He wanted to let you know that he went to the ER last night with chest pain. The records are in [facility].

## 2020-09-02 LAB
BACTERIA SPEC AEROBE CULT: NORMAL
BACTERIA SPEC AEROBE CULT: NORMAL

## 2020-09-04 ENCOUNTER — OFFICE VISIT (OUTPATIENT)
Dept: INTERNAL MEDICINE | Facility: CLINIC | Age: 80
End: 2020-09-04

## 2020-09-04 ENCOUNTER — LAB REQUISITION (OUTPATIENT)
Dept: LAB | Facility: HOSPITAL | Age: 80
End: 2020-09-04

## 2020-09-04 VITALS
OXYGEN SATURATION: 98 % | SYSTOLIC BLOOD PRESSURE: 142 MMHG | DIASTOLIC BLOOD PRESSURE: 70 MMHG | WEIGHT: 174 LBS | BODY MASS INDEX: 23.57 KG/M2 | HEIGHT: 72 IN | TEMPERATURE: 97.3 F | HEART RATE: 80 BPM

## 2020-09-04 DIAGNOSIS — I10 ESSENTIAL HYPERTENSION: ICD-10-CM

## 2020-09-04 DIAGNOSIS — K57.90 DIVERTICULOSIS: ICD-10-CM

## 2020-09-04 DIAGNOSIS — R73.09 OTHER ABNORMAL GLUCOSE: ICD-10-CM

## 2020-09-04 DIAGNOSIS — N18.2 STAGE 2 CHRONIC KIDNEY DISEASE: ICD-10-CM

## 2020-09-04 DIAGNOSIS — E78.2 MIXED HYPERLIPIDEMIA: Primary | ICD-10-CM

## 2020-09-04 DIAGNOSIS — R73.09 ABNORMAL GLUCOSE: ICD-10-CM

## 2020-09-04 DIAGNOSIS — I48.0 PAROXYSMAL ATRIAL FIBRILLATION (HCC): ICD-10-CM

## 2020-09-04 PROCEDURE — 36415 COLL VENOUS BLD VENIPUNCTURE: CPT | Performed by: INTERNAL MEDICINE

## 2020-09-04 PROCEDURE — 99214 OFFICE O/P EST MOD 30 MIN: CPT | Performed by: INTERNAL MEDICINE

## 2020-09-04 NOTE — PROGRESS NOTES
"Patient is a 80 y.o. male who is here for a follow up of hypertension.  Chief Complaint   Patient presents with   • Hypertension         HPI:    Here for mgmt of HTN and hyperlipidemia and PAF.  Onset years.  Recently had PPM and removal of pericardial effusion.  BP has been good.  Occasional dizziness.  Sinuses are bothersome.  No recurrent fever or chills.  Occasional \"pounding\" of his heart.  No abdominal pains.  Appetite is still not the best.     History:     Patient Active Problem List   Diagnosis   • Essential hypertension   • PFO (patent foramen ovale)   • Hyperlipidemia   • Tobacco chew use   • Stage 2 chronic kidney disease   • Atrial fibrillation (CMS/HCC)   • History of TIA (transient ischemic attack) and stroke   • Scalp laceration   • Diverticulosis   • Pre-syncope   • Syncope and collapse   • Sick sinus syndrome (CMS/HCC)   • Pericardial effusion       Past Medical History:   Diagnosis Date   • A-fib (CMS/HCC)    • Chronic kidney disease    • History of migraine headaches    • Hyperlipidemia    • Hypertension    • Mitral valve regurgitation 12/1/2016   • PFO (patent foramen ovale)    • Stroke (CMS/HCC)        Past Surgical History:   Procedure Laterality Date   • CARDIAC CATHETERIZATION N/A 8/24/2020    Procedure: PERICARDIOCENTESIS;  Surgeon: Dain Nava MD;  Location:  [facility];  Service: Cardiology;  Laterality: N/A;   • CARDIAC ELECTROPHYSIOLOGY PROCEDURE N/A 8/24/2020    Procedure: PACEMAKER IMPLANTATION- DC;  Surgeon: Dain Nava MD;  Location:  [facility];  Service: Cardiology;  Laterality: N/A;   • TONSILLECTOMY AND ADENOIDECTOMY         Current Outpatient Medications on File Prior to Visit   Medication Sig   • atorvastatin (LIPITOR) 40 MG tablet Take 40 mg by mouth Daily.   • coenzyme Q10 100 MG capsule Take 100 mg by mouth Daily.   • glucosamine sulfate 500 MG capsule capsule Take 500 mg by mouth Daily.   • guaiFENesin (MUCINEX) 600 MG 12 hr " tablet Take 1,200 mg by mouth Daily As Needed for Cough or Congestion.   • Multiple Vitamins-Minerals (MULTIVITAMIN ADULT PO) Take 1 tablet by mouth Daily.   • tamsulosin (FLOMAX) 0.4 MG capsule 24 hr capsule Take 1 capsule by mouth 2 (two) times a day.   • colchicine 0.6 MG tablet Take 1 tablet by mouth 2 (Two) Times a Day.   • HYDROcodone-acetaminophen (NORCO) 5-325 MG per tablet Take 1 tablet by mouth Every 6 (Six) Hours As Needed for Severe Pain  for up to 12 doses.     No current facility-administered medications on file prior to visit.        Family History   Problem Relation Age of Onset   • Arthritis Other    • Hyperlipidemia Other    • Stroke Other    • Heart attack Mother    • Heart attack Father        Social History     Socioeconomic History   • Marital status:      Spouse name: Not on file   • Number of children: Not on file   • Years of education: Not on file   • Highest education level: Not on file   Tobacco Use   • Smoking status: Never Smoker   • Smokeless tobacco: Current User     Types: Chew   • Tobacco comment: chews once every 4-5 months   Substance and Sexual Activity   • Alcohol use: Yes     Comment: 1-2 drinks a week   • Drug use: No   • Sexual activity: Defer         Review of Systems   Constitutional: Negative for activity change, appetite change, chills, fatigue, fever and unexpected weight change.   HENT: Negative for congestion, ear pain, hearing loss, rhinorrhea, sinus pressure, sinus pain, sore throat and trouble swallowing.    Eyes: Negative for photophobia, pain, discharge and itching.   Respiratory: Negative for cough, chest tightness, shortness of breath and wheezing.    Cardiovascular: Negative for chest pain, palpitations and leg swelling.   Gastrointestinal: Negative for abdominal distention, abdominal pain, blood in stool, constipation, diarrhea and vomiting.        Colonoscopy by Dr Schmid   Endocrine: Negative for cold intolerance, heat intolerance, polydipsia,  "polyphagia and polyuria.   Genitourinary: Negative for difficulty urinating, dysuria, enuresis, frequency, genital sores, hematuria and urgency.        Followed by Dr Segal   Musculoskeletal: Positive for arthralgias and back pain (better). Negative for gait problem, joint swelling and myalgias.   Skin: Negative for pallor, rash and wound.   Allergic/Immunologic: Negative for immunocompromised state.   Neurological: Positive for dizziness and light-headedness. Negative for tremors, seizures, syncope, speech difficulty, weakness, numbness and headaches.   Hematological: Negative for adenopathy.   Psychiatric/Behavioral: Negative for behavioral problems, dysphoric mood, sleep disturbance and suicidal ideas. The patient is not nervous/anxious.        /70 (BP Location: Left arm, Patient Position: Sitting)   Pulse 80   Temp 97.3 °F (36.3 °C) (Infrared)   Ht 182.9 cm (72.01\")   Wt 78.9 kg (174 lb)   SpO2 98%   BMI 23.59 kg/m²       Physical Exam   Constitutional: He is oriented to person, place, and time. He appears well-developed and well-nourished.   HENT:   Head: Normocephalic and atraumatic.   Right Ear: External ear normal.   Left Ear: External ear normal.   Mouth/Throat: Oropharynx is clear and moist.   Right TM perforated   Eyes: Conjunctivae and EOM are normal.   Neck: Normal range of motion. Neck supple.   Cardiovascular: Normal rate, regular rhythm and normal heart sounds.   Pulmonary/Chest: Effort normal and breath sounds normal.   Abdominal: Soft. Bowel sounds are normal.   Musculoskeletal: Normal range of motion.   Lymphadenopathy:     He has no cervical adenopathy.   Neurological: He is alert and oriented to person, place, and time.   Skin: Skin is warm and dry.   Easy bruising   Psychiatric: He has a normal mood and affect. His behavior is normal. Thought content normal.       Procedure:      Discussion/Summary:    htn-if BP over 130/80 restart BB  paf-restart eliquis, may need BB if HR " increases  Hyperlipidemia- labs 6/13 at goal, counseled on diet, recheck on rtc  djd-stable on tylenol  Thrombocytopenia-cbc noted  TIA-cont rf mod  CKD-recheck improving, advised fluids and NSAIDS avoidance  Abnormal glucose-AIC today normal     9/4 Labs noted and dw patient, advised to increase fluids, refusing flu shot     Pneumovax 23 after 1/20    Current Outpatient Medications:   •  atorvastatin (LIPITOR) 40 MG tablet, Take 40 mg by mouth Daily., Disp: , Rfl:   •  coenzyme Q10 100 MG capsule, Take 100 mg by mouth Daily., Disp: , Rfl:   •  glucosamine sulfate 500 MG capsule capsule, Take 500 mg by mouth Daily., Disp: , Rfl:   •  guaiFENesin (MUCINEX) 600 MG 12 hr tablet, Take 1,200 mg by mouth Daily As Needed for Cough or Congestion., Disp: , Rfl:   •  Multiple Vitamins-Minerals (MULTIVITAMIN ADULT PO), Take 1 tablet by mouth Daily., Disp: , Rfl:   •  tamsulosin (FLOMAX) 0.4 MG capsule 24 hr capsule, Take 1 capsule by mouth 2 (two) times a day., Disp: , Rfl:   •  colchicine 0.6 MG tablet, Take 1 tablet by mouth 2 (Two) Times a Day., Disp: 14 tablet, Rfl: 0  •  HYDROcodone-acetaminophen (NORCO) 5-325 MG per tablet, Take 1 tablet by mouth Every 6 (Six) Hours As Needed for Severe Pain  for up to 12 doses., Disp: 12 tablet, Rfl: 0        Pio was seen today for hypertension.    Diagnoses and all orders for this visit:    Mixed hyperlipidemia    Essential hypertension    Paroxysmal atrial fibrillation (CMS/HCC)    Diverticulosis    Stage 2 chronic kidney disease    Abnormal glucose  -     Basic Metabolic Panel  -     Hemoglobin A1c

## 2020-09-05 LAB
BUN SERPL-MCNC: 33 MG/DL (ref 8–23)
BUN/CREAT SERPL: 16.4 (ref 7–25)
CALCIUM SERPL-MCNC: 8.9 MG/DL (ref 8.6–10.5)
CHLORIDE SERPL-SCNC: 105 MMOL/L (ref 98–107)
CO2 SERPL-SCNC: 23.8 MMOL/L (ref 22–29)
CREAT SERPL-MCNC: 2.01 MG/DL (ref 0.76–1.27)
GLUCOSE SERPL-MCNC: 100 MG/DL (ref 65–99)
HBA1C MFR BLD: 5.4 % (ref 4.8–5.6)
POTASSIUM SERPL-SCNC: 4.6 MMOL/L (ref 3.5–5.2)
SODIUM SERPL-SCNC: 140 MMOL/L (ref 136–145)

## 2020-09-22 RX ORDER — ATORVASTATIN CALCIUM 40 MG/1
TABLET, FILM COATED ORAL
Qty: 90 TABLET | Refills: 1 | Status: SHIPPED | OUTPATIENT
Start: 2020-09-22 | End: 2021-05-10

## 2020-10-19 ENCOUNTER — APPOINTMENT (OUTPATIENT)
Dept: GENERAL RADIOLOGY | Facility: HOSPITAL | Age: 80
End: 2020-10-19

## 2020-10-19 ENCOUNTER — APPOINTMENT (OUTPATIENT)
Dept: CT IMAGING | Facility: HOSPITAL | Age: 80
End: 2020-10-19

## 2020-10-19 ENCOUNTER — HOSPITAL ENCOUNTER (INPATIENT)
Facility: HOSPITAL | Age: 80
LOS: 4 days | Discharge: HOME | End: 2020-10-24
Attending: EMERGENCY MEDICINE | Admitting: INTERNAL MEDICINE

## 2020-10-19 DIAGNOSIS — N18.9 CHRONIC RENAL FAILURE, UNSPECIFIED CKD STAGE: ICD-10-CM

## 2020-10-19 DIAGNOSIS — I31.39 PERICARDIAL EFFUSION: ICD-10-CM

## 2020-10-19 DIAGNOSIS — I48.91 ATRIAL FIBRILLATION WITH RAPID VENTRICULAR RESPONSE (HCC): Primary | ICD-10-CM

## 2020-10-19 LAB
ALBUMIN SERPL-MCNC: 3.7 G/DL (ref 3.5–5.2)
ALBUMIN/GLOB SERPL: 1 G/DL
ALP SERPL-CCNC: 117 U/L (ref 39–117)
ALT SERPL W P-5'-P-CCNC: 33 U/L (ref 1–41)
ANION GAP SERPL CALCULATED.3IONS-SCNC: 15 MMOL/L (ref 5–15)
AST SERPL-CCNC: 37 U/L (ref 1–40)
BASOPHILS # BLD AUTO: 0.03 10*3/MM3 (ref 0–0.2)
BASOPHILS NFR BLD AUTO: 0.2 % (ref 0–1.5)
BILIRUB SERPL-MCNC: 0.9 MG/DL (ref 0–1.2)
BUN SERPL-MCNC: 29 MG/DL (ref 8–23)
BUN/CREAT SERPL: 15.3 (ref 7–25)
CALCIUM SPEC-SCNC: 9.1 MG/DL (ref 8.6–10.5)
CHLORIDE SERPL-SCNC: 100 MMOL/L (ref 98–107)
CO2 SERPL-SCNC: 19 MMOL/L (ref 22–29)
CREAT SERPL-MCNC: 1.89 MG/DL (ref 0.76–1.27)
CRP SERPL-MCNC: 16.84 MG/DL (ref 0–0.5)
DEPRECATED RDW RBC AUTO: 44.1 FL (ref 37–54)
EOSINOPHIL # BLD AUTO: 0 10*3/MM3 (ref 0–0.4)
EOSINOPHIL NFR BLD AUTO: 0 % (ref 0.3–6.2)
ERYTHROCYTE [DISTWIDTH] IN BLOOD BY AUTOMATED COUNT: 12.8 % (ref 12.3–15.4)
GFR SERPL CREATININE-BSD FRML MDRD: 34 ML/MIN/1.73
GLOBULIN UR ELPH-MCNC: 3.8 GM/DL
GLUCOSE SERPL-MCNC: 149 MG/DL (ref 65–99)
HCT VFR BLD AUTO: 32.3 % (ref 37.5–51)
HGB BLD-MCNC: 10.4 G/DL (ref 13–17.7)
HOLD SPECIMEN: NORMAL
HOLD SPECIMEN: NORMAL
IMM GRANULOCYTES # BLD AUTO: 0.05 10*3/MM3 (ref 0–0.05)
IMM GRANULOCYTES NFR BLD AUTO: 0.4 % (ref 0–0.5)
LIPASE SERPL-CCNC: 34 U/L (ref 13–60)
LYMPHOCYTES # BLD AUTO: 1.37 10*3/MM3 (ref 0.7–3.1)
LYMPHOCYTES NFR BLD AUTO: 9.9 % (ref 19.6–45.3)
MCH RBC QN AUTO: 30.3 PG (ref 26.6–33)
MCHC RBC AUTO-ENTMCNC: 32.2 G/DL (ref 31.5–35.7)
MCV RBC AUTO: 94.2 FL (ref 79–97)
MONOCYTES # BLD AUTO: 1.13 10*3/MM3 (ref 0.1–0.9)
MONOCYTES NFR BLD AUTO: 8.2 % (ref 5–12)
NEUTROPHILS NFR BLD AUTO: 11.22 10*3/MM3 (ref 1.7–7)
NEUTROPHILS NFR BLD AUTO: 81.3 % (ref 42.7–76)
NRBC BLD AUTO-RTO: 0 /100 WBC (ref 0–0.2)
NT-PROBNP SERPL-MCNC: 734.2 PG/ML (ref 0–1800)
PLATELET # BLD AUTO: 254 10*3/MM3 (ref 140–450)
PMV BLD AUTO: 10.6 FL (ref 6–12)
POTASSIUM SERPL-SCNC: 4 MMOL/L (ref 3.5–5.2)
PROT SERPL-MCNC: 7.5 G/DL (ref 6–8.5)
RBC # BLD AUTO: 3.43 10*6/MM3 (ref 4.14–5.8)
SODIUM SERPL-SCNC: 134 MMOL/L (ref 136–145)
TROPONIN T SERPL-MCNC: 0.01 NG/ML (ref 0–0.03)
TROPONIN T SERPL-MCNC: <0.01 NG/ML (ref 0–0.03)
WBC # BLD AUTO: 13.8 10*3/MM3 (ref 3.4–10.8)
WHOLE BLOOD HOLD SPECIMEN: NORMAL
WHOLE BLOOD HOLD SPECIMEN: NORMAL

## 2020-10-19 PROCEDURE — 93005 ELECTROCARDIOGRAM TRACING: CPT

## 2020-10-19 PROCEDURE — 0 IOPAMIDOL PER 1 ML: Performed by: EMERGENCY MEDICINE

## 2020-10-19 PROCEDURE — 83605 ASSAY OF LACTIC ACID: CPT | Performed by: INTERNAL MEDICINE

## 2020-10-19 PROCEDURE — 71045 X-RAY EXAM CHEST 1 VIEW: CPT

## 2020-10-19 PROCEDURE — 0202U NFCT DS 22 TRGT SARS-COV-2: CPT | Performed by: INTERNAL MEDICINE

## 2020-10-19 PROCEDURE — 71250 CT THORAX DX C-: CPT

## 2020-10-19 PROCEDURE — 83690 ASSAY OF LIPASE: CPT | Performed by: EMERGENCY MEDICINE

## 2020-10-19 PROCEDURE — 71275 CT ANGIOGRAPHY CHEST: CPT

## 2020-10-19 PROCEDURE — 74176 CT ABD & PELVIS W/O CONTRAST: CPT

## 2020-10-19 PROCEDURE — 99223 1ST HOSP IP/OBS HIGH 75: CPT | Performed by: INTERNAL MEDICINE

## 2020-10-19 PROCEDURE — 86900 BLOOD TYPING SEROLOGIC ABO: CPT

## 2020-10-19 PROCEDURE — 87040 BLOOD CULTURE FOR BACTERIA: CPT | Performed by: INTERNAL MEDICINE

## 2020-10-19 PROCEDURE — 83880 ASSAY OF NATRIURETIC PEPTIDE: CPT | Performed by: EMERGENCY MEDICINE

## 2020-10-19 PROCEDURE — 86901 BLOOD TYPING SEROLOGIC RH(D): CPT

## 2020-10-19 PROCEDURE — 85025 COMPLETE CBC W/AUTO DIFF WBC: CPT | Performed by: EMERGENCY MEDICINE

## 2020-10-19 PROCEDURE — 84484 ASSAY OF TROPONIN QUANT: CPT | Performed by: EMERGENCY MEDICINE

## 2020-10-19 PROCEDURE — 74174 CTA ABD&PLVS W/CONTRAST: CPT

## 2020-10-19 PROCEDURE — 93005 ELECTROCARDIOGRAM TRACING: CPT | Performed by: EMERGENCY MEDICINE

## 2020-10-19 PROCEDURE — 84145 PROCALCITONIN (PCT): CPT | Performed by: INTERNAL MEDICINE

## 2020-10-19 PROCEDURE — 99285 EMERGENCY DEPT VISIT HI MDM: CPT

## 2020-10-19 PROCEDURE — 80053 COMPREHEN METABOLIC PANEL: CPT | Performed by: EMERGENCY MEDICINE

## 2020-10-19 PROCEDURE — 86140 C-REACTIVE PROTEIN: CPT | Performed by: INTERNAL MEDICINE

## 2020-10-19 RX ORDER — SODIUM CHLORIDE 0.9 % (FLUSH) 0.9 %
10 SYRINGE (ML) INJECTION AS NEEDED
Status: DISCONTINUED | OUTPATIENT
Start: 2020-10-19 | End: 2020-10-20

## 2020-10-19 RX ORDER — DILTIAZEM HCL IN NACL,ISO-OSM 125 MG/125
5-15 PLASTIC BAG, INJECTION (ML) INTRAVENOUS
Status: DISCONTINUED | OUTPATIENT
Start: 2020-10-19 | End: 2020-10-20

## 2020-10-19 RX ORDER — DILTIAZEM HYDROCHLORIDE 5 MG/ML
10 INJECTION INTRAVENOUS ONCE
Status: COMPLETED | OUTPATIENT
Start: 2020-10-19 | End: 2020-10-19

## 2020-10-19 RX ORDER — SODIUM CHLORIDE, SODIUM LACTATE, POTASSIUM CHLORIDE, CALCIUM CHLORIDE 600; 310; 30; 20 MG/100ML; MG/100ML; MG/100ML; MG/100ML
75 INJECTION, SOLUTION INTRAVENOUS CONTINUOUS
Status: DISCONTINUED | OUTPATIENT
Start: 2020-10-19 | End: 2020-10-20

## 2020-10-19 RX ORDER — ASPIRIN 81 MG/1
324 TABLET, CHEWABLE ORAL ONCE
Status: COMPLETED | OUTPATIENT
Start: 2020-10-19 | End: 2020-10-19

## 2020-10-19 RX ADMIN — DILTIAZEM HYDROCHLORIDE 10 MG: 5 INJECTION INTRAVENOUS at 20:20

## 2020-10-19 RX ADMIN — Medication 5 MG/HR: at 20:21

## 2020-10-19 RX ADMIN — IOPAMIDOL 100 ML: 755 INJECTION, SOLUTION INTRAVENOUS at 23:46

## 2020-10-19 RX ADMIN — ASPIRIN 324 MG: 81 TABLET, CHEWABLE ORAL at 20:20

## 2020-10-20 ENCOUNTER — APPOINTMENT (OUTPATIENT)
Dept: CARDIOLOGY | Facility: HOSPITAL | Age: 80
End: 2020-10-20

## 2020-10-20 PROBLEM — I48.91 ATRIAL FIBRILLATION WITH RAPID VENTRICULAR RESPONSE: Status: ACTIVE | Noted: 2020-10-20

## 2020-10-20 LAB
ABO GROUP BLD: NORMAL
ABO GROUP BLD: NORMAL
ARTERIAL PATENCY WRIST A: ABNORMAL
ATMOSPHERIC PRESS: ABNORMAL MM[HG]
B PARAPERT DNA SPEC QL NAA+PROBE: NOT DETECTED
B PERT DNA SPEC QL NAA+PROBE: NOT DETECTED
BASE EXCESS BLDA CALC-SCNC: -3.4 MMOL/L (ref 0–2)
BDY SITE: ABNORMAL
BH CV ECHO MEAS - AO ROOT AREA (BSA CORRECTED): 1.7
BH CV ECHO MEAS - AO ROOT AREA: 9 CM^2
BH CV ECHO MEAS - AO ROOT DIAM: 3.4 CM
BH CV ECHO MEAS - BSA(HAYCOCK): 2 M^2
BH CV ECHO MEAS - BSA: 2 M^2
BH CV ECHO MEAS - BZI_BMI: 24.4 KILOGRAMS/M^2
BH CV ECHO MEAS - BZI_METRIC_HEIGHT: 182 CM
BH CV ECHO MEAS - BZI_METRIC_WEIGHT: 80.7 KG
BH CV ECHO MEAS - EDV(CUBED): 38.3 ML
BH CV ECHO MEAS - EDV(MOD-SP2): 99 ML
BH CV ECHO MEAS - EDV(MOD-SP4): 120 ML
BH CV ECHO MEAS - EDV(TEICH): 46.5 ML
BH CV ECHO MEAS - EF(CUBED): 60.4 %
BH CV ECHO MEAS - EF(MOD-BP): 45.2 %
BH CV ECHO MEAS - EF(MOD-SP2): 44.8 %
BH CV ECHO MEAS - EF(MOD-SP4): 45.8 %
BH CV ECHO MEAS - EF(TEICH): 53.1 %
BH CV ECHO MEAS - ESV(CUBED): 15.2 ML
BH CV ECHO MEAS - ESV(MOD-SP2): 54.6 ML
BH CV ECHO MEAS - ESV(MOD-SP4): 65.1 ML
BH CV ECHO MEAS - ESV(TEICH): 21.8 ML
BH CV ECHO MEAS - FS: 26.6 %
BH CV ECHO MEAS - IVS/LVPW: 1.1
BH CV ECHO MEAS - IVSD: 1.3 CM
BH CV ECHO MEAS - LA DIMENSION: 3.6 CM
BH CV ECHO MEAS - LA/AO: 1.1
BH CV ECHO MEAS - LAT PEAK E' VEL: 10.2 CM/SEC
BH CV ECHO MEAS - LATERAL E/E' RATIO: 7.3
BH CV ECHO MEAS - LV DIASTOLIC VOL/BSA (35-75): 59.4 ML/M^2
BH CV ECHO MEAS - LV MASS(C)D: 136.8 GRAMS
BH CV ECHO MEAS - LV MASS(C)DI: 67.7 GRAMS/M^2
BH CV ECHO MEAS - LV MAX PG: 2.9 MMHG
BH CV ECHO MEAS - LV MEAN PG: 1.2 MMHG
BH CV ECHO MEAS - LV SYSTOLIC VOL/BSA (12-30): 32.2 ML/M^2
BH CV ECHO MEAS - LV V1 MAX: 85.9 CM/SEC
BH CV ECHO MEAS - LV V1 MEAN: 48.9 CM/SEC
BH CV ECHO MEAS - LV V1 VTI: 12.2 CM
BH CV ECHO MEAS - LVIDD: 3.4 CM
BH CV ECHO MEAS - LVIDS: 2.5 CM
BH CV ECHO MEAS - LVLD AP2: 7.6 CM
BH CV ECHO MEAS - LVLD AP4: 8.3 CM
BH CV ECHO MEAS - LVLS AP2: 6.7 CM
BH CV ECHO MEAS - LVLS AP4: 7.3 CM
BH CV ECHO MEAS - LVOT AREA (M): 3.1 CM^2
BH CV ECHO MEAS - LVOT AREA: 3.2 CM^2
BH CV ECHO MEAS - LVOT DIAM: 2 CM
BH CV ECHO MEAS - LVPWD: 1.2 CM
BH CV ECHO MEAS - MED PEAK E' VEL: 9.1 CM/SEC
BH CV ECHO MEAS - MEDIAL E/E' RATIO: 8.1
BH CV ECHO MEAS - MV DEC SLOPE: 445.5 CM/SEC^2
BH CV ECHO MEAS - MV DEC TIME: 0.17 SEC
BH CV ECHO MEAS - MV E MAX VEL: 74.1 CM/SEC
BH CV ECHO MEAS - MV MAX PG: 4.4 MMHG
BH CV ECHO MEAS - MV MEAN PG: 1.1 MMHG
BH CV ECHO MEAS - MV V2 MAX: 104.5 CM/SEC
BH CV ECHO MEAS - MV V2 MEAN: 46.7 CM/SEC
BH CV ECHO MEAS - MV V2 VTI: 24.8 CM
BH CV ECHO MEAS - MVA(VTI): 1.6 CM^2
BH CV ECHO MEAS - RAP SYSTOLE: 8 MMHG
BH CV ECHO MEAS - RVSP: 26 MMHG
BH CV ECHO MEAS - SI(CUBED): 11.5 ML/M^2
BH CV ECHO MEAS - SI(LVOT): 19.5 ML/M^2
BH CV ECHO MEAS - SI(MOD-SP2): 22 ML/M^2
BH CV ECHO MEAS - SI(MOD-SP4): 27.2 ML/M^2
BH CV ECHO MEAS - SI(TEICH): 12.2 ML/M^2
BH CV ECHO MEAS - SV(CUBED): 23.2 ML
BH CV ECHO MEAS - SV(LVOT): 39.5 ML
BH CV ECHO MEAS - SV(MOD-SP2): 44.4 ML
BH CV ECHO MEAS - SV(MOD-SP4): 54.9 ML
BH CV ECHO MEAS - SV(TEICH): 24.7 ML
BH CV ECHO MEAS - TAPSE (>1.6): 1.6 CM
BH CV ECHO MEAS - TR MAX PG: 18 MMHG
BH CV ECHO MEAS - TR MAX VEL: 213.8 CM/SEC
BH CV ECHO MEASUREMENTS AVERAGE E/E' RATIO: 7.68
BH CV VAS BP LEFT ARM: NORMAL MMHG
BH CV VAS BP RIGHT ARM: NORMAL MMHG
BH CV XLRA - RV BASE: 4.6 CM
BH CV XLRA - RV LENGTH: 6.7 CM
BH CV XLRA - RV MID: 3.3 CM
BH CV XLRA - TDI S': 11.1 CM/SEC
BLD GP AB SCN SERPL QL: NEGATIVE
BODY TEMPERATURE: 37 C
C PNEUM DNA NPH QL NAA+NON-PROBE: NOT DETECTED
CO2 BLDA-SCNC: 22.1 MMOL/L (ref 22–33)
COHGB MFR BLD: 0.8 % (ref 0–2)
D-LACTATE SERPL-SCNC: 1.2 MMOL/L (ref 0.5–2)
EPAP: 0
ERYTHROCYTE [SEDIMENTATION RATE] IN BLOOD: 31 MM/HR (ref 0–20)
FLUAV H1 2009 PAND RNA NPH QL NAA+PROBE: NOT DETECTED
FLUAV H1 HA GENE NPH QL NAA+PROBE: NOT DETECTED
FLUAV H3 RNA NPH QL NAA+PROBE: NOT DETECTED
FLUAV SUBTYP SPEC NAA+PROBE: NOT DETECTED
FLUBV RNA ISLT QL NAA+PROBE: NOT DETECTED
HADV DNA SPEC NAA+PROBE: NOT DETECTED
HCO3 BLDA-SCNC: 21 MMOL/L (ref 20–26)
HCOV 229E RNA SPEC QL NAA+PROBE: NOT DETECTED
HCOV HKU1 RNA SPEC QL NAA+PROBE: NOT DETECTED
HCOV NL63 RNA SPEC QL NAA+PROBE: NOT DETECTED
HCOV OC43 RNA SPEC QL NAA+PROBE: NOT DETECTED
HCT VFR BLD CALC: 25.5 %
HGB BLDA-MCNC: 8.3 G/DL (ref 13.5–17.5)
HMPV RNA NPH QL NAA+NON-PROBE: NOT DETECTED
HPIV1 RNA SPEC QL NAA+PROBE: NOT DETECTED
HPIV2 RNA SPEC QL NAA+PROBE: NOT DETECTED
HPIV3 RNA NPH QL NAA+PROBE: NOT DETECTED
HPIV4 P GENE NPH QL NAA+PROBE: NOT DETECTED
INHALED O2 CONCENTRATION: 28 %
IPAP: 0
LV EF 2D ECHO EST: 55 %
M PNEUMO IGG SER IA-ACNC: NOT DETECTED
MAXIMAL PREDICTED HEART RATE: 138 BPM
MAXIMAL PREDICTED HEART RATE: 138 BPM
METHGB BLD QL: 0.6 % (ref 0–1.5)
MODALITY: ABNORMAL
NOTE: ABNORMAL
OXYHGB MFR BLDV: 91.3 % (ref 94–99)
PAW @ PEAK INSP FLOW SETTING VENT: 0 CMH2O
PCO2 BLDA: 34.4 MM HG (ref 35–45)
PCO2 TEMP ADJ BLD: 34.4 MM HG (ref 35–48)
PH BLDA: 7.39 PH UNITS (ref 7.35–7.45)
PH, TEMP CORRECTED: 7.39 PH UNITS
PO2 BLDA: 64 MM HG (ref 83–108)
PO2 TEMP ADJ BLD: 64 MM HG (ref 83–108)
PROCALCITONIN SERPL-MCNC: 0.11 NG/ML (ref 0–0.25)
RH BLD: POSITIVE
RH BLD: POSITIVE
RHINOVIRUS RNA SPEC NAA+PROBE: NOT DETECTED
RSV RNA NPH QL NAA+NON-PROBE: NOT DETECTED
SARS-COV-2 RNA NPH QL NAA+NON-PROBE: NOT DETECTED
STRESS TARGET HR: 117 BPM
STRESS TARGET HR: 117 BPM
T&S EXPIRATION DATE: NORMAL
TOTAL RATE: 0 BREATHS/MINUTE

## 2020-10-20 PROCEDURE — 93308 TTE F-UP OR LMTD: CPT

## 2020-10-20 PROCEDURE — 25010000002 PHENYLEPHRINE PER 1 ML: Performed by: INTERNAL MEDICINE

## 2020-10-20 PROCEDURE — 93662 INTRACARDIAC ECG (ICE): CPT | Performed by: INTERNAL MEDICINE

## 2020-10-20 PROCEDURE — 93306 TTE W/DOPPLER COMPLETE: CPT | Performed by: INTERNAL MEDICINE

## 2020-10-20 PROCEDURE — 85652 RBC SED RATE AUTOMATED: CPT | Performed by: INTERNAL MEDICINE

## 2020-10-20 PROCEDURE — 99152 MOD SED SAME PHYS/QHP 5/>YRS: CPT | Performed by: INTERNAL MEDICINE

## 2020-10-20 PROCEDURE — 93005 ELECTROCARDIOGRAM TRACING: CPT | Performed by: PHYSICIAN ASSISTANT

## 2020-10-20 PROCEDURE — 25010000002 MIDAZOLAM PER 1 MG: Performed by: INTERNAL MEDICINE

## 2020-10-20 PROCEDURE — C1730 CATH, EP, 19 OR FEW ELECT: HCPCS | Performed by: INTERNAL MEDICINE

## 2020-10-20 PROCEDURE — 99153 MOD SED SAME PHYS/QHP EA: CPT | Performed by: INTERNAL MEDICINE

## 2020-10-20 PROCEDURE — C1769 GUIDE WIRE: HCPCS | Performed by: INTERNAL MEDICINE

## 2020-10-20 PROCEDURE — C1894 INTRO/SHEATH, NON-LASER: HCPCS | Performed by: INTERNAL MEDICINE

## 2020-10-20 PROCEDURE — 86900 BLOOD TYPING SEROLOGIC ABO: CPT | Performed by: INTERNAL MEDICINE

## 2020-10-20 PROCEDURE — 99291 CRITICAL CARE FIRST HOUR: CPT | Performed by: INTERNAL MEDICINE

## 2020-10-20 PROCEDURE — 93308 TTE F-UP OR LMTD: CPT | Performed by: INTERNAL MEDICINE

## 2020-10-20 PROCEDURE — 86901 BLOOD TYPING SEROLOGIC RH(D): CPT | Performed by: INTERNAL MEDICINE

## 2020-10-20 PROCEDURE — 36600 WITHDRAWAL OF ARTERIAL BLOOD: CPT

## 2020-10-20 PROCEDURE — 0W9D3ZZ DRAINAGE OF PERICARDIAL CAVITY, PERCUTANEOUS APPROACH: ICD-10-PCS | Performed by: INTERNAL MEDICINE

## 2020-10-20 PROCEDURE — 25010000002 VANCOMYCIN 10 G RECONSTITUTED SOLUTION

## 2020-10-20 PROCEDURE — 86850 RBC ANTIBODY SCREEN: CPT | Performed by: INTERNAL MEDICINE

## 2020-10-20 PROCEDURE — 82805 BLOOD GASES W/O2 SATURATION: CPT

## 2020-10-20 PROCEDURE — C1759 CATH, INTRA ECHOCARDIOGRAPHY: HCPCS | Performed by: INTERNAL MEDICINE

## 2020-10-20 PROCEDURE — 33016 PERICARDIOCENTESIS W/IMAGING: CPT | Performed by: INTERNAL MEDICINE

## 2020-10-20 PROCEDURE — 25010000002 MORPHINE PER 10 MG: Performed by: INTERNAL MEDICINE

## 2020-10-20 PROCEDURE — 25010000002 ONDANSETRON PER 1 MG: Performed by: INTERNAL MEDICINE

## 2020-10-20 PROCEDURE — 99232 SBSQ HOSP IP/OBS MODERATE 35: CPT | Performed by: INTERNAL MEDICINE

## 2020-10-20 PROCEDURE — 99221 1ST HOSP IP/OBS SF/LOW 40: CPT | Performed by: PHYSICIAN ASSISTANT

## 2020-10-20 PROCEDURE — 93306 TTE W/DOPPLER COMPLETE: CPT

## 2020-10-20 PROCEDURE — 25010000002 FENTANYL CITRATE (PF) 100 MCG/2ML SOLUTION: Performed by: INTERNAL MEDICINE

## 2020-10-20 PROCEDURE — C1729 CATH, DRAINAGE: HCPCS | Performed by: INTERNAL MEDICINE

## 2020-10-20 PROCEDURE — 93010 ELECTROCARDIOGRAM REPORT: CPT | Performed by: INTERNAL MEDICINE

## 2020-10-20 RX ORDER — ONDANSETRON 2 MG/ML
INJECTION INTRAMUSCULAR; INTRAVENOUS AS NEEDED
Status: DISCONTINUED | OUTPATIENT
Start: 2020-10-20 | End: 2020-10-20 | Stop reason: HOSPADM

## 2020-10-20 RX ORDER — FAMOTIDINE 10 MG/ML
20 INJECTION, SOLUTION INTRAVENOUS EVERY 12 HOURS SCHEDULED
Status: DISCONTINUED | OUTPATIENT
Start: 2020-10-20 | End: 2020-10-22

## 2020-10-20 RX ORDER — NALOXONE HCL 0.4 MG/ML
0.1 VIAL (ML) INJECTION
Status: DISCONTINUED | OUTPATIENT
Start: 2020-10-20 | End: 2020-10-22

## 2020-10-20 RX ORDER — ONDANSETRON 2 MG/ML
4 INJECTION INTRAMUSCULAR; INTRAVENOUS EVERY 6 HOURS PRN
Status: DISCONTINUED | OUTPATIENT
Start: 2020-10-20 | End: 2020-10-24 | Stop reason: HOSPADM

## 2020-10-20 RX ORDER — MORPHINE SULFATE 1 MG/ML
INJECTION, SOLUTION EPIDURAL; INTRATHECAL; INTRAVENOUS AS NEEDED
Status: DISCONTINUED | OUTPATIENT
Start: 2020-10-20 | End: 2020-10-20 | Stop reason: HOSPADM

## 2020-10-20 RX ORDER — COLCHICINE 0.6 MG/1
0.6 TABLET ORAL EVERY 12 HOURS SCHEDULED
Status: DISCONTINUED | OUTPATIENT
Start: 2020-10-20 | End: 2020-10-24 | Stop reason: HOSPADM

## 2020-10-20 RX ORDER — MIDAZOLAM HYDROCHLORIDE 1 MG/ML
INJECTION INTRAMUSCULAR; INTRAVENOUS AS NEEDED
Status: DISCONTINUED | OUTPATIENT
Start: 2020-10-20 | End: 2020-10-20 | Stop reason: HOSPADM

## 2020-10-20 RX ORDER — SODIUM CHLORIDE 0.9 % (FLUSH) 0.9 %
10 SYRINGE (ML) INJECTION AS NEEDED
Status: DISCONTINUED | OUTPATIENT
Start: 2020-10-20 | End: 2020-10-20

## 2020-10-20 RX ORDER — MORPHINE SULFATE 5 MG/ML
INJECTION, SOLUTION INTRAVENOUS CONTINUOUS
Status: DISCONTINUED | OUTPATIENT
Start: 2020-10-20 | End: 2020-10-20

## 2020-10-20 RX ORDER — HYDROCORTISONE ACETATE 0.5 %
1500 CREAM (GRAM) TOPICAL DAILY
COMMUNITY
End: 2020-10-24 | Stop reason: HOSPADM

## 2020-10-20 RX ORDER — SODIUM CHLORIDE 9 MG/ML
INJECTION, SOLUTION INTRAVENOUS CONTINUOUS PRN
Status: COMPLETED | OUTPATIENT
Start: 2020-10-20 | End: 2020-10-20

## 2020-10-20 RX ORDER — DEXTROSE AND SODIUM CHLORIDE 5; .45 G/100ML; G/100ML
100 INJECTION, SOLUTION INTRAVENOUS CONTINUOUS
Status: DISCONTINUED | OUTPATIENT
Start: 2020-10-20 | End: 2020-10-21

## 2020-10-20 RX ORDER — CHLORAL HYDRATE 500 MG
1000 CAPSULE ORAL DAILY
COMMUNITY

## 2020-10-20 RX ORDER — MORPHINE SULFATE 1 MG/ML
INJECTION INTRAVENOUS CONTINUOUS
Status: DISCONTINUED | OUTPATIENT
Start: 2020-10-20 | End: 2020-10-22

## 2020-10-20 RX ORDER — SODIUM CHLORIDE 0.9 % (FLUSH) 0.9 %
10 SYRINGE (ML) INJECTION AS NEEDED
Status: DISCONTINUED | OUTPATIENT
Start: 2020-10-20 | End: 2020-10-24 | Stop reason: HOSPADM

## 2020-10-20 RX ORDER — FENTANYL CITRATE 50 UG/ML
INJECTION, SOLUTION INTRAMUSCULAR; INTRAVENOUS AS NEEDED
Status: DISCONTINUED | OUTPATIENT
Start: 2020-10-20 | End: 2020-10-20 | Stop reason: HOSPADM

## 2020-10-20 RX ORDER — SODIUM CHLORIDE 0.9 % (FLUSH) 0.9 %
10 SYRINGE (ML) INJECTION EVERY 12 HOURS SCHEDULED
Status: DISCONTINUED | OUTPATIENT
Start: 2020-10-20 | End: 2020-10-24 | Stop reason: HOSPADM

## 2020-10-20 RX ORDER — MORPHINE SULFATE 2 MG/ML
2 INJECTION, SOLUTION INTRAMUSCULAR; INTRAVENOUS ONCE
Status: DISCONTINUED | OUTPATIENT
Start: 2020-10-20 | End: 2020-10-22

## 2020-10-20 RX ORDER — PROMETHAZINE HYDROCHLORIDE 12.5 MG/1
12.5 TABLET ORAL EVERY 6 HOURS PRN
Status: DISCONTINUED | OUTPATIENT
Start: 2020-10-20 | End: 2020-10-24 | Stop reason: HOSPADM

## 2020-10-20 RX ORDER — ACETAMINOPHEN 325 MG/1
650 TABLET ORAL EVERY 6 HOURS PRN
Status: DISCONTINUED | OUTPATIENT
Start: 2020-10-20 | End: 2020-10-24 | Stop reason: HOSPADM

## 2020-10-20 RX ORDER — NICOTINE 21 MG/24HR
1 PATCH, TRANSDERMAL 24 HOURS TRANSDERMAL
Status: DISCONTINUED | OUTPATIENT
Start: 2020-10-20 | End: 2020-10-21

## 2020-10-20 RX ORDER — LIDOCAINE HYDROCHLORIDE 10 MG/ML
INJECTION, SOLUTION EPIDURAL; INFILTRATION; INTRACAUDAL; PERINEURAL AS NEEDED
Status: DISCONTINUED | OUTPATIENT
Start: 2020-10-20 | End: 2020-10-20 | Stop reason: HOSPADM

## 2020-10-20 RX ORDER — NALOXONE HCL 0.4 MG/ML
0.1 VIAL (ML) INJECTION
Status: DISCONTINUED | OUTPATIENT
Start: 2020-10-20 | End: 2020-10-24 | Stop reason: HOSPADM

## 2020-10-20 RX ORDER — FLUMAZENIL 0.1 MG/ML
INJECTION INTRAVENOUS AS NEEDED
Status: DISCONTINUED | OUTPATIENT
Start: 2020-10-20 | End: 2020-10-20 | Stop reason: HOSPADM

## 2020-10-20 RX ORDER — SODIUM CHLORIDE 0.9 % (FLUSH) 0.9 %
10 SYRINGE (ML) INJECTION EVERY 12 HOURS SCHEDULED
Status: DISCONTINUED | OUTPATIENT
Start: 2020-10-20 | End: 2020-10-20

## 2020-10-20 RX ADMIN — VANCOMYCIN HYDROCHLORIDE 1500 MG: 10 INJECTION, POWDER, LYOPHILIZED, FOR SOLUTION INTRAVENOUS at 00:45

## 2020-10-20 RX ADMIN — AZTREONAM 2 G: 2 INJECTION, POWDER, LYOPHILIZED, FOR SOLUTION INTRAMUSCULAR; INTRAVENOUS at 22:45

## 2020-10-20 RX ADMIN — Medication 5 MG/HR: at 08:13

## 2020-10-20 RX ADMIN — FAMOTIDINE 20 MG: 10 INJECTION INTRAVENOUS at 02:05

## 2020-10-20 RX ADMIN — SODIUM CHLORIDE, PRESERVATIVE FREE 10 ML: 5 INJECTION INTRAVENOUS at 09:41

## 2020-10-20 RX ADMIN — FAMOTIDINE 20 MG: 10 INJECTION INTRAVENOUS at 20:20

## 2020-10-20 RX ADMIN — AZTREONAM 2 G: 2 INJECTION, POWDER, LYOPHILIZED, FOR SOLUTION INTRAMUSCULAR; INTRAVENOUS at 05:25

## 2020-10-20 RX ADMIN — SODIUM CHLORIDE, PRESERVATIVE FREE 10 ML: 5 INJECTION INTRAVENOUS at 02:05

## 2020-10-20 RX ADMIN — DEXTROSE AND SODIUM CHLORIDE 100 ML/HR: 5; 450 INJECTION, SOLUTION INTRAVENOUS at 15:48

## 2020-10-20 RX ADMIN — FAMOTIDINE 20 MG: 10 INJECTION INTRAVENOUS at 09:39

## 2020-10-20 RX ADMIN — SODIUM CHLORIDE, PRESERVATIVE FREE 10 ML: 5 INJECTION INTRAVENOUS at 20:20

## 2020-10-20 RX ADMIN — COLCHICINE 0.6 MG: 0.6 TABLET, FILM COATED ORAL at 20:20

## 2020-10-20 RX ADMIN — AZTREONAM 2 G: 2 INJECTION, POWDER, LYOPHILIZED, FOR SOLUTION INTRAMUSCULAR; INTRAVENOUS at 00:10

## 2020-10-20 RX ADMIN — AZTREONAM 2 G: 2 INJECTION, POWDER, LYOPHILIZED, FOR SOLUTION INTRAMUSCULAR; INTRAVENOUS at 14:24

## 2020-10-20 NOTE — PROGRESS NOTES
CTS Progress Note    Pio Baum  [id number]  1940      Subjective:81 y/o  male seen briefly in EP Lab.  Patient is 2 months s/p pacemaker insertion.  Presented to ER last PM with c/o chest pain, abdominal pain,and pain along ribs on left.Symptoms present x 1 week.  Now associated with SOA and vomiting. Pacemaker implant followed by decreased BP and subsequent pericardiocentesis. Follow-up Echo ---> pericardial effusion resolved.Patient noted to have huge effusion on CT Scan of chest and abdomen as well as Echo.  Now hemodynamically stable. Will be admitted to ICU for observation. Repeat Echo in AM.  Watch drainage amount and check serial H/H.  Will probably need pericardial window in AM.    Objective:  Temp:  [98.1 °F (36.7 °C)-99.5 °F (37.5 °C)] 98.8 °F (37.1 °C)  Heart Rate:  [] 76  Resp:  [12-18] 16  BP: ()/() 116/52    Plan: I have reviewed, verified, and confirmed the patient's history and current status. Will proceed as outlined above.    Full consult to follow.      Daniel Fried MD  10/20/20  16:51 EDT

## 2020-10-20 NOTE — PROGRESS NOTES
Pulmonary/Critical Care History and Physical Exam     LOS: 0 days   Patient Care Team:  Lupillo Hill MD as PCP - General    Chief Complaint:    Chief Complaint   Patient presents with   • Abdominal Pain       Subjective     HPI:   Mr. Pio Baum, is a 80 y.o. male with PMH of A. fib status post recent pacemaker placement in August, CAD, hyperlipidemia, hypertension, mitral valve regurgitation, PFO, CVA who presented to the emergency department on 10/19/2020 with chest pain.  According the patient he has a 1 week history of chest pain from his esophagus down to his abdomen, under his left rib cage and radiating around to the left mid back with with pulsations.  Over the past 2 days the symptoms have increased and he now has associated shortness of breath with strenuous activity as well as congestion, nonproductive cough and vomiting of bile.      Vital signs on arrival heart rate 137, blood pressure 127/81, SPO2 88 to 96%.  Significant labs include white count of 13.8, hemoglobin 10.4, platelets 254, BUN 29, creatinine 1.89, glucose 149, sodium 134, potassium 4, , lipase 34, AST 37, ALT 33, alk phos 117, total bili 0.9, troponin less than 0.010, procalcitonin 0.11, CRP 16.84, respiratory PCR negative for all analytes, lactate 1.2.  Chest x-ray with mild increased markings identified left lung base with small left pleural effusion.  Heart is enlarged.  Stable calcified granuloma identified in the right upper lobe.  CT abdomen pelvis showed large pericardial effusion measuring up to 3.6 cm at the cardiac apex.  Small left pleural effusion and probable left basilar atelectasis or less likely pneumonia.  Diverticulosis.  Prostamegaly.  CTA chest with no aneurysm or dissection in thoracoabdominal aorta.  No pulmonary embolism.  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  Mesenteric vessels and renal arteries  are widely patent.  Remainder of the chest abdomen and pelvis CT not significantly changed from the noncontrast examinations.    Of importance patient received pacemaker placement in August 2020 at which time he did have complications requiring him to go back to surgery and then ICU admission for hypotension.  He did develop pericardial tamponade/effusion with and underwent a pericardiocentesis with approximately 270 mL of dark blood removed.  Additionally he was cardioverted from A. fib to sinus rhythm with repeat echo showing improvement and no recurrence of pericardial effusion.  He has remained on Eliquis since discharge with last dose yesterday morning, no p.m. dose taken.    He presented to the ED last evening and was initially admitted to the hospitalist service however it was determined he had a large pericardial effusion.  It was believed it best for the patient be admitted to the ICU instead of the floor and therefore the hospitalist contacted the ER nurse practitioner to have her call me for admission to the ICU. On arrival to the unit he is alert and oriented. He continues to have chest/abdominal discomfort but is not requesting pain medications at this time.       History taken from: patient    Past Medical History:   Diagnosis Date   • A-fib (CMS/[facility])    • Chronic kidney disease    • History of migraine headaches    • Hyperlipidemia    • Hypertension    • Mitral valve regurgitation    • Pericardial effusion     s/p PPM placement   • PFO (patent foramen ovale)    • Testicular cyst     removal 1970   • TIA (transient ischemic attack)        Past Surgical History:   Procedure Laterality Date   • CARDIAC CATHETERIZATION N/A 8/24/2020    Procedure: PERICARDIOCENTESIS;  Surgeon: Dain Nava MD;  Location: [facility] INVASIVE LOCATION;  Service: Cardiology;  Laterality: N/A;   • CARDIAC ELECTROPHYSIOLOGY PROCEDURE N/A 8/24/2020    Procedure: PACEMAKER IMPLANTATION- DC;  Surgeon: Dain Nava MD;   Location: [facility] INVASIVE LOCATION;  Service: Cardiology;  Laterality: N/A;   • CATARACT EXTRACTION W/ INTRAOCULAR LENS  IMPLANT, BILATERAL     • TONSILLECTOMY AND ADENOIDECTOMY  11 yo       Family History   Problem Relation Age of Onset   • Arthritis Other    • Hyperlipidemia Other    • Stroke Other    • Heart attack Mother    • Heart attack Father        Social History     Socioeconomic History   • Marital status:      Spouse name: Not on file   • Number of children: Not on file   • Years of education: Not on file   • Highest education level: Not on file   Tobacco Use   • Smoking status: Never Smoker   • Smokeless tobacco: Current User     Types: Chew   • Tobacco comment: Occasionally chews   Substance and Sexual Activity   • Alcohol use: Yes     Comment: 4 drinks per month   • Drug use: No   • Sexual activity: Defer       Allergies   Allergen Reactions   • Flonase [Fluticasone] Irritability     Gets a nose bleed as soon as used   • Penicillins Rash     Rash all over body including mouth/throat        PMH/FH/SocH were reviewed by me and updates were made.     Review of Systems:    All systems were reviewed and negative except as noted in subjective.  Cardiovascular: positive for  chest pressure / pain, at rest and chest pressure / pain, on exertion  Gastrointestinal: positive for  pain    Objective     Vital Signs  Temp:  [98.2 °F (36.8 °C)-99.5 °F (37.5 °C)] 99.5 °F (37.5 °C)  Heart Rate:  [] 75  Resp:  [16-18] 16  BP: ()/() 104/48    Physical Exam:     In general is a well-developed man in no acute distress.  He is alert and oriented x3.    HEENT shows pupils equal round and reactive to light, EOMI, oropharynx is clear.  Neck examination shows no JVD.  There are no carotid bruits.  Neck is supple.  Lungs are clear to auscultation bilaterally.  CV shows regular rhythm.  There is a regular heartbeat.  No murmurs.  Pacemaker over left precordium.  Abdomen is soft, no tenderness to  "palpation, normal bowel sounds  Extremities show no cyanosis, clubbing, or edema.     Results Review:     I reviewed the patient's new clinical results.   Results from last 7 days   Lab Units 10/19/20  1944   SODIUM mmol/L 134*   POTASSIUM mmol/L 4.0   CHLORIDE mmol/L 100   CO2 mmol/L 19.0*   BUN mg/dL 29*   CREATININE mg/dL 1.89*   CALCIUM mg/dL 9.1   BILIRUBIN mg/dL 0.9   ALK PHOS U/L 117   ALT (SGPT) U/L 33   AST (SGOT) U/L 37   GLUCOSE mg/dL 149*     Results from last 7 days   Lab Units 10/19/20  1944   WBC 10*3/mm3 13.80*   HEMOGLOBIN g/dL 10.4*   HEMATOCRIT % 32.3*   PLATELETS 10*3/mm3 254           I reviewed the patient's new imaging including images and reports.    Medication Review:   aztreonam, 2 g, Intravenous, Q8H  famotidine, 20 mg, Intravenous, Q12H  nicotine, 1 patch, Transdermal, Q24H  sodium chloride, 10 mL, Intravenous, Q12H  vancomycin (dosing per levels), , Does not apply, Daily      dilTIAZem, 5-15 mg/hr, Last Rate: 10 mg/hr (10/19/20 2142)  Pharmacy to dose vancomycin,         Assessment/Plan       Pericardial effusion    Essential hypertension    PFO (patent foramen ovale)    Hyperlipidemia    CKD (chronic kidney disease) stage 3, GFR 30-59 ml/min    Atrial fibrillation (CMS/[facility])    History of TIA (transient ischemic attack) and stroke    Leukocytosis      1. Transfer to ICU  2. ECHO pending; Cardiology consulted for the morning.  3. Continue Diltiazem drip for HR control  4. NPO for possible procedure tomorrow  5. Pain management if patient requests, at this point he said \"I can definitely tolerate it right now\"  6. Patient takes elliquis home will hold for now  7. PUD ppx  8. Supportive Care      Haile Harding MD  10/20/20  05:04 EDT              "

## 2020-10-20 NOTE — ED PROVIDER NOTES
Subjective   Mr. Pio Baum is a 80 y.o. male who presents to the ED with c/o chest pain. The pain onset 2 weeks ago and is located in the midsternal chest radiating to his bilateral upper extremities. He has also been experiencing intermittent palpitations as well as shortness of breath with exertion. He has a history of atrial fibrillation and on 8/28/20 he had a pacemaker placed by Dr. Nava. Since the pacemaker placement he has not needed rate controlling medication but he is still on Eliquis 5 mg twice daily. He also complains of nausea, vomiting, and non-productive cough but denies edema or abdominal pain. There are no other acute symptoms at this time.      History provided by:  Patient  Chest Pain  Pain location:  Substernal area  Pain radiates to:  L arm and R arm  Pain severity:  Moderate  Onset quality:  Sudden  Duration:  2 weeks  Timing:  Constant  Progression:  Unchanged  Chronicity:  New  Relieved by:  None tried  Worsened by:  Nothing  Ineffective treatments:  None tried  Associated symptoms: cough, nausea, shortness of breath, vomiting and weakness    Associated symptoms: no abdominal pain, no fever and no lower extremity edema    Cough:     Cough characteristics:  Non-productive  Risk factors: high cholesterol, hypertension and obesity    Risk factors: no smoking        Review of Systems   Constitutional: Negative for chills and fever.   Respiratory: Positive for cough and shortness of breath.    Cardiovascular: Positive for chest pain.   Gastrointestinal: Positive for nausea and vomiting. Negative for abdominal pain.   Genitourinary: Negative for difficulty urinating and dysuria.   Neurological: Positive for weakness.   All other systems reviewed and are negative.      Past Medical History:   Diagnosis Date   • A-fib (CMS/HCC)    • Chronic kidney disease    • History of migraine headaches    • Hyperlipidemia    • Hypertension    • Mitral valve regurgitation    • Pericardial effusion      s/p PPM placement   • PFO (patent foramen ovale)    • Testicular cyst     removal 1970   • TIA (transient ischemic attack)        Allergies   Allergen Reactions   • Flonase [Fluticasone] Irritability     Gets a nose bleed as soon as used   • Penicillins Rash     Rash all over body including mouth/throat        Past Surgical History:   Procedure Laterality Date   • CARDIAC CATHETERIZATION N/A 8/24/2020    Procedure: PERICARDIOCENTESIS;  Surgeon: Dain Nava MD;  Location:  [facility];  Service: Cardiology;  Laterality: N/A;   • CARDIAC CATHETERIZATION N/A 10/20/2020    Procedure: PERICARDIOCENTESIS;  Surgeon: Dain Nava MD;  Location:  [facility];  Service: Cardiology;  Laterality: N/A;   • CARDIAC ELECTROPHYSIOLOGY PROCEDURE N/A 8/24/2020    Procedure: PACEMAKER IMPLANTATION- DC;  Surgeon: Dain Nava MD;  Location:  [facility];  Service: Cardiology;  Laterality: N/A;   • CATARACT EXTRACTION W/ INTRAOCULAR LENS  IMPLANT, BILATERAL     • TONSILLECTOMY AND ADENOIDECTOMY  11 yo       Family History   Problem Relation Age of Onset   • Arthritis Other    • Hyperlipidemia Other    • Stroke Other    • Heart attack Mother    • Heart attack Father        Social History     Socioeconomic History   • Marital status:      Spouse name: Not on file   • Number of children: Not on file   • Years of education: Not on file   • Highest education level: Not on file   Tobacco Use   • Smoking status: Never Smoker   • Smokeless tobacco: Current User     Types: Chew   • Tobacco comment: Occasionally chews   Substance and Sexual Activity   • Alcohol use: Yes     Comment: 4 drinks per month   • Drug use: No   • Sexual activity: Defer         Objective   Physical Exam  Vitals signs and nursing note reviewed.   Constitutional:       Appearance: Normal appearance. He is well-developed. He is not ill-appearing or toxic-appearing.   HENT:      Head: Normocephalic and  atraumatic.      Mouth/Throat:      Mouth: Mucous membranes are moist.   Eyes:      General: Lids are normal.      Extraocular Movements: Extraocular movements intact.      Conjunctiva/sclera: Conjunctivae normal.      Pupils: Pupils are equal, round, and reactive to light.   Neck:      Musculoskeletal: Full passive range of motion without pain and normal range of motion.      Trachea: Trachea normal.   Cardiovascular:      Rate and Rhythm: Tachycardia present. Rhythm irregular.      Pulses: Normal pulses.      Heart sounds: Normal heart sounds.   Pulmonary:      Effort: Pulmonary effort is normal. No respiratory distress.      Breath sounds: Normal breath sounds. No decreased breath sounds, wheezing, rhonchi or rales.   Abdominal:      General: Bowel sounds are normal.      Palpations: Abdomen is soft.      Tenderness: There is no abdominal tenderness.   Musculoskeletal: Normal range of motion.   Skin:     General: Skin is warm and dry.      Findings: No rash.   Neurological:      Mental Status: He is alert and oriented to person, place, and time.      Cranial Nerves: No cranial nerve deficit.   Psychiatric:         Speech: Speech normal.         Behavior: Behavior normal. Behavior is cooperative.         Procedures         ED Course  ED Course as of Oct 21 0418   Mon Oct 19, 2020   2059 Dr. Alva paged at this time.      [KG]   2100 Dr. Alva contacted.  Echo and pacer interrogation will be ordered in am.       [KG]   2104 Deya TORRES contacted, she agrees with plan.  Pacemaker to be interrogated in the am.     [KG]   2200 Dr. Benítez consulted suggested that we get a CTA of chest & abdomen.      [KG]   Tue Oct 20, 2020   0100 Dr. Harding contacted, he will admit the patient.      [KG]      ED Course User Index  [KG] Marichuy Spicer, HIMA     Recent Results (from the past 24 hour(s))   Sedimentation Rate    Collection Time: 10/20/20  4:59 AM    Specimen: Blood   Result Value Ref Range    Sed  Rate 31 (H) 0 - 20 mm/hr   Adult Transthoracic Echo Complete W/ Cont if Necessary Per Protocol    Collection Time: 10/20/20  9:20 AM   Result Value Ref Range    BSA 2.0 m^2    IVSd 1.3 cm    LVIDd 3.4 cm    LVIDs 2.5 cm    LVPWd 1.2 cm    IVS/LVPW 1.1     FS 26.6 %    EDV(Teich) 46.5 ml    ESV(Teich) 21.8 ml    EF(Teich) 53.1 %    EDV(cubed) 38.3 ml    ESV(cubed) 15.2 ml    EF(cubed) 60.4 %    LV mass(C)d 136.8 grams    LV mass(C)dI 67.7 grams/m^2    SV(Teich) 24.7 ml    SI(Teich) 12.2 ml/m^2    SV(cubed) 23.2 ml    SI(cubed) 11.5 ml/m^2    Ao root diam 3.4 cm    Ao root area 9.0 cm^2    LA dimension 3.6 cm    LA/Ao 1.1     LVOT diam 2.0 cm    LVOT area 3.2 cm^2    LVOT area(traced) 3.1 cm^2    LVLd ap4 8.3 cm    EDV(MOD-sp4) 120.0 ml    LVLs ap4 7.3 cm    ESV(MOD-sp4) 65.1 ml    EF(MOD-sp4) 45.8 %    LVLd ap2 7.6 cm    EDV(MOD-sp2) 99.0 ml    LVLs ap2 6.7 cm    ESV(MOD-sp2) 54.6 ml    EF(MOD-sp2) 44.8 %    EF(MOD-bp) 45.2 %    SV(MOD-sp4) 54.9 ml    SI(MOD-sp4) 27.2 ml/m^2    SV(MOD-sp2) 44.4 ml    SI(MOD-sp2) 22.0 ml/m^2    Ao root area (BSA corrected) 1.7     LV Mcginnis Vol (BSA corrected) 59.4 ml/m^2    LV Sys Vol (BSA corrected) 32.2 ml/m^2    TAPSE (>1.6) 1.6 cm    MV E max vinnie 74.1 cm/sec    MV V2 max 104.5 cm/sec    MV max PG 4.4 mmHg    MV V2 mean 46.7 cm/sec    MV mean PG 1.1 mmHg    MV V2 VTI 24.8 cm    MVA(VTI) 1.6 cm^2    MV dec slope 445.5 cm/sec^2    MV dec time 0.17 sec    LV V1 max PG 2.9 mmHg    LV V1 mean PG 1.2 mmHg    LV V1 max 85.9 cm/sec    LV V1 mean 48.9 cm/sec    LV V1 VTI 12.2 cm    SV(LVOT) 39.5 ml    SI(LVOT) 19.5 ml/m^2    TR max vinnie 213.8 cm/sec    TR max PG 18.0 mmHg    RVSP(TR) 26.0 mmHg    RAP systole 8.0 mmHg    RV Base 4.6 cm    RV Length 6.7 cm    RV Mid 3.3 cm    RV S' 11.1 cm/sec    Lat E/e'  7.3     Med E/e' 8.1     Lat Peak E' Vinnie 10.2 cm/sec    Med Peak E' Vinnie 9.1 cm/sec     CV ECHO ABDOUL - BZI_BMI 24.4 kilograms/m^2     CV ECHO ABDOUL - BSA(HAYCOCK) 2.0 m^2     CV ECHO ABDOUL  - BZI_METRIC_WEIGHT 80.7 kg    BH CV ECHO ABDOUL - BZI_METRIC_HEIGHT 182.0 cm    Avg E/e' ratio 7.68     Target HR (85%) 117 bpm    Max. Pred. HR (100%) 138 bpm    BH CV VAS BP LEFT ARM 98/56 mmHg    Echo EF Estimated 55 %   Type & Screen    Collection Time: 10/20/20 12:49 PM    Specimen: Blood   Result Value Ref Range    ABO Type O     RH type Positive     Antibody Screen Negative     T&S Expiration Date 10/23/2020 11:59:59 PM    Adult Transthoracic Echo Limited W/ Cont if Necessary Per Protocol    Collection Time: 10/20/20  1:31 PM   Result Value Ref Range    Target HR (85%) 117 bpm    Max. Pred. HR (100%) 138 bpm    BH CV VAS BP RIGHT /72 mmHg   Blood Gas, Arterial With Co-Ox    Collection Time: 10/20/20  4:44 PM    Specimen: Arterial Blood   Result Value Ref Range    Site Left Radial     Joel's Test N/A     pH, Arterial 7.395 7.350 - 7.450 pH units    pCO2, Arterial 34.4 (L) 35.0 - 45.0 mm Hg    pO2, Arterial 64.0 (L) 83.0 - 108.0 mm Hg    HCO3, Arterial 21.0 20.0 - 26.0 mmol/L    Base Excess, Arterial -3.4 (L) 0.0 - 2.0 mmol/L    Hemoglobin, Blood Gas 8.3 (L) 13.5 - 17.5 g/dL    Hematocrit, Blood Gas 25.5 %    Oxyhemoglobin 91.3 (L) 94 - 99 %    Methemoglobin 0.60 0.00 - 1.50 %    Carboxyhemoglobin 0.8 0 - 2 %    CO2 Content 22.1 22 - 33 mmol/L    Temperature 37.0 C    Barometric Pressure for Blood Gas      Modality Nasal Cannula     FIO2 28 %    Rate 0 Breaths/minute    PIP 0 cmH2O    IPAP 0     EPAP 0     Note      pH, Temp Corrected 7.395 pH Units    pCO2, Temperature Corrected 34.4 (L) 35 - 48 mm Hg    pO2, Temperature Corrected 64.0 (L) 83 - 108 mm Hg     Note: In addition to lab results from this visit, the labs listed above may include labs taken at another facility or during a different encounter within the last 24 hours. Please correlate lab times with ED admission and discharge times for further clarification of the services performed during this visit.    CT Angiogram Chest With & Without  Contrast   Final Result      1. No aneurysm or dissection in the thoracoabdominal aorta.   2. No pulmonary embolism.   3. 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   4. Mesenteric vessels and renal arteries are widely patent.   5. Remainder of the chest, abdomen, and pelvis CT is not significantly changed from the noncontrast examinations this evening.      Signer Name: Chad Rebolledo MD    Signed: 10/20/2020 12:43 AM    Workstation Name: [facility]      CT Angiogram Abdomen Pelvis   Final Result      1. No aneurysm or dissection in the thoracoabdominal aorta.   2. No pulmonary embolism.   3. 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   4. Mesenteric vessels and renal arteries are widely patent.   5. Remainder of the chest, abdomen, and pelvis CT is not significantly changed from the noncontrast examinations this evening.      Signer Name: Chad Rebolledo MD    Signed: 10/20/2020 12:43 AM    Workstation Name: [facility]      CT Chest Without Contrast   Final Result      1. Large pericardial effusion.   2. Small left pleural effusion with trace right pleural effusion.   3. Atelectasis in the lower lobes, left greater than right. Negative for pneumonia.      Signer Name: Chad Rebolledo MD    Signed: 10/19/2020 10:31 PM    Workstation Name: [facility]      CT Abdomen Pelvis Without Contrast   Final Result      1. Large pericardial effusion measuring up to 3.6 cm at the cardiac apex. Cardiology referral and cardiac echo recommended for further assessment of cardiac function due to the size of the effusion.   2. Small left pleural effusion and probable left basilar atelectasis or less likely pneumonia.   3.  Diverticulosis.   4. Prostamegaly.               Signer Name: Garry Davila MD    Signed: 10/19/2020 10:31 PM    Workstation Name: [facility]     [facility]      XR Chest 1 View   Preliminary Result   Mild increased markings identified left lung base with small   left pleural effusion. The heart is enlarged. Stable calcified granuloma   identified in the right upper lobe.       D:  10/19/2020   E:  10/20/2020              XR Chest 1 View    (Results Pending)     Vitals:    10/21/20 0200 10/21/20 0230 10/21/20 0300 10/21/20 0330   BP: 103/54 119/64 113/62 107/57   BP Location: Right arm      Patient Position: Lying      Pulse: 75 81 (!) 121 101   Resp: (!) 32      Temp:       TempSrc:       SpO2: 94% 91% 94% 92%   Weight:       Height:         Medications   Pharmacy to dose vancomycin (has no administration in time range)   aztreonam (AZACTAM) 2 g/100 mL 0.9% NS (mbp) (2 g Intravenous New Bag 10/20/20 4473)   vancomycin (dosing per levels) ( Does not apply Canceled Entry 10/20/20 0936)   sodium chloride 0.9 % flush 10 mL (10 mL Intravenous Given 10/20/20 2020)   sodium chloride 0.9 % flush 10 mL (has no administration in time range)   famotidine (PEPCID) injection 20 mg (20 mg Intravenous Given 10/20/20 2020)   nicotine (NICODERM CQ) 21 MG/24HR patch 1 patch (1 patch Transdermal Not Given 10/20/20 0945)   influenza vac split quad (FLUZONE,FLUARIX,AFLURIA,FLULAVAL) injection 0.5 mL (has no administration in time range)   Morphine Sulfate (PF) 2 mg ( Intravenous Canceled Entry 10/20/20 1424)   colchicine tablet 0.6 mg (0.6 mg Oral Given 10/20/20 2020)   ondansetron (ZOFRAN) injection 4 mg (has no administration in time range)   naloxone (NARCAN) injection 0.1 mg (has no administration in time range)   naloxone (NARCAN) injection 0.1 mg (has no administration in time range)   promethazine (PHENERGAN) tablet 12.5 mg (has no administration in time range)   naloxone (NARCAN) injection 0.1 mg (has  no administration in time range)   Morphine sulfate PCA 1 mg/mL 30 mL syringe ( Intravenous Not Given 10/20/20 1731)   dextrose 5 % and sodium chloride 0.45 % infusion (100 mL/hr Intravenous New Bag 10/21/20 0034)   acetaminophen (TYLENOL) tablet 650 mg (has no administration in time range)   aspirin chewable tablet 324 mg (324 mg Oral Given 10/19/20 2020)   dilTIAZem (CARDIZEM) injection 10 mg (10 mg Intravenous Given 10/19/20 2020)   aztreonam (AZACTAM) 2 g/100 mL 0.9% NS (mbp) (0 g Intravenous Stopped 10/20/20 0045)   vancomycin 1500 mg/500 mL 0.9% NS IVPB ([facility]) (1,500 mg Intravenous New Bag 10/20/20 0045)   iopamidol (ISOVUE-370) 76 % injection 100 mL (100 mL Intravenous Given 10/19/20 2346)   sodium chloride 0.9 % infusion (1,000 mL Intravenous New Bag 10/20/20 1200)   heparin (porcine) infusion  - ADS Override Pull (has no administration in time range)   midazolam (VERSED) 5 MG/5ML injection  - ADS Override Pull (has no administration in time range)   fentaNYL citrate (PF) (SUBLIMAZE) 100 MCG/2ML injection  - ADS Override Pull (has no administration in time range)   methohexital (BREVITAL) 50 MG/5ML injection solution prefilled syringe  - ADS Override Pull (has no administration in time range)   fentaNYL citrate (PF) (SUBLIMAZE) 100 MCG/2ML injection  - ADS Override Pull (has no administration in time range)   fentaNYL citrate (PF) (SUBLIMAZE) 100 MCG/2ML injection  - ADS Override Pull (has no administration in time range)   morphine 2 MG/ML injection  - ADS Override Pull (has no administration in time range)   ondansetron (ZOFRAN) 4 MG/2ML injection  - ADS Override Pull (has no administration in time range)   fentaNYL citrate (PF) (SUBLIMAZE) 100 MCG/2ML injection  - ADS Override Pull (has no administration in time range)   flumazenil (ROMAZICON) 0.5 MG/5ML injection  - ADS Override Pull (has no administration in time range)     ECG/EMG Results (last 24 hours)     Procedure Component Value Units Date/Time     ECG 12 Lead [[id number]] Collected: 10/19/20 1823     Updated: 10/19/20 2017    Narrative:      Test Reason : chest pain  Blood Pressure : **/** mmHG  Vent. Rate : 081 BPM     Atrial Rate : 081 BPM     P-R Int : 226 ms          QRS Dur : 094 ms      QT Int : 330 ms       P-R-T Axes : 027 056 030 degrees     QTc Int : 383 ms    Atrial-paced rhythm with prolonged AV conduction with occasional premature  ventricular complexes  Septal infarct , age undetermined  Abnormal ECG  When compared with ECG of 30-AUG-2020 22:49,  No significant change was found      Confirmed by IZABELLA BORRERO MD ([id number]) on 10/19/2020 8:17:10 PM    Referred By:  FILIPPO BURGER           Confirmed By:IZABELLA BORRERO MD        ECG 12 Lead   Preliminary Result   Test Reason : paf   Blood Pressure : **/** mmHG   Vent. Rate : 075 BPM     Atrial Rate : 075 BPM      P-R Int : 146 ms          QRS Dur : 088 ms       QT Int : 362 ms       P-R-T Axes : 000 050 030 degrees      QTc Int : 404 ms      Normal sinus rhythm   Early repolarization   Normal ECG   When compared with ECG of 19-OCT-2020 21:44,   Sinus rhythm has replaced Atrial fibrillation   Vent. rate has decreased BY  43 BPM   Criteria for Septal infarct are no longer present   T wave inversion no longer evident in Inferior leads      Referred By:             Confirmed By:       ECG 12 Lead   Final Result   Test Reason : chest pain   Blood Pressure : **/** mmHG   Vent. Rate : 118 BPM     Atrial Rate : 113 BPM      P-R Int : 000 ms          QRS Dur : 092 ms       QT Int : 306 ms       P-R-T Axes : 000 065 009 degrees      QTc Int : 428 ms      Atrial fibrillation with rapid ventricular response with premature   ventricular or aberrantly conducted complexes   Septal infarct (cited on or before 19-OCT-2020)   Abnormal ECG   When compared with ECG of 19-OCT-2020 18:23,   Atrial fibrillation has replaced Electronic atrial pacemaker   T wave inversion more evident in Inferior leads   Confirmed by IZABELLA BORRERO MD  ([id number]) on 10/20/2020 12:52:01 AM      Referred By:  FILIPPO BURGER           Confirmed By:IZABELLA BORRERO MD      ECG 12 Lead   Final Result   Test Reason : chest pain   Blood Pressure : **/** mmHG   Vent. Rate : 081 BPM     Atrial Rate : 081 BPM      P-R Int : 226 ms          QRS Dur : 094 ms       QT Int : 330 ms       P-R-T Axes : 027 056 030 degrees      QTc Int : 383 ms      Atrial-paced rhythm with prolonged AV conduction with occasional premature   ventricular complexes   Septal infarct , age undetermined   Abnormal ECG   When compared with ECG of 30-AUG-2020 22:49,   No significant change was found         Confirmed by IZABELLA BORRERO MD ([id number]) on 10/19/2020 8:17:10 PM      Referred By:  FILIPPO BURGER           Confirmed By:IZABELLA BORRERO MD      ECG 12 Lead    (Results Pending)   ECG 12 Lead    (Results Pending)         COVID-19 RISK SCREEN     1. Has the patient had close contact without PPE with a lab confirmed COVID-19 (+) person or a person under investigation (PUI) for COVID-19 infection?  -- No     2. Has the patient had respiratory symptoms, worsened/new cough and/or SOA, unexplained fever, or sudden loss of smell and/or taste in the past 7 days? --  No    3. Does the patient have baseline higher exposure risk such as working in healthcare field, currently residing in healthcare facility, or ongoing hemodialysis?  --  No                                          MDM    Final diagnoses:   Atrial fibrillation with rapid ventricular response (CMS/HCC)   Pericardial effusion   Chronic renal failure, unspecified CKD stage       Documentation assistance provided by nay Alvares.  Information recorded by the scribe was done at my direction and has been verified and validated by me.     Armen Alvares  10/19/20 2051       Marichuy Spicer, APRN  10/21/20 3654

## 2020-10-20 NOTE — PROGRESS NOTES
"Pharmacy Consult-Vancomycin Dosing  Pio Baum is a  80 y.o. male receiving vancomycin therapy.     Indication:PNA, possible infectious pericardial effusion  Consulting Provider: Dr. Benítez  ID Consult:     Goal Trough: 15-20    Current Antimicrobial Therapy  Anti-Infectives (From admission, onward)      Ordered     Dose/Rate Route Frequency Start Stop    10/19/20 2323  vancomycin (dosing per levels)     Ordering Provider: Jamie Fontenot, PharmD     Does not apply Daily 10/20/20 0900 10/27/20 0859    10/19/20 2319  aztreonam (AZACTAM) 2 g/100 mL 0.9% NS (mbp)     Ordering Provider: Juan Benítez DO    2 g  over 4 Hours Intravenous Every 8 Hours 10/20/20 0600 10/25/20 0559    10/19/20 2322  vancomycin 1500 mg/500 mL 0.9% NS IVPB ([facility])     Ordering Provider: Jamie Fontenot, PharmD    20 mg/kg × 77.1 kg Intravenous Once 10/19/20 2324      10/19/20 2319  aztreonam (AZACTAM) 2 g/100 mL 0.9% NS (mbp)     Ordering Provider: Juan Benítez DO    2 g  over 30 Minutes Intravenous Once 10/19/20 2321      10/19/20 2319  Pharmacy to dose vancomycin     Ordering Provider: Juan Benítez DO     Does not apply Continuous PRN 10/19/20 2318 10/26/20 2317            Allergies  Allergies as of 10/19/2020 - Reviewed 10/19/2020   Allergen Reaction Noted    Flonase [fluticasone] Irritability 04/27/2016    Penicillins Rash 04/27/2016       Labs    Results from last 7 days   Lab Units 10/19/20  1944   BUN mg/dL 29*   CREATININE mg/dL 1.89*       Results from last 7 days   Lab Units 10/19/20  1944   WBC 10*3/mm3 13.80*       Evaluation of Dosing     Last Dose Received in the ED/Outside Facility: none  Is Patient on Dialysis or Renal Replacement: no    Ht - 182.9 cm (72\")  Wt - 77.1 kg (170 lb)    Estimated Creatinine Clearance: 32.9 mL/min (A) (by C-G formula based on SCr of 1.89 mg/dL (H)).    Intake & Output (last 3 days)       None            Microbiology and Radiology  Microbiology Results (last 10 days)       ** No " results found for the last 240 hours. **            Vancomycin Levels:                        Assessment/Plan:  The patient will be started on a bolus of 20mg/kg for a dose of 1500mg . Given the patient's current renal status, will dose per random levels and obtain a random level with morning labs before ordering another dose.  Due to infection severity, will target a trough of 15-20.    Pharmacy will continue to follow the patient’s culture results and clinical progress daily.    Jamie Fontenot, GerriD

## 2020-10-20 NOTE — PLAN OF CARE
Goal Outcome Evaluation:  Plan of Care Reviewed With: patient  Progress: no change  Outcome Summary: Pt remained NPO today. Went to EP lab for Pericardiocentesis. Pt HR controlled. slow to arouse post procedure.  ml. IV fuilds started.

## 2020-10-20 NOTE — PROGRESS NOTES
Discharge Planning Assessment  [facility]     Patient Name: Pio Baum  MRN: [id number]  Today's Date: 10/20/2020    Admit Date: 10/19/2020    Discharge Needs Assessment     Row Name 10/20/20 1239       Living Environment    Lives With  spouse    Name(s) of Who Lives With Patient  Patient lives with his wife Willow    Current Living Arrangements  home/apartment/condo    Primary Care Provided by  self    Provides Primary Care For  no one    Family Caregiver if Needed  spouse    Quality of Family Relationships  supportive    Able to Return to Prior Arrangements  yes       Resource/Environmental Concerns    Resource/Environmental Concerns  none    Transportation Concerns  car, none       Transition Planning    Patient/Family Anticipates Transition to  home    Patient/Family Anticipated Services at Transition  none    Transportation Anticipated  family or friend will provide       Discharge Needs Assessment    Readmission Within the Last 30 Days  no previous admission in last 30 days    Equipment Currently Used at Home  none    Concerns to be Addressed  no discharge needs identified    Anticipated Changes Related to Illness  none    Equipment Needed After Discharge  none        Discharge Plan     Row Name 10/20/20 5754       Plan    Plan  Home    Plan Comments  Patient off unit for procedure, information obtained from chart.  Patient resides in [facility] and lives with his wife.  Prior to admission patient was independent with ADL's and mobility.  Patient does not have any DME.  Plan is home upon discharge.  CM will continue to follow.        Continued Care and Services - Admitted Since 10/19/2020    Coordination has not been started for this encounter.       Expected Discharge Date and Time     Expected Discharge Date Expected Discharge Time    Oct 25, 2020         Demographic Summary     Row Name 10/20/20 1238       General Information    Admission Type  inpatient    Arrived From  home    Referral  Source  admission list    Reason for Consult  discharge planning    Preferred Language  English       Contact Information    Permission Granted to Share Info With          Functional Status    No documentation.       Psychosocial    No documentation.       Abuse/Neglect    No documentation.       Legal    No documentation.       Substance Abuse    No documentation.       Patient Forms    No documentation.           Jolynn Alvarado RN

## 2020-10-20 NOTE — PLAN OF CARE
Goal Outcome Evaluation:  Plan of Care Reviewed With: patient  Progress: no change  Outcome Summary: Patient admitted from ED after CT scan results showed a large pericardial effusion. Cardizem drip started in the ED for atrial fibrillation w/ RVR; pt. remains in a-fib, but HR is controlled in the 70's-90's. All other vital signs stable; afebrile. Minimal c/o chest pain at rest, significantly increased with activity. Pt. remains NPO for possible pericardiocentesis today. Cardiology Consult ordered.

## 2020-10-20 NOTE — PROGRESS NOTES
Critical Care Note     LOS: 0 days   Patient Care Team:  Lupillo Hill MD as PCP - General    Chief Complaint/Reason for visit:    Chief Complaint   Patient presents with   • Abdominal Pain   Large pericardial effusion  Atrial fibrillation with recent pacemaker, August  Mitral valve regurgitation  PFO with history of stroke  Hypotension    Subjective     80-year-old gentleman with atrial fibrillation, coronary artery disease, PFO with stroke, mitral valve regurgitation, hypertension who underwent permanent pacemaker in August complicated by hypotension and tamponade requiring pericardiocentesis.  270 mL of blood was removed.  He was cardioverted into sinus rhythm.  Repeat echocardiogram revealed no recurrence of his effusion and Eliquis was restarted.  He now presents with a 1 week history of chest pain and increasing shortness of breath.  He has a nonproductive cough and he has had some vomiting.  CTA of the chest revealed no pulmonary embolism but a large pericardial effusion and a small left pleural effusion.  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    Interval History:     He currently denies chest pain.  He is on Cardene drip for his recurrent atrial fibrillation.  He has no fever or cough.  He denies lower extremity edema.  Oxygen saturation is 95% on 2 L.  Hypotension has improved with volume.  He was empirically placed on vancomycin and aztreonam.    Review of Systems:    All systems were reviewed and negative except as noted in subjective.    Medical history, surgical history, social history, family history reviewed    Objective     Intake/Output:    Intake/Output Summary (Last 24 hours) at 10/20/2020 1030  Last data filed at 10/20/2020 0900  Gross per 24 hour   Intake 947 ml   Output --   Net 947 ml       Nutrition:  NPO Diet    Infusions:  dilTIAZem, 5-15 mg/hr, Last Rate: 5 mg/hr (10/20/20 0813)  Pharmacy to dose vancomycin,         Telemetry: Atrial fibrillation             Vital  "Signs  Blood pressure 117/68, pulse 89, temperature 98.2 °F (36.8 °C), temperature source Oral, resp. rate 14, height 182.9 cm (72\"), weight 80.7 kg (178 lb), SpO2 95 %.    Physical Exam:  General Appearance:   Elderly gentleman in no respiratory distress   Head:   Atraumatic   Eyes:          Pupils are reactive, no jaundice   Ears:     Throat:  Oral mucosa moist   Neck:  Prominent neck veins   Back:      Lungs:    Diminished breath sounds left base    Heart:   Irregular rhythm, distant heart sounds, systolic murmur, left subclavian pacemaker site without erythema   Abdomen:    Nondistended, bowel sounds present   Rectal:   Deferred   Extremities:  No pretibial edema   Pulses:    Skin:  Warm and dry   Lymph nodes:    Neurologic:  Alert and cooperative      Results Review:     I reviewed the patient's new clinical results.   Results from last 7 days   Lab Units 10/19/20  1944   SODIUM mmol/L 134*   POTASSIUM mmol/L 4.0   CHLORIDE mmol/L 100   CO2 mmol/L 19.0*   BUN mg/dL 29*   CREATININE mg/dL 1.89*   CALCIUM mg/dL 9.1   BILIRUBIN mg/dL 0.9   ALK PHOS U/L 117   ALT (SGPT) U/L 33   AST (SGOT) U/L 37   GLUCOSE mg/dL 149*     Results from last 7 days   Lab Units 10/19/20  1944   WBC 10*3/mm3 13.80*   HEMOGLOBIN g/dL 10.4*   HEMATOCRIT % 32.3*   PLATELETS 10*3/mm3 254         Lab Results   Component Value Date    BLOODCX No growth at 5 days 08/28/2020     No results found for: URINECX    I reviewed the patient's new imaging including images and reports.      All medications reviewed.   aztreonam, 2 g, Intravenous, Q8H  famotidine, 20 mg, Intravenous, Q12H  nicotine, 1 patch, Transdermal, Q24H  sodium chloride, 10 mL, Intravenous, Q12H  vancomycin (dosing per levels), , Does not apply, Daily          Assessment/Plan       Pericardial effusion    Essential hypertension    PFO (patent foramen ovale)    Hyperlipidemia    CKD (chronic kidney disease) stage 3, GFR 30-59 ml/min    Atrial fibrillation (CMS/HCC)    History of TIA " (transient ischemic attack) and stroke    Leukocytosis    Atrial fibrillation with rapid ventricular response (CMS/HCC)      #1 hypotension, possibly from his large pericardial effusion, or from his recurrent atrial arrhythmia and diltiazem.  Cardizem drip was decreased to 5 mg/h and he received some gentle hydration and now blood pressure is adequate.    #2 large pericardial effusion, recurrent compared to August.  I personally spoke with Dr. Ríso who will review echocardiogram    #3 chronic kidney disease, last night on admission creatinine was 1.89.  1 month ago creatinine was 2-2.4, 6 months ago creatinine was 1.45.  He does not have a history of diabetes.  A1c level in September was 5.4.    #4 recurrent atrial fibrillation Eliquis has been restarted.  Small PFO noted on echocardiogram from May 2019, however no further mention on subsequent echocardiograms.    #5 leukocytosis, white blood cell count is 13.8.  He has some mild basilar atelectasis on CT scan of the chest on left.  No urinalysis was performed.  Pacemaker site is intact without erythema or fluctuance.  He does not have a fever.  Because of hypotension he was empirically placed on vancomycin and aztreonam.  Respiratory PCR panel is negative.  Blood cultures are pending    PLAN:  Consult cardiology for large pericardial effusion versus CT surgery for a window  Hold Eliquis  Cardizem for atrial fibrillation rate control blood pressure allowing  Monitor renal function  Continue empiric antibiotics pending blood cultures    VTE Prophylaxis: Anticoagulated    Stress Ulcer Prophylaxis: None    Gabriella Mao MD  10/20/20  10:30 EDT      Time: Critical care 30 min  I personally provided care to this critically ill patient as documented above.  Critical care time does not include time spent on separately billed procedures.  Non of my critical care time was concurrent with other critical care providers.

## 2020-10-20 NOTE — H&P
"    [facility] Medicine Services  HISTORY AND PHYSICAL    Patient Name: Pio Baum  : 1940  MRN: [id number]  Primary Care Physician: Lupillo Hill MD  Date of admission: 10/19/2020      Subjective   Subjective     Chief Complaint:  Chest pain    HPI:  Pio Baum is a 80 y.o. male with a PMH significant for prior CVA, PAF, PFO, HLD, HTN, CKD and PPM who presents to the ED with chest pain from his esophagus down around his abdomen, under his left rib cage radiating around to the left mid back with pulsations during the episodes of intermittent intense pain; these symptoms have been present for ~ 1 week and have gotten progressively worse w/ associated shortness of breath w/ strenuous activity as well as a congested, non productive cough over the week and vomiting yellow bitter tasting bile x 2 days. He denies fever or chills, denies swelling, weight gain or weight loss, no syncopal episodes. He reports a recent history of PPM placement in 2020 at which time he had some complications and had to go back to surgery and spent some time in the ICU d/t \"bottoming out his blood pressure and having to be shocked\"; brief review of pacemaker procedure appears placement was on 20, he did have low blood pressure and required vasopressor therapy, following the initial placement, he developed pericardial tamponade/effusion and underwent a pericardiocentesis w/ ~ 270 ml of darke blood removed, he also underwent ECV from AFIB to SR and a repeat ECHO the next day to ensure no recurrent effusion; of note, the patient is chronically anticoagulated with Eliquis - last dose was this am, no evening dose was taken.     He denies any known COVID-19 contacts and denies fever/chills or loss of taste or smell; he is not a current smoker, does not drink alcohol or use illicit drugs; he was initially going to be admitted to the floor however after assessment and review of EMR and " current symptoms, there is concern for likely recurrent bleeding causing the findings of large pericardial effusion on CT chest obtained in the ER.       Current COVID Risks are:  [] Fever [x]  Cough [x] Shortness of breath [] Fatigue [] Change in taste or smell    [] Exposure to COVID positive patient  [] High risk facility   []  NONE    Review of Systems   Constitutional: Positive for activity change, appetite change (x 2 days decreased) and fatigue. Negative for chills, diaphoresis, fever and unexpected weight change.   HENT: Negative.    Eyes: Negative.    Respiratory: Positive for cough and shortness of breath. Negative for wheezing.    Cardiovascular: Positive for chest pain. Negative for palpitations and leg swelling.   Gastrointestinal: Positive for abdominal pain, nausea and vomiting. Negative for abdominal distention, constipation and diarrhea.   Endocrine: Negative.    Genitourinary: Negative.    Musculoskeletal: Negative.    Skin: Negative.    Allergic/Immunologic: Negative.    Neurological: Negative.    Hematological: Negative.    Psychiatric/Behavioral: Negative.         All other systems reviewed and are negative.     Personal History     Past Medical History:   Diagnosis Date   • A-fib (CMS/HCC)    • Chronic kidney disease    • History of migraine headaches    • Hyperlipidemia    • Hypertension    • Mitral valve regurgitation 12/1/2016   • PFO (patent foramen ovale)    • Stroke (CMS/HCC)        Past Surgical History:   Procedure Laterality Date   • CARDIAC CATHETERIZATION N/A 8/24/2020    Procedure: PERICARDIOCENTESIS;  Surgeon: Dain Nava MD;  Location:  [facility]  INVASIVE [facility];  Service: Cardiology;  Laterality: N/A;   • CARDIAC ELECTROPHYSIOLOGY PROCEDURE N/A 8/24/2020    Procedure: PACEMAKER IMPLANTATION- DC;  Surgeon: Dain Nava MD;  Location:  [facility]  INVASIVE LOCATION;  Service: Cardiology;  Laterality: N/A;   • TONSILLECTOMY AND ADENOIDECTOMY         Family History: family  history includes Arthritis in an other family member; Heart attack in his father and mother; Hyperlipidemia in an other family member; Stroke in an other family member. Otherwise pertinent FHx was reviewed and unremarkable.     Social History:  reports that he has never smoked. His smokeless tobacco use includes chew. He reports current alcohol use. He reports that he does not use drugs.  Social History     Social History Narrative   • Not on file       Medications:  Available home medication information reviewed.  (Not in a hospital admission)      Allergies   Allergen Reactions   • Flonase [Fluticasone] Irritability     Gets a nose bleed as soon as used   • Penicillins Rash     Rash all over body including mouth/throat        Objective   Objective     Vital Signs:   Temp:  [98.2 °F (36.8 °C)] 98.2 °F (36.8 °C)  Heart Rate:  [] 93  Resp:  [16] 16  BP: (111-143)/() 118/70        Physical Exam  Constitutional:       General: He is not in acute distress.     Appearance: Normal appearance. He is not ill-appearing, toxic-appearing or diaphoretic.   HENT:      Head: Normocephalic and atraumatic.      Nose: Nose normal.   Eyes:      General: No scleral icterus.     Extraocular Movements: Extraocular movements intact.      Pupils: Pupils are equal, round, and reactive to light.   Neck:      Musculoskeletal: Normal range of motion and neck supple. No muscular tenderness.   Cardiovascular:      Rate and Rhythm: Tachycardia present. Rhythm irregular.      Pulses: Normal pulses.      Heart sounds: Heart sounds are distant.   Pulmonary:      Effort: Pulmonary effort is normal. No respiratory distress.      Breath sounds: Normal breath sounds. No wheezing or rhonchi.   Abdominal:      General: Abdomen is flat. Bowel sounds are normal. There is no distension.      Palpations: Abdomen is soft.      Tenderness: There is abdominal tenderness (radiating to left back).   Musculoskeletal: Normal range of motion.      Right  lower leg: No edema.      Left lower leg: No edema.   Skin:     General: Skin is warm and dry.      Capillary Refill: Capillary refill takes less than 2 seconds.   Neurological:      General: No focal deficit present.      Mental Status: He is alert and oriented to person, place, and time.   Psychiatric:         Mood and Affect: Mood normal.         Thought Content: Thought content normal.         Judgment: Judgment normal.          Results Reviewed:  I have personally reviewed most recent indicated data and agree with findings including:  [x]  Laboratory  [x]  Radiology  [x]  EKG/Telemetry  []  Pathology  []  Cardiac/Vascular Studies  [x]  Old records  []  Other:  Most pertinent findings include: see HPI above and summary of results below.    Results from last 7 days   Lab Units 10/19/20  1944   WBC 10*3/mm3 13.80*   HEMOGLOBIN g/dL 10.4*   HEMATOCRIT % 32.3*   PLATELETS 10*3/mm3 254     Results from last 7 days   Lab Units 10/19/20  2359 10/19/20  2146 10/19/20  1944   SODIUM mmol/L  --   --  134*   POTASSIUM mmol/L  --   --  4.0   CHLORIDE mmol/L  --   --  100   CO2 mmol/L  --   --  19.0*   BUN mg/dL  --   --  29*   CREATININE mg/dL  --   --  1.89*   GLUCOSE mg/dL  --   --  149*   CALCIUM mg/dL  --   --  9.1   ALT (SGPT) U/L  --   --  33   AST (SGOT) U/L  --   --  37   TROPONIN T ng/mL  --  0.013 <0.010   PROBNP pg/mL  --   --  734.2   LACTATE mmol/L 1.2  --   --      Estimated Creatinine Clearance: 32.9 mL/min (A) (by C-G formula based on SCr of 1.89 mg/dL (H)).  Brief Urine Lab Results  (Last result in the past 365 days)      Color   Clarity   Blood   Leuk Est   Nitrite   Protein   CREAT   Urine HCG        08/28/20 0535 Yellow Clear Negative Negative Negative Negative             Imaging Results (Last 24 Hours)     Procedure Component Value Units Date/Time    CT Angiogram Chest With & Without Contrast [[id number]] Resulted: 10/19/20 2340     Updated: 10/19/20 2346    CT Angiogram Abdomen Pelvis [[id number]]  Resulted: 10/19/20 2340     Updated: 10/19/20 2346    CT Abdomen Pelvis Without Contrast [[id number]] Collected: 10/19/20 2231     Updated: 10/19/20 2234    Narrative:      CT Abdomen Pelvis WO    INDICATION:   Chest pain 2 weeks. Abdominal pain nausea and vomiting. Atrial fibrillation.    TECHNIQUE:   CT of the abdomen and pelvis without IV contrast. Coronal and sagittal reconstructions were obtained.  Radiation dose reduction techniques included automated exposure control or exposure modulation based on body size. Count of known CT and cardiac nuc  med studies performed in previous 12 months: 0.     COMPARISON:   None available.    FINDINGS:  Abdomen: Trace pleural thickening/pleural fluid on the right. Small left sided pleural effusion and probable left basilar atelectasis. There is a large pericardial effusion measuring up to 3.6 cm approaching the level of the cardiac apex. Suggest further  evaluation with cardiac echo.    Normal caliber aorta and atherosclerotic change. Spleen and adrenal glands and pancreas are unremarkable within limitations of motion degradation. Negative gallbladder and liver. The kidneys are nonobstructed and there is no radiopaque stone. No  adenopathy.    Pelvis: Negative bladder. The prostate is enlarged. No drainable fluid collection. Extensive diverticulosis. Appendix not clearly identified. No focal inflammatory change in the right lower quadrant. No free air. Inguinal canals are negative. No  suspicious bone lesion. Multilevel degenerative changes.      Impression:        1. Large pericardial effusion measuring up to 3.6 cm at the cardiac apex. Cardiology referral and cardiac echo recommended for further assessment of cardiac function due to the size of the effusion.  2. Small left pleural effusion and probable left basilar atelectasis or less likely pneumonia.  3. Diverticulosis.  4. Prostamegaly.          Signer Name: Garry Davila MD   Signed: 10/19/2020 10:31 PM   Workstation  Name: [email or web address]    [facility]    CT Chest Without Contrast [[id number]] Collected: 10/19/20 2231     Updated: 10/19/20 2233    Narrative:      CT Chest WO    INDICATION:   Chest pain for 2 weeks. Nausea and vomiting.    TECHNIQUE:   CT of the thorax without IV contrast. Coronal and sagittal reconstructions were obtained.  Radiation dose reduction techniques included automated exposure control or exposure modulation based on body size. Count of known CT and cardiac nuc med studies  performed in previous 12 months: 1.     COMPARISON:   8/30/2020    FINDINGS:  Please see abdomen pelvis CT report dictated separately. Right-sided pacer is noted. The heart is enlarged. There is a large pericardial effusion noted. It measures up to about 2.8 cm in thickness. There is a small left pleural effusion, and there is a  minimal amount of right-sided pleural fluid. There is no adenopathy. Coronary artery calcifications are present. There is some mild atelectasis in the left lower lobe. Calcified granuloma is present in the right upper lobe. There is minimal dependent  atelectasis in the right lower lobe. Lungs are otherwise clear. No CT findings present to indicate pneumonia. Note: CT may be negative in the earliest stages of COVID-19. No pulmonary edema is identified.      Impression:        1. Large pericardial effusion.  2. Small left pleural effusion with trace right pleural effusion.  3. Atelectasis in the lower lobes, left greater than right. Negative for pneumonia.    Signer Name: Chad Rebolledo MD   Signed: 10/19/2020 10:31 PM   Workstation Name: [facility]    [facility]    XR Chest 1 View [[id number]] Collected: 10/19/20 1849     Updated: 10/19/20 1849    Narrative:         EXAMINATION: XR CHEST 1 VW-      INDICATION: Chest Pain triage protocol      COMPARISON: 08/30/2020     FINDINGS: Veda chest reveals heart to be enlarged. Small left pleural  effusion with mild  increased markings at the left lung base. Cardiac  pacer leads in satisfactory position. The right lung is clear.           Impression:      Mild increased markings identified left lung base with small  left pleural effusion. The heart is enlarged. Stable calcified granuloma  identified in the right upper lobe              Results for orders placed during the hospital encounter of 08/21/20   Adult Transthoracic Echo Limited W/ Cont if Necessary Per Protocol    Narrative · Estimated EF = 65%.  · Left ventricular systolic function is normal.  · There is a small (<1cm) circumferential pericardial effusion.          Assessment/Plan   Assessment & Plan     Active Hospital Problems    Diagnosis POA   • **Pericardial effusion [I31.3] Yes   • Leukocytosis [D72.829] Unknown   • Atrial fibrillation (CMS/HCC) [I48.91] Yes     a. CHADS-VASc = 5 (HTN, Age > 75, h/o Stroke)  b. MPS, 01/17/2017: EF 58%, negative, low risk study           • History of TIA (transient ischemic attack) and stroke [Z86.73] Not Applicable     a. Carotid duplex 04/20/2016 no significant disease  b. Echocardiogram 04/20/2016 showed normal LV function, positive bubble study. Closure not considered due to need for chronic anticoagulation therapy.  c. Cardiac event monitor showed atrial fibrillation/flutter with intermittent RVR, aberrancy, IVCD/sinus rhythm with PVCs           • PFO (patent foramen ovale) [Q21.1] Not Applicable     Echocardiogram 04/20/2016: Normal LV systolic function, left atrium 3.6 cm, positive bubble study. Mild mitral valve prolapse, Mild MR.     • Hyperlipidemia [E78.5] Yes   • CKD (chronic kidney disease) stage 3, GFR 30-59 ml/min [N18.30] Yes     Stage 2     • Essential hypertension [I10] Yes     Plan:    Chest pain w/ findings of large pericardial effusion on CT chest w/o contrast:   - obtaining CTa chest/abd/pelvis to evaluate for active extravasation  - recommend monitoring in ICU overnight  - CT surgery consult for AM  - hold  Eliquis  - check coags    Leukocytosis / Shortness of breath w/ exertion w/ findings of small left pleural effusion, trace right pleural effusion and atelectasis in lower lobes (left > right) and negative for pneumonia:  - recommend spectrum antibiotics, received Azactam and Vancomycin in ER  - LR @ 75 ml/hr  - obtain respiratory PCR; r/o COVID-19    AFIB w/ PPM / PFO:  - cardizem drip to maintain HR < 120, continued from ER    DVT prophylaxis:  SCDS      CODE STATUS:  There are no questions and answers to display.       Electronically signed by Nan Diaz, HIMA, 10/19/20, 11:55 PM EDT.      Attending   Admission Attestation       I have seen and examined the patient, performing an independent face-to-face diagnostic evaluation with plan of care reviewed and developed with the advanced practice clinician (APC).      Brief Summary Statement:   Pio Baum is a 80 y.o. male with past medical history paroxysmal atrial fibrillation, PFO, hyperlipidemia, hypertension, CVA, CKD, recent pacemaker placement who presents to the ER with complaints of chest pain.    Patient recently had hospitalization for pacemaker placement which was complicated by bloody pericardial effusion post procedure with associated tamponade requiring emergent pericardiocentesis.  He was off of his Eliquis x1 week and has since resumed this.  Patient reports that he has been having chest pain under his left rib cage radiating to his mid back with pulsations and intermittent intensity of the pain for approximately 1 week.  He is also had associated shortness of air and nonproductive cough.  He reports nausea and vomiting as well for the last 2 days.  Denies any known exposure to COVID-19.    Work-up in the ER reveals large pericardial effusion.  Patient currently with continued atrial fibrillation with RVR and borderline pressure after being placed on diltiazem.  Given patient's recent ICU admission and need for pericardiocentesis, it has  been recommended that patient be initially admitted to ICU for closer observation.    Remainder of detailed HPI is as noted by APC and has been reviewed and/or edited by me for completeness.    Attending Physical Exam:  Constitutional: Awake, alert  Eyes: PERRLA, sclerae anicteric, no conjunctival injection  HENT: NCAT, mucous membranes moist  Neck: Supple, no thyromegaly, no lymphadenopathy, trachea midline  Respiratory: Clear to auscultation bilaterally, nonlabored respirations   Cardiovascular: irregularly irregular, tachycardic, no murmurs, rubs, slightly muffled heart sounds but still audible, JVD present  Gastrointestinal: Positive bowel sounds, soft, nontender, nondistended  Musculoskeletal: No bilateral ankle edema, no clubbing or cyanosis to extremities  Psychiatric: Appropriate affect, cooperative  Neurologic: Oriented x 3, strength symmetric in all extremities, Cranial Nerves grossly intact to confrontation, speech clear  Skin: No rashes      Brief Assessment/Plan :  See detailed assessment and plan developed with APC which I have reviewed and/or edited for completeness.        Admission Status: I believe that this patient meets INPATIENT status due to pericardial effusion. Concern for developing tamponade.  I feel patient’s risk for adverse outcomes and need for care warrant INPATIENT evaluation and I predict the patient’s care encounter to likely last beyond 2 midnights.        Juan Benítez, DO  10/20/20

## 2020-10-20 NOTE — SIGNIFICANT NOTE
Patient evaluated by myself and Nan RABAGO at bedside in the ER.  Apparently patient had recent complications of bloody pericardial effusion s/p pacemaker placement in which he developed tamponade and required emergent pericardiocentesis as well as ICU admission.  He currently has evidence of large pericardial effusion. Recently resumed his eliquis. Last dose the morning of 10/19. Holding further eliquis. Initial CT without contrast but didn't exactly show consistency of pericardial effusion that you would expected with acute blood. Hb noted to be slightly lower than when discharged. I have recommended CTA to look for active extravasation as benefits outweigh risk at this point with borderline creatinine levels.  Will initiate low rate of maintenance fluids post dye exposure.  I have recommended emergent echo tonight, ER discussed this with Dr. Alva.  I have advised to have them call CT surgery for consideration of possible window vs repeat pericardiocentesis.  Recommend ICU admission at least overnight as he currently has borderline pressure while on Diltiazem drip and continued afib with RVR.  CRP and sed rate as chest pain could be related to pericarditis vs low cardiac output. Broad spectrum abx until infection is ruled out given recent instrumentation.

## 2020-10-20 NOTE — CONSULTS
[facility] at [facility] - Cardiology Consult    Pio Baum  1940  [id number]    Admit Date:  10/19/2020    Consult Date:  10/20/20        PCP:  Lupillo Hill MD Req MD:  Dr. Harding  Consulting MD:  Dr. Dain Nava        CC:  Pericardial Effusion    Reason for Consult: Atrial Fibrillation, recurrent pericardial effusion on NOAC    PROBLEM LIST:  1. Pericardial Effusion   A.  Right sided DDDR pacemaker 8/24/20.  Pericardial Effusion with tamponade post procedure s/p pericardiocentesis 8/24/2020.  270cc dark blood removed.   B.  Persistent chest pain, dyspnea, weakness with [facility] ED Presentation 10/19/2020   C.  CT Chest:  Large pericardial effusion with small left pleural effusion.    2. Paroxysmal atrial fibrillation/SSS:   A.  CHADS-VASc = 5 (HTN, Age > 75, h/o Stroke), on Eliquis 5 mg BID.    B.  MPS, 01/17/2017: EF 58%, negative, low risk study   C.  DDDR [employer] permanent pacemaker 8/24/2020, Dr. Nava.   C.  ECV with 200J shock during pericardiocentesis. Normal pacemaker functioning.  3. TIA/CVA   A.  Carotid duplex, 04/20/2016: No significant disease   B.  Echocardiogram, 04/20/2016: Normal LV function, positive bubble study. Closure not considered due to need for chronic anticoagulation therapy.   C.  Cardiac event monitor showed atrial fibrillation/flutter with intermittent RVR, aberrancy, IVCD/sinus rhythm w/ PVCs  4. Syncope:   A.  2 week Zio, 05/16/2019: SR, occasional PAC's and PVC's. Occasional short SVT/PAT, 21 runs at 3-20 beats.   B.  Echocardiogram, 05/16/2019: EF 55%. Borderline right-sided chamber enlargement. Mild MR/TR, normal RVSP.  5. Hypertension  6. Dyslipidemia  7. Oral tobacco abuse  8. History of migraine headaches  9. Chronic kidney disease stage III  10.  Surgical history:  a. Tonsillectomy/adenoictomy           Allergies:  is allergic to flonase [fluticasone] and penicillins.    Medications Prior to Admission   Medication Sig  Dispense Refill Last Dose   • apixaban (ELIQUIS) 5 MG tablet tablet Take 5 mg by mouth 2 (Two) Times a Day.      • Ergocalciferol (VITAMIN D2 PO) Take 1 capsule by mouth Daily.      • Glucosamine-Chondroit-Vit C-Mn (Glucosamine Chondr 1500 Complx) capsule Take 1,500 mg by mouth Daily.      • Omega-3 1000 MG capsule Take 1,000 mg by mouth Daily.      • UBIQUINOL PO Take  by mouth Daily. Pt. Takes Coenzyme Q10 with Ubiquinol capsule      • ZINC-VITAMIN C PO Take 1-3 tablets by mouth Daily As Needed.      • atorvastatin (LIPITOR) 40 MG tablet TAKE 1 TABLET EVERY DAY (Patient taking differently: Take 40 mg by mouth Every Night.) 90 tablet 1    • coenzyme Q10 100 MG capsule Take 100 mg by mouth Daily.      • guaiFENesin (MUCINEX) 600 MG 12 hr tablet Take 1,200 mg by mouth Daily As Needed for Cough or Congestion.      • tamsulosin (FLOMAX) 0.4 MG capsule 24 hr capsule Take 1 capsule by mouth 2 (two) times a day.          dilTIAZem, 5-15 mg/hr, Last Rate: 5 mg/hr (10/20/20 0813)  Pharmacy to dose vancomycin,             CARDIAC RISK FACTORS:   advanced age (older than 55 for men, 65 for women), dyslipidemia, hypertension and male gender.         HPI:  Pio Baum is an 81 yo  CM who recently underwent DDDR permanent pacemaker on 8/24/2020 Dr. Nava.  Post recovery complicated by pericardial effusion with tamponade resulting in emergent pericardiocentesis.  A repeat limited echo showed no further fluid build up and he was discharged home on 8/26/2020 on colchicine.  He complained of fever after DC and presented to [facility] ED within 48 hours and sent home.  He presented again on 8/31/2020 - CT showed no evidence of recurrent effusion.  That visit, he was advised to take Tylenol for pain.   He presents to [facility] ED with continued chest pain, SOA, N/V.  He has been taking his Eliquis.  CT Chest shows large pericardial effusion without tamponade.  He was admitted to the ICU with labile BP readings.  He is in AFib  "with RVR and has been started on IV Cardizem for better rate control.  Dr. Nava has been asked to see Mr. Baum in consultation for further evaluation and management of his recurrent pericardial effusion.          Social History     Socioeconomic History   • Marital status:      Spouse name: Not on file   • Number of children: Not on file   • Years of education: Not on file   • Highest education level: Not on file   Tobacco Use   • Smoking status: Never Smoker   • Smokeless tobacco: Current User     Types: Chew   • Tobacco comment: Occasionally chews   Substance and Sexual Activity   • Alcohol use: Yes     Comment: 4 drinks per month   • Drug use: No   • Sexual activity: Defer     Family History   Problem Relation Age of Onset   • Arthritis Other    • Hyperlipidemia Other    • Stroke Other    • Heart attack Mother    • Heart attack Father      Past Surgical History:   Procedure Laterality Date   • CARDIAC CATHETERIZATION N/A 8/24/2020    Procedure: PERICARDIOCENTESIS;  Surgeon: Dain Nava MD;  Location:  [facility];  Service: Cardiology;  Laterality: N/A;   • CARDIAC ELECTROPHYSIOLOGY PROCEDURE N/A 8/24/2020    Procedure: PACEMAKER IMPLANTATION- DC;  Surgeon: Dain Nava MD;  Location:  [facility];  Service: Cardiology;  Laterality: N/A;   • CATARACT EXTRACTION W/ INTRAOCULAR LENS  IMPLANT, BILATERAL     • TONSILLECTOMY AND ADENOIDECTOMY  11 yo     ROS: Pertinent items are noted in HPI, all other systems reviewed and negative     Objective     height is 182.9 cm (72\") and weight is 80.7 kg (178 lb). His oral temperature is 98.2 °F (36.8 °C). His blood pressure is 117/68 and his pulse is 89. His respiration is 14 and oxygen saturation is 95%.      Intake/Output Summary (Last 24 hours) at 10/20/2020 1052  Last data filed at 10/20/2020 0900  Gross per 24 hour   Intake 947 ml   Output --   Net 947 ml         Physical Exam:  General Appearance:    Alert, " cooperative, in no acute distress.  Appears stated age.   Head:    Normocephalic, without obvious abnormality, atraumatic   Eyes:            Lids and lashes normal, conjunctivae and sclerae normal, no   icterus, no pallor, corneas clear, PERRLA   Ears:    Ears appear intact with no abnormalities noted   Throat:   No oral lesions, no thrush, oral mucosa moist   Neck:   No adenopathy, supple, trachea midline, no thyromegaly, no     carotid bruit, no JVD   Back:     No kyphosis present, no scoliosis present, no skin lesions,       erythema or scars, no tenderness to percussion or                   palpation,   range of motion normal   Lungs:     Clear to auscultation,respirations regular, even and                   unlabored    Heart:    Irregular Irregular rhythm and tachycardic rate, normal S1 and S2, no murmur, no gallop, no rub, no click       Abdomen:     Normal bowel sounds, no masses, no organomegaly, soft        mildly-tender, non-distended, no guarding, no rebound                 tenderness       Extremities:   Moves all extremities well,  no cyanosis, no redness, no edema   Pulses:   Pulses palpable and equal bilaterally   Skin:   No bleeding, bruising or rash   Lymph nodes:   No palpable adenopathy   Neurologic:   Cranial nerves 2 - 12 grossly intact, sensation intact, DTR        present and equal bilaterally          Results Review:  I personally reviewed the patient's clinical results.  Results from last 7 days   Lab Units 10/19/20  1944   WBC 10*3/mm3 13.80*   HEMOGLOBIN g/dL 10.4*   HEMATOCRIT % 32.3*   PLATELETS 10*3/mm3 254     Results from last 7 days   Lab Units 10/19/20  1944   SODIUM mmol/L 134*   POTASSIUM mmol/L 4.0   CHLORIDE mmol/L 100   CO2 mmol/L 19.0*   BUN mg/dL 29*   CREATININE mg/dL 1.89*   CALCIUM mg/dL 9.1   BILIRUBIN mg/dL 0.9   ALK PHOS U/L 117   ALT (SGPT) U/L 33   AST (SGOT) U/L 37   GLUCOSE mg/dL 149*     CRP:  16.84          Results from last 7 days   Lab Units 10/19/20  1944    PROBNP pg/mL 734.2       Radiology:  Imaging Results (Last 72 Hours)     Procedure Component Value Units Date/Time    XR Chest 1 View [[id number]] Collected: 10/19/20 1849     Updated: 10/20/20 1020    Narrative:      EXAMINATION: XR CHEST 1 VW- 10/19/2020     INDICATION: Chest Pain triage protocol      COMPARISON: 08/30/2020     FINDINGS: Portable chest reveals heart to be enlarged. Small left  pleural effusion with mild increased markings at the left lung base.  Cardiac pacer leads in satisfactory position. The right lung is clear.            Impression:      Mild increased markings identified left lung base with small  left pleural effusion. The heart is enlarged. Stable calcified granuloma  identified in the right upper lobe.     D:  10/19/2020  E:  10/20/2020          CT Angiogram Chest With & Without Contrast [[id number]] Collected: 10/20/20 0043     Updated: 10/20/20 0046    Narrative:      CT ANGIOGRAM CHEST W WO CONTRAST, CTA Abdomen Pelvis    INDICATION:   Chest and back pain today. Atrial fibrillation.    TECHNIQUE:   CT angiogram of the chest, abdomen, and pelvis with IV contrast. 3-D reconstructions were obtained and reviewed.   Radiation dose reduction techniques included automated exposure control or exposure modulation based on body size. Count of known CT and  cardiac nuc med studies performed in previous 12 months: 3.     COMPARISON:   Noncontrast chest, abdomen, and pelvis CT 10/19/2020    FINDINGS:   No aneurysm or dissection is seen in the thoracoabdominal aorta. Although the exam is tailored for evaluation of the aorta, there is nothing to suggest pulmonary embolism. Both main renal arteries are widely patent. There is a tiny accessory right renal  artery that also appears patent. The SMA and FLAKITA are widely patent. 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 The vessel is otherwise widely patent. There is some  mild  atherosclerotic plaque in the aorta with no significant stenosis. Scattered mild plaques are noted throughout the iliac arterial system in the pelvis with no stenosis. Common iliac arteries and proximal superficial and deep femoral arteries on both sides  are also widely patent.    Large pericardial effusion is unchanged. Pleural fluid is stable. There is atelectasis, predominantly in the left lower lobe. Gallbladder is unremarkable. Solid abdominal organs are within normal limits. There is colonic diverticulosis without acute  diverticulitis. No bowel obstruction is seen. Prostate is enlarged. Urinary bladder is grossly normal.      Impression:        1. No aneurysm or dissection in the thoracoabdominal aorta.  2. No pulmonary embolism.  3. 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  4. Mesenteric vessels and renal arteries are widely patent.  5. Remainder of the chest, abdomen, and pelvis CT is not significantly changed from the noncontrast examinations this evening.    Signer Name: Chad Rebloledo MD   Signed: 10/20/2020 12:43 AM   Workstation Name: [email or web address]    [facility]    CT Angiogram Abdomen Pelvis [[id number]] Collected: 10/20/20 0043     Updated: 10/20/20 0046    Narrative:      CT ANGIOGRAM CHEST W WO CONTRAST, CTA Abdomen Pelvis    INDICATION:   Chest and back pain today. Atrial fibrillation.    TECHNIQUE:   CT angiogram of the chest, abdomen, and pelvis with IV contrast. 3-D reconstructions were obtained and reviewed.   Radiation dose reduction techniques included automated exposure control or exposure modulation based on body size. Count of known CT and  cardiac nuc med studies performed in previous 12 months: 3.     COMPARISON:   Noncontrast chest, abdomen, and pelvis CT 10/19/2020    FINDINGS:   No aneurysm or dissection is seen in the thoracoabdominal aorta. Although the exam  is tailored for evaluation of the aorta, there is nothing to suggest pulmonary embolism. Both main renal arteries are widely patent. There is a tiny accessory right renal  artery that also appears patent. The SMA and FLAKITA are widely patent. 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 The vessel is otherwise widely patent. There is some mild  atherosclerotic plaque in the aorta with no significant stenosis. Scattered mild plaques are noted throughout the iliac arterial system in the pelvis with no stenosis. Common iliac arteries and proximal superficial and deep femoral arteries on both sides  are also widely patent.    Large pericardial effusion is unchanged. Pleural fluid is stable. There is atelectasis, predominantly in the left lower lobe. Gallbladder is unremarkable. Solid abdominal organs are within normal limits. There is colonic diverticulosis without acute  diverticulitis. No bowel obstruction is seen. Prostate is enlarged. Urinary bladder is grossly normal.      Impression:        1. No aneurysm or dissection in the thoracoabdominal aorta.  2. No pulmonary embolism.  3. 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  4. Mesenteric vessels and renal arteries are widely patent.  5. Remainder of the chest, abdomen, and pelvis CT is not significantly changed from the noncontrast examinations this evening.    Signer Name: Chad Rebolledo MD   Signed: 10/20/2020 12:43 AM   Workstation Name: [email or web address]    [facility]    CT Abdomen Pelvis Without Contrast [[id number]] Collected: 10/19/20 2231     Updated: 10/19/20 2234    Narrative:      CT Abdomen Pelvis WO    INDICATION:   Chest pain 2 weeks. Abdominal pain nausea and vomiting. Atrial fibrillation.    TECHNIQUE:   CT of the abdomen and pelvis without IV contrast. Coronal and  sagittal reconstructions were obtained.  Radiation dose reduction techniques included automated exposure control or exposure modulation based on body size. Count of known CT and cardiac nuc  med studies performed in previous 12 months: 0.     COMPARISON:   None available.    FINDINGS:  Abdomen: Trace pleural thickening/pleural fluid on the right. Small left sided pleural effusion and probable left basilar atelectasis. There is a large pericardial effusion measuring up to 3.6 cm approaching the level of the cardiac apex. Suggest further  evaluation with cardiac echo.    Normal caliber aorta and atherosclerotic change. Spleen and adrenal glands and pancreas are unremarkable within limitations of motion degradation. Negative gallbladder and liver. The kidneys are nonobstructed and there is no radiopaque stone. No  adenopathy.    Pelvis: Negative bladder. The prostate is enlarged. No drainable fluid collection. Extensive diverticulosis. Appendix not clearly identified. No focal inflammatory change in the right lower quadrant. No free air. Inguinal canals are negative. No  suspicious bone lesion. Multilevel degenerative changes.      Impression:        1. Large pericardial effusion measuring up to 3.6 cm at the cardiac apex. Cardiology referral and cardiac echo recommended for further assessment of cardiac function due to the size of the effusion.  2. Small left pleural effusion and probable left basilar atelectasis or less likely pneumonia.  3. Diverticulosis.  4. Prostamegaly.          Signer Name: Garry Davila MD   Signed: 10/19/2020 10:31 PM   Workstation [facility]: [email or web address]    [facility]    CT Chest Without Contrast [[id number]] Collected: 10/19/20 2231     Updated: 10/19/20 2233    Narrative:      CT Chest WO    INDICATION:   Chest pain for 2 weeks. Nausea and vomiting.    TECHNIQUE:   CT of the thorax without IV contrast. Coronal and sagittal reconstructions were obtained.  Radiation dose  reduction techniques included automated exposure control or exposure modulation based on body size. Count of known CT and cardiac nuc med studies  performed in previous 12 months: 1.     COMPARISON:   8/30/2020    FINDINGS:  Please see abdomen pelvis CT report dictated separately. Right-sided pacer is noted. The heart is enlarged. There is a large pericardial effusion noted. It measures up to about 2.8 cm in thickness. There is a small left pleural effusion, and there is a  minimal amount of right-sided pleural fluid. There is no adenopathy. Coronary artery calcifications are present. There is some mild atelectasis in the left lower lobe. Calcified granuloma is present in the right upper lobe. There is minimal dependent  atelectasis in the right lower lobe. Lungs are otherwise clear. No CT findings present to indicate pneumonia. Note: CT may be negative in the earliest stages of COVID-19. No pulmonary edema is identified.      Impression:        1. Large pericardial effusion.  2. Small left pleural effusion with trace right pleural effusion.  3. Atelectasis in the lower lobes, left greater than right. Negative for pneumonia.    Signer Name: Chad Rebolledo MD   Signed: 10/19/2020 10:31 PM   Workstation Name: [facility]    [facility]            Tele:  Atrial Fibrillation     Assessment/Plan     1.  Persistent Atrial Fibrillation, with RVR   -  Rate control for now.   -  Proceed with pericardiocentesis and re evaluated rhythm.   -  Interrogate device.    2.  Recurrent Pericardial Effusion   -  Hold NOAC   -  Plan to take to EP lab for repeat pericardiocentesis.  Risks and benefits have been reviewed by Dr. Nava and the patient is willing to proceed.  Further recommendations based on outcomes.    3.  SSS/Sinus Bradycardia   -  S/P DDDR [employer] Pacemaker 8/24/2020   -  Pericardial effusion post procedure with emergent pericardiocentesis.  Normal pacer function at that time.   -   Interrogate device today.    4.  Chronic NOAC use   -  Holding Eliquis for now.      I discussed the patients findings and my recommendations with the patient, any present family members, and the nursing staff.  Dain Nava MD saw and examined patient, verified hx and PE, read all radiographic studies, reviewed labs and micro data, and formulated dx, plan for treatment and all medical decision making.      Erika Asher PA-C, working with Dain Nava MD  10/20/20 10:52 EDT

## 2020-10-21 ENCOUNTER — APPOINTMENT (OUTPATIENT)
Dept: CARDIOLOGY | Facility: HOSPITAL | Age: 80
End: 2020-10-21

## 2020-10-21 ENCOUNTER — APPOINTMENT (OUTPATIENT)
Dept: GENERAL RADIOLOGY | Facility: HOSPITAL | Age: 80
End: 2020-10-21

## 2020-10-21 LAB
ANION GAP SERPL CALCULATED.3IONS-SCNC: 8 MMOL/L (ref 5–15)
BASOPHILS # BLD AUTO: 0.09 10*3/MM3 (ref 0–0.2)
BASOPHILS NFR BLD AUTO: 0.8 % (ref 0–1.5)
BH CV ECHO MEAS - BSA(HAYCOCK): 2 M^2
BH CV ECHO MEAS - BSA: 2 M^2
BH CV ECHO MEAS - BZI_BMI: 24.8 KILOGRAMS/M^2
BH CV ECHO MEAS - BZI_METRIC_HEIGHT: 177.8 CM
BH CV ECHO MEAS - BZI_METRIC_WEIGHT: 78.5 KG
BH CV VAS BP RIGHT ARM: NORMAL MMHG
BUN SERPL-MCNC: 32 MG/DL (ref 8–23)
BUN/CREAT SERPL: 19.4 (ref 7–25)
CALCIUM SPEC-SCNC: 8.1 MG/DL (ref 8.6–10.5)
CHLORIDE SERPL-SCNC: 108 MMOL/L (ref 98–107)
CO2 SERPL-SCNC: 22 MMOL/L (ref 22–29)
CREAT SERPL-MCNC: 1.65 MG/DL (ref 0.76–1.27)
CRP SERPL-MCNC: 18.8 MG/DL (ref 0–0.5)
DEPRECATED RDW RBC AUTO: 45.1 FL (ref 37–54)
EOSINOPHIL # BLD AUTO: 0.07 10*3/MM3 (ref 0–0.4)
EOSINOPHIL NFR BLD AUTO: 0.6 % (ref 0.3–6.2)
ERYTHROCYTE [DISTWIDTH] IN BLOOD BY AUTOMATED COUNT: 13 % (ref 12.3–15.4)
GFR SERPL CREATININE-BSD FRML MDRD: 40 ML/MIN/1.73
GLUCOSE SERPL-MCNC: 151 MG/DL (ref 65–99)
HCT VFR BLD AUTO: 25.2 % (ref 37.5–51)
HGB BLD-MCNC: 7.8 G/DL (ref 13–17.7)
IMM GRANULOCYTES # BLD AUTO: 0.05 10*3/MM3 (ref 0–0.05)
IMM GRANULOCYTES NFR BLD AUTO: 0.4 % (ref 0–0.5)
LYMPHOCYTES # BLD AUTO: 1.27 10*3/MM3 (ref 0.7–3.1)
LYMPHOCYTES NFR BLD AUTO: 11.2 % (ref 19.6–45.3)
MAGNESIUM SERPL-MCNC: 2.3 MG/DL (ref 1.6–2.4)
MAXIMAL PREDICTED HEART RATE: 138 BPM
MCH RBC QN AUTO: 29.5 PG (ref 26.6–33)
MCHC RBC AUTO-ENTMCNC: 31 G/DL (ref 31.5–35.7)
MCV RBC AUTO: 95.5 FL (ref 79–97)
MONOCYTES # BLD AUTO: 1.1 10*3/MM3 (ref 0.1–0.9)
MONOCYTES NFR BLD AUTO: 9.7 % (ref 5–12)
NEUTROPHILS NFR BLD AUTO: 77.3 % (ref 42.7–76)
NEUTROPHILS NFR BLD AUTO: 8.72 10*3/MM3 (ref 1.7–7)
NRBC BLD AUTO-RTO: 0 /100 WBC (ref 0–0.2)
PLATELET # BLD AUTO: 229 10*3/MM3 (ref 140–450)
PMV BLD AUTO: 10.6 FL (ref 6–12)
POTASSIUM SERPL-SCNC: 3.9 MMOL/L (ref 3.5–5.2)
PROCALCITONIN SERPL-MCNC: 0.16 NG/ML (ref 0–0.25)
RBC # BLD AUTO: 2.64 10*6/MM3 (ref 4.14–5.8)
SODIUM SERPL-SCNC: 138 MMOL/L (ref 136–145)
STRESS TARGET HR: 117 BPM
VANCOMYCIN SERPL-MCNC: 6.9 MCG/ML (ref 5–40)
WBC # BLD AUTO: 11.3 10*3/MM3 (ref 3.4–10.8)

## 2020-10-21 PROCEDURE — 83735 ASSAY OF MAGNESIUM: CPT | Performed by: INTERNAL MEDICINE

## 2020-10-21 PROCEDURE — 71045 X-RAY EXAM CHEST 1 VIEW: CPT

## 2020-10-21 PROCEDURE — 80048 BASIC METABOLIC PNL TOTAL CA: CPT | Performed by: INTERNAL MEDICINE

## 2020-10-21 PROCEDURE — 93308 TTE F-UP OR LMTD: CPT | Performed by: INTERNAL MEDICINE

## 2020-10-21 PROCEDURE — 93010 ELECTROCARDIOGRAM REPORT: CPT | Performed by: INTERNAL MEDICINE

## 2020-10-21 PROCEDURE — 93308 TTE F-UP OR LMTD: CPT

## 2020-10-21 PROCEDURE — 99222 1ST HOSP IP/OBS MODERATE 55: CPT | Performed by: THORACIC SURGERY (CARDIOTHORACIC VASCULAR SURGERY)

## 2020-10-21 PROCEDURE — 25010000002 VANCOMYCIN PER 500 MG

## 2020-10-21 PROCEDURE — 99232 SBSQ HOSP IP/OBS MODERATE 35: CPT | Performed by: INTERNAL MEDICINE

## 2020-10-21 PROCEDURE — 85025 COMPLETE CBC W/AUTO DIFF WBC: CPT | Performed by: INTERNAL MEDICINE

## 2020-10-21 PROCEDURE — 86140 C-REACTIVE PROTEIN: CPT | Performed by: INTERNAL MEDICINE

## 2020-10-21 PROCEDURE — 25010000002 FUROSEMIDE PER 20 MG: Performed by: INTERNAL MEDICINE

## 2020-10-21 PROCEDURE — 80202 ASSAY OF VANCOMYCIN: CPT

## 2020-10-21 PROCEDURE — 93005 ELECTROCARDIOGRAM TRACING: CPT | Performed by: INTERNAL MEDICINE

## 2020-10-21 PROCEDURE — 99233 SBSQ HOSP IP/OBS HIGH 50: CPT | Performed by: INTERNAL MEDICINE

## 2020-10-21 PROCEDURE — 25010000002 METHYLPREDNISOLONE PER 125 MG: Performed by: INTERNAL MEDICINE

## 2020-10-21 PROCEDURE — 84145 PROCALCITONIN (PCT): CPT | Performed by: INTERNAL MEDICINE

## 2020-10-21 RX ORDER — VANCOMYCIN HYDROCHLORIDE 1 G/200ML
1000 INJECTION, SOLUTION INTRAVENOUS EVERY 24 HOURS
Status: DISCONTINUED | OUTPATIENT
Start: 2020-10-21 | End: 2020-10-23

## 2020-10-21 RX ORDER — FUROSEMIDE 10 MG/ML
40 INJECTION INTRAMUSCULAR; INTRAVENOUS ONCE
Status: COMPLETED | OUTPATIENT
Start: 2020-10-21 | End: 2020-10-21

## 2020-10-21 RX ORDER — METHYLPREDNISOLONE SODIUM SUCCINATE 125 MG/2ML
80 INJECTION, POWDER, LYOPHILIZED, FOR SOLUTION INTRAMUSCULAR; INTRAVENOUS EVERY 8 HOURS SCHEDULED
Status: COMPLETED | OUTPATIENT
Start: 2020-10-21 | End: 2020-10-22

## 2020-10-21 RX ORDER — FERROUS SULFATE 325(65) MG
325 TABLET ORAL 2 TIMES DAILY WITH MEALS
Status: DISCONTINUED | OUTPATIENT
Start: 2020-10-21 | End: 2020-10-24 | Stop reason: HOSPADM

## 2020-10-21 RX ORDER — FLECAINIDE ACETATE 50 MG/1
100 TABLET ORAL EVERY 12 HOURS SCHEDULED
Status: DISCONTINUED | OUTPATIENT
Start: 2020-10-21 | End: 2020-10-24 | Stop reason: HOSPADM

## 2020-10-21 RX ADMIN — METHYLPREDNISOLONE SODIUM SUCCINATE 80 MG: 125 INJECTION, POWDER, FOR SOLUTION INTRAMUSCULAR; INTRAVENOUS at 10:27

## 2020-10-21 RX ADMIN — VANCOMYCIN HYDROCHLORIDE 1000 MG: 1 INJECTION, SOLUTION INTRAVENOUS at 12:40

## 2020-10-21 RX ADMIN — AZTREONAM 2 G: 2 INJECTION, POWDER, LYOPHILIZED, FOR SOLUTION INTRAMUSCULAR; INTRAVENOUS at 14:58

## 2020-10-21 RX ADMIN — DEXTROSE AND SODIUM CHLORIDE 100 ML/HR: 5; 450 INJECTION, SOLUTION INTRAVENOUS at 00:34

## 2020-10-21 RX ADMIN — AZTREONAM 2 G: 2 INJECTION, POWDER, LYOPHILIZED, FOR SOLUTION INTRAMUSCULAR; INTRAVENOUS at 06:49

## 2020-10-21 RX ADMIN — FAMOTIDINE 20 MG: 10 INJECTION INTRAVENOUS at 08:35

## 2020-10-21 RX ADMIN — FUROSEMIDE 40 MG: 10 INJECTION, SOLUTION INTRAMUSCULAR; INTRAVENOUS at 12:39

## 2020-10-21 RX ADMIN — COLCHICINE 0.6 MG: 0.6 TABLET, FILM COATED ORAL at 08:35

## 2020-10-21 RX ADMIN — FLECAINIDE ACETATE 100 MG: 50 TABLET ORAL at 10:26

## 2020-10-21 RX ADMIN — COLCHICINE 0.6 MG: 0.6 TABLET, FILM COATED ORAL at 21:30

## 2020-10-21 RX ADMIN — METOPROLOL TARTRATE 12.5 MG: 25 TABLET, FILM COATED ORAL at 10:26

## 2020-10-21 RX ADMIN — SODIUM CHLORIDE, PRESERVATIVE FREE 10 ML: 5 INJECTION INTRAVENOUS at 21:30

## 2020-10-21 RX ADMIN — FLECAINIDE ACETATE 100 MG: 50 TABLET ORAL at 21:30

## 2020-10-21 RX ADMIN — METHYLPREDNISOLONE SODIUM SUCCINATE 80 MG: 125 INJECTION, POWDER, FOR SOLUTION INTRAMUSCULAR; INTRAVENOUS at 18:15

## 2020-10-21 RX ADMIN — DEXTROSE AND SODIUM CHLORIDE 100 ML/HR: 5; 450 INJECTION, SOLUTION INTRAVENOUS at 10:33

## 2020-10-21 RX ADMIN — FERROUS SULFATE TAB 325 MG (65 MG ELEMENTAL FE) 325 MG: 325 (65 FE) TAB at 18:15

## 2020-10-21 RX ADMIN — FAMOTIDINE 20 MG: 10 INJECTION INTRAVENOUS at 21:30

## 2020-10-21 RX ADMIN — FERROUS SULFATE TAB 325 MG (65 MG ELEMENTAL FE) 325 MG: 325 (65 FE) TAB at 12:02

## 2020-10-21 RX ADMIN — AZTREONAM 2 G: 2 INJECTION, POWDER, LYOPHILIZED, FOR SOLUTION INTRAMUSCULAR; INTRAVENOUS at 21:30

## 2020-10-21 RX ADMIN — METOPROLOL TARTRATE 12.5 MG: 25 TABLET, FILM COATED ORAL at 21:30

## 2020-10-21 NOTE — PROGRESS NOTES
[facility] at [facility]  Progress Note       LOS: 1 day   Patient Care Team:  Lupillo Hill MD as PCP - General    Chief Complaint: Atrial fibrillation/chest pain    Subjective No CP or SOB. Doing well    Interval History: Underwent pericardiocentesis with pericardial drain left in place yesterday.        Review of Systems:   Pertinent positives in HPI, all others reviewed and negative.      Objective       Current Facility-Administered Medications:   •  acetaminophen (TYLENOL) tablet 650 mg, 650 mg, Oral, Q6H PRN, Leonie Florence APRN  •  aztreonam (AZACTAM) 2 g/100 mL 0.9% NS (mbp), 2 g, Intravenous, Q8H, Juan Benítez, , 2 g at 10/21/20 0649  •  colchicine tablet 0.6 mg, 0.6 mg, Oral, Q12H, Dain Nava MD, 0.6 mg at 10/21/20 0835  •  dextrose 5 % and sodium chloride 0.45 % infusion, 100 mL/hr, Intravenous, Continuous, Gabriella Mao MD, Last Rate: 100 mL/hr at 10/21/20 0034, 100 mL/hr at 10/21/20 0034  •  famotidine (PEPCID) injection 20 mg, 20 mg, Intravenous, Q12H, Leonie Florence APRN, 20 mg at 10/21/20 0835  •  influenza vac split quad (FLUZONE,FLUARIX,AFLURIA,FLULAVAL) injection 0.5 mL, 0.5 mL, Intramuscular, During Hospitalization, Leonie Florence APRN  •  Morphine Sulfate (PF) 2 mg, 2 mg, Intravenous, Once, Dain Nava MD  •  Morphine sulfate PCA 1 mg/mL 30 mL syringe, , Intravenous, Continuous, Gabriella Mao MD  •  naloxone (NARCAN) injection 0.1 mg, 0.1 mg, Intravenous, Q5 Min PRN, Dain Nava MD  •  naloxone (NARCAN) injection 0.1 mg, 0.1 mg, Intravenous, Q5 Min PRN, Gabriella Mao MD  •  naloxone (NARCAN) injection 0.1 mg, 0.1 mg, Intravenous, Q5 Min PRN, Gabriella Mao MD  •  nicotine (NICODERM CQ) 21 MG/24HR patch 1 patch, 1 patch, Transdermal, Q24H, Leonie Florence, HIMA  •  ondansetron (ZOFRAN) injection 4 mg, 4 mg, Intravenous, Q6H PRN, Dain Nava MD  •  Pharmacy to dose vancomycin, , Does not apply,  "Continuous PRN, Juan Benítez, DO  •  promethazine (PHENERGAN) tablet 12.5 mg, 12.5 mg, Oral, Q6H PRN, Gabriella Mao MD  •  sodium chloride 0.9 % flush 10 mL, 10 mL, Intravenous, Q12H, Leonie Florence APRN, 10 mL at 10/20/20 2020  •  sodium chloride 0.9 % flush 10 mL, 10 mL, Intravenous, PRN, Leonie Florence APRN  •  vancomycin (dosing per levels), , Does not apply, Daily, Jamie Fontenot, PharmD    Vital Sign Min/Max for last 24 hours  Temp  Min: 98.1 °F (36.7 °C)  Max: 100.4 °F (38 °C)   BP  Min: 91/78  Max: 136/60   Pulse  Min: 70  Max: 121   Resp  Min: 12  Max: 32   SpO2  Min: 84 %  Max: 100 %   Flow (L/min)  Min: 1  Max: 2   Weight  Min: 78.8 kg (173 lb 11.6 oz)  Max: 80.7 kg (178 lb)     Flowsheet Rows      First Filed Value   Admission Height  182.9 cm (72\") Documented at 10/19/2020 1709   Admission Weight  77.1 kg (170 lb) Documented at 10/19/2020 1709            Intake/Output Summary (Last 24 hours) at 10/21/2020 0842  Last data filed at 10/21/2020 0649  Gross per 24 hour   Intake 2783 ml   Output 150 ml   Net 2633 ml       Physical Exam:     General Appearance:    Alert, cooperative, in no acute distress   Lungs:     Clear to auscultation anteriorly ,respirations regular, even and                  unlabored    Heart:   Irregular irregular rate and rhythm, normal S1 and S2, no            murmur, no gallop, no rub, no click   Chest Wall:    No abnormalities observed   Abdomen:     Normal bowel sounds, no masses, no organomegaly, soft        non-tender, non-distended, no guarding, no rebound                tenderness   Extremities:   Moves all extremities well, no edema, no cyanosis, no             redness   Pulses:   Pulses palpable and equal bilaterally   Skin:   No bleeding, bruising or rash        Results Review:   Results from last 7 days   Lab Units 10/19/20  1944   WBC 10*3/mm3 13.80*   HEMOGLOBIN g/dL 10.4*   HEMATOCRIT % 32.3*   PLATELETS 10*3/mm3 254     Results from last 7 days   Lab Units " 10/19/20  1944   SODIUM mmol/L 134*   POTASSIUM mmol/L 4.0   CHLORIDE mmol/L 100   CO2 mmol/L 19.0*   BUN mg/dL 29*   CREATININE mg/dL 1.89*   GLUCOSE mg/dL 149*                  Results from last 7 days   Lab Units 10/19/20  1944   PROBNP pg/mL 734.2         Results from last 7 days   Lab Units 10/19/20  2146 10/19/20  1944   TROPONIN T ng/mL 0.013 <0.010       Intake/Output Summary (Last 24 hours) at 10/21/2020 0842  Last data filed at 10/21/2020 0649  Gross per 24 hour   Intake 2783 ml   Output 150 ml   Net 2633 ml       I personally viewed and interpreted the patient's EKG/Telemetry data    EKG: .  Atrial fibrillation ventricular rate 97 bpm mild diffuse ST elevation noted.    Telemetry: Normal sinus rhythm/atrial fibrillation    Ejection Fraction  Lab Results   Component Value Date    EF 58 01/17/2017       Echo EF Estimated  Lab Results   Component Value Date    ECHOEFEST 55 10/20/2020         Present on Admission:  • Essential hypertension  • Hyperlipidemia  • CKD (chronic kidney disease) stage 3, GFR 30-59 ml/min  • Atrial fibrillation (CMS/HCC)  • Pericardial effusion  • Leukocytosis  • Atrial fibrillation with rapid ventricular response (CMS/HCC)    Assessment/Plan     1.  Pericardial effusion status post pericardiocentesis with pericardial drain left in place yesterday.  Minimal drainage overnight.  Awaiting limited echocardiogram today to ensure no recurrent pericardial effusion is present.  If no significant effusion is noted,  will then pull pericardial drain.  Hold Eliquis indefinitely at this time.  On colchicine.    2.  Sick sinus syndrome status post DDDR permanent pacemaker implantation with normal pacemaker function.    3.  Atrial fibrillation persistent/paroxysmal in nature.  Start flecainide today.  Add beta-blocker as well.    4.  Anemia secondary to blood loss.  Monitor hematocrit tomorrow.  Hemodynamically stable.  Add iron supplements.    Dain Nava MD  10/21/20  08:42  EDT    Addendum: Reviewed the echocardiogram demonstrates the approximately 1.2 cm fluid in the pericardium similar to what was present at the end of the pericardiocentesis procedure yesterday.  Have discussed the case with Dr. Fried.  Will discontinue pericardial drain today.  Repeat echocardiogram in 48 hours.  If pericardial effusion is worsening consider pericardial window at that time.  In the meantime we will start IV Solu-Medrol for 5 doses.

## 2020-10-21 NOTE — PLAN OF CARE
Goal Outcome Evaluation:  Plan of Care Reviewed With: patient, spouse  Progress: improving       No new effusion, pericardial drain removed per Dr. Nava. Will need repeat echo prior to discharge.    Started on Flecainide and Lopressor.    Lasix and steroids given.    UOP has increased and his urine has cleared up from anna in color to clear.     His confusion has resolved, he has been up to the chair and is on room air.     Hgb 7.8, Fe started.

## 2020-10-21 NOTE — PROGRESS NOTES
Critical Care Note     LOS: 1 day   Patient Care Team:  Lupillo Hill MD as PCP - General    Chief Complaint/Reason for visit:    Chief Complaint   Patient presents with   • Abdominal Pain   Large pericardial effusion  Atrial fibrillation with recent pacemaker, August  Mitral valve regurgitation  PFO with history of stroke  Hypotension    Subjective     80-year-old gentleman with atrial fibrillation, coronary artery disease, PFO with stroke, mitral valve regurgitation, hypertension who underwent permanent pacemaker in August complicated by hypotension and tamponade requiring pericardiocentesis.  270 mL of blood was removed.  He was cardioverted into sinus rhythm.  Repeat echocardiogram revealed no recurrence of his effusion and Eliquis was restarted.  He now presents with a 1 week history of chest pain and increasing shortness of breath.  He has a nonproductive cough and he has had some vomiting.  CTA of the chest revealed no pulmonary embolism but a large pericardial effusion and a small left pleural effusion.  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  He was empirically placed on vancomycin and aztreonam.  He was taken to the Cath Lab yesterday and underwent a pericardiocentesis with 1300 mL of bloody fluid removed.  There was residual 1 cm effusion that could not be drained and a loculated area as well.    Interval History:     He is feeling significantly less short of air.  He is not having any chest pain.  Currently on 3 L nasal cannula saturation is 96 to 99%.  300 and mils of urine today.  Creatinine is 1.65, hemoglobin 7.8    Review of Systems:    All systems were reviewed and negative except as noted in subjective.    Medical history, surgical history, social history, family history reviewed    Objective     Intake/Output:    Intake/Output Summary (Last 24 hours) at 10/21/2020 1534  Last data filed at 10/21/2020 1200  Gross per 24 hour   Intake 2160.1 ml   Output 450 ml   Net 1710.1 ml  "      Nutrition:  Diet Regular    Infusions:  Morphine,   Pharmacy to dose vancomycin,         Telemetry: Atrial fibrillation             Vital Signs  Blood pressure 103/67, pulse 87, temperature 97.8 °F (36.6 °C), temperature source Oral, resp. rate 18, height 182.9 cm (72\"), weight 78.8 kg (173 lb 11.6 oz), SpO2 96 %.    Physical Exam:  General Appearance:   Elderly gentleman in no respiratory distress   Head:   Atraumatic   Eyes:          Pupils are reactive, no jaundice   Ears:     Throat:  Oral mucosa moist   Neck:  No JVD   Back:      Lungs:    Diminished breath sounds left base    Heart:   Irregular rhythm, distant heart sounds, systolic murmur, left subclavian pacemaker site without erythema.  Pericardial drain in place with no output   Abdomen:    Nondistended, bowel sounds present   Rectal:   Deferred   Extremities:  No pretibial edema   Pulses:    Skin:  Warm and dry   Lymph nodes:    Neurologic:  Alert and cooperative      Results Review:     I reviewed the patient's new clinical results.   Results from last 7 days   Lab Units 10/21/20  0830 10/19/20  1944   SODIUM mmol/L 138 134*   POTASSIUM mmol/L 3.9 4.0   CHLORIDE mmol/L 108* 100   CO2 mmol/L 22.0 19.0*   BUN mg/dL 32* 29*   CREATININE mg/dL 1.65* 1.89*   CALCIUM mg/dL 8.1* 9.1   BILIRUBIN mg/dL  --  0.9   ALK PHOS U/L  --  117   ALT (SGPT) U/L  --  33   AST (SGOT) U/L  --  37   GLUCOSE mg/dL 151* 149*     Results from last 7 days   Lab Units 10/21/20  0830 10/19/20  1944   WBC 10*3/mm3 11.30* 13.80*   HEMOGLOBIN g/dL 7.8* 10.4*   HEMATOCRIT % 25.2* 32.3*   PLATELETS 10*3/mm3 229 254     Results from last 7 days   Lab Units 10/20/20  1644   PH, ARTERIAL pH units 7.395   PO2 ART mm Hg 64.0*   PCO2, ARTERIAL mm Hg 34.4*   HCO3 ART mmol/L 21.0     Lab Results   Component Value Date    BLOODCX No growth at 24 hours 10/19/2020    BLOODCX No growth at 24 hours 10/19/2020     No results found for: URINECX    I reviewed the patient's new imaging including " images and reports.  Echocardiogram Findings preliminary report    Left Ventricle Left ventricle not assessed. Left ventricular systolic function not assessed. Left ventricle not assessed.   Right Ventricle Right ventricle not assessed.   Left Atrium Left atrium not assessed.   Right Atrium Right atrium not assessed. Inferior vena cava not assessed.   Aortic Valve The aortic valve is not assessed.   Mitral Valve Mitral valve was not assessed.   Tricuspid Valve Tricuspid valve not assessed.   Greater Vessels The aortic root not assessed.   Pericardium There is a moderate (1-2cm) pericardial effusion adjacent to the right atrium, right ventricle and left ventricle. There is no evidence of cardiac tamponade. There is a left pleural effusion.     COMPARISON:   Noncontrast chest, abdomen, and pelvis CT 10/19/2020     FINDINGS:   No aneurysm or dissection is seen in the thoracoabdominal aorta. Although the exam is tailored for evaluation of the aorta, there is nothing to suggest pulmonary embolism. Both main renal arteries are widely patent. There is a tiny accessory right renal  artery that also appears patent. The SMA and FLAKITA are widely patent. 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 The vessel is otherwise widely patent. There is some mild  atherosclerotic plaque in the aorta with no significant stenosis. Scattered mild plaques are noted throughout the iliac arterial system in the pelvis with no stenosis. Common iliac arteries and proximal superficial and deep femoral arteries on both sides  are also widely patent.     Large pericardial effusion is unchanged. Pleural fluid is stable. There is atelectasis, predominantly in the left lower lobe. Gallbladder is unremarkable. Solid abdominal organs are within normal limits. There is colonic diverticulosis without acute  diverticulitis. No bowel obstruction is seen. Prostate is enlarged. Urinary  bladder is grossly normal.     IMPRESSION:     1. No aneurysm or dissection in the thoracoabdominal aorta.  2. No pulmonary embolism.  3. 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  4. Mesenteric vessels and renal arteries are widely patent.  5. Remainder of the chest, abdomen, and pelvis CT is not significantly changed from the noncontrast examinations this evening.     Signer Name: Chad Rebolledo MD   Signed: 10/20/2020 12:43 AM    All medications reviewed.   [START ON 10/23/2020] Pharmacy Consult, , Does not apply, Once  aztreonam, 2 g, Intravenous, Q8H  colchicine, 0.6 mg, Oral, Q12H  famotidine, 20 mg, Intravenous, Q12H  ferrous sulfate, 325 mg, Oral, BID With Meals  flecainide, 100 mg, Oral, Q12H  methylPREDNISolone sodium succinate, 80 mg, Intravenous, Q8H  metoprolol tartrate, 12.5 mg, Oral, Q12H  Morphine Sulfate (PF), 2 mg, Intravenous, Once  sodium chloride, 10 mL, Intravenous, Q12H  vancomycin, 1,000 mg, Intravenous, Q24H          Assessment/Plan       Pericardial effusion    Essential hypertension    PFO (patent foramen ovale)    Hyperlipidemia    CKD (chronic kidney disease) stage 3, GFR 30-59 ml/min    Atrial fibrillation (CMS/[facility])    History of TIA (transient ischemic attack) and stroke    Leukocytosis    Atrial fibrillation with rapid ventricular response (CMS/HCC)      #1 hypotension, possibly from his large pericardial effusion, or from his recurrent atrial arrhythmia and diltiazem.  He is off Cardizem drip.  Post pericardiocentesis his blood pressure has improved    #2 large pericardial effusion, recurrent compared to August.  He was taken to the Cath Lab where 1300 mL of bloody fluid was removed from his pericardial sac.  He is less short of breath post procedure.  Hemoglobin is 7.8.    #3 chronic kidney disease,  on admission creatinine was 1.89.  1 month ago creatinine was 2-2.4, 6 months ago  creatinine was 1.45.  Today his serum creatinine is 1.65, improved compared to admission.  Urine output is adequate.  He does not have a history of diabetes.  A1c level in September was 5.4.    #4 recurrent atrial fibrillation Eliquis had been restarted, but is currently on hold.  Small PFO noted on echocardiogram from May 2019, however no further mention on subsequent echocardiograms.  He remains in atrial fibrillation with heart rate     #5 leukocytosis, white blood cell count was 13.8.  Today it is 11.3.  He has some mild basilar atelectasis on CT scan of the chest on left.  No urinalysis was performed.  Pacemaker site is intact without erythema or fluctuance.  He does not have a fever.  Because of hypotension he was empirically placed on vancomycin and aztreonam.  Respiratory PCR panel is negative.  Blood cultures are no growth at 24 hours    PLAN:    CT surgery contemplating a pericardial window  Monitor renal function  Continue hydration  Vancomycin, aztreonam for 72 hours, final blood cultures  Continue flecainide  Colchicine started for pericardial irritation  Methylprednisolone also started      VTE Prophylaxis: Anticoagulated    Stress Ulcer Prophylaxis: Eusebio Mao MD  10/21/20  15:34 EDT      Time: Critical care 25 min

## 2020-10-21 NOTE — CONSULTS
CTS Consult  Pio Baum  [id number]  1940    Patient Care Team:  Lupillo Hill MD as PCP - General    CC: I feel pretty good      Reason for Consult: Large pericardial effusion    HPI: 80-year-old  male now 2 months status post placement of a permanent pacemaker.  Following pacemaker placement the patient required pericardiocentesis.  After discharge he did well and was seemingly stable.  Over the past week to week and a half he has noticed increasing epigastric discomfort chest pain and pain in his left lateral chest wall.  Over the past 2 or 3 days the symptoms have gotten worse and have become associated with ease of fatigue and shortness of breath.  He was seen in the emergency department at [facility] and CT scan of the abdomen revealed a huge pericardial effusion.  This was confirmed by CT angiogram of the chest.  No pulmonary embolus was noted.  The patient subsequently was seen by Dr. Nava taken to the cardiac Cath Lab for pericardiocentesis.  He has a pericardial catheter in place which has drained approximately 1100 cc of bloody fluid.  The patient's vital signs are stable hemodynamics are normal.  Through the night he has remained stable he is put out an additional  cc of bloody fluid.  His hematocrit has dropped from 32-25 although he has had significant fluid ministration.  CTS has been asked to follow in the event that the patient continues to bleed intrapericardial AN requires pericardial window.      Pericardial effusion    Essential hypertension    PFO (patent foramen ovale)    Hyperlipidemia    CKD (chronic kidney disease) stage 3, GFR 30-59 ml/min    Atrial fibrillation (CMS/[facility])    History of TIA (transient ischemic attack) and stroke    Leukocytosis    Atrial fibrillation with rapid ventricular response (CMS/HCC)      Review of Systems:  General: No anorexia, no weight loss, general malaise and weakness.  No fever chills or night  sweats.  HEENT: No headaches no visual changes no hearing loss no rhinitis no pharyngitis  Pulmonary: No shortness of breath, no cough, no hemoptysis  Heart: No palpitations,  No malaise or shortness of breath, no atrial fibrillation, no bradycardia, no syncope.  No anginal quality chest pain.  Gastrointestinal: No nausea, vomiting, diarrhea, or constipation. No acholic stool, no jaundice.  Renal: No dysuria, no frequency, no hematuria.  Skin: No rash, no skin lesions, no skin tumors.  Neurologic: No seizures, no muscle weakness, no sensory deficit, no amaurosis,  Psychiatric: No anxiety, no history of psychosis  Hematologic: No bleeding history, no ease of bruising, no history of blood disorder.  All other systems were reviewed and are negative.    History  Past Medical History:   Diagnosis Date   • A-fib (CMS/HCC)    • Chronic kidney disease    • History of migraine headaches    • Hyperlipidemia    • Hypertension    • Mitral valve regurgitation    • Pericardial effusion     s/p PPM placement   • PFO (patent foramen ovale)    • Testicular cyst     removal 1970   • TIA (transient ischemic attack)      Past Surgical History:   Procedure Laterality Date   • CARDIAC CATHETERIZATION N/A 8/24/2020    Procedure: PERICARDIOCENTESIS;  Surgeon: Dain Nava MD;  Location: [facility] EP INVASIVE LOCATION;  Service: Cardiology;  Laterality: N/A;   • CARDIAC CATHETERIZATION N/A 10/20/2020    Procedure: PERICARDIOCENTESIS;  Surgeon: Dain Nava MD;  Location:  [facility] EP INVASIVE LOCATION;  Service: Cardiology;  Laterality: N/A;   • CARDIAC ELECTROPHYSIOLOGY PROCEDURE N/A 8/24/2020    Procedure: PACEMAKER IMPLANTATION- DC;  Surgeon: Dain Nava MD;  Location:  [facility] EP INVASIVE LOCATION;  Service: Cardiology;  Laterality: N/A;   • CATARACT EXTRACTION W/ INTRAOCULAR LENS  IMPLANT, BILATERAL     • TONSILLECTOMY AND ADENOIDECTOMY  9 yo     Family History   Problem Relation Age of Onset   • Arthritis Other    •  Hyperlipidemia Other    • Stroke Other    • Heart attack Mother    • Heart attack Father      Social History     Tobacco Use   • Smoking status: Never Smoker   • Smokeless tobacco: Current User     Types: Chew   • Tobacco comment: Occasionally chews   Substance Use Topics   • Alcohol use: Yes     Comment: 4 drinks per month   • Drug use: No     Medications Prior to Admission   Medication Sig Dispense Refill Last Dose   • apixaban (ELIQUIS) 5 MG tablet tablet Take 5 mg by mouth 2 (Two) Times a Day.      • Ergocalciferol (VITAMIN D2 PO) Take 1 capsule by mouth Daily.      • Glucosamine-Chondroit-Vit C-Mn (Glucosamine Chondr 1500 Complx) capsule Take 1,500 mg by mouth Daily.      • Omega-3 1000 MG capsule Take 1,000 mg by mouth Daily.      • UBIQUINOL PO Take  by mouth Daily. Pt. Takes Coenzyme Q10 with Ubiquinol capsule      • ZINC-VITAMIN C PO Take 1-3 tablets by mouth Daily As Needed.      • atorvastatin (LIPITOR) 40 MG tablet TAKE 1 TABLET EVERY DAY (Patient taking differently: Take 40 mg by mouth Every Night.) 90 tablet 1    • coenzyme Q10 100 MG capsule Take 100 mg by mouth Daily.      • guaiFENesin (MUCINEX) 600 MG 12 hr tablet Take 1,200 mg by mouth Daily As Needed for Cough or Congestion.      • tamsulosin (FLOMAX) 0.4 MG capsule 24 hr capsule Take 1 capsule by mouth 2 (two) times a day.        Allergies:  Flonase [fluticasone] and Penicillins    Objective    Vital Signs  Temp:  [97.8 °F (36.6 °C)-100.4 °F (38 °C)] 97.8 °F (36.6 °C)  Heart Rate:  [] 87  Resp:  [16-32] 18  BP: ()/() 103/67    Physical Exam:  General Appearance: alert, appears stated age and cooperative  Head: normocephalic, without obvious abnormality and atraumatic  Ears/Nose: no rhinitis, external ears normal  Throat:  no oral lesions, no pharyngitis  Neck: no adenopathy, suppple, trachea midline, no thyromegaly, no carotid bruit and no JVD  Lungs: clear to auscultation, respirations regular, respirations even and  respirations unlabored  Heart: regular rhythm & normal rate, normal S1, S2, no murmur  Abdomen: normal bowel sounds, no masses, soft, non-tender  Extremities: moves extremities well and no edema  Pulses: pulses palpable and equal bilaterally (femoral, DP, PT)  Skin: no bleeding, bruising or rash  Neurologic: mental status orientated to person, place, time and situation, CN intact, no motor or sensory loss          Data Review:  Results from last 7 days   Lab Units 10/21/20  0830   WBC 10*3/mm3 11.30*   HEMOGLOBIN g/dL 7.8*   HEMATOCRIT % 25.2*   PLATELETS 10*3/mm3 229     Results from last 7 days   Lab Units 10/21/20  0830   SODIUM mmol/L 138   POTASSIUM mmol/L 3.9   CHLORIDE mmol/L 108*   CO2 mmol/L 22.0   BUN mg/dL 32*   CREATININE mg/dL 1.65*   GLUCOSE mg/dL 151*   CALCIUM mg/dL 8.1*     Coagulation: No results found for: INR, APTT  Cardiac markers:   Results from last 7 days   Lab Units 10/19/20  2146   TROPONIN T ng/mL 0.013     ABGs:   Results from last 7 days   Lab Units 10/20/20  1644   BASE EXCESS ART mmol/L -3.4*         Imaging Results (Last 72 Hours)     Procedure Component Value Units Date/Time    XR Chest 1 View [[id number]] Collected: 10/19/20 1849     Updated: 10/21/20 0958    Narrative:      EXAMINATION: XR CHEST 1 VW- 10/19/2020     INDICATION: Chest Pain triage protocol      COMPARISON: 08/30/2020     FINDINGS: Portable chest reveals heart to be enlarged. Small left  pleural effusion with mild increased markings at the left lung base.  Cardiac pacer leads in satisfactory position. The right lung is clear.            Impression:      Mild increased markings identified left lung base with small  left pleural effusion. The heart is enlarged. Stable calcified granuloma  identified in the right upper lobe.     D:  10/19/2020  E:  10/20/2020     This report was finalized on 10/21/2020 9:54 AM by Dr. Lauren Diez MD.       XR Chest 1 View [[id number]] Collected: 10/21/20 0832     Updated: 10/21/20  0837    Narrative:      EXAMINATION: XR CHEST 1 VW-      INDICATION: pericardial effusion; I48.91-Unspecified atrial  fibrillation; I31.3-Pericardial effusion (noninflammatory);  N18.9-Chronic kidney disease, unspecified      COMPARISON: 10/19/2020     FINDINGS: Unchanged enlargement of the cardiac silhouette likely related  to pericardial effusion. Small left pleural effusion. No new focal  airspace opacity elsewhere. No pneumothorax. A drain overlies the  inferior aspect of the heart shadow, possibly pericardial drain.  Correlate with recent procedure.       Impression:      Unchanged enlargement of the cardiac silhouette likely  related to pericardial effusion. Small left pleural effusion. No new  focal airspace opacity elsewhere. No pneumothorax. A drain overlies the  inferior aspect of the heart shadow, possibly pericardial drain.  Correlate with recent procedure.     This report was finalized on 10/21/2020 8:34 AM by Rex Covington.       CT Angiogram Chest With & Without Contrast [[id number]] Collected: 10/20/20 0043     Updated: 10/20/20 0046    Narrative:      CT ANGIOGRAM CHEST W WO CONTRAST, CTA Abdomen Pelvis    INDICATION:   Chest and back pain today. Atrial fibrillation.    TECHNIQUE:   CT angiogram of the chest, abdomen, and pelvis with IV contrast. 3-D reconstructions were obtained and reviewed.   Radiation dose reduction techniques included automated exposure control or exposure modulation based on body size. Count of known CT and  cardiac nuc med studies performed in previous 12 months: 3.     COMPARISON:   Noncontrast chest, abdomen, and pelvis CT 10/19/2020    FINDINGS:   No aneurysm or dissection is seen in the thoracoabdominal aorta. Although the exam is tailored for evaluation of the aorta, there is nothing to suggest pulmonary embolism. Both main renal arteries are widely patent. There is a tiny accessory right renal  artery that also appears patent. The SMA and FLAKITA are widely patent. 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 The vessel is otherwise widely patent. There is some mild  atherosclerotic plaque in the aorta with no significant stenosis. Scattered mild plaques are noted throughout the iliac arterial system in the pelvis with no stenosis. Common iliac arteries and proximal superficial and deep femoral arteries on both sides  are also widely patent.    Large pericardial effusion is unchanged. Pleural fluid is stable. There is atelectasis, predominantly in the left lower lobe. Gallbladder is unremarkable. Solid abdominal organs are within normal limits. There is colonic diverticulosis without acute  diverticulitis. No bowel obstruction is seen. Prostate is enlarged. Urinary bladder is grossly normal.      Impression:        1. No aneurysm or dissection in the thoracoabdominal aorta.  2. No pulmonary embolism.  3. 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  4. Mesenteric vessels and renal arteries are widely patent.  5. Remainder of the chest, abdomen, and pelvis CT is not significantly changed from the noncontrast examinations this evening.    Signer Name: Chad Rebolledo MD   Signed: 10/20/2020 12:43 AM   Workstation Name: [email or web address]    [facility]    CT Angiogram Abdomen Pelvis [[id number]] Collected: 10/20/20 0043     Updated: 10/20/20 0046    Narrative:      CT ANGIOGRAM CHEST W WO CONTRAST, CTA Abdomen Pelvis    INDICATION:   Chest and back pain today. Atrial fibrillation.    TECHNIQUE:   CT angiogram of the chest, abdomen, and pelvis with IV contrast. 3-D reconstructions were obtained and reviewed.   Radiation dose reduction techniques included automated exposure control or exposure modulation based on body size. Count of known CT and  cardiac nuc med studies performed in previous 12 months: 3.      COMPARISON:   Noncontrast chest, abdomen, and pelvis CT 10/19/2020    FINDINGS:   No aneurysm or dissection is seen in the thoracoabdominal aorta. Although the exam is tailored for evaluation of the aorta, there is nothing to suggest pulmonary embolism. Both main renal arteries are widely patent. There is a tiny accessory right renal  artery that also appears patent. The SMA and FLAKITA are widely patent. 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 The vessel is otherwise widely patent. There is some mild  atherosclerotic plaque in the aorta with no significant stenosis. Scattered mild plaques are noted throughout the iliac arterial system in the pelvis with no stenosis. Common iliac arteries and proximal superficial and deep femoral arteries on both sides  are also widely patent.    Large pericardial effusion is unchanged. Pleural fluid is stable. There is atelectasis, predominantly in the left lower lobe. Gallbladder is unremarkable. Solid abdominal organs are within normal limits. There is colonic diverticulosis without acute  diverticulitis. No bowel obstruction is seen. Prostate is enlarged. Urinary bladder is grossly normal.      Impression:        1. No aneurysm or dissection in the thoracoabdominal aorta.  2. No pulmonary embolism.  3. 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  4. Mesenteric vessels and renal arteries are widely patent.  5. Remainder of the chest, abdomen, and pelvis CT is not significantly changed from the noncontrast examinations this evening.    Signer Name: Chad Rebolledo MD   Signed: 10/20/2020 12:43 AM   Workstation Name: [email or web address]    [facility]    CT Abdomen Pelvis Without Contrast [[id number]] Collected: 10/19/20 2231     Updated: 10/19/20 2234    Narrative:      CT Abdomen Pelvis  WO    INDICATION:   Chest pain 2 weeks. Abdominal pain nausea and vomiting. Atrial fibrillation.    TECHNIQUE:   CT of the abdomen and pelvis without IV contrast. Coronal and sagittal reconstructions were obtained.  Radiation dose reduction techniques included automated exposure control or exposure modulation based on body size. Count of known CT and cardiac nuc  med studies performed in previous 12 months: 0.     COMPARISON:   None available.    FINDINGS:  Abdomen: Trace pleural thickening/pleural fluid on the right. Small left sided pleural effusion and probable left basilar atelectasis. There is a large pericardial effusion measuring up to 3.6 cm approaching the level of the cardiac apex. Suggest further  evaluation with cardiac echo.    Normal caliber aorta and atherosclerotic change. Spleen and adrenal glands and pancreas are unremarkable within limitations of motion degradation. Negative gallbladder and liver. The kidneys are nonobstructed and there is no radiopaque stone. No  adenopathy.    Pelvis: Negative bladder. The prostate is enlarged. No drainable fluid collection. Extensive diverticulosis. Appendix not clearly identified. No focal inflammatory change in the right lower quadrant. No free air. Inguinal canals are negative. No  suspicious bone lesion. Multilevel degenerative changes.      Impression:        1. Large pericardial effusion measuring up to 3.6 cm at the cardiac apex. Cardiology referral and cardiac echo recommended for further assessment of cardiac function due to the size of the effusion.  2. Small left pleural effusion and probable left basilar atelectasis or less likely pneumonia.  3. Diverticulosis.  4. Prostamegaly.          Signer Name: Garry Davila MD   Signed: 10/19/2020 10:31 PM   Workstation Name: [email or web address]    [facility]    CT Chest Without Contrast [[id number]] Collected: 10/19/20 2231     Updated: 10/19/20 2233    Narrative:      CT Chest  WO    INDICATION:   Chest pain for 2 weeks. Nausea and vomiting.    TECHNIQUE:   CT of the thorax without IV contrast. Coronal and sagittal reconstructions were obtained.  Radiation dose reduction techniques included automated exposure control or exposure modulation based on body size. Count of known CT and cardiac nuc med studies  performed in previous 12 months: 1.     COMPARISON:   8/30/2020    FINDINGS:  Please see abdomen pelvis CT report dictated separately. Right-sided pacer is noted. The heart is enlarged. There is a large pericardial effusion noted. It measures up to about 2.8 cm in thickness. There is a small left pleural effusion, and there is a  minimal amount of right-sided pleural fluid. There is no adenopathy. Coronary artery calcifications are present. There is some mild atelectasis in the left lower lobe. Calcified granuloma is present in the right upper lobe. There is minimal dependent  atelectasis in the right lower lobe. Lungs are otherwise clear. No CT findings present to indicate pneumonia. Note: CT may be negative in the earliest stages of COVID-19. No pulmonary edema is identified.      Impression:        1. Large pericardial effusion.  2. Small left pleural effusion with trace right pleural effusion.  3. Atelectasis in the lower lobes, left greater than right. Negative for pneumonia.    Signer Name: Chad Rebolledo MD   Signed: 10/19/2020 10:31 PM   Workstation Name: [facility]    [facility]            Imaging: Echocardiograms from August and 2 echocardiograms from this admission reviewed.  Cardiac echo reviewed and discussed with Dr. Nava in the EP lab.  Most recent echocardiogram from this morning reviewed.        Assessment: 80-year-old  male with a large pericardial effusion following remote pacemaker implant.      Plan:  Discussed with Dr. Nava.  Since the pericardial effusion currently on the most recent echo is relatively small and since the  patient was put out only 50 cc overnight I agree with removing the pericardial tubes.  The patient will remain in the hospital for 1-2 days for continued observation.  Repeat echo may be needed prior to discharge. I have reviewed, verified, and confirmed the above history and current status.  I have examined the patient and confirmed the above physical findings.Above plan and treatment regimen discussed in detail with patient.  Options of treatment, attendant risks vs benefits, and my recommendations were discussed and all questions answered.      Daniel Fried MD  10/21/20  14:27 EDT

## 2020-10-21 NOTE — PLAN OF CARE
Goal Outcome Evaluation:  Plan of Care Reviewed With: patient  Progress: improving  Outcome Summary: Pt. remains drowsy after procedure yesterday. Several times through the night, he has awoken from sleep confused and restless, but reorients quickly. Vital signs relatively stable. Low-grade fever through the night, Tmax 100.4 axillary. Left lower chest pericardial drain in place with 30 mL sanguinous output for the shift. Pt. was cardioverted to SR during procedure yesterday, but converted back to atrial fibrillation at 0305 this AM. Incontinent urine x3.

## 2020-10-21 NOTE — PROGRESS NOTES
Clinical Nutrition     Multidisciplinary Rounds      Patient Name: Pio Baum  Date of Encounter: 10/21/20 11:06 EDT  MRN: [id number]  Admission date: 10/19/2020      JOSE. FERN to continue to follow per protocol.     Plan for possible pericardial window     Current diet: NPO Diet  No active supplement orders    Intervention:  Follow treatment plan  Care plan reviewed    Follow up:   Per protocol    Shellie Washington RD  11:06 EDT  Time: 10min

## 2020-10-21 NOTE — PROGRESS NOTES
Pharmacy Consult-Vancomycin Dosing  Pio Baum is a  80 y.o. male receiving vancomycin therapy.     Indication: PNA, possible infectious pericardial effusion   Consulting Provider: Intensivist  ID Consult: No    Goal AUC: 400 - 600 mg/L*hr    Current Antimicrobial Therapy  Anti-Infectives (From admission, onward)      Ordered     Dose/Rate Route Frequency Start Stop    10/21/20 1113  vancomycin (VANCOCIN) in iso-osmotic dextrose IVPB 1 g (premix) 200 mL     Ordering Provider: Gatito Zhou RPH    1,000 mg  over 60 Minutes Intravenous Every 24 Hours 10/21/20 1200 10/26/20 1159    10/19/20 2319  aztreonam (AZACTAM) 2 g/100 mL 0.9% NS (mbp)     Meredith Nassar, PharmD reviewed the order on 10/21/20 0805.   Ordering Provider: Juan Benítez DO    2 g  over 4 Hours Intravenous Every 8 Hours 10/20/20 0600 10/25/20 0559    10/19/20 2322  vancomycin 1500 mg/500 mL 0.9% NS IVPB ([facility])     Ordering Provider: Jamie Fontenot, PharmD    20 mg/kg × 77.1 kg Intravenous Once 10/19/20 2324 10/20/20 0045    10/19/20 2319  aztreonam (AZACTAM) 2 g/100 mL 0.9% NS (mbp)     Ordering Provider: Juan Benítez DO    2 g  over 30 Minutes Intravenous Once 10/19/20 2321 10/20/20 0045    10/19/20 2319  Pharmacy to dose vancomycin     Meredith Nassar, PharmD reviewed the order on 10/21/20 0806.   Ordering Provider: Juan Benítez DO     Does not apply Continuous PRN 10/19/20 2318 10/26/20 2317            Allergies  Allergies as of 10/19/2020 - Reviewed 10/19/2020   Allergen Reaction Noted    Flonase [fluticasone] Irritability 04/27/2016    Penicillins Rash 04/27/2016       Labs    Results from last 7 days   Lab Units 10/21/20  0830 10/19/20  1944   BUN mg/dL 32* 29*   CREATININE mg/dL 1.65* 1.89*       Results from last 7 days   Lab Units 10/21/20  0830 10/19/20  1944   WBC 10*3/mm3 11.30* 13.80*       Evaluation of Dosing     Last Dose Received in the ED/Outside Facility:   Is Patient on Dialysis or Renal Replacement:     Ht -  "182.9 cm (72\")  Wt - 78.8 kg (173 lb 11.6 oz)    Estimated Creatinine Clearance: 38.5 mL/min (A) (by C-G formula based on SCr of 1.65 mg/dL (H)).    Intake & Output (last 3 days)         10/18 0701 - 10/19 0700 10/19 0701 - 10/20 0700 10/20 0701 - 10/21 0700 10/21 0701 - 10/22 0700    I.V. (mL/kg)  84 (1) 2572 (32.6) 167.1 (2.1)    IV Piggyback  733 299 42    Total Intake(mL/kg)  817 (10.1) 2871 (36.4) 209.1 (2.7)    Urine (mL/kg/hr)   100 (0.1) 200 (0.6)    Drains   50     Total Output   150 200    Net  +817 +2721 +9.1            Urine Unmeasured Occurrence   3 x             Microbiology and Radiology  Microbiology Results (last 10 days)       Procedure Component Value - Date/Time    Blood Culture - Blood, Arm, Right [[id number]] Collected: 10/19/20 2359    Lab Status: Preliminary result Specimen: Blood from Arm, Right Updated: 10/21/20 0030     Blood Culture No growth at 24 hours    Blood Culture - Blood, Arm, Left [[id number]] Collected: 10/19/20 2359    Lab Status: Preliminary result Specimen: Blood from Arm, Left Updated: 10/21/20 0030     Blood Culture No growth at 24 hours    Respiratory Panel PCR w/COVID-19(SARS-CoV-2) [facility] In-House, NP Swab in UTM/VTM, 3-4 HR TAT - Swab, Nasopharynx [[id number]]  (Normal) Collected: 10/19/20 2351    Lab Status: Final result Specimen: Swab from Nasopharynx Updated: 10/20/20 0114     ADENOVIRUS, PCR Not Detected     Coronavirus 229E Not Detected     Coronavirus HKU1 Not Detected     Coronavirus NL63 Not Detected     Coronavirus OC43 Not Detected     COVID19 Not Detected     Human Metapneumovirus Not Detected     Human Rhinovirus/Enterovirus Not Detected     Influenza A PCR Not Detected     Influenza A H1 Not Detected     Influenza A H1 2009 PCR Not Detected     Influenza A H3 Not Detected     Influenza B PCR Not Detected     Parainfluenza Virus 1 Not Detected     Parainfluenza Virus 2 Not Detected     Parainfluenza Virus 3 Not Detected     Parainfluenza " Virus 4 Not Detected     RSV, PCR Not Detected     Bordetella pertussis pcr Not Detected     Bordetella parapertussis PCR Not Detected     Chlamydophila pneumoniae PCR Not Detected     Mycoplasma pneumo by PCR Not Detected    Narrative:      Fact sheet for providers: https://docs.[employer]/wp-content/uploads/MIH5498-4527-EK8.1-EUA-Provider-Fact-Sheet-3.pdf    Fact sheet for patients: https://docs.[employer]/wp-content/uploads/NRX8047-4815-LC3.1-EUA-Patient-Fact-Sheet-1.pdf            Reported Vancomycin Levels    Results from last 7 days   Lab Units 10/21/20  0830   VANCOMYCIN RM mcg/mL 6.90                  [facility] AUC Calculation:    Current AUC:  244 mg/L*hr (after 1x dose)    Predicted Steady State AUC on Current Dose: n/a mg/L*hr  _________________________________    Predicted Steady State AUC on New Dose:  498 mg/L*hr    Assessment/Plan:  1. Pharmacy to dose vancomycin for PNA/possible infectious pericardial effusion. Previously dosed per levels, will transition to AUC dosing. Goal -600 mg/L*hr.  2. Patient received loading dose of vancomycin 1500 mg (~19mg/kg) IV on 10/20 @ 0045. No maintenance dose scheduled initially due to questionable renal function.   3. Vancomycin random level of 6.9 mcg/mL today, corresponding AUC of 244 mg/L*hr. Renal function improving, Scr 1.65 today. Spoke with RN who reports patient has had adequate UOP overnight and this AM.   4. Given improvement in renal function, will schedule maintenance dose of vancomycin 1000 mg IV Q24H. Assess clearance by trough level on 10/23 @ 1130, prior to 3rd dose of new regimen.  5. Pharmacy will continue to monitor renal function, cultures and sensitivities, and clinical status to adjust regimen as necessary.      Gatito Zhou, PharmD  Pharmacy Resident  10/21/2020  11:16 EDT

## 2020-10-22 LAB
ALBUMIN SERPL-MCNC: 2.9 G/DL (ref 3.5–5.2)
ANION GAP SERPL CALCULATED.3IONS-SCNC: 11 MMOL/L (ref 5–15)
BASOPHILS # BLD AUTO: 0.02 10*3/MM3 (ref 0–0.2)
BASOPHILS NFR BLD AUTO: 0.1 % (ref 0–1.5)
BUN SERPL-MCNC: 35 MG/DL (ref 8–23)
BUN/CREAT SERPL: 22.6 (ref 7–25)
CALCIUM SPEC-SCNC: 8.5 MG/DL (ref 8.6–10.5)
CHLORIDE SERPL-SCNC: 105 MMOL/L (ref 98–107)
CO2 SERPL-SCNC: 22 MMOL/L (ref 22–29)
CREAT SERPL-MCNC: 1.55 MG/DL (ref 0.76–1.27)
DEPRECATED RDW RBC AUTO: 44.5 FL (ref 37–54)
EOSINOPHIL # BLD AUTO: 0.09 10*3/MM3 (ref 0–0.4)
EOSINOPHIL NFR BLD AUTO: 0.6 % (ref 0.3–6.2)
ERYTHROCYTE [DISTWIDTH] IN BLOOD BY AUTOMATED COUNT: 12.7 % (ref 12.3–15.4)
GFR SERPL CREATININE-BSD FRML MDRD: 43 ML/MIN/1.73
GLUCOSE SERPL-MCNC: 163 MG/DL (ref 65–99)
HCT VFR BLD AUTO: 26.3 % (ref 37.5–51)
HGB BLD-MCNC: 8.4 G/DL (ref 13–17.7)
IMM GRANULOCYTES # BLD AUTO: 0.08 10*3/MM3 (ref 0–0.05)
IMM GRANULOCYTES NFR BLD AUTO: 0.6 % (ref 0–0.5)
INR PPP: 1.59 (ref 0.85–1.16)
LYMPHOCYTES # BLD AUTO: 0.77 10*3/MM3 (ref 0.7–3.1)
LYMPHOCYTES NFR BLD AUTO: 5.5 % (ref 19.6–45.3)
MAGNESIUM SERPL-MCNC: 1.9 MG/DL (ref 1.6–2.4)
MCH RBC QN AUTO: 30.7 PG (ref 26.6–33)
MCHC RBC AUTO-ENTMCNC: 31.9 G/DL (ref 31.5–35.7)
MCV RBC AUTO: 96 FL (ref 79–97)
MONOCYTES # BLD AUTO: 0.32 10*3/MM3 (ref 0.1–0.9)
MONOCYTES NFR BLD AUTO: 2.3 % (ref 5–12)
NEUTROPHILS NFR BLD AUTO: 12.62 10*3/MM3 (ref 1.7–7)
NEUTROPHILS NFR BLD AUTO: 90.9 % (ref 42.7–76)
NRBC BLD AUTO-RTO: 0 /100 WBC (ref 0–0.2)
PHOSPHATE SERPL-MCNC: 2.8 MG/DL (ref 2.5–4.5)
PLATELET # BLD AUTO: 269 10*3/MM3 (ref 140–450)
PMV BLD AUTO: 10.5 FL (ref 6–12)
POTASSIUM SERPL-SCNC: 3.6 MMOL/L (ref 3.5–5.2)
PROTHROMBIN TIME: 18.6 SECONDS (ref 11.5–14)
RBC # BLD AUTO: 2.74 10*6/MM3 (ref 4.14–5.8)
SODIUM SERPL-SCNC: 138 MMOL/L (ref 136–145)
WBC # BLD AUTO: 13.9 10*3/MM3 (ref 3.4–10.8)

## 2020-10-22 PROCEDURE — 83735 ASSAY OF MAGNESIUM: CPT | Performed by: INTERNAL MEDICINE

## 2020-10-22 PROCEDURE — 25010000003 MAGNESIUM SULFATE 4 GM/100ML SOLUTION: Performed by: INTERNAL MEDICINE

## 2020-10-22 PROCEDURE — 25010000002 VANCOMYCIN PER 500 MG

## 2020-10-22 PROCEDURE — 85610 PROTHROMBIN TIME: CPT | Performed by: INTERNAL MEDICINE

## 2020-10-22 PROCEDURE — 99232 SBSQ HOSP IP/OBS MODERATE 35: CPT | Performed by: INTERNAL MEDICINE

## 2020-10-22 PROCEDURE — 25010000002 DIPHENHYDRAMINE PER 50 MG: Performed by: NURSE PRACTITIONER

## 2020-10-22 PROCEDURE — 85025 COMPLETE CBC W/AUTO DIFF WBC: CPT | Performed by: INTERNAL MEDICINE

## 2020-10-22 PROCEDURE — 99231 SBSQ HOSP IP/OBS SF/LOW 25: CPT | Performed by: PHYSICIAN ASSISTANT

## 2020-10-22 PROCEDURE — 25010000002 METHYLPREDNISOLONE PER 125 MG: Performed by: INTERNAL MEDICINE

## 2020-10-22 PROCEDURE — 80069 RENAL FUNCTION PANEL: CPT | Performed by: INTERNAL MEDICINE

## 2020-10-22 RX ORDER — MAGNESIUM SULFATE HEPTAHYDRATE 40 MG/ML
2 INJECTION, SOLUTION INTRAVENOUS AS NEEDED
Status: DISCONTINUED | OUTPATIENT
Start: 2020-10-22 | End: 2020-10-24 | Stop reason: HOSPADM

## 2020-10-22 RX ORDER — FAMOTIDINE 20 MG/1
20 TABLET, FILM COATED ORAL DAILY
Status: DISCONTINUED | OUTPATIENT
Start: 2020-10-22 | End: 2020-10-24 | Stop reason: HOSPADM

## 2020-10-22 RX ORDER — MAGNESIUM SULFATE HEPTAHYDRATE 40 MG/ML
4 INJECTION, SOLUTION INTRAVENOUS AS NEEDED
Status: DISCONTINUED | OUTPATIENT
Start: 2020-10-22 | End: 2020-10-24 | Stop reason: HOSPADM

## 2020-10-22 RX ORDER — POTASSIUM CHLORIDE 7.45 MG/ML
10 INJECTION INTRAVENOUS
Status: DISCONTINUED | OUTPATIENT
Start: 2020-10-22 | End: 2020-10-24 | Stop reason: HOSPADM

## 2020-10-22 RX ORDER — POTASSIUM CHLORIDE 750 MG/1
40 CAPSULE, EXTENDED RELEASE ORAL AS NEEDED
Status: DISCONTINUED | OUTPATIENT
Start: 2020-10-22 | End: 2020-10-24 | Stop reason: HOSPADM

## 2020-10-22 RX ORDER — DIPHENHYDRAMINE HYDROCHLORIDE 50 MG/ML
12.5 INJECTION INTRAMUSCULAR; INTRAVENOUS ONCE
Status: COMPLETED | OUTPATIENT
Start: 2020-10-22 | End: 2020-10-22

## 2020-10-22 RX ORDER — POTASSIUM CHLORIDE 1.5 G/1.77G
40 POWDER, FOR SOLUTION ORAL AS NEEDED
Status: DISCONTINUED | OUTPATIENT
Start: 2020-10-22 | End: 2020-10-24 | Stop reason: HOSPADM

## 2020-10-22 RX ADMIN — AZTREONAM 2 G: 2 INJECTION, POWDER, LYOPHILIZED, FOR SOLUTION INTRAMUSCULAR; INTRAVENOUS at 21:02

## 2020-10-22 RX ADMIN — FLECAINIDE ACETATE 100 MG: 50 TABLET ORAL at 21:02

## 2020-10-22 RX ADMIN — VANCOMYCIN HYDROCHLORIDE 1000 MG: 1 INJECTION, SOLUTION INTRAVENOUS at 12:31

## 2020-10-22 RX ADMIN — SODIUM CHLORIDE, PRESERVATIVE FREE 10 ML: 5 INJECTION INTRAVENOUS at 21:02

## 2020-10-22 RX ADMIN — DIPHENHYDRAMINE HYDROCHLORIDE 12.5 MG: 50 INJECTION INTRAMUSCULAR; INTRAVENOUS at 17:34

## 2020-10-22 RX ADMIN — METHYLPREDNISOLONE SODIUM SUCCINATE 80 MG: 125 INJECTION, POWDER, FOR SOLUTION INTRAMUSCULAR; INTRAVENOUS at 17:34

## 2020-10-22 RX ADMIN — COLCHICINE 0.6 MG: 0.6 TABLET, FILM COATED ORAL at 21:02

## 2020-10-22 RX ADMIN — METHYLPREDNISOLONE SODIUM SUCCINATE 80 MG: 125 INJECTION, POWDER, FOR SOLUTION INTRAMUSCULAR; INTRAVENOUS at 04:19

## 2020-10-22 RX ADMIN — METOPROLOL TARTRATE 12.5 MG: 25 TABLET, FILM COATED ORAL at 08:35

## 2020-10-22 RX ADMIN — AZTREONAM 2 G: 2 INJECTION, POWDER, LYOPHILIZED, FOR SOLUTION INTRAMUSCULAR; INTRAVENOUS at 14:49

## 2020-10-22 RX ADMIN — MAGNESIUM SULFATE HEPTAHYDRATE 4 G: 40 INJECTION, SOLUTION INTRAVENOUS at 10:49

## 2020-10-22 RX ADMIN — COLCHICINE 0.6 MG: 0.6 TABLET, FILM COATED ORAL at 08:35

## 2020-10-22 RX ADMIN — FERROUS SULFATE TAB 325 MG (65 MG ELEMENTAL FE) 325 MG: 325 (65 FE) TAB at 08:35

## 2020-10-22 RX ADMIN — FAMOTIDINE 20 MG: 20 TABLET, FILM COATED ORAL at 08:36

## 2020-10-22 RX ADMIN — AZTREONAM 2 G: 2 INJECTION, POWDER, LYOPHILIZED, FOR SOLUTION INTRAMUSCULAR; INTRAVENOUS at 06:14

## 2020-10-22 RX ADMIN — METHYLPREDNISOLONE SODIUM SUCCINATE 80 MG: 125 INJECTION, POWDER, FOR SOLUTION INTRAMUSCULAR; INTRAVENOUS at 10:49

## 2020-10-22 RX ADMIN — FERROUS SULFATE TAB 325 MG (65 MG ELEMENTAL FE) 325 MG: 325 (65 FE) TAB at 17:34

## 2020-10-22 RX ADMIN — POTASSIUM CHLORIDE 40 MEQ: 10 CAPSULE, COATED, EXTENDED RELEASE ORAL at 10:49

## 2020-10-22 RX ADMIN — FLECAINIDE ACETATE 100 MG: 50 TABLET ORAL at 08:35

## 2020-10-22 RX ADMIN — METOPROLOL TARTRATE 12.5 MG: 25 TABLET, FILM COATED ORAL at 21:02

## 2020-10-22 NOTE — PLAN OF CARE
Goal Outcome Evaluation:  Plan of Care Reviewed With: patient  Progress: improving       Patient doing well He continues to be paced in NSR. Replaced K and Mg. Will have ekg and echo in the am, may d/c home tomorrow per cardiology.

## 2020-10-22 NOTE — PROGRESS NOTES
"      Cardiac Electrophysiology Inpatient Follow Up Note         [facility]    Progress Note      CC:  Pericardial Effusion    Subjective       Mr. Pio Baum is a 80-year-old white male seen in EP follow-up today for his pericardial effusion.  Upon evaluation patient is resting comfortably in recliner at bedside without complaints.  Patient has been up ambulating in halls.  Upon assessment patient is on room air with SaO2 98-99%.    REVIEW OF SYSTEMS  ROS no change from admission H&P except for: above    Objective   VITAL SIGNS  /84   Pulse 78   Temp 97.4 °F (36.3 °C) (Oral)   Resp 18   Ht 182.9 cm (72\")   Wt 78.8 kg (173 lb 11.6 oz)   SpO2 97%   BMI 23.56 kg/m²     Pulse Ox: SpO2  Av.6 %  Min: 88 %  Max: 98 %  Supplemental O2: O2 Flow Rate (L/min): 2 L/min     Admit Weight  Weight: 77.1 kg (170 lb)  Last 3 Weights    Body mass index is 23.56 kg/m².    INTAKE/OUTPUT  I/O last 3 completed shifts:  In: 2967.1 [I.V.:2186.1; IV Piggyback:781]  Out: 1275 [Urine:1275]    Intake/Output Summary (Last 24 hours) at 10/22/2020 1135  Last data filed at 10/22/2020 1100  Gross per 24 hour   Intake 1442 ml   Output 1075 ml   Net 367 ml       PHYSICAL EXAM  General appearance: awake, alert, oriented, moves all extremities  Lungs: no rhonchi, no wheezes, no rales  Heart: S1-S2, RRR  Abdomen: positive bowel sounds, no bruits, no masses  Extremities: warm and dry, no cyanosis, no clubbing    LABS  Results from last 7 days   Lab Units 10/22/20  0525 10/21/20  0830 10/19/20  1944   WBC 10*3/mm3 13.90* 11.30* 13.80*   HEMOGLOBIN g/dL 8.4* 7.8* 10.4*   HEMATOCRIT % 26.3* 25.2* 32.3*   MCV fL 96.0 95.5 94.2   PLATELETS 10*3/mm3 269 229 254         Results from last 7 days   Lab Units 10/22/20  0525 10/21/20  0830 10/19/20  1944   POTASSIUM mmol/L 3.6 3.9 4.0   CHLORIDE mmol/L 105 108* 100   CO2 mmol/L 22.0 22.0 19.0*   BUN mg/dL 35* 32* 29*   CREATININE mg/dL 1.55* 1.65* 1.89* "   GLUCOSE mg/dL 163* 151* 149*   CALCIUM mg/dL 8.5* 8.1* 9.1   MAGNESIUM mg/dL 1.9 2.3  --      Results from last 7 days   Lab Units 10/22/20  0525   PROTIME Seconds 18.6*   INR  1.59*         Results from last 7 days   Lab Units 10/22/20  0525 10/21/20  0830   MAGNESIUM mg/dL 1.9 2.3       CURRENT MEDICATIONS  [START ON 10/23/2020] Pharmacy Consult, , Does not apply, Once  aztreonam, 2 g, Intravenous, Q8H  colchicine, 0.6 mg, Oral, Q12H  famotidine, 20 mg, Oral, Daily  ferrous sulfate, 325 mg, Oral, BID With Meals  flecainide, 100 mg, Oral, Q12H  methylPREDNISolone sodium succinate, 80 mg, Intravenous, Q8H  metoprolol tartrate, 12.5 mg, Oral, Q12H  sodium chloride, 10 mL, Intravenous, Q12H  vancomycin, 1,000 mg, Intravenous, Q24H      CONTINUOUS INFUSIONS  Morphine,   Pharmacy to dose vancomycin,         Assessment & Plan     Mr. Pio Baum is a very pleasant 80-year-old white male seen in EP follow-up today status post pericardiocentesis on 10/20/2020 with drain removal yesterday per Dr. Nava.  Upon evaluation today patient is stable without complaints.  Patient has orders for transfer to telemetry with plan for repeat limited echocardiogram tomorrow to assess for residual pericardial effusion status post discontinuation of pericardial drain.  Patient continues on colchicine and Solu-Medrol with noted gradual improvement in H&H.  We will continue to hold Eliquis indefinitely per Dr. Nava.  Patient remains in a paced rhythm with initiation of flecainide therapy yesterday.  Will perform EKG in a.m. to assess QRSD.      Electronically signed by HIMA Lyle, 10/22/20, 11:35 AM EDT.    _______________          Cardiac Electrophysiology Procedure Note      [facility]          CARDIAC ELECTROPHYSIOLOGIST ADDENDUM    I have personally performed a face to face diagnostic evaluation on this patient.  I have reviewed the note authored by the advanced practice  "provider and agree with the history of present illness, past medical history, surgical history, social history, family history and review of systems.. I have reviewed current medications, and lab values.  My findings are below:  .    History of Present Illness:  80 y.o. male who today is feeling much better.  He has no complaints.  He is much less short of breath.  He has no pain at the site of his pericardial drain.  No nausea vomit fevers or chills.    Review of Systems:   · Constitutional: No Fevers/Chills, No recent weight gain weight loss, No fatigue.  · Cardiovascular: Per HPI  · Respiratory: No cough or wheezing, no sputum production. No hematemesis.    · Gastrointestinal: No Nausea, Vomiting, Diarrhea, or Constipation. No bloody or dark stools.  · All others negative except what is noted above and in my MIKE's note.     Physical Exam   Vital Signs: /82 (BP Location: Right arm, Patient Position: Lying)   Pulse 70   Temp 97.4 °F (36.3 °C) (Oral)   Resp 16   Ht 182.9 cm (72\")   Wt 78.8 kg (173 lb 11.6 oz)   SpO2 96%   BMI 23.56 kg/m²  O2 Flow Rate (L/min): 2 L/min     Admission Weight: 77.1 kg (170 lb)     General appearance: Alert oriented and cooperative.  In no acute distress  Skin:  Warm and Dry to touch  Head: Normocephalic, without obvious abnormality, atraumatic.   Eyes: Conjunctivae unremarkable, EOM's intact.Sclera non icteric.  Neck: No JVD, No carotid bruit. Neck supple, trachea midline  Lungs: Clear to auscultation bilaterally, no use of accessory muscles.    Heart:: RRR with Normal S1 and S2 . No murmurs and no gallops.  Abdomen: Soft, non-tender. Bowel sounds normal.  Extremities: No edema.  Neurologic: Oriented to time, person and place, affect appropriate.  No focal/major motor or sensory defects noted.    Psychiatric:  Appropriate mood, memory and judgment.  Good use of semantics and logic.    PLAN:    80-year-old gentleman's postoperative day #2 after pericardiocentesis.  " Pericardial drain has been removed.  We will repeat echocardiogram and assess for effusion.  Was noted to have 1.2 cm of pericardial fluid at the end of his pericardiocentesis which is remained stable.  The plan was to repeat the echocardiogram tomorrow.  If after tomorrow his effusion is indeed larger we will for for pericardial window.     Enmanuel Queen, DO

## 2020-10-22 NOTE — PROGRESS NOTES
Continued Stay Note  [facility]     Patient Name: Pio Baum  MRN: [id number]  Today's Date: 10/22/2020    Admit Date: 10/19/2020    Discharge Plan     Row Name 10/22/20 1234       Plan    Plan  Home    Patient/Family in Agreement with Plan  yes    Plan Comments  Spoke with patient at bedside.  Patient sitting up on edge of bed eating lunch.  Patient plans to go home when discharged and denies any needs.  Discussed in DMR.  Patient will need ECHO prior to discharge which may be tomorrow.  CM will continue to follow.        Discharge Codes    No documentation.       Expected Discharge Date and Time     Expected Discharge Date Expected Discharge Time    Oct 25, 2020             Jolynn Alvarado RN

## 2020-10-22 NOTE — PROGRESS NOTES
Critical Care Note     LOS: 2 days   Patient Care Team:  Lupillo Hill MD as PCP - General    Chief Complaint/Reason for visit:    Chief Complaint   Patient presents with   • Abdominal Pain   Large pericardial effusion  Atrial fibrillation with recent pacemaker, August  Mitral valve regurgitation  PFO with history of stroke  Hypotension    Subjective     80-year-old gentleman with atrial fibrillation, coronary artery disease, PFO with stroke, mitral valve regurgitation, hypertension who underwent permanent pacemaker in August complicated by hypotension and tamponade requiring pericardiocentesis.  270 mL of blood was removed.  He was cardioverted into sinus rhythm.  Repeat echocardiogram revealed no recurrence of his effusion and Eliquis was restarted.  He now presents with a 1 week history of chest pain and increasing shortness of breath.  He has a nonproductive cough and he has had some vomiting.  CTA of the chest revealed no pulmonary embolism but a large pericardial effusion and a small left pleural effusion.  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  He was empirically placed on vancomycin and aztreonam.  He was taken to the Cath Lab yesterday and underwent a pericardiocentesis with 1300 mL of bloody fluid removed.  There was residual 1 cm effusion that could not be drained and a loculated area as well.  CT surgery evaluated with consideration of window.  However they felt the effusion was not big enough for surgery and therefore it was canceled.    Interval History:     Pericardial drain pulled without difficulty  Currently denies chest pain or shortness of air  Tolerating a p.o. diet without nausea or vomiting  Converted to sinus rhythm overnight    Review of Systems:    All systems were reviewed and negative except as noted in subjective.    Medical history, surgical history, social history, family history reviewed    Objective     Intake/Output:    Intake/Output Summary (Last 24 hours) at  "10/22/2020 1545  Last data filed at 10/22/2020 1400  Gross per 24 hour   Intake 1485 ml   Output 525 ml   Net 960 ml       Nutrition:  Diet Regular    Infusions:  Morphine,   Pharmacy to dose vancomycin,         Telemetry: Sinus rhythm             Vital Signs  Blood pressure 142/79, pulse 75, temperature 97.4 °F (36.3 °C), temperature source Oral, resp. rate 18, height 182.9 cm (72\"), weight 78.8 kg (173 lb 11.6 oz), SpO2 95 %.    Physical Exam:  General Appearance:   Elderly gentleman in no respiratory distress   Head:   Atraumatic   Eyes:          Pupils are reactive, no jaundice   Ears:     Throat:  Oral mucosa moist   Neck:  No JVD   Back:      Lungs:    Improved breath sounds at the left base.  Clear anteriorly    Heart:   regular rhythm,  heart sounds auscultated, systolic murmur, left subclavian pacemaker site without erythema.  Pericardial drain site dressing intact   Abdomen:    Nondistended, bowel sounds present   Rectal:   Deferred   Extremities:  No pretibial edema   Pulses:    Skin:  Warm and dry   Lymph nodes:    Neurologic:  Alert and cooperative      Results Review:     I reviewed the patient's new clinical results.   Results from last 7 days   Lab Units 10/22/20  0525 10/21/20  0830 10/19/20  1944   SODIUM mmol/L 138 138 134*   POTASSIUM mmol/L 3.6 3.9 4.0   CHLORIDE mmol/L 105 108* 100   CO2 mmol/L 22.0 22.0 19.0*   BUN mg/dL 35* 32* 29*   CREATININE mg/dL 1.55* 1.65* 1.89*   CALCIUM mg/dL 8.5* 8.1* 9.1   BILIRUBIN mg/dL  --   --  0.9   ALK PHOS U/L  --   --  117   ALT (SGPT) U/L  --   --  33   AST (SGOT) U/L  --   --  37   GLUCOSE mg/dL 163* 151* 149*     Results from last 7 days   Lab Units 10/22/20  0525 10/21/20  0830 10/19/20  1944   WBC 10*3/mm3 13.90* 11.30* 13.80*   HEMOGLOBIN g/dL 8.4* 7.8* 10.4*   HEMATOCRIT % 26.3* 25.2* 32.3*   PLATELETS 10*3/mm3 269 229 254     Results from last 7 days   Lab Units 10/20/20  1644   PH, ARTERIAL pH units 7.395   PO2 ART mm Hg 64.0*   PCO2, ARTERIAL mm Hg " 34.4*   HCO3 ART mmol/L 21.0     Lab Results   Component Value Date    BLOODCX No growth at 2 days 10/19/2020    BLOODCX No growth at 2 days 10/19/2020     No results found for: URINECX    I reviewed the patient's new imaging including images and reports.  Echocardiogram Findings preliminary report    Left Ventricle Left ventricle not assessed. Left ventricular systolic function not assessed. Left ventricle not assessed.   Right Ventricle Right ventricle not assessed.   Left Atrium Left atrium not assessed.   Right Atrium Right atrium not assessed. Inferior vena cava not assessed.   Aortic Valve The aortic valve is not assessed.   Mitral Valve Mitral valve was not assessed.   Tricuspid Valve Tricuspid valve not assessed.   Greater Vessels The aortic root not assessed.   Pericardium There is a moderate (1-2cm) pericardial effusion adjacent to the right atrium, right ventricle and left ventricle. There is no evidence of cardiac tamponade. There is a left pleural effusion.     COMPARISON:   Noncontrast chest, abdomen, and pelvis CT 10/19/2020     FINDINGS:   No aneurysm or dissection is seen in the thoracoabdominal aorta. Although the exam is tailored for evaluation of the aorta, there is nothing to suggest pulmonary embolism. Both main renal arteries are widely patent. There is a tiny accessory right renal  artery that also appears patent. The SMA and FLAKITA are widely patent. 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 The vessel is otherwise widely patent. There is some mild  atherosclerotic plaque in the aorta with no significant stenosis. Scattered mild plaques are noted throughout the iliac arterial system in the pelvis with no stenosis. Common iliac arteries and proximal superficial and deep femoral arteries on both sides  are also widely patent.     Large pericardial effusion is unchanged. Pleural fluid is stable. There is atelectasis,  predominantly in the left lower lobe. Gallbladder is unremarkable. Solid abdominal organs are within normal limits. There is colonic diverticulosis without acute  diverticulitis. No bowel obstruction is seen. Prostate is enlarged. Urinary bladder is grossly normal.     IMPRESSION:     1. No aneurysm or dissection in the thoracoabdominal aorta.  2. No pulmonary embolism.  3. 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  4. Mesenteric vessels and renal arteries are widely patent.  5. Remainder of the chest, abdomen, and pelvis CT is not significantly changed from the noncontrast examinations this evening.     Signer Name: Chad Rebolledo MD   Signed: 10/20/2020 12:43 AM    All medications reviewed.   aztreonam, 2 g, Intravenous, Q8H  colchicine, 0.6 mg, Oral, Q12H  famotidine, 20 mg, Oral, Daily  ferrous sulfate, 325 mg, Oral, BID With Meals  flecainide, 100 mg, Oral, Q12H  methylPREDNISolone sodium succinate, 80 mg, Intravenous, Q8H  metoprolol tartrate, 12.5 mg, Oral, Q12H  sodium chloride, 10 mL, Intravenous, Q12H  vancomycin, 1,000 mg, Intravenous, Q24H          Assessment/Plan       Pericardial effusion    Essential hypertension    PFO (patent foramen ovale)    Hyperlipidemia    CKD (chronic kidney disease) stage 3, GFR 30-59 ml/min    Atrial fibrillation (CMS/HCC)    History of TIA (transient ischemic attack) and stroke    Leukocytosis    Atrial fibrillation with rapid ventricular response (CMS/HCC)      #1 hypotension, possibly from his large pericardial effusion, or from his recurrent atrial arrhythmia and diltiazem.   Post pericardiocentesis his blood pressure has improved yesterday systolic blood pressure ranged from 108-146    #2 large pericardial effusion, recurrent compared to August.  He was taken to the Cath Lab October 20 where 1300 mL of bloody fluid was removed from his pericardial sac.   Hemoglobin is  8.4.    #3 chronic kidney disease,  on admission creatinine was 1.89.  1 month ago creatinine was 2-2.4, 6 months ago creatinine was 1.45.  Today his serum creatinine is 1.55, improved compared to admission.  Urine output is adequate.  He does not have a history of diabetes.  A1c level in September was 5.4.    #4 recurrent atrial fibrillation Eliquis had been restarted, but is currently on hold.  Small PFO noted on echocardiogram from May 2019, however no further mention on subsequent echocardiograms.  He converted to sinus rhythm last night.  He remains on flecainide and metoprolol    #5 leukocytosis, white blood cell count was 13.9.   He has some mild basilar atelectasis on CT scan of the chest on left.  No urinalysis was performed.  Pacemaker site is intact without erythema or fluctuance.  He does not have a fever.  Because of hypotension he was empirically placed on vancomycin and aztreonam.  Respiratory PCR panel is negative.  Blood cultures are no growth at 48 hours.  Initially fever could have simply been from pericardial irritation.  Pericardial fluid was not sent for cultures.  He does have a mild left shift.  C-reactive protein is elevated at 16.8 but pro calcitonin was only 0.11.    PLAN:      Monitor renal function  Stop IV fluids  Vancomycin, aztreonam, continue for now and reassess in the morning  Continue flecainide  Colchicine started for pericardial irritation  Methylprednisolone also started, which can elevate white count  Telemetry    VTE Prophylaxis: Defer to cardiology    Stress Ulcer Prophylaxis: Eusebio Mao MD  10/22/20  15:45 EDT      Time: Critical care 25 min

## 2020-10-22 NOTE — PROGRESS NOTES
CTS Progress Note      Chief Complaint: Pericardial effusion    Subjective  Patient sitting up in chair states he is doing well denies complaints of chest pain or dyspnea.  Is looking forward to going home.      Objective    Physical Exam:   Vital Signs   Temp:  [97.6 °F (36.4 °C)-98.1 °F (36.7 °C)] 97.6 °F (36.4 °C)  Heart Rate:  [] 70  Resp:  [18-28] 20  BP: ()/() 123/66   GEN: NAD   RESP: Clear to auscultation bilaterally no wheezes, rales or rhonchi    CV: Regular rate and rhythm no murmurs, rubs or gallops   ABD: Soft, nontender/nondistended with normoactive bowel sounds    EXT: Warm with good color and well-perfused no bilateral lower extremity edema   INT: No clubbing or cyanosis no lesions or rashes appreciated      Intake/Output Summary (Last 24 hours) at 10/22/2020 0938  Last data filed at 10/22/2020 0614  Gross per 24 hour   Intake 1017 ml   Output 1275 ml   Net -258 ml     Results     Results from last 7 days   Lab Units 10/22/20  0525   WBC 10*3/mm3 13.90*   HEMOGLOBIN g/dL 8.4*   HEMATOCRIT % 26.3*   PLATELETS 10*3/mm3 269     Results from last 7 days   Lab Units 10/22/20  0525   SODIUM mmol/L 138   POTASSIUM mmol/L 3.6   CHLORIDE mmol/L 105   CO2 mmol/L 22.0   BUN mg/dL 35*   CREATININE mg/dL 1.55*   GLUCOSE mg/dL 163*   CALCIUM mg/dL 8.5*     Results from last 7 days   Lab Units 10/22/20  0525   INR  1.59*         Assessment/Plan       Pericardial effusion    Essential hypertension    PFO (patent foramen ovale)    Hyperlipidemia    CKD (chronic kidney disease) stage 3, GFR 30-59 ml/min    Atrial fibrillation (CMS/HCC)    History of TIA (transient ischemic attack) and stroke    Leukocytosis    Atrial fibrillation with rapid ventricular response (CMS/HCC)        Plan   Repeat echocardiogram later this afternoon or tomorrow to reassess the pericardial effusion.  Hopefully there will be no need for surgical drainage.  Aggressive pulmonary toilet  Mobilize  PT/OT  Possible DC home within  the next 24 hours per primary service.    KENN Sow  10/22/20  09:38 EDT

## 2020-10-22 NOTE — PROGRESS NOTES
Multidisciplinary Rounds    Patient Name: Pio Baum  Date of Encounter: 10/22/20 10:09 EDT  MRN: [id number]  Admission date: 10/19/2020    Reason for visit: MDR. RD to continue to follow per protocol.     Additional information obtained during MDR:     Current diet: Diet Regular  Supplement: No active supplement orders    Evaluation of Received Nutrient/Fluid Intake:  1 Day:   isuf data.     EMR reviewed     Intervention:  Follow treatment plan  Care plan reviewed    Follow up:   Per protocol      Camryn Partida RD  10:09 EDT  Time: 10min

## 2020-10-22 NOTE — PLAN OF CARE
Goal Outcome Evaluation:  Plan of Care Reviewed With: patient  Progress: improving  Outcome Summary: Vital signs stable; afebrile. No c/o pain through the night. Conversion to sinus rhythm at 1957. Potential transfer to telemetry today.

## 2020-10-23 ENCOUNTER — APPOINTMENT (OUTPATIENT)
Dept: CARDIOLOGY | Facility: HOSPITAL | Age: 80
End: 2020-10-23

## 2020-10-23 LAB
ANION GAP SERPL CALCULATED.3IONS-SCNC: 8 MMOL/L (ref 5–15)
BASOPHILS # BLD AUTO: 0.02 10*3/MM3 (ref 0–0.2)
BASOPHILS NFR BLD AUTO: 0.1 % (ref 0–1.5)
BH CV ECHO MEAS - BSA(HAYCOCK): 2 M^2
BH CV ECHO MEAS - BSA: 2 M^2
BH CV ECHO MEAS - BZI_BMI: 23.5 KILOGRAMS/M^2
BH CV ECHO MEAS - BZI_METRIC_HEIGHT: 182.9 CM
BH CV ECHO MEAS - BZI_METRIC_WEIGHT: 78.5 KG
BUN SERPL-MCNC: 45 MG/DL (ref 8–23)
BUN/CREAT SERPL: 31.5 (ref 7–25)
CALCIUM SPEC-SCNC: 8.4 MG/DL (ref 8.6–10.5)
CHLORIDE SERPL-SCNC: 107 MMOL/L (ref 98–107)
CO2 SERPL-SCNC: 21 MMOL/L (ref 22–29)
CREAT SERPL-MCNC: 1.43 MG/DL (ref 0.76–1.27)
CRP SERPL-MCNC: 8.56 MG/DL (ref 0–0.5)
DEPRECATED RDW RBC AUTO: 46.8 FL (ref 37–54)
EOSINOPHIL # BLD AUTO: 0 10*3/MM3 (ref 0–0.4)
EOSINOPHIL NFR BLD AUTO: 0 % (ref 0.3–6.2)
ERYTHROCYTE [DISTWIDTH] IN BLOOD BY AUTOMATED COUNT: 12.9 % (ref 12.3–15.4)
GFR SERPL CREATININE-BSD FRML MDRD: 47 ML/MIN/1.73
GLUCOSE BLDC GLUCOMTR-MCNC: 151 MG/DL (ref 70–130)
GLUCOSE BLDC GLUCOMTR-MCNC: 170 MG/DL (ref 70–130)
GLUCOSE BLDC GLUCOMTR-MCNC: 193 MG/DL (ref 70–130)
GLUCOSE SERPL-MCNC: 175 MG/DL (ref 65–99)
HCT VFR BLD AUTO: 26.6 % (ref 37.5–51)
HGB BLD-MCNC: 8.2 G/DL (ref 13–17.7)
IMM GRANULOCYTES # BLD AUTO: 0.15 10*3/MM3 (ref 0–0.05)
IMM GRANULOCYTES NFR BLD AUTO: 0.8 % (ref 0–0.5)
LYMPHOCYTES # BLD AUTO: 0.84 10*3/MM3 (ref 0.7–3.1)
LYMPHOCYTES NFR BLD AUTO: 4.5 % (ref 19.6–45.3)
MAGNESIUM SERPL-MCNC: 2.6 MG/DL (ref 1.6–2.4)
MAXIMAL PREDICTED HEART RATE: 138 BPM
MCH RBC QN AUTO: 30.8 PG (ref 26.6–33)
MCHC RBC AUTO-ENTMCNC: 30.8 G/DL (ref 31.5–35.7)
MCV RBC AUTO: 100 FL (ref 79–97)
MONOCYTES # BLD AUTO: 0.65 10*3/MM3 (ref 0.1–0.9)
MONOCYTES NFR BLD AUTO: 3.5 % (ref 5–12)
NEUTROPHILS NFR BLD AUTO: 17.08 10*3/MM3 (ref 1.7–7)
NEUTROPHILS NFR BLD AUTO: 91.1 % (ref 42.7–76)
NRBC BLD AUTO-RTO: 0 /100 WBC (ref 0–0.2)
PLATELET # BLD AUTO: 319 10*3/MM3 (ref 140–450)
PMV BLD AUTO: 10.7 FL (ref 6–12)
POTASSIUM SERPL-SCNC: 4.4 MMOL/L (ref 3.5–5.2)
PROCALCITONIN SERPL-MCNC: 0.09 NG/ML (ref 0–0.25)
RBC # BLD AUTO: 2.66 10*6/MM3 (ref 4.14–5.8)
SODIUM SERPL-SCNC: 136 MMOL/L (ref 136–145)
STRESS TARGET HR: 117 BPM
WBC # BLD AUTO: 18.74 10*3/MM3 (ref 3.4–10.8)

## 2020-10-23 PROCEDURE — 84145 PROCALCITONIN (PCT): CPT | Performed by: INTERNAL MEDICINE

## 2020-10-23 PROCEDURE — 93308 TTE F-UP OR LMTD: CPT

## 2020-10-23 PROCEDURE — 25010000002 VANCOMYCIN PER 500 MG

## 2020-10-23 PROCEDURE — 99232 SBSQ HOSP IP/OBS MODERATE 35: CPT | Performed by: INTERNAL MEDICINE

## 2020-10-23 PROCEDURE — 93010 ELECTROCARDIOGRAM REPORT: CPT | Performed by: INTERNAL MEDICINE

## 2020-10-23 PROCEDURE — 82962 GLUCOSE BLOOD TEST: CPT

## 2020-10-23 PROCEDURE — 80048 BASIC METABOLIC PNL TOTAL CA: CPT

## 2020-10-23 PROCEDURE — 93005 ELECTROCARDIOGRAM TRACING: CPT | Performed by: NURSE PRACTITIONER

## 2020-10-23 PROCEDURE — 85025 COMPLETE CBC W/AUTO DIFF WBC: CPT | Performed by: INTERNAL MEDICINE

## 2020-10-23 PROCEDURE — 93308 TTE F-UP OR LMTD: CPT | Performed by: INTERNAL MEDICINE

## 2020-10-23 PROCEDURE — 86140 C-REACTIVE PROTEIN: CPT | Performed by: INTERNAL MEDICINE

## 2020-10-23 PROCEDURE — 83735 ASSAY OF MAGNESIUM: CPT | Performed by: INTERNAL MEDICINE

## 2020-10-23 RX ADMIN — VANCOMYCIN HYDROCHLORIDE 1000 MG: 1 INJECTION, SOLUTION INTRAVENOUS at 11:29

## 2020-10-23 RX ADMIN — FERROUS SULFATE TAB 325 MG (65 MG ELEMENTAL FE) 325 MG: 325 (65 FE) TAB at 18:10

## 2020-10-23 RX ADMIN — AZTREONAM 2 G: 2 INJECTION, POWDER, LYOPHILIZED, FOR SOLUTION INTRAMUSCULAR; INTRAVENOUS at 05:47

## 2020-10-23 RX ADMIN — FAMOTIDINE 20 MG: 20 TABLET, FILM COATED ORAL at 08:09

## 2020-10-23 RX ADMIN — SODIUM CHLORIDE, PRESERVATIVE FREE 10 ML: 5 INJECTION INTRAVENOUS at 20:04

## 2020-10-23 RX ADMIN — FLECAINIDE ACETATE 100 MG: 50 TABLET ORAL at 20:03

## 2020-10-23 RX ADMIN — COLCHICINE 0.6 MG: 0.6 TABLET, FILM COATED ORAL at 08:09

## 2020-10-23 RX ADMIN — COLCHICINE 0.6 MG: 0.6 TABLET, FILM COATED ORAL at 20:03

## 2020-10-23 RX ADMIN — METOPROLOL TARTRATE 12.5 MG: 25 TABLET, FILM COATED ORAL at 20:03

## 2020-10-23 RX ADMIN — METOPROLOL TARTRATE 12.5 MG: 25 TABLET, FILM COATED ORAL at 08:09

## 2020-10-23 RX ADMIN — FLECAINIDE ACETATE 100 MG: 50 TABLET ORAL at 08:09

## 2020-10-23 RX ADMIN — FERROUS SULFATE TAB 325 MG (65 MG ELEMENTAL FE) 325 MG: 325 (65 FE) TAB at 08:09

## 2020-10-23 NOTE — PROGRESS NOTES
CARDIOLOGY PROGRESS NOTE           10/23/2020 08:42 EDT    Admit Date: 10/19/2020    Admit Diagnosis: Atrial fibrillation with rapid ventricular response (CMS/HCC) [I48.91]  Atrial fibrillation with rapid ventricular response (CMS/HCC) [I48.91]    Chief Compliant: Follow up for pericardial effusion, afib     Subjective:   Patient's status has been stable overnight. He has no issues with chest pain, sob/sanchez, palps. He is remaining in sinus rhythm. Repeat echo this AM shows trivial pericardial effusion.       Objective:     Vitals:    10/23/20 0400 10/23/20 0500 10/23/20 0545 10/23/20 0600   BP: 131/80 135/82  139/75   BP Location: Right arm Right arm  Right arm   Patient Position: Lying Lying  Lying   Pulse: 71 70  74   Resp: 16 16  18   Temp: 97.2 °F (36.2 °C)      TempSrc: Oral      SpO2: 91% 95% 97% 94%   Weight:       Height:           Physical Exam:  General-Well Nourished, Well developed  Eyes - PERRLA  Neck- supple, No mass  CV- regular rate and rhythm, no MRG  Lung- clear bilaterally  Abd- soft, +BS  Musc/skel - Norm strength and range of motion  Skin- warm and dry  Neuro - Alert & Oriented x 3, appropriate mood.      Current Facility-Administered Medications:   •  acetaminophen (TYLENOL) tablet 650 mg, 650 mg, Oral, Q6H PRN, Leonie Florence APRN  •  aztreonam (AZACTAM) 2 g/100 mL 0.9% NS (mbp), 2 g, Intravenous, Q8H, Juan Benítez, DO, 2 g at 10/23/20 0547  •  colchicine tablet 0.6 mg, 0.6 mg, Oral, Q12H, Dain Nava MD, 0.6 mg at 10/23/20 0809  •  famotidine (PEPCID) tablet 20 mg, 20 mg, Oral, Daily, Leonie Florence APRN, 20 mg at 10/23/20 0809  •  ferrous sulfate tablet 325 mg, 325 mg, Oral, BID With Meals, Dain Nava MD, 325 mg at 10/23/20 0809  •  flecainide (TAMBOCOR) tablet 100 mg, 100 mg, Oral, Q12H, Dain Nava MD, 100 mg at 10/23/20 0809  •  influenza vac split quad (FLUZONE,FLUARIX,AFLURIA,FLULAVAL) injection 0.5 mL, 0.5 mL, Intramuscular, During Hospitalization, Leonie Florence,  APRN  •  Magnesium Sulfate 2 gram Bolus, followed by 8 gram infusion (total Mg dose 10 grams)- Mg less than or equal to 1mg/dL, 2 g, Intravenous, PRN **OR** Magnesium Sulfate 2 gram / 50mL Infusion (GIVE X 3 BAGS TO EQUAL 6GM TOTAL DOSE) - Mg 1.1 - 1.5 mg/dl, 2 g, Intravenous, PRN **OR** Magnesium Sulfate 4 gram infusion- Mg 1.6-1.9 mg/dL, 4 g, Intravenous, PRN, Gabriella Mao MD, Last Rate: 25 mL/hr at 10/22/20 1049, 4 g at 10/22/20 1049  •  metoprolol tartrate (LOPRESSOR) half tablet 12.5 mg, 12.5 mg, Oral, Q12H, Dain aNva MD, 12.5 mg at 10/23/20 0809  •  naloxone (NARCAN) injection 0.1 mg, 0.1 mg, Intravenous, Q5 Min PRN, Gabriella Mao MD  •  ondansetron (ZOFRAN) injection 4 mg, 4 mg, Intravenous, Q6H PRN, Dain Nava MD  •  Pharmacy to dose vancomycin, , Does not apply, Continuous PRN, Gatito Zhou RPH  •  potassium chloride (MICRO-K) CR capsule 40 mEq, 40 mEq, Oral, PRN, 40 mEq at 10/22/20 1049 **OR** potassium chloride (KLOR-CON) packet 40 mEq, 40 mEq, Oral, PRN **OR** potassium chloride 10 mEq in 100 mL IVPB, 10 mEq, Intravenous, Q1H PRN, Gabriella Mao MD  •  promethazine (PHENERGAN) tablet 12.5 mg, 12.5 mg, Oral, Q6H PRN, Gabriella Mao MD  •  sodium chloride 0.9 % flush 10 mL, 10 mL, Intravenous, Q12H, Leonie Florence, APRN, 10 mL at 10/22/20 2102  •  sodium chloride 0.9 % flush 10 mL, 10 mL, Intravenous, PRN, Leonie Florence, APRN  •  vancomycin (VANCOCIN) in iso-osmotic dextrose IVPB 1 g (premix) 200 mL, 1,000 mg, Intravenous, Q24H, Fionadanyel Gatito, [facility], 1,000 mg at 10/22/20 1231    Data Review:   Recent Results (from the past 24 hour(s))   Basic Metabolic Panel    Collection Time: 10/23/20  4:16 AM    Specimen: Blood   Result Value Ref Range    Glucose 175 (H) 65 - 99 mg/dL    BUN 45 (H) 8 - 23 mg/dL    Creatinine 1.43 (H) 0.76 - 1.27 mg/dL    Sodium 136 136 - 145 mmol/L    Potassium 4.4 3.5 - 5.2 mmol/L    Chloride 107 98 - 107 mmol/L    CO2 21.0 (L) 22.0  - 29.0 mmol/L    Calcium 8.4 (L) 8.6 - 10.5 mg/dL    eGFR Non African Amer 47 (L) >60 mL/min/1.73    BUN/Creatinine Ratio 31.5 (H) 7.0 - 25.0    Anion Gap 8.0 5.0 - 15.0 mmol/L   C-reactive Protein    Collection Time: 10/23/20  4:16 AM    Specimen: Blood   Result Value Ref Range    C-Reactive Protein 8.56 (H) 0.00 - 0.50 mg/dL   CBC Auto Differential    Collection Time: 10/23/20  4:16 AM    Specimen: Blood   Result Value Ref Range    WBC 18.74 (H) 3.40 - 10.80 10*3/mm3    RBC 2.66 (L) 4.14 - 5.80 10*6/mm3    Hemoglobin 8.2 (L) 13.0 - 17.7 g/dL    Hematocrit 26.6 (L) 37.5 - 51.0 %    .0 (H) 79.0 - 97.0 fL    MCH 30.8 26.6 - 33.0 pg    MCHC 30.8 (L) 31.5 - 35.7 g/dL    RDW 12.9 12.3 - 15.4 %    RDW-SD 46.8 37.0 - 54.0 fl    MPV 10.7 6.0 - 12.0 fL    Platelets 319 140 - 450 10*3/mm3    Neutrophil % 91.1 (H) 42.7 - 76.0 %    Lymphocyte % 4.5 (L) 19.6 - 45.3 %    Monocyte % 3.5 (L) 5.0 - 12.0 %    Eosinophil % 0.0 (L) 0.3 - 6.2 %    Basophil % 0.1 0.0 - 1.5 %    Immature Grans % 0.8 (H) 0.0 - 0.5 %    Neutrophils, Absolute 17.08 (H) 1.70 - 7.00 10*3/mm3    Lymphocytes, Absolute 0.84 0.70 - 3.10 10*3/mm3    Monocytes, Absolute 0.65 0.10 - 0.90 10*3/mm3    Eosinophils, Absolute 0.00 0.00 - 0.40 10*3/mm3    Basophils, Absolute 0.02 0.00 - 0.20 10*3/mm3    Immature Grans, Absolute 0.15 (H) 0.00 - 0.05 10*3/mm3    nRBC 0.0 0.0 - 0.2 /100 WBC   Magnesium    Collection Time: 10/23/20  4:16 AM    Specimen: Blood   Result Value Ref Range    Magnesium 2.6 (H) 1.6 - 2.4 mg/dL   Procalcitonin    Collection Time: 10/23/20  4:16 AM    Specimen: Blood   Result Value Ref Range    Procalcitonin 0.09 0.00 - 0.25 ng/mL     Assessment:       Pericardial effusion    Essential hypertension    PFO (patent foramen ovale)    Hyperlipidemia    CKD (chronic kidney disease) stage 3, GFR 30-59 ml/min    Atrial fibrillation (CMS/HCC)    History of TIA (transient ischemic attack) and stroke    Leukocytosis    Atrial fibrillation with rapid  ventricular response (CMS/HCC)    Plan:   1.  Pericardial effusion status post pericardiocentesis with pericardial drain 10/20/20.  Minimal drainage overnight.  Drain pulled on 10/21. Repeat echo today shows trivial effusion. Hold Eliquis indefinitely at this time. On colchicine and will continue x 7 days. Patient is ok for discharge from EP standpoint.     2.  Sick sinus syndrome status post DDDR permanent pacemaker implantation with normal pacemaker function.     3.  Atrial fibrillation persistent/paroxysmal in nature. Flecainide 100mg BID and Lopressor 12.5mg BID started this admission. EKG this AM stable.      4.  Anemia secondary to blood loss. H&H stable.  Hemodynamically stable.  Add iron supplements.    Patient is clear for discharge from EP standpoint. Discontinue Eliquis now until he follows up with Dr. Nava. Continue Flecainide 100mg BID, Lopressor 12.5mg BID, and Colchicine 0.6mg BID x 7 days. Follow-up with Dr. Nava in one week.     Patient was seen by Dr. Lancaster who agrees with above plan.     Electronically signed by KENN Rodríguez, 10/23/20, 8:45 AM EDT.    I have personally performed a face to face diagnostic evaluation on this patient.  I have reviewed and agree with the care plan.  History and Exam by me shows: 79 yo with pericardial effusion and A. Fib    General-Well Nourished, Well developed  Eyes - PERRLA  Neck- supple, No mass  CV- regular rate and rhythm, no MRG, No edema  Lung- clear bilaterally  Abd- soft, +BS  Musc/skel - Norm strength and range of motion  Skin- warm and dry  Neuro - Alert & Oriented x 3, appropriate mood.    A/P:  1. Pericardial Effusion - Post drainage - Stable ECHO findings  2. A. Fib - Flecainide started    Ishmael Lancaster M.D., F.A.C.C, F.H.R.S.  Cardiology/Electrophysiology  10/23/2020  23:25 EDT

## 2020-10-23 NOTE — PROGRESS NOTES
Clinical Nutrition     Multidisciplinary Rounds      Patient Name: Pio Baum  Date of Encounter: 10/23/20 11:06 EDT  MRN: [id number]  Admission date: 10/19/2020    VINNIE SHIRLEY to continue to follow per protocol.     Current diet: Diet Regular  No active supplement orders    Intervention:  Follow treatment plan  Care plan reviewed    Follow up:   Per protocol      Shellie Washington RD  11:06 EDT  Time: 10min

## 2020-10-23 NOTE — PROGRESS NOTES
CTS Progress Note      Chief Complaint: Pericardial effusion    Subjective  TTE just completed.  Laying in bed.  No complaints.  No CP, SOA, palps.        Objective    Physical Exam:   Vital Signs   Temp:  [97.2 °F (36.2 °C)-97.9 °F (36.6 °C)] 97.2 °F (36.2 °C)  Heart Rate:  [70-83] 74  Resp:  [16-20] 18  BP: (108-157)/() 139/75   GEN: NAD   RESP: Clear to auscultation bilaterally no wheezes, rales or rhonchi    CV: Regular rate and rhythm no murmurs, rubs or gallops   ABD: Soft, nontender/nondistended with normoactive bowel sounds    EXT: Warm with good color and well-perfused no bilateral lower extremity edema   INT: No clubbing or cyanosis no lesions or rashes appreciated      Intake/Output Summary (Last 24 hours) at 10/23/2020 0904  Last data filed at 10/23/2020 0550  Gross per 24 hour   Intake 1207 ml   Output 500 ml   Net 707 ml     Results     Results from last 7 days   Lab Units 10/23/20  0416   WBC 10*3/mm3 18.74*   HEMOGLOBIN g/dL 8.2*   HEMATOCRIT % 26.6*   PLATELETS 10*3/mm3 319     Results from last 7 days   Lab Units 10/23/20  0416   SODIUM mmol/L 136   POTASSIUM mmol/L 4.4   CHLORIDE mmol/L 107   CO2 mmol/L 21.0*   BUN mg/dL 45*   CREATININE mg/dL 1.43*   GLUCOSE mg/dL 175*   CALCIUM mg/dL 8.4*     Results from last 7 days   Lab Units 10/22/20  0525   INR  1.59*         Assessment/Plan       Pericardial effusion    Essential hypertension    PFO (patent foramen ovale)    Hyperlipidemia    CKD (chronic kidney disease) stage 3, GFR 30-59 ml/min    Atrial fibrillation (CMS/HCC)    History of TIA (transient ischemic attack) and stroke    Leukocytosis    Atrial fibrillation with rapid ventricular response (CMS/HCC)  Anemia      Plan   Repeat echocardiogram today (pending read) to reassess the pericardial effusion.  Hopefully there will be no need for surgical drainage.  Aggressive pulmonary toilet  Mobilize  PT/OT  Monitor WBC's, on Steroids.  Afebrile. Per [facility]ists/Hospitalist  Awaiting TTE read  for disposition       Shellie Zayas, APRN  10/23/20  09:04 EDT

## 2020-10-23 NOTE — PLAN OF CARE
Goal Outcome Evaluation:  Plan of Care Reviewed With: patient  Progress: improving  Outcome Summary: VSS; afebrile. Remains in sinus rhythm. No complaints of pain. Rash is improving. UOP- 400 ml. Slightly confused this am, easily reoriented.

## 2020-10-23 NOTE — PROGRESS NOTES
Continued Stay Note  [facility]     Patient Name: Pio Baum  MRN: [id number]  Today's Date: 10/23/2020    Admit Date: 10/19/2020    Discharge Plan     Row Name 10/23/20 1126       Plan    Plan  Home    Plan Comments  Patient plans to return home upon discharge and denies any needs.  CM will continue to follow.    Final Discharge Disposition Code  01 - home or self-care        Discharge Codes    No documentation.       Expected Discharge Date and Time     Expected Discharge Date Expected Discharge Time    Oct 25, 2020             Jolynn Alvarado RN

## 2020-10-23 NOTE — PLAN OF CARE
Goal Outcome Evaluation:  Plan of Care Reviewed With: patient  Progress: improving     Orders to tele remain, ok to go home by cardiology, watching WBC one more day off ABX, anticipate early morning d/c tomorrow

## 2020-10-23 NOTE — PROGRESS NOTES
Critical Care Note     LOS: 3 days   Patient Care Team:  Lupillo Hill MD as PCP - General    Chief Complaint/Reason for visit:    Chief Complaint   Patient presents with   • Abdominal Pain   Large pericardial effusion  Atrial fibrillation with recent pacemaker, August  Mitral valve regurgitation  PFO with history of stroke  Hypotension    Subjective     80-year-old gentleman with atrial fibrillation, coronary artery disease, PFO with stroke, mitral valve regurgitation, hypertension who underwent permanent pacemaker in August complicated by hypotension and tamponade requiring pericardiocentesis.  270 mL of blood was removed.  He was cardioverted into sinus rhythm.  Repeat echocardiogram revealed no recurrence of his effusion and Eliquis was restarted.  He now presents with a 1 week history of chest pain and increasing shortness of breath.  He has a nonproductive cough and he has had some vomiting.  CTA of the chest revealed no pulmonary embolism but a large pericardial effusion and a small left pleural effusion.  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  He was empirically placed on vancomycin and aztreonam.  He was taken to the Cath Lab  and underwent a pericardiocentesis with 1300 mL of bloody fluid removed.  There was residual 1 cm effusion that could not be drained and a loculated area as well.  CT surgery evaluated with consideration of window.  However they felt the effusion was not big enough for surgery and therefore it was canceled.  Drain pulled October 22  He also developed rapid atrial fibrillation.  October 21 he did convert to sinus rhythm    Interval History:     Denies chest pain or shortness of air.  Up to the chair without difficulty.  Tolerating a p.o. diet    Review of Systems:    All systems were reviewed and negative except as noted in subjective.    Medical history, surgical history, social history, family history reviewed    Objective     Intake/Output:    Intake/Output Summary  "(Last 24 hours) at 10/23/2020 1243  Last data filed at 10/23/2020 0800  Gross per 24 hour   Intake 1012 ml   Output 400 ml   Net 612 ml       Nutrition:  Diet Regular    Infusions:  Pharmacy to dose vancomycin,         Telemetry: Sinus rhythm             Vital Signs  Blood pressure 128/80, pulse 70, temperature 97.5 °F (36.4 °C), temperature source Oral, resp. rate 18, height 182.9 cm (72\"), weight 78.8 kg (173 lb 11.6 oz), SpO2 98 %.    Physical Exam:  General Appearance:   Elderly gentleman in no respiratory distress   Head:   Atraumatic   Eyes:          Pupils are reactive, no jaundice   Ears:     Throat:  Oral mucosa moist   Neck:  No JVD   Back:      Lungs:    Breath sounds bilateral equal and clear.      Heart:   regular rhythm,  heart sounds auscultated, systolic murmur, left subclavian pacemaker site without erythema.  Pericardial drain has been removed.  Site without drainage   Abdomen:    Nondistended, bowel sounds present   Rectal:   Deferred   Extremities:  No pretibial edema   Pulses:    Skin:  Warm and dry   Lymph nodes:    Neurologic:  Alert and cooperative      Results Review:     I reviewed the patient's new clinical results.   Results from last 7 days   Lab Units 10/23/20  0416 10/22/20  0525 10/21/20  0830 10/19/20  1944   SODIUM mmol/L 136 138 138 134*   POTASSIUM mmol/L 4.4 3.6 3.9 4.0   CHLORIDE mmol/L 107 105 108* 100   CO2 mmol/L 21.0* 22.0 22.0 19.0*   BUN mg/dL 45* 35* 32* 29*   CREATININE mg/dL 1.43* 1.55* 1.65* 1.89*   CALCIUM mg/dL 8.4* 8.5* 8.1* 9.1   BILIRUBIN mg/dL  --   --   --  0.9   ALK PHOS U/L  --   --   --  117   ALT (SGPT) U/L  --   --   --  33   AST (SGOT) U/L  --   --   --  37   GLUCOSE mg/dL 175* 163* 151* 149*     Results from last 7 days   Lab Units 10/23/20  0416 10/22/20  0525 10/21/20  0830   WBC 10*3/mm3 18.74* 13.90* 11.30*   HEMOGLOBIN g/dL 8.2* 8.4* 7.8*   HEMATOCRIT % 26.6* 26.3* 25.2*   PLATELETS 10*3/mm3 300 481 343     Results from last 7 days   Lab Units " 10/20/20  1644   PH, ARTERIAL pH units 7.395   PO2 ART mm Hg 64.0*   PCO2, ARTERIAL mm Hg 34.4*   HCO3 ART mmol/L 21.0     Lab Results   Component Value Date    BLOODCX No growth at 3 days 10/19/2020    BLOODCX No growth at 3 days 10/19/2020     No results found for: URINECX    I reviewed the patient's new imaging including images and reports.      All medications reviewed.   aztreonam, 2 g, Intravenous, Q8H  colchicine, 0.6 mg, Oral, Q12H  famotidine, 20 mg, Oral, Daily  ferrous sulfate, 325 mg, Oral, BID With Meals  flecainide, 100 mg, Oral, Q12H  metoprolol tartrate, 12.5 mg, Oral, Q12H  sodium chloride, 10 mL, Intravenous, Q12H  vancomycin, 1,000 mg, Intravenous, Q24H          Assessment/Plan       Pericardial effusion    Essential hypertension    PFO (patent foramen ovale)    Hyperlipidemia    CKD (chronic kidney disease) stage 3, GFR 30-59 ml/min    Atrial fibrillation (CMS/formerly [facility])    History of TIA (transient ischemic attack) and stroke    Leukocytosis    Atrial fibrillation with rapid ventricular response (CMS/formerly [facility])      #1 hypotension, possibly from his large pericardial effusion, or from his recurrent atrial arrhythmia and diltiazem.   Post pericardiocentesis his blood pressure has improved yesterday systolic blood pressure ranged from 109-145    #2 large pericardial effusion, recurrent compared to August.  He was taken to the Cath Lab October 20 where 1300 mL of bloody fluid was removed from his pericardial sac.   Hemoglobin is 8.4.    #3 chronic kidney disease,  on admission creatinine was 1.89.  1 month ago creatinine was 2-2.4, 6 months ago creatinine was 1.45.  Today his serum creatinine is 1.43, improved compared to admission.  Urine output is adequate.  He does not have a history of diabetes.  A1c level in September was 5.4.    #4 recurrent atrial fibrillation Eliquis had been restarted, but is currently on hold.  Small PFO noted on echocardiogram from May 2019, however no further mention on subsequent  echocardiograms.  He converted to sinus rhythm last night.  He remains on flecainide and metoprolol    #5 leukocytosis, white blood cell count is 18.7 however patient has been receiving steroids he was empirically placed on vancomycin and aztreonam.  Blood cultures are no growth.  Chest x-ray shows no pneumonia.  Pericardial fluid was not sent for cultures.    PLAN:      Stop antibiotics and observe  Last dose of steroids last night  Repeat echocardiogram ordered  Continue flecainide, metoprolol  Mobilize as tolerated  Telemetry    VTE Prophylaxis: Defer to cardiology    Stress Ulcer Prophylaxis: Eusebio Mao MD  10/23/20  12:43 EDT      Time: 20min

## 2020-10-24 ENCOUNTER — READMISSION MANAGEMENT (OUTPATIENT)
Dept: CALL CENTER | Facility: HOSPITAL | Age: 80
End: 2020-10-24

## 2020-10-24 VITALS
DIASTOLIC BLOOD PRESSURE: 82 MMHG | SYSTOLIC BLOOD PRESSURE: 135 MMHG | HEIGHT: 72 IN | WEIGHT: 173.72 LBS | RESPIRATION RATE: 16 BRPM | TEMPERATURE: 97.6 F | HEART RATE: 70 BPM | BODY MASS INDEX: 23.53 KG/M2 | OXYGEN SATURATION: 97 %

## 2020-10-24 LAB
ANION GAP SERPL CALCULATED.3IONS-SCNC: 10 MMOL/L (ref 5–15)
BASOPHILS # BLD AUTO: 0.03 10*3/MM3 (ref 0–0.2)
BASOPHILS NFR BLD AUTO: 0.2 % (ref 0–1.5)
BUN SERPL-MCNC: 45 MG/DL (ref 8–23)
BUN/CREAT SERPL: 33.1 (ref 7–25)
CALCIUM SPEC-SCNC: 8.7 MG/DL (ref 8.6–10.5)
CHLORIDE SERPL-SCNC: 106 MMOL/L (ref 98–107)
CO2 SERPL-SCNC: 23 MMOL/L (ref 22–29)
CREAT SERPL-MCNC: 1.36 MG/DL (ref 0.76–1.27)
DEPRECATED RDW RBC AUTO: 44 FL (ref 37–54)
EOSINOPHIL # BLD AUTO: 0.08 10*3/MM3 (ref 0–0.4)
EOSINOPHIL NFR BLD AUTO: 0.4 % (ref 0.3–6.2)
ERYTHROCYTE [DISTWIDTH] IN BLOOD BY AUTOMATED COUNT: 13 % (ref 12.3–15.4)
GFR SERPL CREATININE-BSD FRML MDRD: 50 ML/MIN/1.73
GLUCOSE BLDC GLUCOMTR-MCNC: 118 MG/DL (ref 70–130)
GLUCOSE BLDC GLUCOMTR-MCNC: 94 MG/DL (ref 70–130)
GLUCOSE SERPL-MCNC: 105 MG/DL (ref 65–99)
HCT VFR BLD AUTO: 28.4 % (ref 37.5–51)
HGB BLD-MCNC: 9.1 G/DL (ref 13–17.7)
IMM GRANULOCYTES # BLD AUTO: 0.2 10*3/MM3 (ref 0–0.05)
IMM GRANULOCYTES NFR BLD AUTO: 1 % (ref 0–0.5)
LYMPHOCYTES # BLD AUTO: 1.54 10*3/MM3 (ref 0.7–3.1)
LYMPHOCYTES NFR BLD AUTO: 7.9 % (ref 19.6–45.3)
MAGNESIUM SERPL-MCNC: 2.3 MG/DL (ref 1.6–2.4)
MCH RBC QN AUTO: 29.7 PG (ref 26.6–33)
MCHC RBC AUTO-ENTMCNC: 32 G/DL (ref 31.5–35.7)
MCV RBC AUTO: 92.8 FL (ref 79–97)
MONOCYTES # BLD AUTO: 1.22 10*3/MM3 (ref 0.1–0.9)
MONOCYTES NFR BLD AUTO: 6.2 % (ref 5–12)
NEUTROPHILS NFR BLD AUTO: 16.47 10*3/MM3 (ref 1.7–7)
NEUTROPHILS NFR BLD AUTO: 84.3 % (ref 42.7–76)
NRBC BLD AUTO-RTO: 0 /100 WBC (ref 0–0.2)
PLATELET # BLD AUTO: 430 10*3/MM3 (ref 140–450)
PMV BLD AUTO: 10.5 FL (ref 6–12)
POTASSIUM SERPL-SCNC: 3.7 MMOL/L (ref 3.5–5.2)
RBC # BLD AUTO: 3.06 10*6/MM3 (ref 4.14–5.8)
SODIUM SERPL-SCNC: 139 MMOL/L (ref 136–145)
WBC # BLD AUTO: 19.54 10*3/MM3 (ref 3.4–10.8)

## 2020-10-24 PROCEDURE — 80048 BASIC METABOLIC PNL TOTAL CA: CPT | Performed by: INTERNAL MEDICINE

## 2020-10-24 PROCEDURE — 82962 GLUCOSE BLOOD TEST: CPT

## 2020-10-24 PROCEDURE — 99239 HOSP IP/OBS DSCHRG MGMT >30: CPT | Performed by: NURSE PRACTITIONER

## 2020-10-24 PROCEDURE — 83735 ASSAY OF MAGNESIUM: CPT | Performed by: INTERNAL MEDICINE

## 2020-10-24 PROCEDURE — 85025 COMPLETE CBC W/AUTO DIFF WBC: CPT | Performed by: INTERNAL MEDICINE

## 2020-10-24 RX ORDER — FLECAINIDE ACETATE 100 MG/1
100 TABLET ORAL EVERY 12 HOURS SCHEDULED
Qty: 60 TABLET | Refills: 5 | Status: SHIPPED | OUTPATIENT
Start: 2020-10-24 | End: 2020-12-22

## 2020-10-24 RX ORDER — COLCHICINE 0.6 MG/1
0.6 TABLET ORAL EVERY 12 HOURS SCHEDULED
Qty: 14 TABLET | Refills: 0 | Status: SHIPPED | OUTPATIENT
Start: 2020-10-24 | End: 2020-10-31

## 2020-10-24 RX ORDER — FERROUS SULFATE 325(65) MG
325 TABLET ORAL 2 TIMES DAILY WITH MEALS
Qty: 30 TABLET | Refills: 0 | Status: SHIPPED | OUTPATIENT
Start: 2020-10-24 | End: 2020-11-19 | Stop reason: SDUPTHER

## 2020-10-24 RX ADMIN — METOPROLOL TARTRATE 12.5 MG: 25 TABLET, FILM COATED ORAL at 08:19

## 2020-10-24 RX ADMIN — COLCHICINE 0.6 MG: 0.6 TABLET, FILM COATED ORAL at 08:19

## 2020-10-24 RX ADMIN — FERROUS SULFATE TAB 325 MG (65 MG ELEMENTAL FE) 325 MG: 325 (65 FE) TAB at 08:19

## 2020-10-24 RX ADMIN — FLECAINIDE ACETATE 100 MG: 50 TABLET ORAL at 08:20

## 2020-10-24 RX ADMIN — FAMOTIDINE 20 MG: 20 TABLET, FILM COATED ORAL at 08:20

## 2020-10-24 NOTE — DISCHARGE SUMMARY
Discharge Summary    Patient name: Pio Baum  CSN: [id number]  MRN: [id number]  : 1940  Today's date: 10/24/2020     Date of Admission: 10/19/2020    Date of Discharge:  10/24/2020    Admitting Physician: UGO Benítez MD    Attending Physician: KAYLA Fuller MD    Primary Care Provider: Lupillo Hill MD    Consultations:   Dr. Fried- Cardiothoracic Surgery  Dr. Nava- EP/Cardiology    Admission Diagnosis:   Pericardial effusion    Essential hypertension    PFO (patent foramen ovale)    Hyperlipidemia    CKD (chronic kidney disease) stage 3, GFR 30-59 ml/min    Atrial fibrillation (CMS/HCC)    History of TIA (transient ischemic attack) and stroke    Leukocytosis    Discharge Diagnoses:     Pericardial effusion    Essential hypertension    PFO (patent foramen ovale)    CKD (chronic kidney disease) stage 3, GFR 30-59 ml/min    Atrial fibrillation with RVR    Leukocytosis    Hyperlipidemia    History of TIA (transient ischemic attack) and stroke    Procedures:  Procedure(s):  PERICARDIOCENTESIS     History of Present Illness:  Mr. Pio Baum, is an 80 y.o. male with PMH of A. fib status post recent pacemaker placement in August, CAD, hyperlipidemia, hypertension, mitral valve regurgitation, PFO, CVA who presented to the emergency department on 10/19/2020 with chest pain.  According the patient he has a 1 week history of chest pain from his esophagus down to his abdomen, under his left rib cage and radiating around to the left mid back with with pulsations.  Over the previous 2 days prior to admission the symptoms have increased and he had associated shortness of breath with strenuous activity, as well as congestion, nonproductive cough, and vomiting of bile.       Vital signs on arrival: heart rate 137, blood pressure 127/81, SPO2 88 to 96%.  Significant labs include white count of 13.8, hemoglobin 10.4, platelets 254, BUN 29, creatinine 1.89, glucose 149, sodium 134, potassium 4, BNP  734, lipase 34, AST 37, ALT 33, alk phos 117, total bili 0.9, troponin less than 0.010, procalcitonin 0.11, CRP 16.84, respiratory PCR negative for all analytes, lactate 1.2.  Chest x-ray with mild increased markings identified left lung base with small left pleural effusion.  Heart is enlarged.  Stable calcified granuloma identified in the right upper lobe.  CT abdomen pelvis showed large pericardial effusion measuring up to 3.6 cm at the cardiac apex.  Small left pleural effusion and probable left basilar atelectasis or less likely pneumonia.  Diverticulosis.  Prostamegaly.  CTA chest with no aneurysm or dissection in thoracoabdominal aorta.  No pulmonary embolism.  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  Mesenteric vessels and renal arteries were widely patent.  Remainder of the chest abdomen and pelvis CT not significantly changed from the noncontrast examinations.     Of importance patient received pacemaker placement in August 2020 at which time he did have complications requiring him to go back to surgery and then ICU admission for hypotension.  He did develop pericardial tamponade/effusion and underwent a pericardiocentesis with approximately 270 mL of dark blood removed.  Additionally he was cardioverted from A. fib to sinus rhythm with repeat echo showing improvement and no recurrence of pericardial effusion.  He has remained on Eliquis since discharge with last dose thh morning of admision but no p.m. dose taken.     He presented to the ED and was initially admitted to the hospitalist service however it was determined he had a large pericardial effusion.  It was believed it best for the patient be admitted to the ICU instead of the floor. On arrival to the unit he was alert and oriented. He continued to have chest/abdominal discomfort but was not requesting pain medications at the time.     Hospital  "Course:  Pio Baum is a 80 y.o. male who presented to [facility] as discussed in HPI. He was admitted to ICU, with ECHO ordered and cardiology consulted. He was started on empiric antibiotics, and Cardizem. He underwent pericardiocentesis with drain left in place on 10/20/20 with 1300 ml bloody fluid removed. There was minimal drainage noted over the course of that evening post procedure. He remained in Afib with rate  bpm.  Follow-up echo showed a small pericardial effusion without evidence of tamponade.  Pericardial drain was removed 10/21/20 without difficulty, and patient denied chest pain. There was no need for surgical window per CT surgery. Repeat echo showed \"trivial amount\" of effusion.  He was started on colchicine to continue for 7 days, flecainide, Lopressor, with discontinuation of Eliquis until follow-up with .  On 10/24/2020 the patient was deemed acceptable for discharge home.  He is to follow-up with primary care, and cardiology within 1 week.      Vitals:  /94 (BP Location: Right arm, Patient Position: Lying)   Pulse 70   Temp 97.8 °F (36.6 °C) (Oral)   Resp 16   Ht 182.9 cm (72\")   Wt 78.8 kg (173 lb 11.6 oz)   SpO2 99%   BMI 23.56 kg/m²     Advance Directives: Code Status and Medical Interventions:   Ordered at: 10/20/20 0037     Code Status:    CPR     Medical Interventions (Level of Support Prior to Arrest):    Full        Physical Exam:  GENERAL : NAD, conversant  RESPIRATORY/THORAX : normal respiratory effort and no intercostal retractions, CTAB  CARDIOVASCULAR : Normal S1/S2, RRR. no lower ext edema.  GASTROINTESTINAL : Soft, NT/ND. BS x 4 normoactive. No hepatosplenomegaly.  MUSCULOSKELETAL : No cyanosis, clubbing, or ischemia  NEUROLOGICAL: alert and oriented to person, place and time  PSYCHOLOGICAL : Appropriate affect    Labs:  Results from last 7 days   Lab Units 10/24/20  0611   WBC 10*3/mm3 19.54*   HEMOGLOBIN g/dL 9.1* "   HEMATOCRIT % 28.4*   PLATELETS 10*3/mm3 430     Results from last 7 days   Lab Units 10/24/20  0611  10/19/20  1944   SODIUM mmol/L 139   < > 134*   POTASSIUM mmol/L 3.7   < > 4.0   CHLORIDE mmol/L 106   < > 100   CO2 mmol/L 23.0   < > 19.0*   BUN mg/dL 45*   < > 29*   CREATININE mg/dL 1.36*   < > 1.89*   CALCIUM mg/dL 8.7   < > 9.1   BILIRUBIN mg/dL  --   --  0.9   ALK PHOS U/L  --   --  117   ALT (SGPT) U/L  --   --  33   AST (SGOT) U/L  --   --  37   GLUCOSE mg/dL 105*   < > 149*    < > = values in this interval not displayed.         Magnesium   Date Value Ref Range Status   10/24/2020 2.3 1.6 - 2.4 mg/dL Final   10/23/2020 2.6 (H) 1.6 - 2.4 mg/dL Final   10/22/2020 1.9 1.6 - 2.4 mg/dL Final     Phosphorus   Date Value Ref Range Status   10/22/2020 2.8 2.5 - 4.5 mg/dL Final                 Discharge Medications:     Discharge Medications      New Medications      Instructions Start Date   colchicine 0.6 MG tablet   0.6 mg, Oral, Every 12 Hours Scheduled      ferrous sulfate 325 (65 FE) MG tablet   325 mg, Oral, 2 Times Daily With Meals      flecainide 100 MG tablet  Commonly known as: TAMBOCOR   100 mg, Oral, Every 12 Hours Scheduled      influenza vac split quad 0.5 ML suspension prefilled syringe injection  Commonly known as: FLUZONE,FLUARIX,AFLURIA,FLULAVAL   0.5 mL, Intramuscular, Once      metoprolol tartrate 25 MG tablet  Commonly known as: LOPRESSOR   12.5 mg, Oral, Every 12 Hours Scheduled         Changes to Medications      Instructions Start Date   atorvastatin 40 MG tablet  Commonly known as: LIPITOR  What changed: when to take this   TAKE 1 TABLET EVERY DAY         Continue These Medications      Instructions Start Date   guaiFENesin 600 MG 12 hr tablet  Commonly known as: MUCINEX   1,200 mg, Oral, Daily PRN      Omega-3 1000 MG capsule   1,000 mg, Oral, Daily      tamsulosin 0.4 MG capsule 24 hr capsule  Commonly known as: FLOMAX   1 capsule, Oral, 2 times daily      VITAMIN D2 PO   1 capsule,  Oral, Daily      ZINC-VITAMIN C PO   1-3 tablets, Oral, Daily PRN         Stop These Medications    apixaban 5 MG tablet tablet  Commonly known as: ELIQUIS     coenzyme Q10 100 MG capsule     Glucosamine Chondr 1500 Complx capsule     UBIQUINOL PO            Discharge Diet:   Cardiac, Thin    Activity at Discharge:   With Assistance    Follow-up Appointments  Future Appointments   Date Time Provider Department Center   11/13/2020 10:30 AM Yahaira Carson MD [facility] None   12/22/2020 11:30 AM Lupillo Hill MD [facility] None     Additional Instructions for the Follow-ups that You Need to Schedule     Discharge Follow-up with PCP   As directed       Currently Documented PCP:    Lupillo Hill MD    PCP Phone Number:    [phone]     Follow Up Details: Asap for transition of care.         Discharge Follow-up with Specified Provider: Dr. Nava; 1 Week   As directed      To: Dr. Nava    Follow Up: 1 Week         Discharge Follow-up with Specified Provider: Dr. Nava; 1 Week   As directed      To: Dr. Nava    Follow Up: 1 Week             Discharge Instructions:  Home with assistance  Follow up as above  Medications e-prescribed- [facility]     HIMA Roberson ACNP-BC  Pulmonary & Critical Care Medicine    Time: I spent 40 minutes on this discharge activity which included: face-to-face encounter with the patient, reviewing the data in the system, coordination of the care with the nursing staff as well as consultants, documentation, and entering orders.       CC: Lupillo Hill MD         [unfilled]    I have personally seen, interviewed and examined the patient and verified all the key components of the history, physical examination, assessment and plan with HIMA Thompson ACNP-BC and reviewed the note, which reflects my changes and contributions.    I saw and evaluated the patient on the day of discharge, physical exam with plan for the day.    Electronically  signed by George Fuller DO, 10/24/20, 2:54 PM EDT.

## 2020-10-24 NOTE — PLAN OF CARE
Problem: Adult Inpatient Plan of Care  Goal: Plan of Care Review  10/24/2020 0434 by Angelica Barrera RN  Outcome: Ongoing, Progressing  Flowsheets (Taken 10/24/2020 0434)  Outcome Summary: VSS. No complaints this shift. Ambulated this am with no issues.  Plan is to go home sometime today.

## 2020-10-24 NOTE — OUTREACH NOTE
Prep Survey      Responses   [facility] patient discharged from?  [place]   Is LACE score < 7 ?  No   Eligibility  [facility]   Date of Admission  10/19/20   Date of Discharge  10/24/20   Discharge Disposition  Home or Self Care   Discharge diagnosis  pericardial effusion, s/p pericardiocentesis   Does the patient have one of the following disease processes/diagnoses(primary or secondary)?  Other   Does the patient have Home health ordered?  No   Is there a DME ordered?  No   Prep survey completed?  Yes          Leydi Loredo, RN

## 2020-10-25 ENCOUNTER — NURSE TRIAGE (OUTPATIENT)
Dept: CALL CENTER | Facility: HOSPITAL | Age: 80
End: 2020-10-25

## 2020-10-25 LAB
BACTERIA SPEC AEROBE CULT: NORMAL
BACTERIA SPEC AEROBE CULT: NORMAL

## 2020-10-26 ENCOUNTER — HOSPITAL ENCOUNTER (EMERGENCY)
Facility: HOSPITAL | Age: 80
Discharge: HOME | End: 2020-10-26
Attending: EMERGENCY MEDICINE | Admitting: EMERGENCY MEDICINE

## 2020-10-26 ENCOUNTER — APPOINTMENT (OUTPATIENT)
Dept: GENERAL RADIOLOGY | Facility: HOSPITAL | Age: 80
End: 2020-10-26

## 2020-10-26 ENCOUNTER — APPOINTMENT (OUTPATIENT)
Dept: CARDIOLOGY | Facility: HOSPITAL | Age: 80
End: 2020-10-26

## 2020-10-26 ENCOUNTER — TRANSITIONAL CARE MANAGEMENT TELEPHONE ENCOUNTER (OUTPATIENT)
Dept: CALL CENTER | Facility: HOSPITAL | Age: 80
End: 2020-10-26

## 2020-10-26 ENCOUNTER — TELEPHONE (OUTPATIENT)
Dept: CARDIOLOGY | Facility: CLINIC | Age: 80
End: 2020-10-26

## 2020-10-26 VITALS
HEART RATE: 70 BPM | OXYGEN SATURATION: 95 % | DIASTOLIC BLOOD PRESSURE: 88 MMHG | TEMPERATURE: 97.8 F | HEIGHT: 72 IN | WEIGHT: 172 LBS | RESPIRATION RATE: 16 BRPM | BODY MASS INDEX: 23.3 KG/M2 | SYSTOLIC BLOOD PRESSURE: 148 MMHG

## 2020-10-26 DIAGNOSIS — I31.9 PERICARDITIS, UNSPECIFIED CHRONICITY, UNSPECIFIED TYPE: Primary | ICD-10-CM

## 2020-10-26 LAB
ALBUMIN SERPL-MCNC: 2.9 G/DL (ref 3.5–5.2)
ALBUMIN/GLOB SERPL: 1.1 G/DL
ALP SERPL-CCNC: 95 U/L (ref 39–117)
ALT SERPL W P-5'-P-CCNC: 100 U/L (ref 1–41)
ANION GAP SERPL CALCULATED.3IONS-SCNC: 9 MMOL/L (ref 5–15)
AST SERPL-CCNC: 34 U/L (ref 1–40)
BASOPHILS # BLD AUTO: 0.05 10*3/MM3 (ref 0–0.2)
BASOPHILS NFR BLD AUTO: 0.4 % (ref 0–1.5)
BH CV ECHO MEAS - AO ROOT AREA (BSA CORRECTED): 1.7
BH CV ECHO MEAS - AO ROOT AREA: 9 CM^2
BH CV ECHO MEAS - AO ROOT DIAM: 3.4 CM
BH CV ECHO MEAS - BSA(HAYCOCK): 2 M^2
BH CV ECHO MEAS - BSA: 2 M^2
BH CV ECHO MEAS - BZI_BMI: 23.3 KILOGRAMS/M^2
BH CV ECHO MEAS - BZI_METRIC_HEIGHT: 182.9 CM
BH CV ECHO MEAS - BZI_METRIC_WEIGHT: 78 KG
BH CV ECHO MEAS - EDV(CUBED): 80.9 ML
BH CV ECHO MEAS - EDV(MOD-SP2): 87 ML
BH CV ECHO MEAS - EDV(MOD-SP4): 95 ML
BH CV ECHO MEAS - EDV(TEICH): 84.2 ML
BH CV ECHO MEAS - EF(CUBED): 75.5 %
BH CV ECHO MEAS - EF(MOD-BP): 45 %
BH CV ECHO MEAS - EF(MOD-SP2): 44.8 %
BH CV ECHO MEAS - EF(MOD-SP4): 47.4 %
BH CV ECHO MEAS - EF(TEICH): 67.7 %
BH CV ECHO MEAS - ESV(CUBED): 19.8 ML
BH CV ECHO MEAS - ESV(MOD-SP2): 48 ML
BH CV ECHO MEAS - ESV(MOD-SP4): 50 ML
BH CV ECHO MEAS - ESV(TEICH): 27.2 ML
BH CV ECHO MEAS - FS: 37.4 %
BH CV ECHO MEAS - IVS/LVPW: 0.98
BH CV ECHO MEAS - IVSD: 1.1 CM
BH CV ECHO MEAS - LA DIMENSION: 2.6 CM
BH CV ECHO MEAS - LA/AO: 0.76
BH CV ECHO MEAS - LV DIASTOLIC VOL/BSA (35-75): 47.5 ML/M^2
BH CV ECHO MEAS - LV MASS(C)D: 172.4 GRAMS
BH CV ECHO MEAS - LV MASS(C)DI: 86.3 GRAMS/M^2
BH CV ECHO MEAS - LV SYSTOLIC VOL/BSA (12-30): 25 ML/M^2
BH CV ECHO MEAS - LVIDD: 4.3 CM
BH CV ECHO MEAS - LVIDS: 2.7 CM
BH CV ECHO MEAS - LVLD AP2: 7.6 CM
BH CV ECHO MEAS - LVLD AP4: 7.7 CM
BH CV ECHO MEAS - LVLS AP2: 7.1 CM
BH CV ECHO MEAS - LVLS AP4: 6.7 CM
BH CV ECHO MEAS - LVOT AREA (M): 3.8 CM^2
BH CV ECHO MEAS - LVOT AREA: 3.8 CM^2
BH CV ECHO MEAS - LVOT DIAM: 2.2 CM
BH CV ECHO MEAS - LVPWD: 1.1 CM
BH CV ECHO MEAS - RAP SYSTOLE: 3 MMHG
BH CV ECHO MEAS - RVSP: 26 MMHG
BH CV ECHO MEAS - SI(CUBED): 30.6 ML/M^2
BH CV ECHO MEAS - SI(MOD-SP2): 19.5 ML/M^2
BH CV ECHO MEAS - SI(MOD-SP4): 22.5 ML/M^2
BH CV ECHO MEAS - SI(TEICH): 28.6 ML/M^2
BH CV ECHO MEAS - SV(CUBED): 61.1 ML
BH CV ECHO MEAS - SV(MOD-SP2): 39 ML
BH CV ECHO MEAS - SV(MOD-SP4): 45 ML
BH CV ECHO MEAS - SV(TEICH): 57.1 ML
BH CV ECHO MEAS - TR MAX PG: 23 MMHG
BH CV ECHO MEAS - TR MAX VEL: 241 CM/SEC
BH CV VAS BP LEFT ARM: NORMAL MMHG
BILIRUB SERPL-MCNC: 0.3 MG/DL (ref 0–1.2)
BUN SERPL-MCNC: 33 MG/DL (ref 8–23)
BUN/CREAT SERPL: 23.2 (ref 7–25)
CALCIUM SPEC-SCNC: 8.4 MG/DL (ref 8.6–10.5)
CHLORIDE SERPL-SCNC: 107 MMOL/L (ref 98–107)
CO2 SERPL-SCNC: 23 MMOL/L (ref 22–29)
CREAT SERPL-MCNC: 1.42 MG/DL (ref 0.76–1.27)
DEPRECATED RDW RBC AUTO: 44.8 FL (ref 37–54)
EOSINOPHIL # BLD AUTO: 0.24 10*3/MM3 (ref 0–0.4)
EOSINOPHIL NFR BLD AUTO: 1.7 % (ref 0.3–6.2)
ERYTHROCYTE [DISTWIDTH] IN BLOOD BY AUTOMATED COUNT: 13 % (ref 12.3–15.4)
GFR SERPL CREATININE-BSD FRML MDRD: 48 ML/MIN/1.73
GLOBULIN UR ELPH-MCNC: 2.7 GM/DL
GLUCOSE SERPL-MCNC: 105 MG/DL (ref 65–99)
HCT VFR BLD AUTO: 30.3 % (ref 37.5–51)
HGB BLD-MCNC: 10 G/DL (ref 13–17.7)
HOLD SPECIMEN: NORMAL
HOLD SPECIMEN: NORMAL
IMM GRANULOCYTES # BLD AUTO: 0.26 10*3/MM3 (ref 0–0.05)
IMM GRANULOCYTES NFR BLD AUTO: 1.9 % (ref 0–0.5)
LIPASE SERPL-CCNC: 54 U/L (ref 13–60)
LV EF 2D ECHO EST: 45 %
LYMPHOCYTES # BLD AUTO: 2.21 10*3/MM3 (ref 0.7–3.1)
LYMPHOCYTES NFR BLD AUTO: 16.1 % (ref 19.6–45.3)
MCH RBC QN AUTO: 31.3 PG (ref 26.6–33)
MCHC RBC AUTO-ENTMCNC: 33 G/DL (ref 31.5–35.7)
MCV RBC AUTO: 95 FL (ref 79–97)
MONOCYTES # BLD AUTO: 1.07 10*3/MM3 (ref 0.1–0.9)
MONOCYTES NFR BLD AUTO: 7.8 % (ref 5–12)
MV VENA CONTRACTA: 0.4 CM
NEUTROPHILS NFR BLD AUTO: 72.1 % (ref 42.7–76)
NEUTROPHILS NFR BLD AUTO: 9.92 10*3/MM3 (ref 1.7–7)
NRBC BLD AUTO-RTO: 0 /100 WBC (ref 0–0.2)
NT-PROBNP SERPL-MCNC: 3172 PG/ML (ref 0–1800)
PLATELET # BLD AUTO: 483 10*3/MM3 (ref 140–450)
PMV BLD AUTO: 9.8 FL (ref 6–12)
POTASSIUM SERPL-SCNC: 3.6 MMOL/L (ref 3.5–5.2)
PROT SERPL-MCNC: 5.6 G/DL (ref 6–8.5)
RBC # BLD AUTO: 3.19 10*6/MM3 (ref 4.14–5.8)
SODIUM SERPL-SCNC: 139 MMOL/L (ref 136–145)
TROPONIN T SERPL-MCNC: 0.03 NG/ML (ref 0–0.03)
TROPONIN T SERPL-MCNC: 0.04 NG/ML (ref 0–0.03)
WBC # BLD AUTO: 13.75 10*3/MM3 (ref 3.4–10.8)
WHOLE BLOOD HOLD SPECIMEN: NORMAL
WHOLE BLOOD HOLD SPECIMEN: NORMAL

## 2020-10-26 PROCEDURE — 99285 EMERGENCY DEPT VISIT HI MDM: CPT

## 2020-10-26 PROCEDURE — 93321 DOPPLER ECHO F-UP/LMTD STD: CPT

## 2020-10-26 PROCEDURE — 93005 ELECTROCARDIOGRAM TRACING: CPT | Performed by: EMERGENCY MEDICINE

## 2020-10-26 PROCEDURE — 93325 DOPPLER ECHO COLOR FLOW MAPG: CPT | Performed by: INTERNAL MEDICINE

## 2020-10-26 PROCEDURE — 80053 COMPREHEN METABOLIC PANEL: CPT

## 2020-10-26 PROCEDURE — 93325 DOPPLER ECHO COLOR FLOW MAPG: CPT

## 2020-10-26 PROCEDURE — 93308 TTE F-UP OR LMTD: CPT

## 2020-10-26 PROCEDURE — 71045 X-RAY EXAM CHEST 1 VIEW: CPT

## 2020-10-26 PROCEDURE — 93321 DOPPLER ECHO F-UP/LMTD STD: CPT | Performed by: INTERNAL MEDICINE

## 2020-10-26 PROCEDURE — 93005 ELECTROCARDIOGRAM TRACING: CPT

## 2020-10-26 PROCEDURE — 84484 ASSAY OF TROPONIN QUANT: CPT | Performed by: EMERGENCY MEDICINE

## 2020-10-26 PROCEDURE — 85025 COMPLETE CBC W/AUTO DIFF WBC: CPT

## 2020-10-26 PROCEDURE — 93308 TTE F-UP OR LMTD: CPT | Performed by: INTERNAL MEDICINE

## 2020-10-26 PROCEDURE — 83690 ASSAY OF LIPASE: CPT

## 2020-10-26 PROCEDURE — 84484 ASSAY OF TROPONIN QUANT: CPT

## 2020-10-26 PROCEDURE — 83880 ASSAY OF NATRIURETIC PEPTIDE: CPT

## 2020-10-26 RX ORDER — SODIUM CHLORIDE 0.9 % (FLUSH) 0.9 %
10 SYRINGE (ML) INJECTION AS NEEDED
Status: DISCONTINUED | OUTPATIENT
Start: 2020-10-26 | End: 2020-10-26 | Stop reason: HOSPADM

## 2020-10-26 RX ORDER — ASPIRIN 81 MG/1
324 TABLET, CHEWABLE ORAL ONCE
Status: DISCONTINUED | OUTPATIENT
Start: 2020-10-26 | End: 2020-10-26 | Stop reason: HOSPADM

## 2020-10-26 RX ORDER — PREDNISONE 10 MG/1
TABLET ORAL
Qty: 1 TABLET | Refills: 0 | Status: SHIPPED | OUTPATIENT
Start: 2020-10-26 | End: 2020-10-27 | Stop reason: SDUPTHER

## 2020-10-26 NOTE — TELEPHONE ENCOUNTER
Patients wife called and was transferred to me from [facility]. Patients wife says the patient was in the hospital last week for a pericardial effusion and over the weekend was experiencing CP. The patients wife expressed great concern stating the patient has slept most of the weekend and experienced CP on and off. Today he got up to shower then felt extremely sick and experienced CP again. I instructed them to get to the ER for further workup considering the patients recent history of pericardial effusion. The patients wife and patient agreed and stated they would call an ambulance.

## 2020-10-26 NOTE — OUTREACH NOTE
Call Center TCM Note      Responses   [facility] patient discharged from?  [place]   Does the patient have one of the following disease processes/diagnoses(primary or secondary)?  Other   TCM attempt successful?  No   Unsuccessful attempts  Attempt 2 [Patient in the ED]          Khushboo Rodriguez RN    10/26/2020, 14:49 EDT

## 2020-10-26 NOTE — TELEPHONE ENCOUNTER
"Wife is the caller.  He was discharged from the hospital and Lisinopril was on on his list of discharge medications but was a home medication.  Wife is wanting to know if he should be taking it.  Advised to call his PCP in the morning to discuss this.      Reason for Disposition  • Caller has medication question, adult has minor symptoms, caller declines triage, AND triager answers question    Additional Information  • Negative: Drug overdose and triager unable to answer question  • Negative: Caller requesting information unrelated to medicine  • Negative: Caller requesting a prescription for Strep throat and has a positive culture result  • Negative: Rash while taking a medication or within 3 days of stopping it  • Negative: Immunization reaction suspected  • Negative: [1] Asthma and [2] having symptoms of asthma (cough, wheezing, etc.)  • Negative: [1] Influenza symptoms AND [2] anti-viral med prescription request, such as Tamiflu  • Negative: [1] Symptom of illness (e.g., headache, abdominal pain, earache, vomiting) AND [2] more than mild  • Negative: MORE THAN A DOUBLE DOSE of a prescription or over-the-counter (OTC) drug  • Negative: [1] DOUBLE DOSE (an extra dose or lesser amount) of over-the-counter (OTC) drug AND [2] any symptoms (e.g., dizziness, nausea, pain, sleepiness)  • Negative: [1] DOUBLE DOSE (an extra dose or lesser amount) of prescription drug AND [2] any symptoms (e.g., dizziness, nausea, pain, sleepiness)  • Negative: Took another person's prescription drug  • Negative: [1] DOUBLE DOSE (an extra dose or lesser amount) of prescription drug AND [2] NO symptoms (Exception: a double dose of antibiotics)  • Negative: Diabetes drug error or overdose (e.g., took wrong type of insulin or took extra dose)  • Negative: [1] Request for URGENT new prescription or refill of \"essential\" medication (i.e., likelihood of harm to patient if not taken) AND [2] triager unable to fill per unit policy  • Negative: " "[1] Prescription not at pharmacy AND [2] was prescribed by PCP recently  • Negative: [1] Pharmacy calling with prescription questions AND [2] triager unable to answer question  • Negative: [1] Caller has URGENT medication question about med that PCP or specialist prescribed AND [2] triager unable to answer question  • Negative: [1] Caller has NON-URGENT medication question about med that PCP prescribed AND [2] triager unable to answer question  • Negative: [1] Caller requesting a NON-URGENT new prescription or refill AND [2] triager unable to refill per unit policy  • Negative: [1] Caller has medication question about med not prescribed by PCP AND [2] triager unable to answer question (e.g., compatibility with other med, storage)  • Negative: Caller requesting a CONTROLLED substance prescription refill (e.g., narcotics, ADHD medicines)  • Negative: Caller wants to use a complementary or alternative medicine  • Negative: [1] Prescription prescribed recently is not at pharmacy AND [2] triager has access to patient's EMR AND [3] prescription is recorded in the EMR  • Negative: [1] DOUBLE DOSE (an extra dose or lesser amount) of over-the-counter (OTC) drug AND [2] NO symptoms  • Negative: [1] DOUBLE DOSE (an extra dose or lesser amount) of antibiotic drug AND [2] NO symptoms  • Negative: Caller has medication question only, adult not sick, and triager answers question    Answer Assessment - Initial Assessment Questions  1.   NAME of MEDICATION: \"What medicine are you calling about?\"      lisinopril  2.   QUESTION: \"What is your question?\"      Just discharged from the hospital and lisinopril a home medication is not on list of medications.    3.   PRESCRIBING HCP: \"Who prescribed it?\" Reason: if prescribed by specialist, call should be referred to that group.     Has been on it for awhile.    4. SYMPTOMS: \"Do you have any symptoms?\"     No   5. SEVERITY: If symptoms are present, ask \"Are they mild, moderate or " "severe?\"      No   6.  PREGNANCY:  \"Is there any chance that you are pregnant?\" \"When was your last menstrual period?\"      Male    Protocols used: MEDICATION QUESTION CALL-ADULT-      "

## 2020-10-26 NOTE — ED PROVIDER NOTES
Subjective   Mr. Baum is an 81 yo male who has had two episodes of pericardial effusion in the last few months. Last pericardiocentesis was just last week, over one liter, catheter pulled after no further drainage, DC'd two days ago. He is on colchicine. He has been having persistent substernal chest pain without radiation. He has been taking his colchicine.This morning the pain was worse but he had no dyspnea, sweats, nausea, or radiation of the pain. He did call EMS and was given NTG x 2 with resolution of the chest pain. Currently he feels pretty good. He has no history of CAD or CHF. He had a benign stress test in 2017, no heart cath.      History provided by:  Medical records and patient      Review of Systems   Constitutional: Negative.  Negative for fever.   HENT: Negative.    Respiratory: Negative.  Negative for shortness of breath.    Cardiovascular: Positive for chest pain and leg swelling (He states that he noticed leg swelling a day or two ago.). Negative for palpitations.   Gastrointestinal: Negative.  Negative for abdominal pain and nausea.   Neurological: Negative.    Psychiatric/Behavioral: Negative.    All other systems reviewed and are negative.      Past Medical History:   Diagnosis Date   • A-fib (CMS/HCC)    • Chronic kidney disease    • History of migraine headaches    • Hyperlipidemia    • Hypertension    • Mitral valve regurgitation    • Pericardial effusion     s/p PPM placement   • PFO (patent foramen ovale)    • Testicular cyst     removal 1970   • TIA (transient ischemic attack)        Allergies   Allergen Reactions   • Flonase [Fluticasone] Irritability     Gets a nose bleed as soon as used   • Penicillins Rash     Rash all over body including mouth/throat        Past Surgical History:   Procedure Laterality Date   • CARDIAC CATHETERIZATION N/A 8/24/2020    Procedure: PERICARDIOCENTESIS;  Surgeon: Dani Nava MD;  Location: [facility] INVASIVE LOCATION;  Service: Cardiology;   Laterality: N/A;   • CARDIAC CATHETERIZATION N/A 10/20/2020    Procedure: PERICARDIOCENTESIS;  Surgeon: Dain Nava MD;  Location: [facility] EP INVASIVE LOCATION;  Service: Cardiology;  Laterality: N/A;   • CARDIAC ELECTROPHYSIOLOGY PROCEDURE N/A 8/24/2020    Procedure: PACEMAKER IMPLANTATION- DC;  Surgeon: Dain Nava MD;  Location:  [facility] INVASIVE LOCATION;  Service: Cardiology;  Laterality: N/A;   • CATARACT EXTRACTION W/ INTRAOCULAR LENS  IMPLANT, BILATERAL     • TONSILLECTOMY AND ADENOIDECTOMY  11 yo       Family History   Problem Relation Age of Onset   • Arthritis Other    • Hyperlipidemia Other    • Stroke Other    • Heart attack Mother    • Heart attack Father        Social History     Socioeconomic History   • Marital status:      Spouse name: Not on file   • Number of children: Not on file   • Years of education: Not on file   • Highest education level: Not on file   Tobacco Use   • Smoking status: Never Smoker   • Smokeless tobacco: Current User     Types: Chew   • Tobacco comment: Occasionally chews   Substance and Sexual Activity   • Alcohol use: Yes     Comment: 4 drinks per month   • Drug use: No   • Sexual activity: Defer           Objective   Physical Exam  Vitals signs and nursing note reviewed.   Constitutional:       General: He is not in acute distress.     Appearance: He is well-developed and normal weight.   HENT:      Head: Atraumatic.      Mouth/Throat:      Comments: Airway patent  Eyes:      Conjunctiva/sclera: Conjunctivae normal.   Neck:      Musculoskeletal: Neck supple.      Trachea: Phonation normal.   Cardiovascular:      Rate and Rhythm: Normal rate and regular rhythm.      Heart sounds: Normal heart sounds.   Pulmonary:      Effort: Pulmonary effort is normal. No respiratory distress.      Breath sounds: Normal breath sounds.   Chest:      Chest wall: No tenderness.   Abdominal:      Palpations: Abdomen is soft.      Tenderness: There is no abdominal  tenderness.   Skin:     General: Skin is warm and dry.      Comments: Minimal bruising epigastrium, presumably from pericardiocentesis site.   Neurological:      Mental Status: He is alert and oriented to person, place, and time.   Psychiatric:         Mood and Affect: Mood normal.         Behavior: Behavior normal.         Procedures           ED Course  ED Course as of Oct 26 2339   Mon Oct 26, 2020   1417 His troponins are similar and likely do not reflect acute MI. However, with chest pain relieved by NTG and new elevated BNP, this does not act like pericarditis. I have requested [facility] evaluation.    [LI]   1530 Currently awaiting final cardiologist disposition.    [LI]   1546 Cardiology has finalized evaluation, eprescribed steroids, reviewed echo, and are ready for him to be DC'd.    [LI]      ED Course User Index  [LI] Dereck Muñiz MD                                  Recent Results (from the past 24 hour(s))   Troponin    Collection Time: 10/26/20 11:30 AM    Specimen: Blood   Result Value Ref Range    Troponin T 0.032 (C) 0.000 - 0.030 ng/mL   Comprehensive Metabolic Panel    Collection Time: 10/26/20 11:30 AM    Specimen: Blood   Result Value Ref Range    Glucose 105 (H) 65 - 99 mg/dL    BUN 33 (H) 8 - 23 mg/dL    Creatinine 1.42 (H) 0.76 - 1.27 mg/dL    Sodium 139 136 - 145 mmol/L    Potassium 3.6 3.5 - 5.2 mmol/L    Chloride 107 98 - 107 mmol/L    CO2 23.0 22.0 - 29.0 mmol/L    Calcium 8.4 (L) 8.6 - 10.5 mg/dL    Total Protein 5.6 (L) 6.0 - 8.5 g/dL    Albumin 2.90 (L) 3.50 - 5.20 g/dL    ALT (SGPT) 100 (H) 1 - 41 U/L    AST (SGOT) 34 1 - 40 U/L    Alkaline Phosphatase 95 39 - 117 U/L    Total Bilirubin 0.3 0.0 - 1.2 mg/dL    eGFR Non African Amer 48 (L) >60 mL/min/1.73    Globulin 2.7 gm/dL    A/G Ratio 1.1 g/dL    BUN/Creatinine Ratio 23.2 7.0 - 25.0    Anion Gap 9.0 5.0 - 15.0 mmol/L   Lipase    Collection Time: 10/26/20 11:30 AM    Specimen: Blood   Result Value Ref Range    Lipase 54 13 - 60 U/L    BNP    Collection Time: 10/26/20 11:30 AM    Specimen: Blood   Result Value Ref Range    proBNP 3,172.0 (H) 0.0-1,800.0 pg/mL   Light Blue Top    Collection Time: 10/26/20 11:30 AM   Result Value Ref Range    Extra Tube hold for add-on    Green Top (Gel)    Collection Time: 10/26/20 11:30 AM   Result Value Ref Range    Extra Tube Hold for add-ons.    Lavender Top    Collection Time: 10/26/20 11:30 AM   Result Value Ref Range    Extra Tube hold for add-on    Gold Top - SST    Collection Time: 10/26/20 11:30 AM   Result Value Ref Range    Extra Tube Hold for add-ons.    CBC Auto Differential    Collection Time: 10/26/20 11:30 AM    Specimen: Blood   Result Value Ref Range    WBC 13.75 (H) 3.40 - 10.80 10*3/mm3    RBC 3.19 (L) 4.14 - 5.80 10*6/mm3    Hemoglobin 10.0 (L) 13.0 - 17.7 g/dL    Hematocrit 30.3 (L) 37.5 - 51.0 %    MCV 95.0 79.0 - 97.0 fL    MCH 31.3 26.6 - 33.0 pg    MCHC 33.0 31.5 - 35.7 g/dL    RDW 13.0 12.3 - 15.4 %    RDW-SD 44.8 37.0 - 54.0 fl    MPV 9.8 6.0 - 12.0 fL    Platelets 483 (H) 140 - 450 10*3/mm3    Neutrophil % 72.1 42.7 - 76.0 %    Lymphocyte % 16.1 (L) 19.6 - 45.3 %    Monocyte % 7.8 5.0 - 12.0 %    Eosinophil % 1.7 0.3 - 6.2 %    Basophil % 0.4 0.0 - 1.5 %    Immature Grans % 1.9 (H) 0.0 - 0.5 %    Neutrophils, Absolute 9.92 (H) 1.70 - 7.00 10*3/mm3    Lymphocytes, Absolute 2.21 0.70 - 3.10 10*3/mm3    Monocytes, Absolute 1.07 (H) 0.10 - 0.90 10*3/mm3    Eosinophils, Absolute 0.24 0.00 - 0.40 10*3/mm3    Basophils, Absolute 0.05 0.00 - 0.20 10*3/mm3    Immature Grans, Absolute 0.26 (H) 0.00 - 0.05 10*3/mm3    nRBC 0.0 0.0 - 0.2 /100 WBC   Troponin    Collection Time: 10/26/20  1:28 PM    Specimen: Blood   Result Value Ref Range    Troponin T 0.038 (C) 0.000 - 0.030 ng/mL   Adult Transthoracic Echo Limited W/ Cont if Necessary Per Protocol    Collection Time: 10/26/20  2:28 PM   Result Value Ref Range    BH CV VAS BP LEFT /107 mmHg    BSA 2.0 m^2    IVSd 1.1 cm    LVIDd 4.3 cm     LVIDs 2.7 cm    LVPWd 1.1 cm    IVS/LVPW 0.98     FS 37.4 %    EDV(Teich) 84.2 ml    ESV(Teich) 27.2 ml    EF(Teich) 67.7 %    EDV(cubed) 80.9 ml    ESV(cubed) 19.8 ml    EF(cubed) 75.5 %    LV mass(C)d 172.4 grams    LV mass(C)dI 86.3 grams/m^2    SV(Teich) 57.1 ml    SI(Teich) 28.6 ml/m^2    SV(cubed) 61.1 ml    SI(cubed) 30.6 ml/m^2    Ao root diam 3.4 cm    Ao root area 9.0 cm^2    LA dimension 2.6 cm    LA/Ao 0.76     LVOT diam 2.2 cm    LVOT area 3.8 cm^2    LVOT area(traced) 3.8 cm^2    LVLd ap4 7.7 cm    EDV(MOD-sp4) 95.0 ml    LVLs ap4 6.7 cm    ESV(MOD-sp4) 50.0 ml    EF(MOD-sp4) 47.4 %    LVLd ap2 7.6 cm    EDV(MOD-sp2) 87.0 ml    LVLs ap2 7.1 cm    ESV(MOD-sp2) 48.0 ml    EF(MOD-sp2) 44.8 %    EF(MOD-bp) 45.0 %    SV(MOD-sp4) 45.0 ml    SI(MOD-sp4) 22.5 ml/m^2    SV(MOD-sp2) 39.0 ml    SI(MOD-sp2) 19.5 ml/m^2    Ao root area (BSA corrected) 1.7     LV Mcginnis Vol (BSA corrected) 47.5 ml/m^2    LV Sys Vol (BSA corrected) 25.0 ml/m^2    TR max kathleen 241 cm/sec    TR max PG 23 mmHg     CV ECHO ABDOUL - BZI_BMI 23.3 kilograms/m^2     CV ECHO ABDOUL - BSA(HAYCOCK) 2.0 m^2     CV ECHO ABDOUL - BZI_METRIC_WEIGHT 78.0 kg     CV ECHO ABDOUL - BZI_METRIC_HEIGHT 182.9 cm    MV vena contracta 0.40 cm    RAP systole 3 mmHg    RVSP(TR) 26 mmHg    Echo EF Estimated 45 %     Note: In addition to lab results from this visit, the labs listed above may include labs taken at another facility or during a different encounter within the last 24 hours. Please correlate lab times with ED admission and discharge times for further clarification of the services performed during this visit.    XR Chest 1 View   Final Result   Decreasing now trace volume left pleural effusion from prior   comparison with improved aeration in the left mid and lower lung. No new   parenchymal process with cardiac silhouette unchanged.       D:  10/26/2020   E:  10/26/2020       This report was finalized on 10/26/2020 3:56 PM by Dr. Gunnar Garcia.             Vitals:    10/26/20 1520 10/26/20 1535 10/26/20 1540 10/26/20 1552   BP:       Pulse: 70 70 70 70   Resp:       Temp:       TempSrc:       SpO2: 96% 95% 97% 95%   Weight:       Height:         Medications - No data to display  ECG/EMG Results (last 24 hours)     Procedure Component Value Units Date/Time    ECG 12 Lead [[id number]] Collected: 10/26/20 1126     Updated: 10/26/20 1237    Narrative:      Test Reason : chest pain  Blood Pressure : **/** mmHG  Vent. Rate : 070 BPM     Atrial Rate : 070 BPM     P-R Int : 230 ms          QRS Dur : 132 ms      QT Int : 408 ms       P-R-T Axes : 005 060 116 degrees     QTc Int : 440 ms    Electronic atrial pacemaker  Nonspecific intraventricular block  Nonspecific T wave abnormality  Abnormal ECG  When compared with ECG of 23-OCT-2020 05:26,  ST no longer depressed in Anterior leads  Nonspecific T wave abnormality now evident in Inferior leads  Nonspecific T wave abnormality, worse in Anterolateral leads  Confirmed by IZABELLA BORRERO MD ([id number]) on 10/26/2020 12:37:24 PM    Referred By:  ed md           Confirmed By:IZABELLA BORRERO MD    ECG 12 Lead [[id number]] Collected: 10/26/20 1324     Updated: 10/26/20 1400        ECG 12 Lead         ECG 12 Lead   Final Result   Test Reason : chest pain   Blood Pressure : **/** mmHG   Vent. Rate : 070 BPM     Atrial Rate : 070 BPM      P-R Int : 230 ms          QRS Dur : 132 ms       QT Int : 408 ms       P-R-T Axes : 005 060 116 degrees      QTc Int : 440 ms      Electronic atrial pacemaker   Nonspecific intraventricular block   Nonspecific T wave abnormality   Abnormal ECG   When compared with ECG of 23-OCT-2020 05:26,   ST no longer depressed in Anterior leads   Nonspecific T wave abnormality now evident in Inferior leads   Nonspecific T wave abnormality, worse in Anterolateral leads   Confirmed by IZABELLA BORRERO MD ([id number]) on 10/26/2020 12:37:24 PM      Referred By:  negro price           Confirmed By:IZABELLA BORRERO MD                     MDM    Final diagnoses:   Pericarditis, unspecified chronicity, unspecified type            Dereck Muñiz MD  10/26/20 2917

## 2020-10-26 NOTE — OUTREACH NOTE
Call Center TCM Note      Responses   [facility] patient discharged from?  [place]   Does the patient have one of the following disease processes/diagnoses(primary or secondary)?  Other   TCM attempt successful?  No   Unsuccessful attempts  Attempt 1 [Patient in the ED. ]          Khushboo Rodriguez RN    10/26/2020, 12:21 EDT

## 2020-10-26 NOTE — CONSULTS
[facility] at [facility]  CARDIOLOGY CONSULTATION NOTE    Pio Baum  : 1940  MRN:[id number]  [phone]  Date of Admission:10/26/2020  Date of Consultation: 10/26/20    PCP: Lupillo Hill MD    IDENTIFICATION: A 80 y.o. male     Chief Complaint   Patient presents with   • Chest Pain       PROBLEM LIST:   1. Pericardial Effusion       A.  Right sided DDDR pacemaker 20.  Pericardial Effusion with tamponade post procedure s/p pericardiocentesis 2020.  270cc dark blood removed.       B.  Persistent chest pain, dyspnea, weakness with [facility] ED Presentation 10/19/2020       C.  CT Chest:  Large pericardial effusion with small left pleural effusion.   D. Repeat Pericardiocentesis 10/20/2020-ECV to Sinus, Tambocor therpay   E. ER admit 10/26/2020 with recurrent pleuritic chest pain. Repeat stat limited Echo, No significant Effusion.      2. Paroxysmal atrial fibrillation/SSS:       A.  CHADS-VASc = 5 (HTN, Age > 75, h/o Stroke), on Eliquis 5 mg BID.        B.  MPS, 2017: EF 58%, negative, low risk study       C.  DDDR [employer] permanent pacemaker 2020, Dr. Nava.       C.  ECV with 200J shock during pericardiocentesis. Normal pacemaker functioning.  3. TIA/CVA       A.  Carotid duplex, 2016: No significant disease       B.  Echocardiogram, 2016: Normal LV function, positive bubble study. Closure not considered due to need for chronic anticoagulation therapy.       C.  Cardiac event monitor showed atrial fibrillation/flutter with intermittent RVR, aberrancy, IVCD/sinus rhythm w/ PVCs   D. ECV-NSR 10/20/2020. Tambocor therapy. Ekg stable 10/24/2020. NSR.   4. Syncope:       A.  2 week Zio, 2019: SR, occasional PAC's and PVC's. Occasional short SVT/PAT, 21 runs at 3-20 beats.       B.  Echocardiogram, 2019: EF 55%. Borderline right-sided chamber enlargement. Mild MR/TR, normal RVSP.  5. Hypertension  6. Dyslipidemia  7. Oral  tobacco abuse  8. History of migraine headaches  9. Chronic kidney disease stage III  10.  Surgical history:  a. Tonsillectomy/adenoictomy       ALLERGIES:   Allergies   Allergen Reactions   • Flonase [Fluticasone] Irritability     Gets a nose bleed as soon as used   • Penicillins Rash     Rash all over body including mouth/throat        HOME MEDICINES:   Outpatient Medications    atorvastatin (LIPITOR) 40 MG tablet     colchicine 0.6 MG tablet     Ergocalciferol (VITAMIN D2 PO)     ferrous sulfate 325 (65 FE) MG tablet     flecainide (TAMBOCOR) 100 MG tablet     guaiFENesin (MUCINEX) 600 MG 12 hr tablet     metoprolol tartrate (LOPRESSOR) 25 MG tablet     Omega-3 1000 MG capsule     tamsulosin (FLOMAX) 0.4 MG capsule 24 hr capsule     ZINC-VITAMIN C PO          HPI:   Pleasant 80-year-old white male seen in consultation the ER after being admitted for recurrent chest pain.  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  The procedure was complicated by pericardial effusion.  He underwent successful pericardiocentesis.  Repeat limited echo revealed no significant recurrent pericardial effusion and was placed on colchicine therapy. HE was discharged home.  He was [facility] to [facility] 10/19/2020 with recurrent pericardial effusion.  He again underwent pericardiocentesis and drain.  His Eliquis therapy was held.  He was placed on again colchicine.  In addition during this time he was in a persistent atrial fibrillation therefore he was converted to sinus rhythm and placed on Tambocor therapy on  10/20/2020.  He remains in sinus rhythm.  Patient apparently called today and stated that he had continued to experience chest pain and was having this off and on throughout over the weekend after going home last week.  He got up to go in the shower he felt very nauseous and weak experience chest pain again.  His wife called our  "office and was told by our office to present to emergency room for further cardiac evaluation.    ROS: All systems have been reviewed and are negative with the exception of those mentioned in the HPI and problem list above.    Surgical History:   Past Surgical History:   Procedure Laterality Date   • CARDIAC CATHETERIZATION N/A 8/24/2020    Procedure: PERICARDIOCENTESIS;  Surgeon: Dain Nava MD;  Location:  [facility] INVASIVE LOCATION;  Service: Cardiology;  Laterality: N/A;   • CARDIAC CATHETERIZATION N/A 10/20/2020    Procedure: PERICARDIOCENTESIS;  Surgeon: Dain Nava MD;  Location:  [facility] INVASIVE LOCATION;  Service: Cardiology;  Laterality: N/A;   • CARDIAC ELECTROPHYSIOLOGY PROCEDURE N/A 8/24/2020    Procedure: PACEMAKER IMPLANTATION- DC;  Surgeon: Dain Nava MD;  Location:  [facility] INVASIVE LOCATION;  Service: Cardiology;  Laterality: N/A;   • CATARACT EXTRACTION W/ INTRAOCULAR LENS  IMPLANT, BILATERAL     • TONSILLECTOMY AND ADENOIDECTOMY  9 yo       Social History:   Social History     Socioeconomic History   • Marital status:      Spouse name: Not on file   • Number of children: Not on file   • Years of education: Not on file   • Highest education level: Not on file   Tobacco Use   • Smoking status: Never Smoker   • Smokeless tobacco: Current User     Types: Chew   • Tobacco comment: Occasionally chews   Substance and Sexual Activity   • Alcohol use: Yes     Comment: 4 drinks per month   • Drug use: No   • Sexual activity: Defer       Family History:   Family History   Problem Relation Age of Onset   • Arthritis Other    • Hyperlipidemia Other    • Stroke Other    • Heart attack Mother    • Heart attack Father        Objective     BP (!) 167/107   Pulse 70   Temp 97.8 °F (36.6 °C) (Oral)   Resp 16   Ht 182.9 cm (72\")   Wt 78 kg (172 lb)   SpO2 96%   BMI 23.33 kg/m²   No intake or output data in the 24 hours ending 10/26/20 1333    PHYSICAL EXAM:  Constitutional:  " Well-nourished, cooperative, in no acute distress.   Head:  Normocephalic, without obvious abnormality, atraumatic.   Neck: No adenopathy, supple, trachea midline, no thyromegaly, no    carotid bruit, no JVD.   Respiratory:   Clear to auscultation bilaterally; respirations regular, even and unlabored. No wheezes, rales or rhonchi.    Cardiovascular:  Regular rhythm and normal rate, normal S1 and S2, no            murmur, no gallop, no rub, no click.   Pulses: Peripheral pulses are present and equal bilaterally.   GI:   Soft, non-distended. Bowel sounds heard throughout. No organomegaly or masses. Non-tender to palpation, no guarding.   Extremities: Trace edema,  nO clubbing or cyanosis.   Skin: Skin is warm and dry. No bleeding, bruising or rash.   Neurological: Alert, oriented to time, person and place. No focal deficits.     Labs/Diagnostic Data  Results from last 7 days   Lab Units 10/26/20  1130 10/24/20  0611 10/23/20  0416   SODIUM mmol/L 139 139 136   POTASSIUM mmol/L 3.6 3.7 4.4   CHLORIDE mmol/L 107 106 107   CO2 mmol/L 23.0 23.0 21.0*   BUN mg/dL 33* 45* 45*   CREATININE mg/dL 1.42* 1.36* 1.43*   GLUCOSE mg/dL 105* 105* 175*   CALCIUM mg/dL 8.4* 8.7 8.4*     Results from last 7 days   Lab Units 10/26/20  1130 10/19/20  2146 10/19/20  1944   TROPONIN T ng/mL 0.032* 0.013 <0.010     Results from last 7 days   Lab Units 10/26/20  1130 10/24/20  0611 10/23/20  0416   WBC 10*3/mm3 13.75* 19.54* 18.74*   HEMOGLOBIN g/dL 10.0* 9.1* 8.2*   HEMATOCRIT % 30.3* 28.4* 26.6*   PLATELETS 10*3/mm3 483* 430 319     Results from last 7 days   Lab Units 10/24/20  0611   MAGNESIUM mg/dL 2.3                 Results from last 7 days   Lab Units 10/26/20  1130   PROBNP pg/mL 3,172.0*     Results from last 7 days   Lab Units 10/22/20  0525   PROTIME Seconds 18.6*   INR  1.59*       I personally reviewed the patient's EKG/Telemetry data  NSR.       EXAMINATION: XR CHEST 1 VW- 10/26/2020   INDICATION: Chest Pain triage protocol     COMPARISON: Chest x-ray 10/21/2020   FINDINGS: Decreasing now trace volume left pleural effusion from prior  comparison with improved aeration in the left mid and lower lung. No new  parenchymal process with cardiac silhouette unchanged.      IMPRESSION:  Decreasing now trace volume left pleural effusion from prior  comparison with improved aeration in the left mid and lower lung. No new  parenchymal process with cardiac silhouette unchanged.   D:  10/26/2020  E:  10/26/2020    Current Medications:    aspirin, 324 mg, Oral, Once           Assessment and Plan:     1. Pericardial effusion: Pericardiocentesis with drain 10/20/2020.   2. Persistent AFib with RVR: ECV 10/20/2020, Flecainide 100mg BID, Lopressor 12.5mg BID. EKG Stable 10/23/2020 and 10/26/2020.  NSR.   3. SSS, Right sided DDDR [facility] Pacemaker 8/24/2020  4. Anticoagulation: Eliquis on hold secondary to recent Pericardial effusion.   5. CKD, Chronic Anemia       PLAN:  · Repeat limited echocardiogram in ER 10/26/2020 reveals trace amount of recurrent pericardial effusion.    · Short course of oral prednisone with Colchine for pericarditis.   · Follow up one one month with Dr. Nava     Thank you for allowing me to participate in the care of Pio Baum. Feel free to contact me directly with any further questions or concerns.  Electronically signed by KENN Bazzi, 10/26/20, 1:56 PM EDT.

## 2020-10-27 ENCOUNTER — TELEPHONE (OUTPATIENT)
Dept: INTERNAL MEDICINE | Facility: CLINIC | Age: 80
End: 2020-10-27

## 2020-10-27 ENCOUNTER — TRANSITIONAL CARE MANAGEMENT TELEPHONE ENCOUNTER (OUTPATIENT)
Dept: CALL CENTER | Facility: HOSPITAL | Age: 80
End: 2020-10-27

## 2020-10-27 RX ORDER — PREDNISONE 10 MG/1
TABLET ORAL
Qty: 1 TABLET | Refills: 0 | Status: SHIPPED | OUTPATIENT
Start: 2020-10-27 | End: 2020-11-13

## 2020-10-27 NOTE — TELEPHONE ENCOUNTER
Crystal,  I contacted Mrs. Baum for AWV and she declined because of  being ill.    Mr. Baum is not feeling well.  He is yellow and pale in color per spouse.  He has a rattling cough, his throat is sore, he is losing his voice, and he chokes frequently with eating.    May I schedule him to see Dr. Hill tomorrow evening?    He is not taking any otc meds except flomax bid.  He stopped those yesterday.    Please advise.

## 2020-10-27 NOTE — TELEPHONE ENCOUNTER
One has not been made yet bc they wanted to let us know about all his symptoms. When would you like for him to come and I will schedule

## 2020-10-27 NOTE — OUTREACH NOTE
Call Center TCM Note      Responses   [facility] patient discharged from?  [place]   Does the patient have one of the following disease processes/diagnoses(primary or secondary)?  Other   TCM attempt successful?  Yes   Call start time  1226   Call end time  1232   Discharge diagnosis  pericardial effusion, s/p pericardiocentesis   Is patient permission given to speak with other caregiver?  Yes   List who call center can speak with  spouse- Willow   Person spoke with today (if not patient) and relationship  spouse- Willow/ Patient   Meds reviewed with patient/caregiver?  Yes   Is the patient having any side effects they believe may be caused by any medication additions or changes?  No   Does the patient have all medications ordered at discharge?  Yes   Is the patient taking all medications as directed (includes completed medication regime)?  Yes   Does the patient have a primary care provider?   Yes   Does the patient have an appointment with their PCP within 7 days of discharge?  N/A   Has the patient kept scheduled appointments due by today?  N/A   Psychosocial issues?  No   Did the patient receive a copy of their discharge instructions?  Yes   Nursing interventions  Reviewed instructions with patient   What is the patient's perception of their health status since discharge?  Same   Is the patient/caregiver able to teach back signs and symptoms related to disease process for when to call PCP?  Yes   Is the patient/caregiver able to teach back signs and symptoms related to disease process for when to call 911?  Yes   Is the patient/caregiver able to teach back the hierarchy of who to call/visit for symptoms/problems? PCP, Specialist, Home health nurse, Urgent Care, ED, 911  Yes   TCM call completed?  Yes   Wrap up additional comments  Patient says he is doing ok but is concerned about his black diarrhea, told patient I would send a note over and have someone call him to discuss the issue.          Ольга  SORIN Brower RN    10/27/2020, 12:32 EDT

## 2020-10-27 NOTE — TELEPHONE ENCOUNTER
Received [employer] message about black diarrhea. Tried calling pt to ask if has been taking an OTC meds. LVM for pt to call back

## 2020-11-02 ENCOUNTER — READMISSION MANAGEMENT (OUTPATIENT)
Dept: CALL CENTER | Facility: HOSPITAL | Age: 80
End: 2020-11-02

## 2020-11-02 NOTE — OUTREACH NOTE
Medical Week 2 Survey      Responses   [facility] patient discharged from?  [place]   Does the patient have one of the following disease processes/diagnoses(primary or secondary)?  Other   Week 2 attempt successful?  Yes   Call start time  1543   Discharge diagnosis  pericardial effusion, s/p pericardiocentesis   Call end time  1558   Person spoke with today (if not patient) and relationship  spouse- Willow/ Patient   Meds reviewed with patient/caregiver?  Yes   Is the patient taking all medications as directed (includes completed medication regime)?  Yes   Has the patient kept scheduled appointments due by today?  N/A   Comments  11/3/2020 @ 345pm   Psychosocial issues?  No   Comments  Pt is more sleepy and fatigued than normal per wife's report. He is more weak than normal. Pt is walking more about to try and get more energy per wife. pt is having diarrhea per wife's report.    What is the patient's perception of their health status since discharge?  Same   Is the patient/caregiver able to teach back the hierarchy of who to call/visit for symptoms/problems? PCP, Specialist, Home health nurse, Urgent Care, ED, 911  Yes   If the patient is a current smoker, are they able to teach back resources for cessation?  Not a smoker   Week 2 Call Completed?  Yes          Jolynn Winkler RN

## 2020-11-03 ENCOUNTER — OFFICE VISIT (OUTPATIENT)
Dept: INTERNAL MEDICINE | Facility: CLINIC | Age: 80
End: 2020-11-03

## 2020-11-03 ENCOUNTER — LAB REQUISITION (OUTPATIENT)
Dept: LAB | Facility: HOSPITAL | Age: 80
End: 2020-11-03

## 2020-11-03 ENCOUNTER — LAB (OUTPATIENT)
Dept: LAB | Facility: HOSPITAL | Age: 80
End: 2020-11-03

## 2020-11-03 VITALS
BODY MASS INDEX: 23.3 KG/M2 | HEART RATE: 72 BPM | WEIGHT: 172 LBS | SYSTOLIC BLOOD PRESSURE: 94 MMHG | HEIGHT: 72 IN | DIASTOLIC BLOOD PRESSURE: 56 MMHG | TEMPERATURE: 97.3 F

## 2020-11-03 DIAGNOSIS — N18.30 STAGE 3 CHRONIC KIDNEY DISEASE, UNSPECIFIED WHETHER STAGE 3A OR 3B CKD (HCC): ICD-10-CM

## 2020-11-03 DIAGNOSIS — I10 ESSENTIAL HYPERTENSION: ICD-10-CM

## 2020-11-03 DIAGNOSIS — E78.49 OTHER HYPERLIPIDEMIA: Primary | ICD-10-CM

## 2020-11-03 DIAGNOSIS — K57.90 DIVERTICULOSIS: ICD-10-CM

## 2020-11-03 DIAGNOSIS — I48.0 PAROXYSMAL ATRIAL FIBRILLATION (HCC): ICD-10-CM

## 2020-11-03 DIAGNOSIS — I10 ESSENTIAL (PRIMARY) HYPERTENSION: ICD-10-CM

## 2020-11-03 DIAGNOSIS — I31.39 PERICARDIAL EFFUSION: ICD-10-CM

## 2020-11-03 PROCEDURE — 36415 COLL VENOUS BLD VENIPUNCTURE: CPT | Performed by: INTERNAL MEDICINE

## 2020-11-03 PROCEDURE — 99495 TRANSJ CARE MGMT MOD F2F 14D: CPT | Performed by: INTERNAL MEDICINE

## 2020-11-03 RX ORDER — LISINOPRIL 10 MG/1
10 TABLET ORAL 2 TIMES DAILY
COMMUNITY
End: 2020-12-22

## 2020-11-03 NOTE — PROGRESS NOTES
Transitional Care Follow Up Visit  Subjective     Pio Baum is a 80 y.o. male who presents for a transitional care management visit.    Within 48 business hours after discharge our office contacted him via telephone to coordinate his care and needs.      I reviewed and discussed the details of that call along with the discharge summary, hospital problems, inpatient lab results, inpatient diagnostic studies, and consultation reports with Pio.     Current outpatient and discharge medications have been reconciled for the patient.  Reviewed by: Lupillo Hill MD      Date of TCM Phone Call 10/24/2020   [facility]   Date of Admission 10/19/2020   Date of Discharge 10/24/2020   Discharge Disposition Home or Self Care     Risk for Readmission (LACE) Score: 12 (10/24/2020  6:00 AM)      History of Present Illness   Course During Hospital Stay: Here for f/u after recent hospitalization for recurrent pericardial effusion.  Required drain with about 1.3 L of fluid.  Breathing is not the best when he is tired.  No dizziness or lightheadedness.  No nausea or emesis. Appetite is good.  No fever or chills.       The following portions of the patient's history were reviewed and updated as appropriate: allergies, current medications, past family history, past medical history, past social history, past surgical history and problem list.    Review of Systems   Constitutional: Positive for fatigue. Negative for activity change, appetite change, chills, fever and unexpected weight change.   HENT: Negative for congestion, ear pain, hearing loss, rhinorrhea, sinus pressure, sinus pain, sore throat and trouble swallowing.    Eyes: Negative for photophobia, pain, discharge and itching.   Respiratory: Negative for cough, chest tightness, shortness of breath and wheezing.    Cardiovascular: Negative for chest pain, palpitations and leg swelling.   Gastrointestinal: Negative for abdominal distention, abdominal pain,  blood in stool, constipation, diarrhea and vomiting.        Colonoscopy by Dr Schmid   Endocrine: Negative for cold intolerance, heat intolerance, polydipsia, polyphagia and polyuria.   Genitourinary: Negative for difficulty urinating, dysuria, enuresis, frequency, genital sores, hematuria and urgency.        Followed by Dr Segal   Musculoskeletal: Positive for arthralgias and back pain (better). Negative for gait problem, joint swelling and myalgias.   Skin: Negative for pallor, rash and wound.   Allergic/Immunologic: Negative for immunocompromised state.   Neurological: Positive for weakness. Negative for tremors, seizures, syncope, speech difficulty, numbness and headaches.   Hematological: Negative for adenopathy.   Psychiatric/Behavioral: Negative for behavioral problems, dysphoric mood, sleep disturbance and suicidal ideas. The patient is not nervous/anxious.        Objective   Physical Exam  Constitutional:       Appearance: Normal appearance. He is well-developed.   HENT:      Head: Normocephalic and atraumatic.      Right Ear: External ear normal.      Left Ear: External ear normal.      Nose: Nose normal.      Mouth/Throat:      Mouth: Mucous membranes are moist.      Pharynx: Oropharynx is clear.   Eyes:      Extraocular Movements: Extraocular movements intact.      Conjunctiva/sclera: Conjunctivae normal.      Pupils: Pupils are equal, round, and reactive to light.   Neck:      Musculoskeletal: Normal range of motion and neck supple.   Cardiovascular:      Rate and Rhythm: Normal rate and regular rhythm.      Heart sounds: Normal heart sounds.   Pulmonary:      Effort: Pulmonary effort is normal.      Breath sounds: Normal breath sounds.   Abdominal:      General: Bowel sounds are normal.      Palpations: Abdomen is soft.   Musculoskeletal: Normal range of motion.   Lymphadenopathy:      Cervical: No cervical adenopathy.   Skin:     General: Skin is warm and dry.   Neurological:      General: No focal  deficit present.      Mental Status: He is alert and oriented to person, place, and time.   Psychiatric:         Mood and Affect: Mood normal.         Behavior: Behavior normal.         Thought Content: Thought content normal.         Assessment/Plan   Diagnoses and all orders for this visit:    1. Other hyperlipidemia (Primary)    2. Essential hypertension  -     CBC (No Diff)  -     Basic Metabolic Panel    3. Pericardial effusion    4. Diverticulosis    5. Stage 3 chronic kidney disease, unspecified whether stage 3a or 3b CKD    6. Paroxysmal atrial fibrillation (CMS/HCC)             htn-cut the lisinopril to 10 mg qhs only  paf-rate controlled on dual tx  Hyperlipidemia- labs 6/13 at goal, counseled on diet, recheck on rtc  djd-stable on tylenol  Thrombocytopenia/anemia-cbc today improving  TIA-cont rf mod  CKD-recheck noted, advised fluids and NSAIDS avoidance  Abnormal glucose-AIC 9/4 normal     9/4 Labs noted and dw patient, advised to increase fluids, refusing flu shot     Pneumovax 23 after 1/20

## 2020-11-04 LAB
BUN SERPL-MCNC: 37 MG/DL (ref 8–23)
BUN/CREAT SERPL: 20.8 (ref 7–25)
CALCIUM SERPL-MCNC: 8.6 MG/DL (ref 8.6–10.5)
CHLORIDE SERPL-SCNC: 102 MMOL/L (ref 98–107)
CO2 SERPL-SCNC: 26.8 MMOL/L (ref 22–29)
CREAT SERPL-MCNC: 1.78 MG/DL (ref 0.76–1.27)
ERYTHROCYTE [DISTWIDTH] IN BLOOD BY AUTOMATED COUNT: 13.3 % (ref 12.3–15.4)
GLUCOSE SERPL-MCNC: 89 MG/DL (ref 65–99)
HCT VFR BLD AUTO: 34.2 % (ref 37.5–51)
HGB BLD-MCNC: 10.9 G/DL (ref 13–17.7)
MCH RBC QN AUTO: 30 PG (ref 26.6–33)
MCHC RBC AUTO-ENTMCNC: 31.9 G/DL (ref 31.5–35.7)
MCV RBC AUTO: 94.2 FL (ref 79–97)
PLATELET # BLD AUTO: 382 10*3/MM3 (ref 140–450)
POTASSIUM SERPL-SCNC: 4.3 MMOL/L (ref 3.5–5.2)
RBC # BLD AUTO: 3.63 10*6/MM3 (ref 4.14–5.8)
SODIUM SERPL-SCNC: 137 MMOL/L (ref 136–145)
WBC # BLD AUTO: 14.83 10*3/MM3 (ref 3.4–10.8)

## 2020-11-07 ENCOUNTER — APPOINTMENT (OUTPATIENT)
Dept: CT IMAGING | Facility: HOSPITAL | Age: 80
End: 2020-11-07

## 2020-11-07 ENCOUNTER — HOSPITAL ENCOUNTER (EMERGENCY)
Facility: HOSPITAL | Age: 80
Discharge: HOME | End: 2020-11-08
Attending: EMERGENCY MEDICINE | Admitting: EMERGENCY MEDICINE

## 2020-11-07 DIAGNOSIS — R55 SYNCOPE, UNSPECIFIED SYNCOPE TYPE: Primary | ICD-10-CM

## 2020-11-07 LAB
ALBUMIN SERPL-MCNC: 3.7 G/DL (ref 3.5–5.2)
ALBUMIN/GLOB SERPL: 1.4 G/DL
ALP SERPL-CCNC: 99 U/L (ref 39–117)
ALT SERPL W P-5'-P-CCNC: 23 U/L (ref 1–41)
ANION GAP SERPL CALCULATED.3IONS-SCNC: 10 MMOL/L (ref 5–15)
AST SERPL-CCNC: 18 U/L (ref 1–40)
BASOPHILS # BLD AUTO: 0.07 10*3/MM3 (ref 0–0.2)
BASOPHILS NFR BLD AUTO: 0.5 % (ref 0–1.5)
BILIRUB SERPL-MCNC: 0.4 MG/DL (ref 0–1.2)
BUN SERPL-MCNC: 25 MG/DL (ref 8–23)
BUN/CREAT SERPL: 15.2 (ref 7–25)
CALCIUM SPEC-SCNC: 8.4 MG/DL (ref 8.6–10.5)
CHLORIDE SERPL-SCNC: 104 MMOL/L (ref 98–107)
CO2 SERPL-SCNC: 23 MMOL/L (ref 22–29)
CREAT SERPL-MCNC: 1.64 MG/DL (ref 0.76–1.27)
DEPRECATED RDW RBC AUTO: 54 FL (ref 37–54)
EOSINOPHIL # BLD AUTO: 0.18 10*3/MM3 (ref 0–0.4)
EOSINOPHIL NFR BLD AUTO: 1.4 % (ref 0.3–6.2)
ERYTHROCYTE [DISTWIDTH] IN BLOOD BY AUTOMATED COUNT: 15.1 % (ref 12.3–15.4)
GFR SERPL CREATININE-BSD FRML MDRD: 40 ML/MIN/1.73
GLOBULIN UR ELPH-MCNC: 2.6 GM/DL
GLUCOSE SERPL-MCNC: 177 MG/DL (ref 65–99)
HCT VFR BLD AUTO: 35.1 % (ref 37.5–51)
HGB BLD-MCNC: 11 G/DL (ref 13–17.7)
HOLD SPECIMEN: NORMAL
HOLD SPECIMEN: NORMAL
IMM GRANULOCYTES # BLD AUTO: 0.05 10*3/MM3 (ref 0–0.05)
IMM GRANULOCYTES NFR BLD AUTO: 0.4 % (ref 0–0.5)
LYMPHOCYTES # BLD AUTO: 1.48 10*3/MM3 (ref 0.7–3.1)
LYMPHOCYTES NFR BLD AUTO: 11.6 % (ref 19.6–45.3)
MAGNESIUM SERPL-MCNC: 2.1 MG/DL (ref 1.6–2.4)
MCH RBC QN AUTO: 30.3 PG (ref 26.6–33)
MCHC RBC AUTO-ENTMCNC: 31.3 G/DL (ref 31.5–35.7)
MCV RBC AUTO: 96.7 FL (ref 79–97)
MONOCYTES # BLD AUTO: 0.81 10*3/MM3 (ref 0.1–0.9)
MONOCYTES NFR BLD AUTO: 6.4 % (ref 5–12)
NEUTROPHILS NFR BLD AUTO: 10.15 10*3/MM3 (ref 1.7–7)
NEUTROPHILS NFR BLD AUTO: 79.7 % (ref 42.7–76)
NRBC BLD AUTO-RTO: 0 /100 WBC (ref 0–0.2)
PLATELET # BLD AUTO: 219 10*3/MM3 (ref 140–450)
PMV BLD AUTO: 10.5 FL (ref 6–12)
POTASSIUM SERPL-SCNC: 4.2 MMOL/L (ref 3.5–5.2)
PROT SERPL-MCNC: 6.3 G/DL (ref 6–8.5)
RBC # BLD AUTO: 3.63 10*6/MM3 (ref 4.14–5.8)
SODIUM SERPL-SCNC: 137 MMOL/L (ref 136–145)
TROPONIN T SERPL-MCNC: <0.01 NG/ML (ref 0–0.03)
WBC # BLD AUTO: 12.74 10*3/MM3 (ref 3.4–10.8)
WHOLE BLOOD HOLD SPECIMEN: NORMAL
WHOLE BLOOD HOLD SPECIMEN: NORMAL

## 2020-11-07 PROCEDURE — 84484 ASSAY OF TROPONIN QUANT: CPT | Performed by: EMERGENCY MEDICINE

## 2020-11-07 PROCEDURE — 70450 CT HEAD/BRAIN W/O DYE: CPT

## 2020-11-07 PROCEDURE — 71250 CT THORAX DX C-: CPT

## 2020-11-07 PROCEDURE — 93005 ELECTROCARDIOGRAM TRACING: CPT

## 2020-11-07 PROCEDURE — 80053 COMPREHEN METABOLIC PANEL: CPT | Performed by: EMERGENCY MEDICINE

## 2020-11-07 PROCEDURE — 93005 ELECTROCARDIOGRAM TRACING: CPT | Performed by: EMERGENCY MEDICINE

## 2020-11-07 PROCEDURE — 83735 ASSAY OF MAGNESIUM: CPT | Performed by: EMERGENCY MEDICINE

## 2020-11-07 PROCEDURE — 85025 COMPLETE CBC W/AUTO DIFF WBC: CPT

## 2020-11-07 PROCEDURE — 99284 EMERGENCY DEPT VISIT MOD MDM: CPT

## 2020-11-07 RX ORDER — SODIUM CHLORIDE 0.9 % (FLUSH) 0.9 %
10 SYRINGE (ML) INJECTION AS NEEDED
Status: DISCONTINUED | OUTPATIENT
Start: 2020-11-07 | End: 2020-11-08 | Stop reason: HOSPADM

## 2020-11-08 VITALS
RESPIRATION RATE: 24 BRPM | DIASTOLIC BLOOD PRESSURE: 67 MMHG | OXYGEN SATURATION: 97 % | BODY MASS INDEX: 22.62 KG/M2 | HEART RATE: 70 BPM | SYSTOLIC BLOOD PRESSURE: 124 MMHG | HEIGHT: 72 IN | WEIGHT: 167 LBS | TEMPERATURE: 98.6 F

## 2020-11-08 LAB — TROPONIN T SERPL-MCNC: <0.01 NG/ML (ref 0–0.03)

## 2020-11-08 NOTE — DISCHARGE INSTRUCTIONS
Follow-up with cardiology and primary care physician for further outpatient evaluation.    Return immediately to the ER with any further concerns.

## 2020-11-08 NOTE — ED PROVIDER NOTES
Subjective   80-year-old male presents for evaluation of syncope.  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 Pacemaker performed by Dr. Nava.  The patient reports that at dinnertime he stood up from his chair and began to have black spots in his vision and ultimately sat back down put suffered and syncopal episode lasting approximately 10 seconds, no reported trauma.  At that given time he denies any lightheadedness, dizziness, chest pain, shortness of breath, or palpitations.  No recent acute respiratory symptoms or infectious symptoms.  The patient does note that he had some chest pain earlier today at 2 PM today when he was stretching on the couch.  The chest pain lasted for only a few seconds in the left lower chest and resolved without any intervention.  Patient reports having some mild epigastric pain at dinnertime itself, which was before he suffered syncopal episode. Patient also had previous history of pericardial effusion in past that required drainage.           Review of Systems   Constitutional: Negative for chills, fatigue and fever.   HENT: Negative for congestion, ear pain, postnasal drip, sinus pressure and sore throat.    Eyes: Negative for pain, redness and visual disturbance.   Respiratory: Negative for cough, chest tightness and shortness of breath.    Cardiovascular: Positive for chest pain. Negative for palpitations and leg swelling.   Gastrointestinal: Positive for abdominal pain. Negative for anal bleeding, blood in stool, diarrhea, nausea and vomiting.   Endocrine: Negative for polydipsia and polyuria.   Genitourinary: Negative for difficulty urinating, dysuria, frequency and urgency.   Musculoskeletal: Negative for arthralgias, back pain and neck pain.   Skin: Negative for pallor and rash.   Allergic/Immunologic: Negative for environmental allergies and immunocompromised state.   Neurological: Positive for  syncope. Negative for dizziness, weakness and headaches.   Hematological: Negative for adenopathy.   Psychiatric/Behavioral: Negative for confusion, self-injury and suicidal ideas. The patient is not nervous/anxious.    All other systems reviewed and are negative.      Past Medical History:   Diagnosis Date   • A-fib (CMS/HCC)    • Chronic kidney disease    • History of migraine headaches    • Hyperlipidemia    • Hypertension    • Mitral valve regurgitation    • Pericardial effusion     s/p PPM placement   • PFO (patent foramen ovale)    • Testicular cyst     removal 1970   • TIA (transient ischemic attack)        Allergies   Allergen Reactions   • Flonase [Fluticasone] Irritability     Gets a nose bleed as soon as used   • Penicillins Rash     Rash all over body including mouth/throat        Past Surgical History:   Procedure Laterality Date   • CARDIAC CATHETERIZATION N/A 8/24/2020    Procedure: PERICARDIOCENTESIS;  Surgeon: Dain Nava MD;  Location:  [facility];  Service: Cardiology;  Laterality: N/A;   • CARDIAC CATHETERIZATION N/A 10/20/2020    Procedure: PERICARDIOCENTESIS;  Surgeon: Dain Nava MD;  Location: [facility];  Service: Cardiology;  Laterality: N/A;   • CARDIAC ELECTROPHYSIOLOGY PROCEDURE N/A 8/24/2020    Procedure: PACEMAKER IMPLANTATION- DC;  Surgeon: Dain Nava MD;  Location:  [facility];  Service: Cardiology;  Laterality: N/A;   • CATARACT EXTRACTION W/ INTRAOCULAR LENS  IMPLANT, BILATERAL     • TONSILLECTOMY AND ADENOIDECTOMY  11 yo       Family History   Problem Relation Age of Onset   • Arthritis Other    • Hyperlipidemia Other    • Stroke Other    • Heart attack Mother    • Heart attack Father        Social History     Socioeconomic History   • Marital status:      Spouse name: Not on file   • Number of children: Not on file   • Years of education: Not on file   • Highest education level: Not on file   Tobacco  Use   • Smoking status: Never Smoker   • Smokeless tobacco: Current User     Types: Chew   • Tobacco comment: Occasionally chews   Substance and Sexual Activity   • Alcohol use: Yes     Comment: 4 drinks per month   • Drug use: No   • Sexual activity: Defer           Objective   Physical Exam  Vitals signs and nursing note reviewed.   Constitutional:       General: He is not in acute distress.     Appearance: Normal appearance. He is well-developed. He is not toxic-appearing or diaphoretic.   HENT:      Head: Normocephalic and atraumatic.      Right Ear: External ear normal.      Left Ear: External ear normal.      Nose: Nose normal.   Eyes:      General: Lids are normal.      Pupils: Pupils are equal, round, and reactive to light.   Neck:      Musculoskeletal: Normal range of motion and neck supple.      Trachea: No tracheal deviation.   Cardiovascular:      Rate and Rhythm: Normal rate and regular rhythm.      Pulses: No decreased pulses.      Heart sounds: Normal heart sounds. No murmur. No friction rub. No gallop.    Pulmonary:      Effort: Pulmonary effort is normal. No respiratory distress.      Breath sounds: Normal breath sounds. No decreased breath sounds, wheezing, rhonchi or rales.   Abdominal:      General: Bowel sounds are normal.      Palpations: Abdomen is soft.      Tenderness: There is no abdominal tenderness. There is no guarding or rebound.   Musculoskeletal: Normal range of motion.         General: No deformity.   Lymphadenopathy:      Cervical: No cervical adenopathy.   Skin:     General: Skin is warm and dry.      Findings: No rash.   Neurological:      Mental Status: He is alert and oriented to person, place, and time.      Cranial Nerves: No cranial nerve deficit.      Sensory: No sensory deficit.   Psychiatric:         Speech: Speech normal.         Behavior: Behavior normal.         Thought Content: Thought content normal.         Judgment: Judgment normal.         Procedures           ED  Course                                           MDM  Number of Diagnoses or Management Options  Syncope, unspecified syncope type: new and requires workup  Diagnosis management comments: The patient's CT scan of the head and CT scan of the chest do not show any acute abnormalities.  The previously present pericardial effusion that was drained has remained resolved.    The patient has maintained normal vital signs throughout the ER course including a normal heart rate, normal heart rhythm, and normal oxygen saturations and blood pressure.    I did serial cardiac enzymes and EKGs which did not show any ischemic changes or abnormal rhythm.    I discussed admission with the patient, but the patient prefers to go home at this given time.  He states that he has follow-up with his cardiologist, Dr. Carson, in 5 days and can discuss his situation further at that given time.    He is advised to take his time whenever transitioning from the lower to higher position.  He is advised to drink plenty of water.  Advised to return to the ER immediately with any further concerns or change in symptoms.       Amount and/or Complexity of Data Reviewed  Clinical lab tests: ordered and reviewed  Tests in the radiology section of CPT®: ordered and reviewed  Decide to obtain previous medical records or to obtain history from someone other than the patient: yes  Obtain history from someone other than the patient: yes  Review and summarize past medical records: yes  Independent visualization of images, tracings, or specimens: yes        Final diagnoses:   Syncope, unspecified syncope type            Millie Waite MD  11/08/20 0050

## 2020-11-09 ENCOUNTER — TELEPHONE (OUTPATIENT)
Dept: CARDIOLOGY | Facility: CLINIC | Age: 80
End: 2020-11-09

## 2020-11-09 ENCOUNTER — EPISODE CHANGES (OUTPATIENT)
Dept: CASE MANAGEMENT | Facility: OTHER | Age: 80
End: 2020-11-09

## 2020-11-09 LAB
QT INTERVAL: 360 MS
QT INTERVAL: 378 MS
QTC INTERVAL: 408 MS
QTC INTERVAL: 418 MS

## 2020-11-09 NOTE — TELEPHONE ENCOUNTER
Pt wife wants you to know that he passed out again on Saturday and was in the ER. They wanted to admit him but pt refused. She is afraid that his heart is stopping. CT scan of head was negative. He was walking from fridge to table and his whole right side was weak and he was walking to the right side. Once he got to the table he fell face first on the top of the table. It lasted about 10-15 seconds and when he came to his speech was incoherent and he had urinated on hisself. Wife states someone needs to do something he is weak all the time and is dying right in front of her eyes. H&H is still low 35.1/11.0. Dr. Hill cut his Ferrous sulfate 325mg to once daily wife thinks it was due to diarrhea. I sent the wife over to Joann Smallwood in the device clinic to check DC PPM  for any alerts from Sat night. She notes no events. Pt has appt with Dr. Carson on Friday. ER wanted him seen by you asap.

## 2020-11-10 ENCOUNTER — HOSPITAL ENCOUNTER (EMERGENCY)
Facility: HOSPITAL | Age: 80
Discharge: HOME | End: 2020-11-10
Attending: EMERGENCY MEDICINE | Admitting: EMERGENCY MEDICINE

## 2020-11-10 ENCOUNTER — TELEPHONE (OUTPATIENT)
Dept: CARDIOLOGY | Facility: CLINIC | Age: 80
End: 2020-11-10

## 2020-11-10 ENCOUNTER — TELEPHONE (OUTPATIENT)
Dept: INTERNAL MEDICINE | Facility: CLINIC | Age: 80
End: 2020-11-10

## 2020-11-10 ENCOUNTER — APPOINTMENT (OUTPATIENT)
Dept: GENERAL RADIOLOGY | Facility: HOSPITAL | Age: 80
End: 2020-11-10

## 2020-11-10 VITALS
RESPIRATION RATE: 16 BRPM | OXYGEN SATURATION: 96 % | SYSTOLIC BLOOD PRESSURE: 136 MMHG | DIASTOLIC BLOOD PRESSURE: 71 MMHG | WEIGHT: 172 LBS | HEIGHT: 72 IN | HEART RATE: 70 BPM | BODY MASS INDEX: 23.3 KG/M2 | TEMPERATURE: 98 F

## 2020-11-10 DIAGNOSIS — R53.1 GENERALIZED WEAKNESS: ICD-10-CM

## 2020-11-10 DIAGNOSIS — I95.1 ORTHOSTATIC HYPOTENSION: Primary | ICD-10-CM

## 2020-11-10 LAB
ALBUMIN SERPL-MCNC: 3.8 G/DL (ref 3.5–5.2)
ALBUMIN/GLOB SERPL: 1.5 G/DL
ALP SERPL-CCNC: 104 U/L (ref 39–117)
ALT SERPL W P-5'-P-CCNC: 16 U/L (ref 1–41)
ANION GAP SERPL CALCULATED.3IONS-SCNC: 9 MMOL/L (ref 5–15)
AST SERPL-CCNC: 14 U/L (ref 1–40)
BASOPHILS # BLD AUTO: 0.06 10*3/MM3 (ref 0–0.2)
BASOPHILS NFR BLD AUTO: 0.7 % (ref 0–1.5)
BILIRUB SERPL-MCNC: 0.3 MG/DL (ref 0–1.2)
BUN SERPL-MCNC: 27 MG/DL (ref 8–23)
BUN/CREAT SERPL: 15 (ref 7–25)
CALCIUM SPEC-SCNC: 8.6 MG/DL (ref 8.6–10.5)
CHLORIDE SERPL-SCNC: 106 MMOL/L (ref 98–107)
CO2 SERPL-SCNC: 22 MMOL/L (ref 22–29)
CREAT SERPL-MCNC: 1.8 MG/DL (ref 0.76–1.27)
DEPRECATED RDW RBC AUTO: 52.8 FL (ref 37–54)
EOSINOPHIL # BLD AUTO: 0.24 10*3/MM3 (ref 0–0.4)
EOSINOPHIL NFR BLD AUTO: 2.6 % (ref 0.3–6.2)
ERYTHROCYTE [DISTWIDTH] IN BLOOD BY AUTOMATED COUNT: 14.9 % (ref 12.3–15.4)
GFR SERPL CREATININE-BSD FRML MDRD: 36 ML/MIN/1.73
GLOBULIN UR ELPH-MCNC: 2.5 GM/DL
GLUCOSE SERPL-MCNC: 94 MG/DL (ref 65–99)
HCT VFR BLD AUTO: 31.8 % (ref 37.5–51)
HGB BLD-MCNC: 10.2 G/DL (ref 13–17.7)
HOLD SPECIMEN: NORMAL
HOLD SPECIMEN: NORMAL
IMM GRANULOCYTES # BLD AUTO: 0.03 10*3/MM3 (ref 0–0.05)
IMM GRANULOCYTES NFR BLD AUTO: 0.3 % (ref 0–0.5)
LYMPHOCYTES # BLD AUTO: 1.85 10*3/MM3 (ref 0.7–3.1)
LYMPHOCYTES NFR BLD AUTO: 20.2 % (ref 19.6–45.3)
MAGNESIUM SERPL-MCNC: 2.2 MG/DL (ref 1.6–2.4)
MCH RBC QN AUTO: 30.9 PG (ref 26.6–33)
MCHC RBC AUTO-ENTMCNC: 32.1 G/DL (ref 31.5–35.7)
MCV RBC AUTO: 96.4 FL (ref 79–97)
MONOCYTES # BLD AUTO: 0.65 10*3/MM3 (ref 0.1–0.9)
MONOCYTES NFR BLD AUTO: 7.1 % (ref 5–12)
NEUTROPHILS NFR BLD AUTO: 6.35 10*3/MM3 (ref 1.7–7)
NEUTROPHILS NFR BLD AUTO: 69.1 % (ref 42.7–76)
NRBC BLD AUTO-RTO: 0 /100 WBC (ref 0–0.2)
PLATELET # BLD AUTO: 152 10*3/MM3 (ref 140–450)
PMV BLD AUTO: 10.7 FL (ref 6–12)
POTASSIUM SERPL-SCNC: 4.2 MMOL/L (ref 3.5–5.2)
PROT SERPL-MCNC: 6.3 G/DL (ref 6–8.5)
RBC # BLD AUTO: 3.3 10*6/MM3 (ref 4.14–5.8)
SODIUM SERPL-SCNC: 137 MMOL/L (ref 136–145)
TROPONIN T SERPL-MCNC: <0.01 NG/ML (ref 0–0.03)
WBC # BLD AUTO: 9.18 10*3/MM3 (ref 3.4–10.8)
WHOLE BLOOD HOLD SPECIMEN: NORMAL
WHOLE BLOOD HOLD SPECIMEN: NORMAL

## 2020-11-10 PROCEDURE — 93005 ELECTROCARDIOGRAM TRACING: CPT

## 2020-11-10 PROCEDURE — 71045 X-RAY EXAM CHEST 1 VIEW: CPT

## 2020-11-10 PROCEDURE — 85025 COMPLETE CBC W/AUTO DIFF WBC: CPT

## 2020-11-10 PROCEDURE — 80053 COMPREHEN METABOLIC PANEL: CPT

## 2020-11-10 PROCEDURE — 93005 ELECTROCARDIOGRAM TRACING: CPT | Performed by: EMERGENCY MEDICINE

## 2020-11-10 PROCEDURE — 99285 EMERGENCY DEPT VISIT HI MDM: CPT

## 2020-11-10 PROCEDURE — 83735 ASSAY OF MAGNESIUM: CPT

## 2020-11-10 PROCEDURE — 96360 HYDRATION IV INFUSION INIT: CPT

## 2020-11-10 PROCEDURE — 84484 ASSAY OF TROPONIN QUANT: CPT

## 2020-11-10 RX ORDER — SODIUM CHLORIDE 0.9 % (FLUSH) 0.9 %
10 SYRINGE (ML) INJECTION AS NEEDED
Status: DISCONTINUED | OUTPATIENT
Start: 2020-11-10 | End: 2020-11-10 | Stop reason: HOSPADM

## 2020-11-10 RX ADMIN — SODIUM CHLORIDE 1000 ML: 9 INJECTION, SOLUTION INTRAVENOUS at 19:08

## 2020-11-10 NOTE — TELEPHONE ENCOUNTER
Called and spoke with the pt. He is ok to wait and see Dr. Carson on Friday. He will call us if needed before then. I also advised that he take his BP when dizzy and write it down to give to Dr. Carson. He will increase his fluids and stand slowly and wait a minute before he starts walking.

## 2020-11-10 NOTE — TELEPHONE ENCOUNTER
Patients wife called back and I told her April already spoke with the patient this morning and I told her the instructions that were relayed to the patient.

## 2020-11-10 NOTE — TELEPHONE ENCOUNTER
These are all medical issues, he is not on any medications and blood pressure is still low, he may be dehydrated or anemic due to diarrhea and GI bleeding.  He should not come to the office in this condition for checkup and instead go to the emergency room as he may need hospitalization to figure out what exactly is wrong and it can be treated by me hospitalist service.

## 2020-11-10 NOTE — ED NOTES
Pt wife Willow ([phone]) called concerned about her . She states that Dr. Carson wants her  evaluated for continued weakness and possible dehydration. She reports that he has had continued issues since a pacemaker insertion in August which resulted in a prolonged hospital stay. She reports that they were here last Saturday night and left without any answers as to why he continue to have these issues.     Wife requests [facility] be called and involved in his care while here so everyone is on the same page on what his cardiologist wants ordered. She is frustrated that no one has been able to give her any answers on what is going on with her  thus far. He has an appointment with cardiology Thursday but the wife states his physician did not want him to wait until Thursday.      Sim Dempsey, RN  11/10/20 4871       Sim Dempsey, RN  11/10/20 1800

## 2020-11-10 NOTE — TELEPHONE ENCOUNTER
Patient's wife, Willow, states that Dr. Hill needs to go over his medications to see why he is passing out.  His blood pressure has been low as indicated in the readings below.  She wanted to know why did Dr. Hill decreased his iron pill to one a day, when Dr. White said that he needs to be taking two a day.  Dr. White wants Dr. Hill to follow his Hematocrit and Hemoglobin.  Dr. Hill can call and talk with  Dr. White or Dr. Carson regarding all of this.  She can be reached at [phone]    Blood Pressure Readings from today:    86/60  72/54  88/59  76/62

## 2020-11-10 NOTE — TELEPHONE ENCOUNTER
Wife called to notify me she spoke w PCP office who stated they can fit pt in tomorrow. I informed wife of AA recommendations of presenting to the ER for further evaluation. Wife is hesitant but states she will go ahead and take him. Will follow up w me after visit.

## 2020-11-10 NOTE — TELEPHONE ENCOUNTER
Wife called our office w several concerns about pt. States he has been feeling terrible since being discharged from the hospital recently. States he has been lethargic and hypotensive, BP running 80/60s. HR 70-80s. He has not been taking his metoprolol or lisinopril. Wife notes pt has an ashen coloring and feels as though he may pass out quite often. Confirms he has been staying hydrated. EP department has ran a remote pacemaker check on pt.     Wife also states pt had several episodes of diarrhea after he was discharged and stool has been dark in color but not tarry. He was started on iron at  discharge. Hgb increased to 11 on 11/7 from 10 on 10/26. I encouraged pt to call PCP office and request appointment. He is currently scheduled to see AA on Friday. Will inform pt and wife of any further recommendations we have before then.

## 2020-11-11 ENCOUNTER — TELEPHONE (OUTPATIENT)
Dept: INTERNAL MEDICINE | Facility: CLINIC | Age: 80
End: 2020-11-11

## 2020-11-11 ENCOUNTER — READMISSION MANAGEMENT (OUTPATIENT)
Dept: CALL CENTER | Facility: HOSPITAL | Age: 80
End: 2020-11-11

## 2020-11-11 NOTE — TELEPHONE ENCOUNTER
Patients wife called to cancel the appointment (  verbal on file).  She stated that patient was in ER 11/10/20 and that he was feeling better and didn't feel a need to come in to appointment.  She wanted to know if it was necessary for him to come in for labwork or anything.  She would like a call back.  Please advise

## 2020-11-11 NOTE — DISCHARGE INSTRUCTIONS
Do not take your dose of Flomax tonight or in the morning.  Return if you have any concerns and drink extra fluids over the next few days.

## 2020-11-11 NOTE — ED PROVIDER NOTES
Subjective   Mr. Baum who is brought to the emergency department by his wife with concerns about weakness and ongoing syncopal episodes.  He has not had any syncopal episodes today but has had episodes of weakness.  She shows me a log of his blood pressures taken throughout the day.  At times they are as low as 78 systolic.  He has several other blood pressure readings in the 80s.  She reports that when he tries to get up and walk he cannot get very far before becoming very weak and sweaty.  He does not have any pain in his chest or shortness of breath but has to stop and rest.  These episodes have been ongoing for months.  He had a pacemaker placed in August.  At that time he had tamponade and since then has had problems with recurring pericardial effusions that have had to be drained.  Despite the pacemaker he continues to have hypotensive episodes.  On exam today he tells me he feels fine.  His wife called his cardiologist Dr. Carson and advised that his pressures were in the 80s today and he was told to come to the emergency department.  He denies having fevers or chills.  He denies having pain anywhere.  He denies cough or vomiting.  He did not take his lisinopril or metoprolol today.      History provided by:  Patient and spouse  Weakness - Generalized  Severity:  Moderate  Onset quality:  Sudden  Timing:  Intermittent  Progression:  Worsening  Chronicity:  Recurrent  Context comment:  Accompanying hypotension associated with walking  Relieved by:  Rest  Worsened by:  Standing  Ineffective treatments: Pacemaker, holding lisinopril and metoprolol.  Associated symptoms: difficulty walking, dizziness and near-syncope    Associated symptoms: no abdominal pain, no chest pain, no cough, no dysuria, no fever, no headaches, no shortness of breath and no vomiting        Review of Systems   Constitutional: Negative for chills and fever.   HENT: Negative for congestion and rhinorrhea.    Respiratory: Negative for  cough and shortness of breath.    Cardiovascular: Positive for near-syncope. Negative for chest pain.   Gastrointestinal: Negative for abdominal pain and vomiting.   Genitourinary: Negative for dysuria.   Musculoskeletal: Positive for gait problem.   Neurological: Positive for dizziness and weakness. Negative for headaches.   All other systems reviewed and are negative.      Past Medical History:   Diagnosis Date   • A-fib (CMS/HCC)    • Chronic kidney disease    • History of migraine headaches    • Hyperlipidemia    • Hypertension    • Mitral valve regurgitation    • Pericardial effusion     s/p PPM placement   • PFO (patent foramen ovale)    • Testicular cyst     removal 1970   • TIA (transient ischemic attack)        Allergies   Allergen Reactions   • Flonase [Fluticasone] Irritability     Gets a nose bleed as soon as used   • Penicillins Rash     Rash all over body including mouth/throat        Past Surgical History:   Procedure Laterality Date   • CARDIAC CATHETERIZATION N/A 8/24/2020    Procedure: PERICARDIOCENTESIS;  Surgeon: Dain Nava MD;  Location:  [facility];  Service: Cardiology;  Laterality: N/A;   • CARDIAC CATHETERIZATION N/A 10/20/2020    Procedure: PERICARDIOCENTESIS;  Surgeon: Dain Nava MD;  Location:  [facility];  Service: Cardiology;  Laterality: N/A;   • CARDIAC ELECTROPHYSIOLOGY PROCEDURE N/A 8/24/2020    Procedure: PACEMAKER IMPLANTATION- DC;  Surgeon: Dain Nava MD;  Location:  [facility];  Service: Cardiology;  Laterality: N/A;   • CATARACT EXTRACTION W/ INTRAOCULAR LENS  IMPLANT, BILATERAL     • TONSILLECTOMY AND ADENOIDECTOMY  9 yo       Family History   Problem Relation Age of Onset   • Arthritis Other    • Hyperlipidemia Other    • Stroke Other    • Heart attack Mother    • Heart attack Father        Social History     Socioeconomic History   • Marital status:      Spouse name: Not on file   • Number of  children: Not on file   • Years of education: Not on file   • Highest education level: Not on file   Tobacco Use   • Smoking status: Never Smoker   • Smokeless tobacco: Current User     Types: Chew   • Tobacco comment: Occasionally chews   Substance and Sexual Activity   • Alcohol use: Yes     Comment: 4 drinks per month   • Drug use: No   • Sexual activity: Defer           Objective   Physical Exam  Vitals signs and nursing note reviewed.   Constitutional:       General: He is not in acute distress.     Appearance: Normal appearance.   HENT:      Head: Normocephalic and atraumatic.   Eyes:      General: No scleral icterus.     Conjunctiva/sclera: Conjunctivae normal.   Neck:      Musculoskeletal: Normal range of motion and neck supple.      Comments: No JVD  Cardiovascular:      Rate and Rhythm: Normal rate and regular rhythm.      Heart sounds: No murmur. No friction rub.   Pulmonary:      Effort: Pulmonary effort is normal.      Breath sounds: Normal breath sounds. No wheezing or rales.   Abdominal:      General: Abdomen is flat.      Palpations: Abdomen is soft.      Tenderness: There is no abdominal tenderness. There is no guarding.   Musculoskeletal:         General: No swelling or tenderness.      Right lower leg: No edema.      Left lower leg: No edema.   Skin:     General: Skin is warm and dry.      Capillary Refill: Capillary refill takes less than 2 seconds.      Coloration: Skin is not pale.   Neurological:      General: No focal deficit present.      Mental Status: He is alert.      Motor: No weakness.      Coordination: Coordination normal.   Psychiatric:         Mood and Affect: Mood normal.         Behavior: Behavior normal.         Thought Content: Thought content normal.         Procedures           ED Course  ED Course as of Nov 10 2021   Tue Nov 10, 2020   1917 I have reviewed Mr. Baum extensive chart.  His wife is frustrated that he continues to have syncopal spells.  She shows me a list of  blood pressures from today and they were in the 70s and 80s.  Vital signs here have been normal.  We performed orthostatics and he went from 1 41-1 14 when he stood up although his heart rate remained at 70.  I offered admission to the hospital as his wife seems very frustrated with this and she asked if it would be covered by her insurance.  I advised her I did not know and she wanted me to check before admitting him.  I spoke with the hospitalist on call who felt that given his normal labs and normal vital signs and the chronic recurring nature of this that it would run a high risk of not being covered and at this point only meets criteria for observation.  His wife did not want him admitted and advises me that he has an appointment in the morning with Dr Hill and an appointment with Dr. Carson in 3 days.  I will give a liter of fluids.  He is on Flomax which I think may be causing these episodes.  He takes it at night and throughout the day his blood pressures have gradually improved.  We will have him hold his Flomax tonight.    [DT]   2018 Mr. Baum remains comfortable.  He has provided urine and we have dipped that and it is negative for infection.  He is actually taking Flomax twice a day.  I will have him stop that for tonight and tomorrow to see if it helps with his hypotensive episodes.  He will see Dr. Hill at 11:00 in the morning.    [DT]      ED Course User Index  [DT] Gunner Gonzalez MD                                           MDM  Number of Diagnoses or Management Options  Generalized weakness: established and worsening  Orthostatic hypotension: established and worsening     Amount and/or Complexity of Data Reviewed  Clinical lab tests: ordered and reviewed  Decide to obtain previous medical records or to obtain history from someone other than the patient: yes  Review and summarize past medical records: yes  Independent visualization of images, tracings, or specimens: yes    Patient  Progress  Patient progress: improved      Final diagnoses:   Orthostatic hypotension   Generalized weakness            Gunner Gonzalez MD  11/10/20 2021

## 2020-11-11 NOTE — OUTREACH NOTE
Medical Week 3 Survey      Responses   [facility] patient discharged from?  [place]   Does the patient have one of the following disease processes/diagnoses(primary or secondary)?  Other   Week 3 attempt successful?  Yes   Call start time  1430   Call end time  1524   Discharge diagnosis  pericardial effusion, s/p pericardiocentesis   Meds reviewed with patient/caregiver?  Yes   Is the patient taking all medications as directed (includes completed medication regime)?  Yes   Has the patient kept scheduled appointments due by today?  Yes   Has home health visited the patient within 72 hours of discharge?  N/A   What is the patient's perception of their health status since discharge?  New symptoms unrelated to diagnosis   Additional teach back comments  Told caller check BP before each dose of BP dose, report black or red stools, if dizzy, lightheaded, pale, SOA then needs to go to ER.  Call back to provider about the black stool, make sure it is addressed.    Week 3 Call Completed?  Yes   Wrap up additional comments  Pt has reported black stools. She made ER aware of this last night.  Was sent to ER yesterday by MD for orthostatic hypotension after he passed out.  Flomax on hold for two doses.  They called PCP today to report all this.  BP this morning 91/58 and now 113/66 HR 76. Is no longer dizzy. PCP told him if BP <90 systolic to hold lisinopril & metroprolol. States PCP is aware of the black stools. Sameera, if office called back today and told her not to come back to ER, states his labs are stable.  Hct 11/7 35.1 on 11/10 31.8.           Jeanna Galvan RN

## 2020-11-12 ENCOUNTER — TELEPHONE (OUTPATIENT)
Dept: CARDIOLOGY | Facility: CLINIC | Age: 80
End: 2020-11-12

## 2020-11-12 DIAGNOSIS — I31.39 PERICARDIAL EFFUSION: Primary | ICD-10-CM

## 2020-11-12 NOTE — TELEPHONE ENCOUNTER
Spoke w pt's wife. Wife states pt overall is starting to feel better, but is still concerned about dark stool and pt's ashen coloring. States pt is still exhausted. Recent ED visit was overall uneventful. IV fluids were given and pt was sent home. Pt is scheduled to be seen by AA tmrw afternoon.

## 2020-11-12 NOTE — TELEPHONE ENCOUNTER
Pt wife SHAHRIARM w concerns about recent ED visit. Attempted to return call, no answer. CLIFFORD w call back number.

## 2020-11-13 ENCOUNTER — OFFICE VISIT (OUTPATIENT)
Dept: CARDIOLOGY | Facility: CLINIC | Age: 80
End: 2020-11-13

## 2020-11-13 ENCOUNTER — LAB (OUTPATIENT)
Dept: LAB | Facility: HOSPITAL | Age: 80
End: 2020-11-13

## 2020-11-13 ENCOUNTER — LAB REQUISITION (OUTPATIENT)
Dept: LAB | Facility: HOSPITAL | Age: 80
End: 2020-11-13

## 2020-11-13 VITALS
SYSTOLIC BLOOD PRESSURE: 118 MMHG | WEIGHT: 169 LBS | HEART RATE: 71 BPM | HEIGHT: 72 IN | BODY MASS INDEX: 22.89 KG/M2 | TEMPERATURE: 96.9 F | DIASTOLIC BLOOD PRESSURE: 60 MMHG

## 2020-11-13 DIAGNOSIS — Z12.11 SCREENING FOR COLON CANCER: Primary | ICD-10-CM

## 2020-11-13 DIAGNOSIS — I48.0 PAROXYSMAL ATRIAL FIBRILLATION (HCC): Primary | ICD-10-CM

## 2020-11-13 DIAGNOSIS — E78.49 OTHER HYPERLIPIDEMIA: ICD-10-CM

## 2020-11-13 DIAGNOSIS — Z00.00 ROUTINE GENERAL MEDICAL EXAMINATION AT A HEALTH CARE FACILITY: ICD-10-CM

## 2020-11-13 DIAGNOSIS — I10 ESSENTIAL HYPERTENSION: ICD-10-CM

## 2020-11-13 PROCEDURE — 36415 COLL VENOUS BLD VENIPUNCTURE: CPT | Performed by: INTERNAL MEDICINE

## 2020-11-13 PROCEDURE — 99214 OFFICE O/P EST MOD 30 MIN: CPT | Performed by: INTERNAL MEDICINE

## 2020-11-13 NOTE — PROGRESS NOTES
[facility] Cardiology    Encounter Date: 2020    Patient ID: Pio Baum is a 80 y.o. male.  : 1940     PCP: Lupillo Hill MD       Chief Complaint: Atrial Fibrillation    PROBLEM LIST:  1. Paroxysmal atrial fibrillation:  a. CHADS-VASc = 5 (HTN, Age > 75, h/o Stroke), on Eliquis 5 mg BID.   b. MPS, 2017: EF 58%, negative, low risk study  c. Implantation of a dual-chamber permanent pacemaker by Dr. Nava, 2020.  d. Pericardiocentesis, 2020: Successful pericardiocentesis in a patient with pericardial tamponade/effusion post right-sided pacemaker implantation with approximately 270 cc of dark blood removed from the pericardium. Successful external cardioversion of atrial fibrillation with 200 J shock to sinus rhythm. Normal pacemaker function post pericardiocentesis.  e. Echocardiogram, 2020: There is a small (<1cm) pericardial effusion. Pericardial fluid was drained by the end of the study.  f. Echocardiogram, 2020: EF 50-60%. There is a trivial pericardial effusion.  g. Echocardiogram, 2020:  EF 65%. There is a small (<1cm) circumferential pericardial effusion.  h. Echocardiogram, 10/20/2020: EF 55%. There are myxomatous changes of the mitral valve apparatus present. There is bileaflet mitral valve prolapse present. Trace MR and Mild TR. Calculated right ventricular systolic pressure from tricuspid regurgitation is 26 mmHg. There is a large (>2cm) pericardial effusion. Borderline echo criteria for tamponade  i. Pericardiocentesis, 10/20/2020: Successful pericardiocentesis with removal of approximately 1300 cc of dark venous blood with small clots present reducing the pericardial effusion from 4.1 cm to approximately 1.1 cm via echocardiographic guidance. Successful intracardiac ultrasound monitoring.  j. Echocardiogram, 10/20/2020: There is a moderate (1-2cm) pericardial effusion adjacent to the right ventricle and left  ventricle.  k. Echocardiogram, 10/21/2020: EF 55%. There is a small, mostly posterior, mild to moderate (1 cm) pericardial effusion along the left ventricle. There is no evidence of pericardial tamponade.  l. Echocardiogram, 10/23/2020: EF 55%. There is a small, mostly posterior pericardial effusion which appears less pronounced compared to the study of October 21, 2020. There is no evidence of pericardial tamponade. Cardiac chambers are grossly normal in size.  m. Echocardiogram, 10/26/2020: EF 45%. Left ventricular wall thickness is consistent with concentric hypertrophy. Mild MR. Moderate TR. No significant pericardial effusion is noted. Compared to previous studies the pericardial effusion has almost completely resolved.  2. TIA/CVA:  a. Carotid duplex, 04/20/2016: No significant disease  b. Echocardiogram, 04/20/2016: Normal LV function, positive bubble study. Closure not considered due to need for chronic anticoagulation therapy.  c. Cardiac event monitor showed atrial fibrillation/flutter with intermittent RVR, aberrancy, IVCD/sinus rhythm with PVCs  3. Syncope:  a. 2 week Zio, 05/16/2019: SR, occasional PAC's and PVC's. Occasional short SVT/PAT, 21 runs at 3-20 beats.  b. Echocardiogram, 05/16/2019: EF 55%. Borderline right-sided chamber enlargement. Mild MR/TR, normal RVSP.  4. Hypertension  5. Dyslipidemia  6. Oral tobacco abuse  7. History of migraine headaches  8. Chronic kidney disease stage II  9. Surgical history:  a. Tonsillectomy/adenoictomy    History of Present Illness  Patient presents today for a 3 month follow-up with a history of paroxysmal atrial fibrillation, pre-syncope, and cardiac risk factors. Patient has reported to the hospital several times since last visit. He has also had several pericardiocenteses procedures since his pacemaker implant, due to recurrence of hemopericardium he was taken off all anticoagulation by Dr. Nava.  The patient also had ER visit for weakness and dark  stools and was started on iron supplementation.  His hemoglobin has stabilized and repeat labs from today are pending.  He also reports multiple side effects to flecainide causing fatigue lethargy weakness and he has discontinued this medication and states that ever since he has started to feel better.  His main complaint is weakness of his legs when he walks otherwise has no current chest pain shortness of breath edema palpitations or syncope.  Still experiencing black stools and has a visit scheduled with PCP today.    Allergies   Allergen Reactions   • Flonase [Fluticasone] Irritability     Gets a nose bleed as soon as used   • Penicillins Rash     Rash all over body including mouth/throat          Current Outpatient Medications:   •  atorvastatin (LIPITOR) 40 MG tablet, TAKE 1 TABLET EVERY DAY (Patient taking differently: Take 40 mg by mouth Every Night.), Disp: 90 tablet, Rfl: 1  •  ferrous sulfate 325 (65 FE) MG tablet, Take 1 tablet by mouth 2 (Two) Times a Day With Meals. (Patient taking differently: Take 325 mg by mouth Daily With Breakfast.), Disp: 30 tablet, Rfl: 0  •  Ergocalciferol (VITAMIN D2 PO), Take 1 capsule by mouth Daily., Disp: , Rfl:   •  flecainide (TAMBOCOR) 100 MG tablet, Take 1 tablet by mouth Every 12 (Twelve) Hours., Disp: 60 tablet, Rfl: 5  •  lisinopril (PRINIVIL,ZESTRIL) 10 MG tablet, Take 10 mg by mouth 2 (two) times a day., Disp: , Rfl:   •  metoprolol tartrate (LOPRESSOR) 25 MG tablet, Take 0.5 tablets by mouth Every 12 (Twelve) Hours., Disp: 30 tablet, Rfl: 5  •  Omega-3 1000 MG capsule, Take 1,000 mg by mouth Daily., Disp: , Rfl:   •  tamsulosin (FLOMAX) 0.4 MG capsule 24 hr capsule, Take 1 capsule by mouth 2 (two) times a day., Disp: , Rfl:     The following portions of the patient's history were reviewed and updated as appropriate: allergies, current medications, past family history, past medical history, past social history, past surgical history and problem  "list.    ROS  Review of Systems   Constitution: Negative for chills, fever, fatigue. Positive for weakness.   Cardiovascular: Negative for chest pain, dyspnea on exertion, leg swelling, palpitations, orthopnea, and syncope.   Respiratory: Negative for cough, shortness of breath, and wheezing.  HENT: Negative for ear pain, nosebleeds, and tinnitus.  Gastrointestinal: Negative for abdominal pain, constipation, diarrhea, nausea and vomiting. Positive for melena.   Genitourinary: No urinary symptoms.  Musculoskeletal: Negative for muscle cramps.  Neurological: Negative for dizziness, headaches, loss of balance, numbness, and symptoms of stroke.  Psychiatric: Normal mental status.     All other systems reviewed and are negative.        Objective:     /60 (BP Location: Left arm, Patient Position: Sitting)   Pulse 71   Temp 96.9 °F (36.1 °C)   Ht 182.9 cm (72\")   Wt 76.7 kg (169 lb)   BMI 22.92 kg/m²    Repeat BP: 120/62    Physical Exam  Constitutional: Patient appears well-developed and well-nourished.   HENT: HEENT exam unremarkable.   Neck: Neck supple. No JVD present. No carotid bruits.   Cardiovascular: Normal rate, regular rhythm and normal heart sounds. No murmur heard.   2+ symmetric pulses.   Pulmonary/Chest: Breath sounds normal. Does not exhibit tenderness.   Abdominal: Abdomen benign.   Musculoskeletal: Does not exhibit edema.   Neurological: Neurological exam unremarkable.   Vitals reviewed.    Data Review:   Lab Results   Component Value Date    GLUCOSE 94 11/10/2020    BUN 27 (H) 11/10/2020    CREATININE 1.80 (H) 11/10/2020    EGFRIFNONA 36 (L) 11/10/2020    EGFRIFAFRI 45 (L) 11/03/2020    BCR 15.0 11/10/2020    K 4.2 11/10/2020    CO2 22.0 11/10/2020    CALCIUM 8.6 11/10/2020    ALBUMIN 3.80 11/10/2020    AST 14 11/10/2020    ALT 16 11/10/2020     Lab Results   Component Value Date    CHLPL 110 08/11/2020    TRIG 70 08/11/2020    HDL 49 08/11/2020    LDL 47 08/11/2020      Lab Results "   Component Value Date    WBC 9.18 11/10/2020    RBC 3.30 (L) 11/10/2020    HGB 10.2 (L) 11/10/2020    HCT 31.8 (L) 11/10/2020    MCV 96.4 11/10/2020     11/10/2020     Lab Results   Component Value Date    TSH 1.670 03/13/2020     Lab Results   Component Value Date    HGBA1C 5.40 09/04/2020        Procedures   Manual device interrogation, 11/13/20:   Normal, well-functioning [employer] PPM with 8 years of battery life remaining.   Events: 2% mode switch- longest 16 hour 52 minutes (10/21/2020)  Device updates: None        Assessment:      Diagnosis Plan   1. Paroxysmal atrial fibrillation (CMS/HCC), 2% mode switch on pacemaker interrogation Continue metoprolol. Begin taking an 81 mg aspirin daily when melena stops.  Keep off anticoagulation due to concerns about GI bleeding and recurrent hemopericardium, will consider restarting in future when his condition is stable.  The patient is aware of risks and benefits.   2. Essential hypertension  Acceptable control; continue metoprolol 25 mg BID.   3. Other hyperlipidemia  Well-controlled; decrease atorvastatin to 20 mg daily by cutting the pills in half.      Plan:   Follow up with Dr. Hill in regards to his black stools.     Begin taking an 81 mg aspirin daily when melena stops.   Decrease atorvastatin to 20 mg daily by cutting the pills in half, his lipid panel is well controlled and he may be experiencing leg weakness due to side effects.   Continue all other current medications.   FU in 1 MO, sooner as needed.  We will review his CBC redrawn this morning.  Thank you for allowing us to participate in the care of your patient.     Scribed for Yahaira Carson MD by Erika Aguilar. 11/13/2020  11:07 EST      I, Yahaira Carson MD, personally performed the services described in this documentation as scribed by the above named individual in my presence, and it is both accurate and complete.  11/13/2020  11:07 EST        Please note that portions of this note may  have been completed with a voice recognition program. Efforts were made to edit the dictations, but occasionally words are mistranscribed.

## 2020-11-14 LAB
BASOPHILS # BLD AUTO: 0 X10E3/UL (ref 0–0.2)
BASOPHILS NFR BLD AUTO: 0 %
EOSINOPHIL # BLD AUTO: 0.3 X10E3/UL (ref 0–0.4)
EOSINOPHIL NFR BLD AUTO: 4 %
ERYTHROCYTE [DISTWIDTH] IN BLOOD BY AUTOMATED COUNT: 13.4 % (ref 11.6–15.4)
HCT VFR BLD AUTO: 33.9 % (ref 37.5–51)
HGB BLD-MCNC: 11.3 G/DL (ref 13–17.7)
IMM GRANULOCYTES # BLD AUTO: 0 X10E3/UL (ref 0–0.1)
IMM GRANULOCYTES NFR BLD AUTO: 0 %
LYMPHOCYTES # BLD AUTO: 1.8 X10E3/UL (ref 0.7–3.1)
LYMPHOCYTES NFR BLD AUTO: 24 %
MCH RBC QN AUTO: 31 PG (ref 26.6–33)
MCHC RBC AUTO-ENTMCNC: 33.3 G/DL (ref 31.5–35.7)
MCV RBC AUTO: 93 FL (ref 79–97)
MONOCYTES # BLD AUTO: 0.6 X10E3/UL (ref 0.1–0.9)
MONOCYTES NFR BLD AUTO: 8 %
NEUTROPHILS # BLD AUTO: 4.6 X10E3/UL (ref 1.4–7)
NEUTROPHILS NFR BLD AUTO: 64 %
PLATELET # BLD AUTO: 150 X10E3/UL (ref 150–450)
QT INTERVAL: 378 MS
QTC INTERVAL: 416 MS
RBC # BLD AUTO: 3.64 X10E6/UL (ref 4.14–5.8)
WBC # BLD AUTO: 7.3 X10E3/UL (ref 3.4–10.8)

## 2020-11-16 ENCOUNTER — TELEPHONE (OUTPATIENT)
Dept: INTERNAL MEDICINE | Facility: CLINIC | Age: 80
End: 2020-11-16

## 2020-11-16 LAB
HEMOCCULT STL QL IA: NORMAL
SPECIMEN STATUS: NORMAL

## 2020-11-16 NOTE — TELEPHONE ENCOUNTER
Caller: RADHA VIRK    Relationship: Emergency Contact    Best call back number: [phone]    Caller requesting test results: WIFE     What test was performed: LABS AND STOOL  SAMPLE    When was the test performed: Friday, 11/13/20    Where was the test performed: LAB     Additional notes: PATIENT WIFE NEEDS TO DISCUSS PATIENT RESULTS AND TREATMENT PLAN. SHE HAS SCHEDULED AN EXAM BY DR. MARIE THE  WHO DID COLONOSCOPY LAST YEAR. DR. FRANCOAM HIS HEART  SUGGESTED TO HAVE SCOPE FOR UPPER GI. PATIENT SPOUSE IS VERY CONCERNED, PATIENT IS GETTING EXTREMELY WEAK.    Called and all questions answered

## 2020-11-17 ENCOUNTER — RESULTS ENCOUNTER (OUTPATIENT)
Dept: CARDIOLOGY | Facility: CLINIC | Age: 80
End: 2020-11-17

## 2020-11-17 DIAGNOSIS — I31.39 PERICARDIAL EFFUSION: ICD-10-CM

## 2020-11-19 RX ORDER — FERROUS SULFATE 325(65) MG
325 TABLET ORAL
Qty: 90 TABLET | Refills: 1 | Status: SHIPPED | OUTPATIENT
Start: 2020-11-19 | End: 2021-04-22

## 2020-11-19 NOTE — TELEPHONE ENCOUNTER
Caller: Pio Baum    Relationship: Self    Best call back number: [phone]     Medication needed:   Requested Prescriptions     Pending Prescriptions Disp Refills   • ferrous sulfate 325 (65 FE) MG tablet 30 tablet 0     Sig: Take 1 tablet by mouth 2 (Two) Times a Day With Meals.       When do you need the refill by:     What details did the patient provide when requesting the medication: PT STATED HAS 5 LEFT. PT STATED HE WOULD LIKE TO KNOW IF DR CASEY WANTS HIM TO CONTINUE TAKING THEM OR STOP    Does the patient have less than a 3 day supply:  [] Yes  [x] No    What is the patient's preferred pharmacy:  [facility]/pharmacy #[id number] - [place], [place] - [address] [facility] [phone] [facility] [phone]   [phone]    PLEASE ADVISE

## 2020-11-23 ENCOUNTER — READMISSION MANAGEMENT (OUTPATIENT)
Dept: CALL CENTER | Facility: HOSPITAL | Age: 80
End: 2020-11-23

## 2020-11-23 NOTE — OUTREACH NOTE
Medical Week 4 Survey      Responses   [facility] patient discharged from?  [place]   Does the patient have one of the following disease processes/diagnoses(primary or secondary)?  Other   Week 4 attempt successful?  Yes   Call start time  0859   Call end time  0907   Medication alerts for this patient  has been started on iron tablet,    Meds reviewed with patient/caregiver?  Yes   Is the patient taking all medications as directed (includes completed medication regime)?  Yes   Has the patient kept scheduled appointments due by today?  Yes   Is the patient still receiving Home Health Services?  N/A   What is the patient's perception of their health status since discharge?  Improving   Week 4 Call Completed?  Yes   Would the patient like one additional call?  No   Graduated  Yes   Did the patient feel the follow up calls were helpful during their recovery period?  Yes   Was the number of calls appropriate?  Yes   Wrap up additional comments  goals are met, has walked 3 blocks, has mopped the kitchen and bathroom floor and is doing face book at the time of the call.  had questions about  medication which were able to answer. bp is improving           Cherelle Nassar RN

## 2020-12-22 ENCOUNTER — OFFICE VISIT (OUTPATIENT)
Dept: INTERNAL MEDICINE | Facility: CLINIC | Age: 80
End: 2020-12-22

## 2020-12-22 ENCOUNTER — TELEPHONE (OUTPATIENT)
Dept: CARDIOLOGY | Facility: CLINIC | Age: 80
End: 2020-12-22

## 2020-12-22 ENCOUNTER — LAB REQUISITION (OUTPATIENT)
Dept: LAB | Facility: HOSPITAL | Age: 80
End: 2020-12-22

## 2020-12-22 VITALS
TEMPERATURE: 97.3 F | OXYGEN SATURATION: 98 % | HEIGHT: 72 IN | BODY MASS INDEX: 22.89 KG/M2 | SYSTOLIC BLOOD PRESSURE: 130 MMHG | HEART RATE: 84 BPM | DIASTOLIC BLOOD PRESSURE: 70 MMHG | WEIGHT: 169 LBS

## 2020-12-22 DIAGNOSIS — Z23 NEED FOR PROPHYLACTIC VACCINATION AGAINST STREPTOCOCCUS PNEUMONIAE (PNEUMOCOCCUS): ICD-10-CM

## 2020-12-22 DIAGNOSIS — Z00.00 ENCOUNTER FOR MEDICARE ANNUAL WELLNESS EXAM: ICD-10-CM

## 2020-12-22 DIAGNOSIS — K57.90 DIVERTICULOSIS: ICD-10-CM

## 2020-12-22 DIAGNOSIS — E78.49 OTHER HYPERLIPIDEMIA: ICD-10-CM

## 2020-12-22 DIAGNOSIS — N18.30 STAGE 3 CHRONIC KIDNEY DISEASE, UNSPECIFIED WHETHER STAGE 3A OR 3B CKD (HCC): ICD-10-CM

## 2020-12-22 DIAGNOSIS — I10 ESSENTIAL HYPERTENSION: ICD-10-CM

## 2020-12-22 DIAGNOSIS — E78.49 OTHER HYPERLIPIDEMIA: Primary | ICD-10-CM

## 2020-12-22 DIAGNOSIS — I48.0 PAROXYSMAL ATRIAL FIBRILLATION (HCC): ICD-10-CM

## 2020-12-22 PROCEDURE — 99397 PER PM REEVAL EST PAT 65+ YR: CPT | Performed by: INTERNAL MEDICINE

## 2020-12-22 PROCEDURE — G0439 PPPS, SUBSEQ VISIT: HCPCS | Performed by: INTERNAL MEDICINE

## 2020-12-22 PROCEDURE — 96160 PT-FOCUSED HLTH RISK ASSMT: CPT | Performed by: INTERNAL MEDICINE

## 2020-12-22 PROCEDURE — 90732 PPSV23 VACC 2 YRS+ SUBQ/IM: CPT | Performed by: INTERNAL MEDICINE

## 2020-12-22 PROCEDURE — G0009 ADMIN PNEUMOCOCCAL VACCINE: HCPCS | Performed by: INTERNAL MEDICINE

## 2020-12-22 PROCEDURE — 1126F AMNT PAIN NOTED NONE PRSNT: CPT | Performed by: INTERNAL MEDICINE

## 2020-12-22 PROCEDURE — 36415 COLL VENOUS BLD VENIPUNCTURE: CPT | Performed by: INTERNAL MEDICINE

## 2020-12-22 PROCEDURE — 1170F FXNL STATUS ASSESSED: CPT | Performed by: INTERNAL MEDICINE

## 2020-12-22 PROCEDURE — 1159F MED LIST DOCD IN RCRD: CPT | Performed by: INTERNAL MEDICINE

## 2020-12-22 NOTE — TELEPHONE ENCOUNTER
----- Message from Pio Baum sent at 12/21/2020  9:20 PM EST -----  Regarding: Complaint  Contact: [phone]  I called at 8:30 a.m. today and requested to speak to Travis Cristobal.  I was transferred to a recorded message for a woman , and I hung up.  My wife, Willow called early afternoon and explained to Lili the reason for my call and concerns.   She was transferred to April’s phone, left a voice mail with detailed information regarding my concerns.  I have  been in Afib for two days with blood pressure readings below a hundred.  Additionally,  I have been having chest pains with my heart beat at times, but not continuously.  It feels as if it is sore, and I feel the soreness and pain with each beat when this occurs.  Since Friday, December 18th, I have remained weak, tired and unable to function.  To be continued due to number of words.

## 2020-12-22 NOTE — TELEPHONE ENCOUNTER
I have this and working with Shakila to see about an off day for the appointment. Just wanted to let you know.

## 2020-12-22 NOTE — PROGRESS NOTES
The ABCs of the Annual Wellness Visit  Subsequent Medicare Wellness Visit    Chief Complaint   Patient presents with   • Annual Exam       Subjective   History of Present Illness:  Pio Baum is a 80 y.o. male who presents for a Subsequent Medicare Wellness Visit.    HEALTH RISK ASSESSMENT    Recent Hospitalizations:  Recently treated at the following:  [facility]    Current Medical Providers:  Patient Care Team:  Lupillo Hill MD as PCP - General    Smoking Status:  Social History     Tobacco Use   Smoking Status Never Smoker   Smokeless Tobacco Current User   • Types: Chew   Tobacco Comment    Occasionally chews       Alcohol Consumption:  Social History     Substance and Sexual Activity   Alcohol Use Yes    Comment: 4 drinks per month       Depression Screen:   PHQ-2/PHQ-9 Depression Screening 12/22/2020   Little interest or pleasure in doing things 3   Feeling down, depressed, or hopeless 0   Trouble falling or staying asleep, or sleeping too much 0   Feeling tired or having little energy 0   Poor appetite or overeating 0   Feeling bad about yourself - or that you are a failure or have let yourself or your family down 0   Trouble concentrating on things, such as reading the newspaper or watching television 0   Moving or speaking so slowly that other people could have noticed. Or the opposite - being so fidgety or restless that you have been moving around a lot more than usual 0   Thoughts that you would be better off dead, or of hurting yourself in some way 0   Total Score 3   If you checked off any problems, how difficult have these problems made it for you to do your work, take care of things at home, or get along with other people? Not difficult at all       Fall Risk Screen:  STEADI Fall Risk Assessment was completed, and patient is at HIGH risk for falls. Assessment completed on:12/22/2020    Health Habits and Functional and Cognitive Screening:  Functional & Cognitive Status  12/22/2020   Do you have difficulty preparing food and eating? No   Do you have difficulty bathing yourself, getting dressed or grooming yourself? No   Do you have difficulty using the toilet? No   Do you have difficulty moving around from place to place? No   Do you have trouble with steps or getting out of a bed or a chair? No   Current Diet Well Balanced Diet   Dental Exam Up to date   Eye Exam Up to date   Exercise (times per week) 7 times per week   Current Exercises Include Walking   Do you need help using the phone?  No   Are you deaf or do you have serious difficulty hearing?  No   Do you need help with transportation? No   Do you need help shopping? No   Do you need help preparing meals?  No   Do you need help with housework?  No   Do you need help with laundry? No   Do you need help taking your medications? No   Do you need help managing money? No   Do you ever drive or ride in a car without wearing a seat belt? No   Have you felt unusual stress, anger or loneliness in the last month? No   Who do you live with? Spouse   If you need help, do you have trouble finding someone available to you? No   Have you been bothered in the last four weeks by sexual problems? No   Do you have difficulty concentrating, remembering or making decisions? No         Does the patient have evidence of cognitive impairment? No    Asprin use counseling:Does not need ASA (and currently is not on it)    Age-appropriate Screening Schedule:  Refer to the list below for future screening recommendations based on patient's age, sex and/or medical conditions. Orders for these recommended tests are listed in the plan section. The patient has been provided with a written plan.    Health Maintenance   Topic Date Due   • TDAP/TD VACCINES (1 - Tdap) 01/21/1959   • ZOSTER VACCINE (2 of 3) 04/08/2014   • INFLUENZA VACCINE  08/01/2020   • LIPID PANEL  08/11/2021            Fall Risk Assessment was completed, and patient is at moderate risk for  falls.      The following portions of the patient's history were reviewed and updated as appropriate: current medications, past family history, past medical history, past social history, past surgical history and problem list.    Outpatient Medications Prior to Visit   Medication Sig Dispense Refill   • atorvastatin (LIPITOR) 40 MG tablet TAKE 1 TABLET EVERY DAY (Patient taking differently: Take 20 mg by mouth Every Night.) 90 tablet 1   • Ergocalciferol (VITAMIN D2 PO) Take 1 capsule by mouth Daily.     • ferrous sulfate 325 (65 FE) MG tablet Take 1 tablet by mouth Daily With Breakfast. 90 tablet 1   • metoprolol tartrate (LOPRESSOR) 25 MG tablet Take 0.5 tablets by mouth Every 12 (Twelve) Hours. 30 tablet 5   • Omega-3 1000 MG capsule Take 1,000 mg by mouth Daily.     • tamsulosin (FLOMAX) 0.4 MG capsule 24 hr capsule Take 1 capsule by mouth 2 (two) times a day.     • flecainide (TAMBOCOR) 100 MG tablet Take 1 tablet by mouth Every 12 (Twelve) Hours. 60 tablet 5   • lisinopril (PRINIVIL,ZESTRIL) 10 MG tablet Take 10 mg by mouth 2 (two) times a day.       No facility-administered medications prior to visit.        Patient Active Problem List   Diagnosis   • Essential hypertension   • PFO (patent foramen ovale)   • Hyperlipidemia   • Tobacco chew use   • CKD (chronic kidney disease) stage 3, GFR 30-59 ml/min   • Atrial fibrillation with RVR   • History of TIA (transient ischemic attack) and stroke   • Leukocytosis   • Scalp laceration   • Diverticulosis   • Pre-syncope   • Syncope and collapse   • Sick sinus syndrome (CMS/HCC)   • Pericardial effusion   • Atrial fibrillation with rapid ventricular response (CMS/HCC)       Advanced Care Planning:  ACP discussion was held with the patient during this visit. Patient does not have an advance directive, information provided.    Review of Systems   Constitutional: Positive for fatigue. Negative for activity change, appetite change, chills, fever and unexpected weight  "change.   HENT: Negative for congestion, ear pain, hearing loss, rhinorrhea, sinus pressure, sinus pain, sore throat and trouble swallowing.    Eyes: Negative for photophobia, pain, discharge and itching.   Respiratory: Negative for cough, chest tightness, shortness of breath and wheezing.    Cardiovascular: Negative for chest pain, palpitations and leg swelling.   Gastrointestinal: Negative for abdominal distention, abdominal pain, blood in stool, constipation, diarrhea and vomiting.        Colonoscopy by Dr Schmid   Endocrine: Negative for cold intolerance, heat intolerance, polydipsia, polyphagia and polyuria.   Genitourinary: Negative for difficulty urinating, dysuria, enuresis, frequency, genital sores, hematuria and urgency.        Followed by Dr Segal   Musculoskeletal: Positive for arthralgias and back pain (better). Negative for gait problem, joint swelling and myalgias.   Skin: Negative for pallor, rash and wound.   Allergic/Immunologic: Negative for immunocompromised state.   Neurological: Positive for dizziness, weakness and light-headedness. Negative for tremors, seizures, syncope, speech difficulty, numbness and headaches.   Hematological: Negative for adenopathy.   Psychiatric/Behavioral: Negative for behavioral problems, dysphoric mood, sleep disturbance and suicidal ideas. The patient is not nervous/anxious.        Compared to one year ago, the patient feels his physical health is worse.  Compared to one year ago, the patient feels his mental health is the same.    Reviewed chart for potential of high risk medication in the elderly: yes  Reviewed chart for potential of harmful drug interactions in the elderly:yes    Objective         Vitals:    12/22/20 1130 12/22/20 1144   BP: 138/64 130/70   BP Location: Left arm    Patient Position: Sitting    Pulse: 84    Temp: 97.3 °F (36.3 °C)    TempSrc: Infrared    SpO2: 98%    Weight: 76.7 kg (169 lb)    Height: 182.9 cm (72.01\")    PainSc: 0-No pain  "       Body mass index is 22.92 kg/m².  Discussed the patient's BMI with him. The BMI is in the acceptable range.    Physical Exam  Constitutional:       Appearance: Normal appearance. He is well-developed.   HENT:      Head: Normocephalic and atraumatic.      Right Ear: External ear normal.      Left Ear: External ear normal.      Nose: Nose normal.      Mouth/Throat:      Mouth: Mucous membranes are moist.      Pharynx: Oropharynx is clear.   Eyes:      Extraocular Movements: Extraocular movements intact.      Conjunctiva/sclera: Conjunctivae normal.      Pupils: Pupils are equal, round, and reactive to light.   Neck:      Musculoskeletal: Normal range of motion and neck supple.   Cardiovascular:      Rate and Rhythm: Normal rate and regular rhythm.      Heart sounds: Normal heart sounds.   Pulmonary:      Effort: Pulmonary effort is normal.      Breath sounds: Normal breath sounds.   Abdominal:      General: Bowel sounds are normal.      Palpations: Abdomen is soft.   Musculoskeletal: Normal range of motion.   Lymphadenopathy:      Cervical: No cervical adenopathy.   Skin:     General: Skin is warm and dry.   Neurological:      General: No focal deficit present.      Mental Status: He is alert and oriented to person, place, and time.   Psychiatric:         Mood and Affect: Mood normal.         Behavior: Behavior normal.         Thought Content: Thought content normal.         Lab Results   Component Value Date    GLU 89 11/03/2020        Assessment/Plan   Medicare Risks and Personalized Health Plan  CMS Preventative Services Quick Reference  Advance Directive Discussion  Cardiovascular risk  Colon Cancer Screening  Fall Risk  Immunizations Discussed/Encouraged (specific immunizations; Hepatitis A Vaccine/Series, Pneumococcal 23 and Shingrix )  Polypharmacy  Prostate Cancer Screening     The above risks/problems have been discussed with the patient.  Pertinent information has been shared with the patient in the  After Visit Summary.  Follow up plans and orders are seen below in the Assessment/Plan Section.    Diagnoses and all orders for this visit:    1. Other hyperlipidemia (Primary)  -     Comprehensive Metabolic Panel  -     Lipid Panel  -     TSH    2. Essential hypertension  -     CBC (No Diff)    3. Paroxysmal atrial fibrillation (CMS/HCC)    4. Diverticulosis    5. Stage 3 chronic kidney disease, unspecified whether stage 3a or 3b CKD    6. Encounter for Medicare annual wellness exam  -     CBC (No Diff)  -     Comprehensive Metabolic Panel  -     Lipid Panel  -     TSH    7. Need for prophylactic vaccination against Streptococcus pneumoniae (pneumococcus)  -     Pneumococcal Polysaccharide Vaccine 23-Valent Greater Than or Equal To 1yo Subcutaneous / IM      Follow Up:  Return in about 4 months (around 4/22/2021) for Recheck, with fasting labs.     An After Visit Summary and PPPS were given to the patient.     HME-counseled on diet and exercise, fasting labs today, pneumovax 23 today  htn-stable on BB only  paf-rate controlled, can add another 1/2 BB prn  Hyperlipidemia- labs today, counseled on diet  djd-stable on tylenol  Thrombocytopenia/anemia-cbc today  TIA-cont rf mod  CKD-recheck today stable, advised fluids and NSAIDS avoidance  Abnormal glucose-labs today at goal     12/22  Labs noted and dw patient     Reviewed the following with the patient: advised patient to avoid alcoholic beverages, encouraged patient to exercise 5-7 days per week for 30 minutes at a time and ideal body weight discussed with patient.

## 2020-12-22 NOTE — TELEPHONE ENCOUNTER
----- Message from Pio Baum sent at 12/21/2020  9:55 PM EST -----  Regarding: Complaint  Contact: [phone]  Please review the previous message.  Dr Nava called me on 12/7 on my cell phone reviewing my history and discussing my health.  He stated he needed to see me within 2 to3 weeks and would have the office call me with an appointment date because he needed to start me back on Eliquis.  When Willow was in on 12/16 for her 6 month follow up with Rex Cristobal, she discussed this with him.  He checked with scheduling and she was told I would be getting a call.  She met Dr Nava when leaving the office, he questioned her about the appointment and repeated his concerns about getting me back on Eliquis.  I have not received a return call or an appointment call.  I can’t continue going to the ER without results, additionally the holidays.

## 2020-12-23 ENCOUNTER — DOCUMENTATION (OUTPATIENT)
Dept: CARDIOLOGY | Facility: CLINIC | Age: 80
End: 2020-12-23

## 2020-12-23 ENCOUNTER — TELEPHONE (OUTPATIENT)
Dept: CARDIOLOGY | Facility: CLINIC | Age: 80
End: 2020-12-23

## 2020-12-23 LAB
ALBUMIN SERPL-MCNC: 3.9 G/DL (ref 3.5–5.2)
ALBUMIN/GLOB SERPL: 1.2 G/DL
ALP SERPL-CCNC: 95 U/L (ref 39–117)
ALT SERPL-CCNC: 9 U/L (ref 1–41)
AST SERPL-CCNC: 12 U/L (ref 1–40)
BILIRUB SERPL-MCNC: 0.4 MG/DL (ref 0–1.2)
BUN SERPL-MCNC: 26 MG/DL (ref 8–23)
BUN/CREAT SERPL: 14.6 (ref 7–25)
CALCIUM SERPL-MCNC: 8.9 MG/DL (ref 8.6–10.5)
CHLORIDE SERPL-SCNC: 106 MMOL/L (ref 98–107)
CHOLEST SERPL-MCNC: 126 MG/DL (ref 0–200)
CO2 SERPL-SCNC: 23.6 MMOL/L (ref 22–29)
CREAT SERPL-MCNC: 1.78 MG/DL (ref 0.76–1.27)
ERYTHROCYTE [DISTWIDTH] IN BLOOD BY AUTOMATED COUNT: 13.5 % (ref 12.3–15.4)
GLOBULIN SER CALC-MCNC: 3.2 GM/DL
GLUCOSE SERPL-MCNC: 86 MG/DL (ref 65–99)
HCT VFR BLD AUTO: 38.6 % (ref 37.5–51)
HDLC SERPL-MCNC: 56 MG/DL (ref 40–60)
HGB BLD-MCNC: 12.7 G/DL (ref 13–17.7)
LDLC SERPL CALC-MCNC: 59 MG/DL (ref 0–100)
MCH RBC QN AUTO: 30.8 PG (ref 26.6–33)
MCHC RBC AUTO-ENTMCNC: 32.9 G/DL (ref 31.5–35.7)
MCV RBC AUTO: 93.5 FL (ref 79–97)
PLATELET # BLD AUTO: 207 10*3/MM3 (ref 140–450)
POTASSIUM SERPL-SCNC: 4.1 MMOL/L (ref 3.5–5.2)
PROT SERPL-MCNC: 7.1 G/DL (ref 6–8.5)
RBC # BLD AUTO: 4.13 10*6/MM3 (ref 4.14–5.8)
SODIUM SERPL-SCNC: 142 MMOL/L (ref 136–145)
TRIGL SERPL-MCNC: 48 MG/DL (ref 0–150)
TSH SERPL DL<=0.005 MIU/L-ACNC: 1.64 UIU/ML (ref 0.27–4.2)
VLDLC SERPL CALC-MCNC: 11 MG/DL (ref 5–40)
WBC # BLD AUTO: 9.98 10*3/MM3 (ref 3.4–10.8)

## 2020-12-23 RX ORDER — METHYLPREDNISOLONE 4 MG/1
TABLET ORAL
Qty: 21 TABLET | Refills: 0 | Status: SHIPPED | OUTPATIENT
Start: 2020-12-23 | End: 2020-12-30

## 2020-12-23 NOTE — PROGRESS NOTES
Mr. Baum called our office today complaining of intermittent episodes of pleuritic chest pain inspiratory in nature worse with lying on his left side over the past few days similar to his previous pericarditis pain.  He denies any fever or substantial shortness of breath.  No cough or hemoptysis.  No lightheadedness or near syncope.  A remote download of his pacemaker this a.m. demonstrates normal pacemaker function with 2% atrial fibrillation.  He is in normal sinus rhythm today.  I asked him to come into the office for evaluation.  In the office, his physical examination demonstrated blood pressure 120/80 with a pulse of 74.  O2 saturation on room air was 97%.  Cardiac examination did not demonstrate a pericardial rub with no significant cardiac abnormalities appreciated.  His lungs were clear to auscultation bilaterally.  Twelve-lead EKG showed 100% atrial paced rhythm normal IL interval and no significant changes compared to his last EKG.  Bedside limited echocardiogram performed by myself demonstrated low normal LVEF with a trivial pericardial effusion noted.  Since the patient has significant renal insufficiency, we will avoid nonsteroidals.  I will give him a Solu-Medrol Dosepak treatment for probable recurrent pericardial pain.  He is to call our office early next week to see if he is improved.  He does understand that if his symptoms worsen he should proceed to the emergency room.

## 2020-12-30 ENCOUNTER — OFFICE VISIT (OUTPATIENT)
Dept: CARDIOLOGY | Facility: CLINIC | Age: 80
End: 2020-12-30

## 2020-12-30 VITALS
DIASTOLIC BLOOD PRESSURE: 70 MMHG | HEIGHT: 72 IN | WEIGHT: 174.6 LBS | SYSTOLIC BLOOD PRESSURE: 136 MMHG | OXYGEN SATURATION: 99 % | BODY MASS INDEX: 23.65 KG/M2 | HEART RATE: 72 BPM

## 2020-12-30 DIAGNOSIS — I48.91 ATRIAL FIBRILLATION WITH RAPID VENTRICULAR RESPONSE (HCC): Primary | ICD-10-CM

## 2020-12-30 DIAGNOSIS — E78.49 OTHER HYPERLIPIDEMIA: ICD-10-CM

## 2020-12-30 DIAGNOSIS — I10 ESSENTIAL HYPERTENSION: ICD-10-CM

## 2020-12-30 PROCEDURE — 99213 OFFICE O/P EST LOW 20 MIN: CPT | Performed by: INTERNAL MEDICINE

## 2020-12-30 PROCEDURE — 93280 PM DEVICE PROGR EVAL DUAL: CPT | Performed by: INTERNAL MEDICINE

## 2020-12-30 NOTE — PROGRESS NOTES
Pio RICARDO Bautista  1940  [phone]    12/30/2020    [facility] CARDIOLOGY     Referring Provider: No ref. provider found     Lupillo Hill MD  [address]  formerly [facility] [address]    Chief Complaint   Patient presents with   • Paroxysmal atrial fibrillation (CMS/HCC)       Problem List:   1. Paroxysmal atrial fibrillation:  a. CHADS-VASc = 5 (HTN, Age > 75, h/o Stroke), on Eliquis 5 mg BID.   b. MPS, 01/17/2017: EF 58%, negative, low risk study  c. Implantation of a dual-chamber permanent pacemaker by Dr. Nava, 08/24/2020.  d. Pericardiocentesis, 08/24/2020: Successful pericardiocentesis in a patient with pericardial tamponade/effusion post right-sided pacemaker implantation with approximately 270 cc of dark blood removed from the pericardium. Successful external cardioversion of atrial fibrillation with 200 J shock to sinus rhythm. Normal pacemaker function post pericardiocentesis.  e. Echocardiogram, 08/24/2020: There is a small (<1cm) pericardial effusion. Pericardial fluid was drained by the end of the study.  f. Echocardiogram, 08/24/2020: EF 50-60%. There is a trivial pericardial effusion.  g. Echocardiogram, 08/25/2020:  EF 65%. There is a small (<1cm) circumferential pericardial effusion.  h. Echocardiogram, 10/20/2020: EF 55%. There are myxomatous changes of the mitral valve apparatus present. There is bileaflet mitral valve prolapse present. Trace MR and Mild TR. Calculated right ventricular systolic pressure from tricuspid regurgitation is 26 mmHg. There is a large (>2cm) pericardial effusion. Borderline echo criteria for tamponade  i. Pericardiocentesis, 10/20/2020: Successful pericardiocentesis with removal of approximately 1300 cc of dark venous blood with small clots present reducing the pericardial effusion from 4.1 cm to approximately 1.1 cm via echocardiographic guidance. Successful intracardiac ultrasound monitoring.  j. Echocardiogram,  10/20/2020: There is a moderate (1-2cm) pericardial effusion adjacent to the right ventricle and left ventricle.  k. Echocardiogram, 10/21/2020: EF 55%. There is a small, mostly posterior, mild to moderate (1 cm) pericardial effusion along the left ventricle. There is no evidence of pericardial tamponade.  l. Echocardiogram, 10/23/2020: EF 55%. There is a small, mostly posterior pericardial effusion which appears less pronounced compared to the study of October 21, 2020. There is no evidence of pericardial tamponade. Cardiac chambers are grossly normal in size.  m. Echocardiogram, 10/26/2020: EF 45%. Left ventricular wall thickness is consistent with concentric hypertrophy. Mild MR. Moderate TR. No significant pericardial effusion is noted. Compared to previous studies the pericardial effusion has almost completely resolved.  2. TIA/CVA:  a. Carotid duplex, 04/20/2016: No significant disease  b. Echocardiogram, 04/20/2016: Normal LV function, positive bubble study. Closure not considered due to need for chronic anticoagulation therapy.  c. Cardiac event monitor showed atrial fibrillation/flutter with intermittent RVR, aberrancy, IVCD/sinus rhythm with PVCs  3. Syncope:  a. 2 week Zio, 05/16/2019: SR, occasional PAC's and PVC's. Occasional short SVT/PAT, 21 runs at 3-20 beats.  b. Echocardiogram, 05/16/2019: EF 55%. Borderline right-sided chamber enlargement. Mild MR/TR, normal RVSP.  4. Hypertension  5. Dyslipidemia  6. Oral tobacco abuse  7. History of migraine headaches  8. Chronic kidney disease stage II  9. Surgical history:  a. Tonsillectomy/adenoictomy    Allergies  Allergies   Allergen Reactions   • Flonase [Fluticasone] Irritability     Gets a nose bleed as soon as used   • Penicillins Rash     Rash all over body including mouth/throat        Current Medications    Current Outpatient Medications:   •  atorvastatin (LIPITOR) 40 MG tablet, TAKE 1 TABLET EVERY DAY (Patient taking differently: Take 20 mg by  mouth Every Night.), Disp: 90 tablet, Rfl: 1  •  Ergocalciferol (VITAMIN D2 PO), Take 1 capsule by mouth Daily., Disp: , Rfl:   •  ferrous sulfate 325 (65 FE) MG tablet, Take 1 tablet by mouth Daily With Breakfast., Disp: 90 tablet, Rfl: 1  •  metoprolol tartrate (LOPRESSOR) 25 MG tablet, Take 0.5 tablets by mouth Every 12 (Twelve) Hours., Disp: 30 tablet, Rfl: 5  •  Omega-3 1000 MG capsule, Take 1,000 mg by mouth Daily., Disp: , Rfl:   •  tamsulosin (FLOMAX) 0.4 MG capsule 24 hr capsule, Take 1 capsule by mouth 2 (two) times a day., Disp: , Rfl:     History of Present Illness     Pt presents for follow up of Paroxysmal Atrial Fibrillation and pre-syncope. Patient was seen 1 week ago for evaluation of intermittent episodes of pleuritic chest pain inspiratory in nature that was worse with lying on his left side. Bedside limited echocardiogram performed by Dr. Nava demonstrated low normal LVEF with a trivial pericardial effusion noted. He was prescribed Solu-Medrol Dosepak treatment for probable recurrent pericardial pain. Since finishing this dosepak, his symptoms have greatly improved and he is overall feeling well. Since we last saw the pt, pt has experienced some episodes of AF. He feels weak and palpitations with these episodes, longest lasting 1-2 hours. No known triggers. Denies any SOB, CP, LH, and dizziness. Due to recurrence of hemopericardium and multiple hospitalizations for pericarditis following his pacemaker implantation he was taken off all anticoagulation by Dr. Nava in August. At visit last week it was discussed restarting the Eliquis at todays visit. Denies any hospitalizations since last week, ER visits, bleeding, or TIA/CVA symptoms. Overall feels well since completing prednisone pack prescribed last week.     ROS:  General:  + fatigue, weight gain or loss  Cardiovascular:  Denies CP, PND, syncope, near syncope, edema + palpitations.  Pulmonary:  Denies HINTON, cough, or  "wheezing      Vitals:    12/30/20 0905   BP: 136/70   BP Location: Left arm   Patient Position: Sitting   Pulse: 72   SpO2: 99%   Weight: 79.2 kg (174 lb 9.6 oz)   Height: 182.9 cm (72\")     Body mass index is 23.68 kg/m².  PE:  General: NAD  Neck: no JVD, no carotid bruits, no TM  Heart RRR, NL S1, S2, no rubs, murmurs  Lungs: CTA, no wheezes, rhonchi, or rales  Abd: soft, non-tender, NL BS  Ext: No musculoskeletal deformities, no edema, cyanosis, or clubbing  Psych: normal mood and affect    Diagnostic Data:    Manual device interrogation: 7% mode switch Afib, 335 Afib with RVR episodes, 160-200 bpm longest 20 seconds. Up from 2% last week. 7.5 years on battery.    Procedures    1. Atrial fibrillation with rapid ventricular response (CMS/HCC)    2. Essential hypertension    3. Other hyperlipidemia          Plan:  1. Paroxysmal Atrial Fibrillation  -Continue metoprolol. Intolerant to flecainide. Consider sotalol or multaq in near future  - CHADS-VASc = 5 (HTN, Age > 75, h/o Stroke), Will start on Xarelto 15 mg daily to start on Monday due to renal insufficiency 1/4/20. Stop ASA as well on 1/4/20.     2. HTN  -Well controlled on current medications.     3. Hyperlipidemia  -Well controlled    Scribed for Dain Nava MD by HIMA Botello. 12/30/2020 10:40 EST     I, Dain Nava MD, personally performed the services described in this documentation as scribed by the above named individual in my presence, and it is both accurate and complete.  12/30/2020  10:41 EST    "

## 2021-01-08 ENCOUNTER — OFFICE VISIT (OUTPATIENT)
Dept: CARDIOLOGY | Facility: CLINIC | Age: 81
End: 2021-01-08

## 2021-01-08 VITALS
HEART RATE: 70 BPM | SYSTOLIC BLOOD PRESSURE: 110 MMHG | DIASTOLIC BLOOD PRESSURE: 60 MMHG | WEIGHT: 173 LBS | OXYGEN SATURATION: 97 % | BODY MASS INDEX: 23.43 KG/M2 | TEMPERATURE: 97.8 F | HEIGHT: 72 IN

## 2021-01-08 DIAGNOSIS — I10 ESSENTIAL HYPERTENSION: ICD-10-CM

## 2021-01-08 DIAGNOSIS — I48.0 PAROXYSMAL ATRIAL FIBRILLATION (HCC): Primary | ICD-10-CM

## 2021-01-08 DIAGNOSIS — E78.49 OTHER HYPERLIPIDEMIA: ICD-10-CM

## 2021-01-08 PROCEDURE — 99214 OFFICE O/P EST MOD 30 MIN: CPT | Performed by: INTERNAL MEDICINE

## 2021-01-08 NOTE — PROGRESS NOTES
[facility] Cardiology    Encounter Date: 2021    Patient ID: Pio Baum is a 80 y.o. male.  : 1940     PCP: Lupillo Hill MD       Chief Complaint: PAF      PROBLEM LIST:  1. Paroxysmal atrial fibrillation:  a. CHADS-VASc = 5 (HTN, Age > 75, h/o Stroke), on Eliquis 5 mg BID.   b. MPS, 2017: EF 58%, negative, low risk study  c. Implantation of a dual-chamber permanent pacemaker by Dr. Nava, 2020.  d. Pericardiocentesis, 2020: Successful pericardiocentesis in a patient with pericardial tamponade/effusion post right-sided pacemaker implantation with approximately 270 cc of dark blood removed from the pericardium. Successful external cardioversion of atrial fibrillation with 200 J shock to sinus rhythm. Normal pacemaker function post pericardiocentesis.  e. Echocardiogram, 2020: There is a small (<1cm) pericardial effusion. Pericardial fluid was drained by the end of the study.  f. Echocardiogram, 2020: EF 50-60%. There is a trivial pericardial effusion.  g. Echocardiogram, 2020:  EF 65%. There is a small (<1cm) circumferential pericardial effusion.  h. Echocardiogram, 10/20/2020: EF 55%. There are myxomatous changes of the mitral valve apparatus present. There is bileaflet mitral valve prolapse present. Trace MR and Mild TR. Calculated right ventricular systolic pressure from tricuspid regurgitation is 26 mmHg. There is a large (>2cm) pericardial effusion. Borderline echo criteria for tamponade  i. Pericardiocentesis, 10/20/2020: Successful pericardiocentesis with removal of approximately 1300 cc of dark venous blood with small clots present reducing the pericardial effusion from 4.1 cm to approximately 1.1 cm via echocardiographic guidance. Successful intracardiac ultrasound monitoring.  j. Echocardiogram, 10/20/2020: There is a moderate (1-2cm) pericardial effusion adjacent to the right ventricle and left  ventricle.  k. Echocardiogram, 10/21/2020: EF 55%. There is a small, mostly posterior, mild to moderate (1 cm) pericardial effusion along the left ventricle. There is no evidence of pericardial tamponade.  l. Echocardiogram, 10/23/2020: EF 55%. There is a small, mostly posterior pericardial effusion which appears less pronounced compared to the study of October 21, 2020. There is no evidence of pericardial tamponade. Cardiac chambers are grossly normal in size.  m. Echocardiogram, 10/26/2020: EF 45%. Left ventricular wall thickness is consistent with concentric hypertrophy. Mild MR. Moderate TR. No significant pericardial effusion is noted. Compared to previous studies the pericardial effusion has almost completely resolved.  2. TIA/CVA:  a. Carotid duplex, 04/20/2016: No significant disease  b. Echocardiogram, 04/20/2016: Normal LV function, positive bubble study. Closure not considered due to need for chronic anticoagulation therapy.  c. Cardiac event monitor showed atrial fibrillation/flutter with intermittent RVR, aberrancy, IVCD/sinus rhythm with PVCs  3. Syncope:  a. 2 week Zio, 05/16/2019: SR, occasional PAC's and PVC's. Occasional short SVT/PAT, 21 runs at 3-20 beats.  b. Echocardiogram, 05/16/2019: EF 55%. Borderline right-sided chamber enlargement. Mild MR/TR, normal RVSP.  4. Hypertension  5. Dyslipidemia  6. Oral tobacco abuse  7. History of migraine headaches  8. Chronic kidney disease stage II  9. Surgical history:  a. Tonsillectomy/adenoictomy    History of Present Illness  Patient presents today for a one month follow-up with a history of paroxysmal atrial fibrillation and cardiac risk factors. Since last visit, he has been feeling well overall from a cardiovascular standpoint. He is still experiencing melena and they were not able to find the source of his bleeding. His iron levels have reportedly returned back to normal and he takes supplements daily. He monitors his blood pressure at home and his  systolic pressure reportedly fluctuates from 110's to 150's. Patient otherwise denies chest pain, shortness of breath, PND, edema, palpitations, syncope, or presyncope at this time.      Allergies   Allergen Reactions   • Flecainide Other (See Comments)     Fatigue, weakness unable to tolerate.    • Flonase [Fluticasone] Irritability     Gets a nose bleed as soon as used   • Penicillins Rash     Rash all over body including mouth/throat          Current Outpatient Medications:   •  atorvastatin (LIPITOR) 40 MG tablet, TAKE 1 TABLET EVERY DAY (Patient taking differently: Take 20 mg by mouth Every Night.), Disp: 90 tablet, Rfl: 1  •  Ergocalciferol (VITAMIN D2 PO), Take 1 capsule by mouth Daily., Disp: , Rfl:   •  ferrous sulfate 325 (65 FE) MG tablet, Take 1 tablet by mouth Daily With Breakfast., Disp: 90 tablet, Rfl: 1  •  metoprolol tartrate (LOPRESSOR) 25 MG tablet, Take 0.5 tablets by mouth Every 12 (Twelve) Hours. (Patient taking differently: Take 12.5 mg by mouth Daily.), Disp: 30 tablet, Rfl: 5  •  Omega-3 1000 MG capsule, Take 1,000 mg by mouth Daily., Disp: , Rfl:   •  rivaroxaban (XARELTO) 15 MG tablet, Take 1 tablet by mouth Daily for 30 days., Disp: 90 tablet, Rfl: 3  •  tamsulosin (FLOMAX) 0.4 MG capsule 24 hr capsule, Take 1 capsule by mouth Daily., Disp: , Rfl:     The following portions of the patient's history were reviewed and updated as appropriate: allergies, current medications, past family history, past medical history, past social history, past surgical history and problem list.    ROS  Review of Systems   Constitution: Negative for chills, fever, fatigue, generalized weakness.   Cardiovascular: Negative for chest pain, dyspnea on exertion, leg swelling, palpitations, orthopnea, and syncope.   Respiratory: Negative for cough, shortness of breath, and wheezing.  HENT: Negative for ear pain, nosebleeds, and tinnitus.  Gastrointestinal: Negative for abdominal pain, constipation, diarrhea, nausea  "and vomiting. Positive for melena.  Genitourinary: No urinary symptoms.  Musculoskeletal: Negative for muscle cramps.  Neurological: Negative for dizziness, headaches, loss of balance, numbness, and symptoms of stroke.  Psychiatric: Normal mental status.     All other systems reviewed and are negative.        Objective:     /60 (BP Location: Left arm, Patient Position: Sitting)   Pulse 70   Temp 97.8 °F (36.6 °C)   Ht 182.9 cm (72\")   Wt 78.5 kg (173 lb)   SpO2 97%   BMI 23.46 kg/m²      Physical Exam  Constitutional: Patient appears well-developed and well-nourished.   HENT: HEENT exam unremarkable.   Neck: Neck supple. No JVD present. No carotid bruits.   Cardiovascular: Normal rate, regular rhythm and normal heart sounds. No murmur heard.   2+ symmetric pulses.   Pulmonary/Chest: Breath sounds normal. Does not exhibit tenderness.   Abdominal: Abdomen benign.   Musculoskeletal: Does not exhibit edema.   Neurological: Neurological exam unremarkable.   Vitals reviewed.    Data Review:   Lab Results   Component Value Date    GLU 86 12/22/2020    BUN 26 (H) 12/22/2020    CREATININE 1.78 (H) 12/22/2020    EGFRIFNONA 37 (L) 12/22/2020    EGFRIFAFRI 45 (L) 12/22/2020    BCR 14.6 12/22/2020    K 4.1 12/22/2020    CO2 23.6 12/22/2020    CALCIUM 8.9 12/22/2020    ALBUMIN 3.90 12/22/2020    AST 12 12/22/2020    ALT 9 12/22/2020     Lab Results   Component Value Date    CHLPL 126 12/22/2020    TRIG 48 12/22/2020    HDL 56 12/22/2020    LDL 59 12/22/2020      Lab Results   Component Value Date    WBC 9.98 12/22/2020    RBC 4.13 (L) 12/22/2020    HGB 12.7 (L) 12/22/2020    HCT 38.6 12/22/2020    MCV 93.5 12/22/2020     12/22/2020     Lab Results   Component Value Date    TSH 1.640 12/22/2020     Lab Results   Component Value Date    HGBA1C 5.40 09/04/2020        Procedures       Assessment:      Diagnosis Plan   1. Paroxysmal atrial fibrillation (CMS/HCC)  Stable and asymptomatic; continue Xarelto and " metoprolol tartrate.    2. Essential hypertension  Increase metoprolol to 0.5 mg BID.    3. Other hyperlipidemia  Well-controlled; continue atorvastatin 20 mg nightly.      Plan:   Stable cardiac status.   Increase metoprolol to 0.5 tablet BID instead of once daily..  Continue all other current medications.   FU in 6 MO, sooner as needed.  Thank you for allowing us to participate in the care of your patient.     Scribed for Yahaira Carson MD by Erika Aguilar. 1/8/2021  14:02 EST        I, Yahaira Carson MD, personally performed the services described in this documentation as scribed by the above named individual in my presence, and it is both accurate and complete.  1/8/2021  17:06 EST        Please note that portions of this note may have been completed with a voice recognition program. Efforts were made to edit the dictations, but occasionally words are mistranscribed.

## 2021-04-10 PROCEDURE — 93296 REM INTERROG EVL PM/IDS: CPT | Performed by: INTERNAL MEDICINE

## 2021-04-10 PROCEDURE — 93294 REM INTERROG EVL PM/LDLS PM: CPT | Performed by: INTERNAL MEDICINE

## 2021-04-22 ENCOUNTER — OFFICE VISIT (OUTPATIENT)
Dept: INTERNAL MEDICINE | Facility: CLINIC | Age: 81
End: 2021-04-22

## 2021-04-22 VITALS
SYSTOLIC BLOOD PRESSURE: 120 MMHG | DIASTOLIC BLOOD PRESSURE: 70 MMHG | WEIGHT: 172 LBS | BODY MASS INDEX: 23.3 KG/M2 | HEIGHT: 72 IN | HEART RATE: 72 BPM | TEMPERATURE: 96.9 F

## 2021-04-22 DIAGNOSIS — K57.90 DIVERTICULOSIS: ICD-10-CM

## 2021-04-22 DIAGNOSIS — I10 ESSENTIAL HYPERTENSION: ICD-10-CM

## 2021-04-22 DIAGNOSIS — I48.91 ATRIAL FIBRILLATION WITH RAPID VENTRICULAR RESPONSE (HCC): ICD-10-CM

## 2021-04-22 DIAGNOSIS — R73.09 ABNORMAL GLUCOSE: ICD-10-CM

## 2021-04-22 DIAGNOSIS — N18.30 STAGE 3 CHRONIC KIDNEY DISEASE, UNSPECIFIED WHETHER STAGE 3A OR 3B CKD (HCC): ICD-10-CM

## 2021-04-22 DIAGNOSIS — D50.8 OTHER IRON DEFICIENCY ANEMIA: ICD-10-CM

## 2021-04-22 DIAGNOSIS — E78.49 OTHER HYPERLIPIDEMIA: Primary | ICD-10-CM

## 2021-04-22 PROCEDURE — 99214 OFFICE O/P EST MOD 30 MIN: CPT | Performed by: INTERNAL MEDICINE

## 2021-04-22 NOTE — PROGRESS NOTES
Patient is a 81 y.o. male who is here for a follow up of hyperlipidemia.  Chief Complaint   Patient presents with   • Hyperlipidemia         HPI:    Here for mgmt of HTN and PAF and hyperlipidemia.  Energy level is better.  Occasional dizziness with change in position.  BP has been elevated at times.  No HAs.  No CP or palpitations.  No edema.  No cough.     History:     Patient Active Problem List   Diagnosis   • Essential hypertension   • PFO (patent foramen ovale)   • Hyperlipidemia   • Tobacco chew use   • CKD (chronic kidney disease) stage 3, GFR 30-59 ml/min (CMS/[facility])   • Atrial fibrillation with RVR   • History of TIA (transient ischemic attack) and stroke   • Leukocytosis   • Scalp laceration   • Diverticulosis   • Pre-syncope   • Syncope and collapse   • Sick sinus syndrome (CMS/[facility])   • Pericardial effusion   • Atrial fibrillation with rapid ventricular response (CMS/[facility])       Past Medical History:   Diagnosis Date   • A-fib (CMS/[facility])    • Chronic kidney disease    • History of migraine headaches    • Hyperlipidemia    • Hypertension    • Mitral valve regurgitation    • Pericardial effusion     s/p PPM placement   • PFO (patent foramen ovale)    • Testicular cyst     removal 1970   • TIA (transient ischemic attack)        Past Surgical History:   Procedure Laterality Date   • CARDIAC CATHETERIZATION N/A 8/24/2020    Procedure: PERICARDIOCENTESIS;  Surgeon: Dain Nava MD;  Location:  [facility];  Service: Cardiology;  Laterality: N/A;   • CARDIAC CATHETERIZATION N/A 10/20/2020    Procedure: PERICARDIOCENTESIS;  Surgeon: Dain Nava MD;  Location:  [facility];  Service: Cardiology;  Laterality: N/A;   • CARDIAC ELECTROPHYSIOLOGY PROCEDURE N/A 8/24/2020    Procedure: PACEMAKER IMPLANTATION- DC;  Surgeon: Dain Nava MD;  Location:  [facility];  Service: Cardiology;  Laterality: N/A;   • CATARACT EXTRACTION W/ INTRAOCULAR LENS  IMPLANT,  BILATERAL     • TONSILLECTOMY AND ADENOIDECTOMY  11 yo       Current Outpatient Medications on File Prior to Visit   Medication Sig   • atorvastatin (LIPITOR) 40 MG tablet TAKE 1 TABLET EVERY DAY (Patient taking differently: Take 20 mg by mouth Every Night.)   • Ergocalciferol (VITAMIN D2 PO) Take 1 capsule by mouth Daily.   • metoprolol tartrate (LOPRESSOR) 25 MG tablet Take 0.5 tablets by mouth Every 12 (Twelve) Hours. (Patient taking differently: Take 12.5 mg by mouth Daily.)   • Omega-3 1000 MG capsule Take 1,000 mg by mouth Daily.   • tamsulosin (FLOMAX) 0.4 MG capsule 24 hr capsule Take 1 capsule by mouth Daily.   • rivaroxaban (XARELTO) 15 MG tablet Take 1 tablet by mouth Daily for 30 days.     No current facility-administered medications on file prior to visit.       Family History   Problem Relation Age of Onset   • Arthritis Other    • Hyperlipidemia Other    • Stroke Other    • Heart attack Mother    • Heart attack Father        Social History     Socioeconomic History   • Marital status:      Spouse name: Not on file   • Number of children: Not on file   • Years of education: Not on file   • Highest education level: Not on file   Tobacco Use   • Smoking status: Never Smoker   • Smokeless tobacco: Former User     Types: Chew   Vaping Use   • Vaping Use: Never used   Substance and Sexual Activity   • Alcohol use: Yes     Comment: 4 drinks per month   • Drug use: No   • Sexual activity: Defer         Review of Systems   Constitutional: Positive for fatigue (better). Negative for activity change, appetite change, chills, fever and unexpected weight change.   HENT: Negative for congestion, ear pain, hearing loss, rhinorrhea, sinus pressure, sinus pain, sore throat and trouble swallowing.    Eyes: Negative for photophobia, pain, discharge and itching.   Respiratory: Negative for cough, chest tightness, shortness of breath and wheezing.    Cardiovascular: Negative for chest pain, palpitations and leg  "swelling.   Gastrointestinal: Negative for abdominal distention, abdominal pain, blood in stool, constipation, diarrhea and vomiting.        Colonoscopy by Dr Schmid   Endocrine: Negative for cold intolerance, heat intolerance, polydipsia, polyphagia and polyuria.   Genitourinary: Negative for difficulty urinating, dysuria, enuresis, frequency, genital sores, hematuria and urgency.        Followed by Dr Segal   Musculoskeletal: Positive for arthralgias. Negative for gait problem, joint swelling and myalgias.   Skin: Negative for pallor, rash and wound.   Allergic/Immunologic: Negative for immunocompromised state.   Neurological: Positive for dizziness and light-headedness. Negative for tremors, seizures, syncope, speech difficulty, numbness and headaches.   Hematological: Negative for adenopathy.   Psychiatric/Behavioral: Negative for behavioral problems, dysphoric mood, sleep disturbance and suicidal ideas. The patient is not nervous/anxious.        /70   Pulse 72   Temp 96.9 °F (36.1 °C) (Infrared)   Ht 182.9 cm (72.01\")   Wt 78 kg (172 lb)   BMI 23.32 kg/m²       Physical Exam  Constitutional:       Appearance: Normal appearance. He is well-developed.   HENT:      Head: Normocephalic and atraumatic.      Right Ear: External ear normal.      Left Ear: External ear normal.      Nose: Nose normal.      Mouth/Throat:      Mouth: Mucous membranes are moist.      Pharynx: Oropharynx is clear.   Eyes:      Extraocular Movements: Extraocular movements intact.      Conjunctiva/sclera: Conjunctivae normal.      Pupils: Pupils are equal, round, and reactive to light.   Cardiovascular:      Rate and Rhythm: Normal rate and regular rhythm.      Heart sounds: Normal heart sounds.   Pulmonary:      Effort: Pulmonary effort is normal.      Breath sounds: Normal breath sounds.   Abdominal:      General: Bowel sounds are normal.      Palpations: Abdomen is soft.   Musculoskeletal:         General: Normal range of motion. "      Cervical back: Normal range of motion and neck supple.   Lymphadenopathy:      Cervical: No cervical adenopathy.   Skin:     General: Skin is warm and dry.   Neurological:      General: No focal deficit present.      Mental Status: He is alert and oriented to person, place, and time.   Psychiatric:         Mood and Affect: Mood normal.         Behavior: Behavior normal.         Thought Content: Thought content normal.         Procedure:      Discussion/Summary:    htn-stable on BB only  paf-rate controlled, can add another 1/2 BB prn  Hyperlipidemia- labs today on lipitor at goal, counseled on diet  djd-stable on tylenol  Thrombocytopenia/anemia-cbc today normal  TIA-cont rf mod  CKD-recheck today, advised fluids and NSAIDS avoidance  Abnormal glucose-labs today stable  Diverticulosis-increase fiber, asymptomatic     4/22  Labs noted and dw patient    Current Outpatient Medications:   •  atorvastatin (LIPITOR) 40 MG tablet, TAKE 1 TABLET EVERY DAY (Patient taking differently: Take 20 mg by mouth Every Night.), Disp: 90 tablet, Rfl: 1  •  Ergocalciferol (VITAMIN D2 PO), Take 1 capsule by mouth Daily., Disp: , Rfl:   •  metoprolol tartrate (LOPRESSOR) 25 MG tablet, Take 0.5 tablets by mouth Every 12 (Twelve) Hours. (Patient taking differently: Take 12.5 mg by mouth Daily.), Disp: 30 tablet, Rfl: 5  •  Omega-3 1000 MG capsule, Take 1,000 mg by mouth Daily., Disp: , Rfl:   •  tamsulosin (FLOMAX) 0.4 MG capsule 24 hr capsule, Take 1 capsule by mouth Daily., Disp: , Rfl:   •  rivaroxaban (XARELTO) 15 MG tablet, Take 1 tablet by mouth Daily for 30 days., Disp: 90 tablet, Rfl: 3        Diagnoses and all orders for this visit:    1. Other hyperlipidemia (Primary)  -     Comprehensive Metabolic Panel  -     Lipid Panel    2. Essential hypertension    3. Atrial fibrillation with rapid ventricular response (CMS/HCC)    4. Diverticulosis    5. Stage 3 chronic kidney disease, unspecified whether stage 3a or 3b CKD  (CMS/[facility])    6. Abnormal glucose  -     Hemoglobin A1c    7. Other iron deficiency anemia  -     CBC (No Diff)  -     Iron    Other orders  -     Hemoglobin A1c

## 2021-04-23 LAB
ALBUMIN SERPL-MCNC: 4.2 G/DL (ref 3.5–5.2)
ALBUMIN/GLOB SERPL: 1.8 G/DL
ALP SERPL-CCNC: 100 U/L (ref 39–117)
ALT SERPL-CCNC: 12 U/L (ref 1–41)
AST SERPL-CCNC: 16 U/L (ref 1–40)
BILIRUB SERPL-MCNC: 0.5 MG/DL (ref 0–1.2)
BUN SERPL-MCNC: 23 MG/DL (ref 8–23)
BUN/CREAT SERPL: 15.1 (ref 7–25)
CALCIUM SERPL-MCNC: 9.5 MG/DL (ref 8.6–10.5)
CHLORIDE SERPL-SCNC: 105 MMOL/L (ref 98–107)
CHOLEST SERPL-MCNC: 120 MG/DL (ref 0–200)
CO2 SERPL-SCNC: 26 MMOL/L (ref 22–29)
CREAT SERPL-MCNC: 1.52 MG/DL (ref 0.76–1.27)
ERYTHROCYTE [DISTWIDTH] IN BLOOD BY AUTOMATED COUNT: 13.6 % (ref 12.3–15.4)
GLOBULIN SER CALC-MCNC: 2.3 GM/DL
GLUCOSE SERPL-MCNC: 75 MG/DL (ref 65–99)
HBA1C MFR BLD: 5.7 % (ref 4.8–5.6)
HCT VFR BLD AUTO: 42 % (ref 37.5–51)
HDLC SERPL-MCNC: 54 MG/DL (ref 40–60)
HGB BLD-MCNC: 14.4 G/DL (ref 13–17.7)
IRON SERPL-MCNC: 80 MCG/DL (ref 59–158)
LDLC SERPL CALC-MCNC: 48 MG/DL (ref 0–100)
MCH RBC QN AUTO: 31.8 PG (ref 26.6–33)
MCHC RBC AUTO-ENTMCNC: 34.3 G/DL (ref 31.5–35.7)
MCV RBC AUTO: 92.7 FL (ref 79–97)
PLATELET # BLD AUTO: 196 10*3/MM3 (ref 140–450)
POTASSIUM SERPL-SCNC: 4.3 MMOL/L (ref 3.5–5.2)
PROT SERPL-MCNC: 6.5 G/DL (ref 6–8.5)
RBC # BLD AUTO: 4.53 10*6/MM3 (ref 4.14–5.8)
SODIUM SERPL-SCNC: 140 MMOL/L (ref 136–145)
TRIGL SERPL-MCNC: 96 MG/DL (ref 0–150)
VLDLC SERPL CALC-MCNC: 18 MG/DL (ref 5–40)
WBC # BLD AUTO: 9.69 10*3/MM3 (ref 3.4–10.8)

## 2021-05-10 RX ORDER — ATORVASTATIN CALCIUM 40 MG/1
TABLET, FILM COATED ORAL
Qty: 90 TABLET | Refills: 3 | Status: SHIPPED | OUTPATIENT
Start: 2021-05-10 | End: 2022-01-21

## 2021-05-10 NOTE — TELEPHONE ENCOUNTER
Medication Refill Request    Medication: atorvastatin (LIPITOR) 40 MG tablet nightly    Pertinent Labs:  Lab Results   Component Value Date    GLUCOSE 94 11/10/2020    BUN 23 04/22/2021    CREATININE 1.52 (H) 04/22/2021    EGFRIFNONA 44 (L) 04/22/2021    EGFRIFAFRI 53 (L) 04/22/2021    BCR 15.1 04/22/2021    K 4.3 04/22/2021    CO2 26.0 04/22/2021    CALCIUM 9.5 04/22/2021    ALBUMIN 4.20 04/22/2021    ALKPHOS 100 04/22/2021    AST 16 04/22/2021    ALT 12 04/22/2021      Lab Results   Component Value Date    CHOL 138 08/31/2018    CHLPL 120 04/22/2021    TRIG 96 04/22/2021    HDL 54 04/22/2021    LDL 48 04/22/2021     Lab Results   Component Value Date    HGBA1C 5.70 (H) 04/22/2021     Lab Results   Component Value Date    WBC 9.69 04/22/2021    HGB 14.4 04/22/2021    HCT 42.0 04/22/2021    MCV 92.7 04/22/2021     04/22/2021     Lab Results   Component Value Date    TSH 1.640 12/22/2020

## 2021-07-06 ENCOUNTER — TELEPHONE (OUTPATIENT)
Dept: CARDIOLOGY | Facility: CLINIC | Age: 81
End: 2021-07-06

## 2021-07-06 NOTE — TELEPHONE ENCOUNTER
Per Dr. Nava, called pt to initiate a manual remote device transmission.     Spoke w/spouse, she reports pt has gone fishing. Spouse states she will have pt call the device clinic when he returns this afternoon.

## 2021-07-09 ENCOUNTER — OFFICE VISIT (OUTPATIENT)
Dept: CARDIOLOGY | Facility: CLINIC | Age: 81
End: 2021-07-09

## 2021-07-09 VITALS
SYSTOLIC BLOOD PRESSURE: 132 MMHG | HEART RATE: 70 BPM | HEIGHT: 72 IN | WEIGHT: 167.8 LBS | BODY MASS INDEX: 22.73 KG/M2 | OXYGEN SATURATION: 98 % | DIASTOLIC BLOOD PRESSURE: 73 MMHG

## 2021-07-09 DIAGNOSIS — E78.2 MIXED HYPERLIPIDEMIA: ICD-10-CM

## 2021-07-09 DIAGNOSIS — I10 ESSENTIAL HYPERTENSION: ICD-10-CM

## 2021-07-09 DIAGNOSIS — I48.0 PAROXYSMAL ATRIAL FIBRILLATION (HCC): Primary | ICD-10-CM

## 2021-07-09 PROCEDURE — 99214 OFFICE O/P EST MOD 30 MIN: CPT | Performed by: INTERNAL MEDICINE

## 2021-07-09 PROCEDURE — 93288 INTERROG EVL PM/LDLS PM IP: CPT | Performed by: INTERNAL MEDICINE

## 2021-07-09 RX ORDER — SODIUM PHOSPHATE,MONO-DIBASIC 19G-7G/118
1 ENEMA (ML) RECTAL DAILY
COMMUNITY

## 2021-07-09 RX ORDER — UBIDECARENONE 100 MG
100 CAPSULE ORAL DAILY
COMMUNITY

## 2021-07-09 RX ORDER — B-COMPLEX WITH VITAMIN C
100 TABLET ORAL DAILY
COMMUNITY

## 2021-07-09 RX ORDER — MULTIVIT WITH MINERALS/LUTEIN
1000 TABLET ORAL DAILY
COMMUNITY

## 2021-07-09 NOTE — PROGRESS NOTES
[facility] Cardiology    Encounter Date: 2021    Patient ID: Pio Baum is a 81 y.o. male.  : 1940     PCP: Lupillo Hill MD       Chief Complaint: Paroxysmal Atrial Fibrillation      PROBLEM LIST:  1. Paroxysmal atrial fibrillation/sick sinus syndrome:  a. CHADS-VASc = 5 (HTN, Age > 75, h/o Stroke), on Eliquis 5 mg BID.   b. MPS, 2017: EF 58%, negative, low risk study  c. Implantation of a dual-chamber permanent pacemaker by Dr. Nava, 2020.  d. Pericardiocentesis, 2020: Successful pericardiocentesis in a patient with pericardial tamponade/effusion post right-sided pacemaker implantation with approximately 270 cc of dark blood removed from the pericardium. Successful external cardioversion of atrial fibrillation with 200 J shock to sinus rhythm. Normal pacemaker function post pericardiocentesis.  e. Echocardiogram, 2020: There is a small (<1cm) pericardial effusion. Pericardial fluid was drained by the end of the study.  f. Echocardiogram, 2020: EF 50-60%. There is a trivial pericardial effusion.  g. Echocardiogram, 2020:  EF 65%. There is a small (<1cm) circumferential pericardial effusion.  h. Echocardiogram, 10/20/2020: EF 55%. There are myxomatous changes of the mitral valve apparatus present. There is bileaflet mitral valve prolapse present. Trace MR and Mild TR. Calculated right ventricular systolic pressure from tricuspid regurgitation is 26 mmHg. There is a large (>2cm) pericardial effusion. Borderline echo criteria for tamponade  i. Pericardiocentesis, 10/20/2020: Successful pericardiocentesis with removal of approximately 1300 cc of dark venous blood with small clots present reducing the pericardial effusion from 4.1 cm to approximately 1.1 cm via echocardiographic guidance. Successful intracardiac ultrasound monitoring.  j. Echocardiogram, 10/20/2020: There is a moderate (1-2cm) pericardial effusion adjacent  to the right ventricle and left ventricle.  k. Echocardiogram, 10/21/2020: EF 55%. There is a small, mostly posterior, mild to moderate (1 cm) pericardial effusion along the left ventricle. There is no evidence of pericardial tamponade.  l. Echocardiogram, 10/23/2020: EF 55%. There is a small, mostly posterior pericardial effusion which appears less pronounced compared to the study of October 21, 2020. There is no evidence of pericardial tamponade. Cardiac chambers are grossly normal in size.  m. Echocardiogram, 10/26/2020: EF 45%. Left ventricular wall thickness is consistent with concentric hypertrophy. Mild MR. Moderate TR. No significant pericardial effusion is noted. Compared to previous studies the pericardial effusion has almost completely resolved.  2. TIA/CVA:  a. Carotid duplex, 04/20/2016: No significant disease  b. Echocardiogram, 04/20/2016: Normal LV function, positive bubble study. Closure not considered due to need for chronic anticoagulation therapy.  c. Cardiac event monitor showed atrial fibrillation/flutter with intermittent RVR, aberrancy, IVCD/sinus rhythm with PVCs  3. Syncope:  a. 2 week Zio, 05/16/2019: SR, occasional PAC's and PVC's. Occasional short SVT/PAT, 21 runs at 3-20 beats.  b. Echocardiogram, 05/16/2019: EF 55%. Borderline right-sided chamber enlargement. Mild MR/TR, normal RVSP.  4. Hypertension  5. Dyslipidemia  6. Oral tobacco abuse  7. History of migraine headaches  8. Chronic kidney disease stage II  9. Surgical history:  a. Tonsillectomy/adenoictomy    History of Present Illness  Patient presents today for 6-month follow-up with a history of atrial fibrillation, TIA, and cardiac risk factors. Since last visit, he has been feeling well overall from a cardiovascular standpoint. Patient denies chest pain, shortness of breath, palpitations, edema, dizziness, and syncope. He has had no interim ER visits, hospitalizations, serious illnesses, or surgeries.    Allergies   Allergen  Reactions   • Flecainide Other (See Comments)     Fatigue, weakness unable to tolerate.    • Flonase [Fluticasone] Irritability     Gets a nose bleed as soon as used   • Penicillins Rash     Rash all over body including mouth/throat          Current Outpatient Medications:   •  ascorbic acid (VITAMIN C) 1000 MG tablet, Take 1,000 mg by mouth Daily., Disp: , Rfl:   •  atorvastatin (LIPITOR) 40 MG tablet, TAKE 1 TABLET EVERY DAY, Disp: 90 tablet, Rfl: 3  •  coenzyme Q10 100 MG capsule, Take 100 mg by mouth Daily., Disp: , Rfl:   •  Ergocalciferol (VITAMIN D2 PO), Take 1 capsule by mouth Daily., Disp: , Rfl:   •  glucosamine-chondroitin 500-400 MG capsule capsule, Take 1 capsule by mouth Daily., Disp: , Rfl:   •  metoprolol tartrate (LOPRESSOR) 25 MG tablet, Take 0.5 tablets by mouth Every 12 (Twelve) Hours. (Patient taking differently: Take 12.5 mg by mouth Daily.), Disp: 30 tablet, Rfl: 5  •  Omega-3 1000 MG capsule, Take 1,000 mg by mouth Daily., Disp: , Rfl:   •  rivaroxaban (XARELTO) 15 MG tablet, Take 1 tablet by mouth Daily for 30 days., Disp: 90 tablet, Rfl: 3  •  tamsulosin (FLOMAX) 0.4 MG capsule 24 hr capsule, Take 1 capsule by mouth Daily., Disp: , Rfl:   •  Zinc 100 MG tablet, Take 100 mg by mouth Daily., Disp: , Rfl:     The following portions of the patient's history were reviewed and updated as appropriate: allergies, current medications, past family history, past medical history, past social history, past surgical history and problem list.    ROS  Review of Systems   Constitution: Negative for chills, fever, fatigue, generalized weakness.   Cardiovascular: Negative for chest pain, dyspnea on exertion, leg swelling, palpitations, orthopnea, and syncope.   Respiratory: Negative for cough, shortness of breath, and wheezing.  HENT: Negative for ear pain, nosebleeds, and tinnitus.  Gastrointestinal: Negative for abdominal pain, constipation, diarrhea, nausea and vomiting.   Genitourinary: No urinary  "symptoms.  Musculoskeletal: Negative for muscle cramps.  Neurological: Negative for dizziness, headaches, loss of balance, numbness, and symptoms of stroke.  Psychiatric: Normal mental status.     All other systems reviewed and are negative.        Objective:     /73 (BP Location: Left arm, Patient Position: Sitting)   Pulse 70   Ht 182.9 cm (72\")   Wt 76.1 kg (167 lb 12.8 oz)   SpO2 98%   BMI 22.76 kg/m²      Physical Exam  Constitutional: Patient appears well-developed and well-nourished.   HENT: HEENT exam unremarkable.   Neck: Neck supple. No JVD present. No carotid bruits.   Cardiovascular: Normal rate, regular rhythm and normal heart sounds. No murmur heard.   2+ symmetric pulses.   Pulmonary/Chest: Breath sounds normal. Does not exhibit tenderness.   Abdominal: Abdomen benign.   Musculoskeletal: Does not exhibit edema.   Neurological: Neurological exam unremarkable.   Vitals reviewed.    Data Review:   Lab Results   Component Value Date    GLU 75 04/22/2021    BUN 23 04/22/2021    CREATININE 1.52 (H) 04/22/2021    EGFRIFNONA 44 (L) 04/22/2021    EGFRIFAFRI 53 (L) 04/22/2021    BCR 15.1 04/22/2021    K 4.3 04/22/2021    CO2 26.0 04/22/2021    CALCIUM 9.5 04/22/2021    ALBUMIN 4.20 04/22/2021    AST 16 04/22/2021    ALT 12 04/22/2021     Lab Results   Component Value Date    CHLPL 120 04/22/2021    TRIG 96 04/22/2021    HDL 54 04/22/2021    LDL 48 04/22/2021      Lab Results   Component Value Date    WBC 9.69 04/22/2021    RBC 4.53 04/22/2021    HGB 14.4 04/22/2021    HCT 42.0 04/22/2021    MCV 92.7 04/22/2021     04/22/2021     Lab Results   Component Value Date    TSH 1.640 12/22/2020     Lab Results   Component Value Date    HGBA1C 5.70 (H) 04/22/2021        Procedures   Manual device interrogation, 07/09/21:   Normal, well-functioning BSC PPM with 8 years of battery life remaining.   Events: 144 \"VHR\" (all in setting of ATR), <1% ATR (longest 38.5 hours)  Device updates: No updates       "   Assessment:      Diagnosis Plan   1. Paroxysmal atrial fibrillation (CMS/HCC)/status post PPM for sick sinus syndrome, normal device function. <1% atrial fibrillation/tachycardia on device interrogation today. Continue current treatment including metoprolol and Xarelto for rate control and stroke prophylaxis.   2. Essential hypertension  Well controlled. Continue current treatment.   3. Mixed hyperlipidemia  Well controlled. Continue atorvastatin.     Plan:   Stable cardiac status.  Normal device function.  Continue current medications.   FU in 6 MO with device interrogation, sooner as needed.  Thank you for allowing us to participate in the care of your patient.     I, Armen Alvares, attest that this documentation has been prepared under the direction and in the presence of Yahaira Carson MD 07/09/2021      I, Yahaira Carson MD, personally performed the services described in this documentation as scribed by the above named individual in my presence, and it is both accurate and complete.  7/9/2021  16:15 EDT        Please note that portions of this note may have been completed with a voice recognition program. Efforts were made to edit the dictations, but occasionally words are mistranscribed.

## 2021-07-10 PROCEDURE — 93296 REM INTERROG EVL PM/IDS: CPT | Performed by: INTERNAL MEDICINE

## 2021-07-10 PROCEDURE — 93294 REM INTERROG EVL PM/LDLS PM: CPT | Performed by: INTERNAL MEDICINE

## 2021-07-23 ENCOUNTER — TELEPHONE (OUTPATIENT)
Dept: CARDIOLOGY | Facility: CLINIC | Age: 81
End: 2021-07-23

## 2021-07-23 NOTE — TELEPHONE ENCOUNTER
Pt has been going in and out of afib since yesterday HR 94. HR today is 87 He felt bad yesterday but feels better today. They will continue to monitor this weekend and call us on Monday if needed.

## 2021-07-27 ENCOUNTER — TELEPHONE (OUTPATIENT)
Dept: CARDIOLOGY | Facility: CLINIC | Age: 81
End: 2021-07-27

## 2021-08-23 ENCOUNTER — OFFICE VISIT (OUTPATIENT)
Dept: INTERNAL MEDICINE | Facility: CLINIC | Age: 81
End: 2021-08-23

## 2021-08-23 VITALS
TEMPERATURE: 96.8 F | SYSTOLIC BLOOD PRESSURE: 140 MMHG | OXYGEN SATURATION: 98 % | BODY MASS INDEX: 22.62 KG/M2 | HEART RATE: 75 BPM | WEIGHT: 167 LBS | DIASTOLIC BLOOD PRESSURE: 74 MMHG | HEIGHT: 72 IN

## 2021-08-23 DIAGNOSIS — I10 ESSENTIAL HYPERTENSION: ICD-10-CM

## 2021-08-23 DIAGNOSIS — I48.0 PAROXYSMAL ATRIAL FIBRILLATION (HCC): ICD-10-CM

## 2021-08-23 DIAGNOSIS — N18.30 STAGE 3 CHRONIC KIDNEY DISEASE, UNSPECIFIED WHETHER STAGE 3A OR 3B CKD (HCC): ICD-10-CM

## 2021-08-23 DIAGNOSIS — E78.49 OTHER HYPERLIPIDEMIA: Primary | ICD-10-CM

## 2021-08-23 PROCEDURE — 99214 OFFICE O/P EST MOD 30 MIN: CPT | Performed by: INTERNAL MEDICINE

## 2021-08-23 NOTE — PROGRESS NOTES
Patient is a 81 y.o. male who is here for a follow up of hypertension and hyperlipidemia.  Chief Complaint   Patient presents with   • Hypertension   • Hyperlipidemia         HPI:    Here for mgmt of HTN and CKD and afib.  No dizziness or lightheadedness.   No HAs.  Breathing is good.  No cough.  No edema.  Walking qam about 15-45 min in the am.  No abdominal pains.  No CP.  Rare palpitations.     History:     Patient Active Problem List   Diagnosis   • Essential hypertension   • PFO (patent foramen ovale)   • Hyperlipidemia   • Tobacco chew use   • CKD (chronic kidney disease) stage 3, GFR 30-59 ml/min (CMS/[facility])   • Atrial fibrillation with RVR   • History of TIA (transient ischemic attack) and stroke   • Leukocytosis   • Scalp laceration   • Diverticulosis   • Pre-syncope   • Syncope and collapse   • Sick sinus syndrome (CMS/[facility])   • Pericardial effusion   • Atrial fibrillation with rapid ventricular response (CMS/[facility])       Past Medical History:   Diagnosis Date   • A-fib (CMS/[facility])    • Chronic kidney disease    • History of migraine headaches    • Hyperlipidemia    • Hypertension    • Mitral valve regurgitation    • Pericardial effusion     s/p PPM placement   • PFO (patent foramen ovale)    • Testicular cyst     removal 1970   • TIA (transient ischemic attack)        Past Surgical History:   Procedure Laterality Date   • CARDIAC CATHETERIZATION N/A 8/24/2020    Procedure: PERICARDIOCENTESIS;  Surgeon: Dain Nava MD;  Location:  [facility];  Service: Cardiology;  Laterality: N/A;   • CARDIAC CATHETERIZATION N/A 10/20/2020    Procedure: PERICARDIOCENTESIS;  Surgeon: Dain Nava MD;  Location:  [facility];  Service: Cardiology;  Laterality: N/A;   • CARDIAC ELECTROPHYSIOLOGY PROCEDURE N/A 8/24/2020    Procedure: PACEMAKER IMPLANTATION- DC;  Surgeon: Dain Nava MD;  Location:  [facility];  Service: Cardiology;  Laterality: N/A;   • CATARACT  EXTRACTION W/ INTRAOCULAR LENS  IMPLANT, BILATERAL     • TONSILLECTOMY AND ADENOIDECTOMY  9 yo       Current Outpatient Medications on File Prior to Visit   Medication Sig   • ascorbic acid (VITAMIN C) 1000 MG tablet Take 1,000 mg by mouth Daily.   • atorvastatin (LIPITOR) 40 MG tablet TAKE 1 TABLET EVERY DAY   • coenzyme Q10 100 MG capsule Take 100 mg by mouth Daily.   • Ergocalciferol (VITAMIN D2 PO) Take 1 capsule by mouth Daily.   • glucosamine-chondroitin 500-400 MG capsule capsule Take 1 capsule by mouth Daily.   • metoprolol tartrate (LOPRESSOR) 25 MG tablet Take 0.5 tablets by mouth Every 12 (Twelve) Hours. (Patient taking differently: Take 12.5 mg by mouth Daily.)   • Omega-3 1000 MG capsule Take 1,000 mg by mouth Daily.   • rivaroxaban (XARELTO) 15 MG tablet Take 1 tablet by mouth Daily for 30 days.   • tamsulosin (FLOMAX) 0.4 MG capsule 24 hr capsule Take 1 capsule by mouth Daily.   • Zinc 100 MG tablet Take 100 mg by mouth Daily.     No current facility-administered medications on file prior to visit.       Family History   Problem Relation Age of Onset   • Arthritis Other    • Hyperlipidemia Other    • Stroke Other    • Heart attack Mother    • Heart attack Father        Social History     Socioeconomic History   • Marital status:      Spouse name: Not on file   • Number of children: Not on file   • Years of education: Not on file   • Highest education level: Not on file   Tobacco Use   • Smoking status: Never Smoker   • Smokeless tobacco: Former User     Types: Chew   Vaping Use   • Vaping Use: Never used   Substance and Sexual Activity   • Alcohol use: Yes     Comment: 4 drinks per month   • Drug use: No   • Sexual activity: Defer         Review of Systems   Constitutional: Positive for fatigue (better). Negative for activity change, appetite change, chills, fever and unexpected weight change.   HENT: Negative for congestion, ear pain, hearing loss, rhinorrhea, sinus pressure, sinus pain, sore  "throat and trouble swallowing.    Eyes: Negative for photophobia, pain, discharge and itching.   Respiratory: Negative for cough, chest tightness, shortness of breath and wheezing.    Cardiovascular: Negative for chest pain, palpitations and leg swelling.   Gastrointestinal: Negative for abdominal distention, abdominal pain, blood in stool, constipation, diarrhea and vomiting.        Colonoscopy by Dr Schmid   Endocrine: Negative for cold intolerance, heat intolerance, polydipsia, polyphagia and polyuria.   Genitourinary: Negative for difficulty urinating, dysuria, enuresis, frequency, genital sores, hematuria and urgency.        Followed by Dr Segal   Musculoskeletal: Positive for arthralgias. Negative for gait problem, joint swelling and myalgias.   Skin: Negative for pallor, rash and wound.   Allergic/Immunologic: Negative for immunocompromised state.   Neurological: Negative for tremors, seizures, syncope, speech difficulty, numbness and headaches.   Hematological: Negative for adenopathy.   Psychiatric/Behavioral: Negative for behavioral problems, dysphoric mood, sleep disturbance and suicidal ideas. The patient is not nervous/anxious.        /74   Pulse 75   Temp 96.8 °F (36 °C) (Infrared)   Ht 182.9 cm (72.01\")   Wt 75.8 kg (167 lb)   SpO2 98%   BMI 22.64 kg/m²       Physical Exam  Constitutional:       Appearance: Normal appearance. He is well-developed.   HENT:      Head: Normocephalic and atraumatic.      Right Ear: External ear normal.      Left Ear: External ear normal.      Nose: Nose normal.      Mouth/Throat:      Mouth: Mucous membranes are moist.      Pharynx: Oropharynx is clear.   Eyes:      Extraocular Movements: Extraocular movements intact.      Conjunctiva/sclera: Conjunctivae normal.      Pupils: Pupils are equal, round, and reactive to light.   Cardiovascular:      Rate and Rhythm: Normal rate and regular rhythm.      Heart sounds: Normal heart sounds.   Pulmonary:      Effort: " Pulmonary effort is normal.      Breath sounds: Normal breath sounds.   Abdominal:      General: Bowel sounds are normal.      Palpations: Abdomen is soft.   Musculoskeletal:         General: Normal range of motion.      Cervical back: Normal range of motion and neck supple.   Lymphadenopathy:      Cervical: No cervical adenopathy.   Skin:     General: Skin is warm and dry.   Neurological:      General: No focal deficit present.      Mental Status: He is alert and oriented to person, place, and time.   Psychiatric:         Mood and Affect: Mood normal.         Behavior: Behavior normal.         Thought Content: Thought content normal.         Procedure:      Discussion/Summary:    htn-stable on BB only  paf-rate controlled, can add another 1/2 BB prn  Hyperlipidemia- labs on lipitor at goal, counseled on diet  djd-stable on tylenol  Thrombocytopenia/anemia-cbc today stable  TIA-cont rf mod  CKD-recheck today stable, advised fluids and NSAIDS avoidance  Abnormal glucose-labs today at goal  Diverticulosis-increase fiber, asymptomatic     8/23 Labs noted and dw patient    Current Outpatient Medications:   •  ascorbic acid (VITAMIN C) 1000 MG tablet, Take 1,000 mg by mouth Daily., Disp: , Rfl:   •  atorvastatin (LIPITOR) 40 MG tablet, TAKE 1 TABLET EVERY DAY, Disp: 90 tablet, Rfl: 3  •  coenzyme Q10 100 MG capsule, Take 100 mg by mouth Daily., Disp: , Rfl:   •  Ergocalciferol (VITAMIN D2 PO), Take 1 capsule by mouth Daily., Disp: , Rfl:   •  glucosamine-chondroitin 500-400 MG capsule capsule, Take 1 capsule by mouth Daily., Disp: , Rfl:   •  metoprolol tartrate (LOPRESSOR) 25 MG tablet, Take 0.5 tablets by mouth Every 12 (Twelve) Hours. (Patient taking differently: Take 12.5 mg by mouth Daily.), Disp: 30 tablet, Rfl: 5  •  Omega-3 1000 MG capsule, Take 1,000 mg by mouth Daily., Disp: , Rfl:   •  rivaroxaban (XARELTO) 15 MG tablet, Take 1 tablet by mouth Daily for 30 days., Disp: 90 tablet, Rfl: 3  •  tamsulosin (FLOMAX)  0.4 MG capsule 24 hr capsule, Take 1 capsule by mouth Daily., Disp: , Rfl:   •  Zinc 100 MG tablet, Take 100 mg by mouth Daily., Disp: , Rfl:         Diagnoses and all orders for this visit:    1. Other hyperlipidemia (Primary)    2. Paroxysmal atrial fibrillation (CMS/HCC)    3. Essential hypertension  -     CBC (No Diff)    4. Stage 3 chronic kidney disease, unspecified whether stage 3a or 3b CKD (CMS/HCC)  -     Comprehensive Metabolic Panel

## 2021-08-24 LAB
ALBUMIN SERPL-MCNC: 4.2 G/DL (ref 3.6–4.6)
ALBUMIN/GLOB SERPL: 1.6 {RATIO} (ref 1.2–2.2)
ALP SERPL-CCNC: 129 IU/L (ref 48–121)
ALT SERPL-CCNC: 12 IU/L (ref 0–44)
AST SERPL-CCNC: 16 IU/L (ref 0–40)
BILIRUB SERPL-MCNC: 0.4 MG/DL (ref 0–1.2)
BUN SERPL-MCNC: 32 MG/DL (ref 8–27)
BUN/CREAT SERPL: 19 (ref 10–24)
CALCIUM SERPL-MCNC: 8.9 MG/DL (ref 8.6–10.2)
CHLORIDE SERPL-SCNC: 106 MMOL/L (ref 96–106)
CO2 SERPL-SCNC: 25 MMOL/L (ref 20–29)
CREAT SERPL-MCNC: 1.65 MG/DL (ref 0.76–1.27)
ERYTHROCYTE [DISTWIDTH] IN BLOOD BY AUTOMATED COUNT: 12.6 % (ref 11.6–15.4)
GLOBULIN SER CALC-MCNC: 2.6 G/DL (ref 1.5–4.5)
GLUCOSE SERPL-MCNC: 87 MG/DL (ref 65–99)
HCT VFR BLD AUTO: 41.3 % (ref 37.5–51)
HGB BLD-MCNC: 13.4 G/DL (ref 13–17.7)
MCH RBC QN AUTO: 30.7 PG (ref 26.6–33)
MCHC RBC AUTO-ENTMCNC: 32.4 G/DL (ref 31.5–35.7)
MCV RBC AUTO: 95 FL (ref 79–97)
PLATELET # BLD AUTO: 162 X10E3/UL (ref 150–450)
POTASSIUM SERPL-SCNC: 4.4 MMOL/L (ref 3.5–5.2)
PROT SERPL-MCNC: 6.8 G/DL (ref 6–8.5)
RBC # BLD AUTO: 4.37 X10E6/UL (ref 4.14–5.8)
SODIUM SERPL-SCNC: 141 MMOL/L (ref 134–144)
WBC # BLD AUTO: 8.4 X10E3/UL (ref 3.4–10.8)

## 2021-09-29 ENCOUNTER — OFFICE VISIT (OUTPATIENT)
Dept: CARDIOLOGY | Facility: CLINIC | Age: 81
End: 2021-09-29

## 2021-09-29 VITALS
HEIGHT: 72 IN | BODY MASS INDEX: 22.75 KG/M2 | HEART RATE: 70 BPM | OXYGEN SATURATION: 98 % | WEIGHT: 168 LBS | DIASTOLIC BLOOD PRESSURE: 70 MMHG | SYSTOLIC BLOOD PRESSURE: 138 MMHG

## 2021-09-29 DIAGNOSIS — I48.0 PAROXYSMAL ATRIAL FIBRILLATION (HCC): ICD-10-CM

## 2021-09-29 DIAGNOSIS — I10 ESSENTIAL HYPERTENSION: ICD-10-CM

## 2021-09-29 DIAGNOSIS — R55 SYNCOPE AND COLLAPSE: Primary | ICD-10-CM

## 2021-09-29 PROCEDURE — 93280 PM DEVICE PROGR EVAL DUAL: CPT | Performed by: INTERNAL MEDICINE

## 2021-09-29 PROCEDURE — 99214 OFFICE O/P EST MOD 30 MIN: CPT | Performed by: INTERNAL MEDICINE

## 2021-09-29 RX ORDER — LISINOPRIL 5 MG/1
5 TABLET ORAL AS NEEDED
COMMUNITY

## 2021-09-29 NOTE — PROGRESS NOTES
Pio Baum  1940  [phone]    09/29/2021    [facility] CARDIOLOGY     Referring Provider: No ref. provider found     Lupillo Hill MD  [address]  [facility] [address]    Chief Complaint   Patient presents with   • Atrial Fibrillation       Problem List:     1. Paroxysmal atrial fibrillation:  a. CHADS-VASc = 5 (HTN, Age > 75, h/o Stroke), on Eliquis 5 mg BID.   b. MPS, 01/17/2017: EF 58%, negative, low risk study  c. Implantation of a dual-chamber permanent pacemaker by Dr. Nava, 08/24/2020.  d. Pericardiocentesis, 08/24/2020: Successful pericardiocentesis in a patient with pericardial tamponade/effusion post right-sided pacemaker implantation with approximately 270 cc of dark blood removed from the pericardium. Successful external cardioversion of atrial fibrillation with 200 J shock to sinus rhythm. Normal pacemaker function post pericardiocentesis.  e. Echocardiogram, 08/24/2020: There is a small (<1cm) pericardial effusion. Pericardial fluid was drained by the end of the study.  f. Echocardiogram, 08/24/2020: EF 50-60%. There is a trivial pericardial effusion.  g. Echocardiogram, 08/25/2020:  EF 65%. There is a small (<1cm) circumferential pericardial effusion.  h. Echocardiogram, 10/20/2020: EF 55%. There are myxomatous changes of the mitral valve apparatus present. There is bileaflet mitral valve prolapse present. Trace MR and Mild TR. Calculated right ventricular systolic pressure from tricuspid regurgitation is 26 mmHg. There is a large (>2cm) pericardial effusion. Borderline echo criteria for tamponade  i. Pericardiocentesis, 10/20/2020: Successful pericardiocentesis with removal of approximately 1300 cc of dark venous blood with small clots present reducing the pericardial effusion from 4.1 cm to approximately 1.1 cm via echocardiographic guidance. Successful intracardiac ultrasound monitoring.  j. Echocardiogram, 10/20/2020: There is a moderate  (1-2cm) pericardial effusion adjacent to the right ventricle and left ventricle.  k. Echocardiogram, 10/21/2020: EF 55%. There is a small, mostly posterior, mild to moderate (1 cm) pericardial effusion along the left ventricle. There is no evidence of pericardial tamponade.  l. Echocardiogram, 10/23/2020: EF 55%. There is a small, mostly posterior pericardial effusion which appears less pronounced compared to the study of October 21, 2020. There is no evidence of pericardial tamponade. Cardiac chambers are grossly normal in size.  m. Echocardiogram, 10/26/2020: EF 45%. Left ventricular wall thickness is consistent with concentric hypertrophy. Mild MR. Moderate TR. No significant pericardial effusion is noted. Compared to previous studies the pericardial effusion has almost completely resolved.  2. TIA/CVA:  a. Carotid duplex, 04/20/2016: No significant disease  b. Echocardiogram, 04/20/2016: Normal LV function, positive bubble study. Closure not considered due to need for chronic anticoagulation therapy.  c. Cardiac event monitor showed atrial fibrillation/flutter with intermittent RVR, aberrancy, IVCD/sinus rhythm with PVCs  3. Syncope:  a. 2 week Zio, 05/16/2019: SR, occasional PAC's and PVC's. Occasional short SVT/PAT, 21 runs at 3-20 beats.  b. Echocardiogram, 05/16/2019: EF 55%. Borderline right-sided chamber enlargement. Mild MR/TR, normal RVSP.  4. Hypertension  5. Dyslipidemia  6. Oral tobacco abuse  7. History of migraine headaches  8. Chronic kidney disease stage II  9. Surgical history:  a. Tonsillectomy/adenoictomy    Allergies  Allergies   Allergen Reactions   • Flecainide Other (See Comments)     Fatigue, weakness unable to tolerate.    • Flonase [Fluticasone] Irritability     Gets a nose bleed as soon as used   • Penicillins Rash     Rash all over body including mouth/throat        Current Medications    Current Outpatient Medications:   •  ascorbic acid (VITAMIN C) 1000 MG tablet, Take 1,000 mg by  "mouth Daily., Disp: , Rfl:   •  atorvastatin (LIPITOR) 40 MG tablet, TAKE 1 TABLET EVERY DAY, Disp: 90 tablet, Rfl: 3  •  coenzyme Q10 100 MG capsule, Take 100 mg by mouth Daily., Disp: , Rfl:   •  Ergocalciferol (VITAMIN D2 PO), Take 1 capsule by mouth Daily., Disp: , Rfl:   •  glucosamine-chondroitin 500-400 MG capsule capsule, Take 1 capsule by mouth Daily., Disp: , Rfl:   •  lisinopril (PRINIVIL,ZESTRIL) 5 MG tablet, Take 5 mg by mouth As Needed., Disp: , Rfl:   •  Omega-3 1000 MG capsule, Take 1,000 mg by mouth Daily., Disp: , Rfl:   •  tamsulosin (FLOMAX) 0.4 MG capsule 24 hr capsule, Take 1 capsule by mouth Daily., Disp: , Rfl:   •  Zinc 100 MG tablet, Take 100 mg by mouth Daily., Disp: , Rfl:   •  rivaroxaban (XARELTO) 15 MG tablet, Take 1 tablet by mouth Daily for 30 days., Disp: 90 tablet, Rfl: 3    History of Present Illness     Pt presents for follow up of PAF and syncope. Since we last saw the pt, pt has had occasional brief episodes of AF, he his moderately symptomatic with palps, LH/Dizziness and hypotension with his episodes. No AF during episodes of syncope. Denies any SOB/CP. Patient stopped his metoprolol 1 month ago do to hypotension and syncopal episodes, last episode of syncope 1 month ago. Now only taking lisinopril as needed for bp 180/90s. Patient denies any hospitalizations, ER visits, bleeding issues on Xarelto, or TIA/CVA symptoms. Overall feels better since stopping his BB and lisinopril.     ROS:  General:  + fatigue, No weight gain or loss  Cardiovascular:  Denies CP, PND, syncope, near syncope, edema + palpitations.  Pulmonary:  Denies HINTON, cough, or wheezing      Vitals:    09/29/21 1312   BP: 138/70   BP Location: Left arm   Patient Position: Sitting   Pulse: 70   SpO2: 98%   Weight: 76.2 kg (168 lb)   Height: 182.9 cm (72\")     Body mass index is 22.78 kg/m².  PE:  General: NAD  Neck: no JVD, no carotid bruits, no TM  Heart RRR, NL S1, S2, S4 present, no rubs, murmurs  Lungs: CTA, " no wheezes, rhonchi, or rales  Abd: soft, non-tender, NL BS  Ext: No musculoskeletal deformities, no edema, cyanosis, or clubbing  Psych: normal mood and affect    Diagnostic Data:  Manual Device Interrogation: BSC PPM, DDDR rate 70, Undelrying rhythm SB, 3% mode switch since 7/21, 8 years on battery. 79% RA paced, 2% RV paced    Procedures    1. Syncope and collapse    2. Paroxysmal atrial fibrillation (HCC)    3. Essential hypertension      Plan:    1. Syncope: prob VVS and hypotension: improved off BBL: monitor for now. Needs hydratioon    2. Paroxysmal Atrial Fibrillation  - 3% mode switch since July 2021, previously Intolerant to flecainide. Moderately Symptomatic with palps, LH/Dizziness/ syncope. Consider sotalol or multaq in near future  - CHADS-VASc = 5 (HTN, Age > 75, h/o Stroke), continue Xarelto 15 mg daily      3. HTN  -Well controlled on current medications.        F/up in 6-8 months    Scribed for Dain Nava MD by HIMA Botello. 9/29/2021 13:54 EDT     I, Dain Nava MD, personally performed the services described in this documentation as scribed by the above named individual in my presence, and it is both accurate and complete.  9/29/2021  13:54 EDT

## 2021-10-09 PROCEDURE — 93296 REM INTERROG EVL PM/IDS: CPT | Performed by: INTERNAL MEDICINE

## 2021-10-09 PROCEDURE — 93294 REM INTERROG EVL PM/LDLS PM: CPT | Performed by: INTERNAL MEDICINE

## 2021-10-21 RX ORDER — RIVAROXABAN 15 MG/1
TABLET, FILM COATED ORAL
Qty: 90 TABLET | Refills: 3 | Status: ON HOLD | OUTPATIENT
Start: 2021-10-21 | End: 2022-04-27 | Stop reason: SDUPTHER

## 2021-11-10 NOTE — TELEPHONE ENCOUNTER
Patient notified.  Letter faxed to dentist and mailed to patient.  Order placed.   Topical Sulfur Applications Pregnancy And Lactation Text: This medication is Pregnancy Category C and has an unknown safety profile during pregnancy. It is unknown if this topical medication is excreted in breast milk.

## 2021-11-23 ENCOUNTER — LAB (OUTPATIENT)
Dept: LAB | Facility: HOSPITAL | Age: 81
End: 2021-11-23

## 2021-11-23 ENCOUNTER — OFFICE VISIT (OUTPATIENT)
Dept: INTERNAL MEDICINE | Facility: CLINIC | Age: 81
End: 2021-11-23

## 2021-11-23 VITALS
OXYGEN SATURATION: 98 % | HEIGHT: 72 IN | SYSTOLIC BLOOD PRESSURE: 140 MMHG | BODY MASS INDEX: 23.13 KG/M2 | TEMPERATURE: 97.1 F | DIASTOLIC BLOOD PRESSURE: 70 MMHG | WEIGHT: 170.8 LBS | HEART RATE: 74 BPM

## 2021-11-23 DIAGNOSIS — E78.49 OTHER HYPERLIPIDEMIA: Primary | ICD-10-CM

## 2021-11-23 DIAGNOSIS — N18.30 STAGE 3 CHRONIC KIDNEY DISEASE, UNSPECIFIED WHETHER STAGE 3A OR 3B CKD (HCC): ICD-10-CM

## 2021-11-23 DIAGNOSIS — I48.0 PAROXYSMAL ATRIAL FIBRILLATION (HCC): ICD-10-CM

## 2021-11-23 DIAGNOSIS — I10 ESSENTIAL HYPERTENSION: ICD-10-CM

## 2021-11-23 LAB
DEPRECATED RDW RBC AUTO: 44.4 FL (ref 37–54)
ERYTHROCYTE [DISTWIDTH] IN BLOOD BY AUTOMATED COUNT: 12.8 % (ref 12.3–15.4)
HCT VFR BLD AUTO: 41.1 % (ref 37.5–51)
HGB BLD-MCNC: 13.4 G/DL (ref 13–17.7)
MCH RBC QN AUTO: 30.9 PG (ref 26.6–33)
MCHC RBC AUTO-ENTMCNC: 32.6 G/DL (ref 31.5–35.7)
MCV RBC AUTO: 94.7 FL (ref 79–97)
PLATELET # BLD AUTO: 142 10*3/MM3 (ref 140–450)
PMV BLD AUTO: 12.6 FL (ref 6–12)
RBC # BLD AUTO: 4.34 10*6/MM3 (ref 4.14–5.8)
WBC NRBC COR # BLD: 8.81 10*3/MM3 (ref 3.4–10.8)

## 2021-11-23 PROCEDURE — 80053 COMPREHEN METABOLIC PANEL: CPT | Performed by: INTERNAL MEDICINE

## 2021-11-23 PROCEDURE — 84443 ASSAY THYROID STIM HORMONE: CPT | Performed by: INTERNAL MEDICINE

## 2021-11-23 PROCEDURE — 85027 COMPLETE CBC AUTOMATED: CPT | Performed by: INTERNAL MEDICINE

## 2021-11-23 PROCEDURE — 99214 OFFICE O/P EST MOD 30 MIN: CPT | Performed by: INTERNAL MEDICINE

## 2021-11-23 NOTE — PROGRESS NOTES
Patient is a 81 y.o. male who is here for a follow up of hyperlipidemia and hypertension  Chief Complaint   Patient presents with   • Hyperlipidemia   • Hypertension         HPI:    Here for mgmt of HTN and PAF and hyperlipidemia.  Doing well.  No dizziness or lightheadedness.  No HAs.  No abdominal pains.  No palpitations.  Occasional bruising.  Energy level is good for the most part.  No fever or chills.     History:     Patient Active Problem List   Diagnosis   • Essential hypertension   • PFO (patent foramen ovale)   • Hyperlipidemia   • Tobacco chew use   • CKD (chronic kidney disease) stage 3, GFR 30-59 ml/min ([facility])   • Atrial fibrillation with RVR   • History of TIA (transient ischemic attack) and stroke   • Leukocytosis   • Scalp laceration   • Diverticulosis   • Pre-syncope   • Syncope and collapse   • Sick sinus syndrome ([facility])   • Pericardial effusion   • Atrial fibrillation with rapid ventricular response ([facility])       Past Medical History:   Diagnosis Date   • A-fib ([facility])    • Chronic kidney disease    • History of migraine headaches    • Hyperlipidemia    • Hypertension    • Mitral valve regurgitation    • Pericardial effusion     s/p PPM placement   • PFO (patent foramen ovale)    • Testicular cyst     removal 1970   • TIA (transient ischemic attack)        Past Surgical History:   Procedure Laterality Date   • BASAL CELL CARCINOMA EXCISION  07/2021   • CARDIAC CATHETERIZATION N/A 8/24/2020    Procedure: PERICARDIOCENTESIS;  Surgeon: Dain Nava MD;  Location:  [facility];  Service: Cardiology;  Laterality: N/A;   • CARDIAC CATHETERIZATION N/A 10/20/2020    Procedure: PERICARDIOCENTESIS;  Surgeon: Dain Nava MD;  Location:  [facility];  Service: Cardiology;  Laterality: N/A;   • CARDIAC ELECTROPHYSIOLOGY PROCEDURE N/A 8/24/2020    Procedure: PACEMAKER IMPLANTATION- DC;  Surgeon: Dain Nava MD;  Location:  [facility];  Service:  Cardiology;  Laterality: N/A;   • CATARACT EXTRACTION W/ INTRAOCULAR LENS  IMPLANT, BILATERAL     • TONSILLECTOMY AND ADENOIDECTOMY  9 yo       Current Outpatient Medications on File Prior to Visit   Medication Sig   • ascorbic acid (VITAMIN C) 1000 MG tablet Take 1,000 mg by mouth Daily.   • atorvastatin (LIPITOR) 40 MG tablet TAKE 1 TABLET EVERY DAY   • coenzyme Q10 100 MG capsule Take 100 mg by mouth Daily.   • Ergocalciferol (VITAMIN D2 PO) Take 1 capsule by mouth Daily.   • glucosamine-chondroitin 500-400 MG capsule capsule Take 1 capsule by mouth Daily.   • lisinopril (PRINIVIL,ZESTRIL) 5 MG tablet Take 5 mg by mouth As Needed.   • Omega-3 1000 MG capsule Take 1,000 mg by mouth Daily.   • tamsulosin (FLOMAX) 0.4 MG capsule 24 hr capsule Take 1 capsule by mouth Daily.   • Xarelto 15 MG tablet TAKE 1 TABLET EVERY DAY   • Zinc 100 MG tablet Take 100 mg by mouth Daily.     No current facility-administered medications on file prior to visit.       Family History   Problem Relation Age of Onset   • Arthritis Other    • Hyperlipidemia Other    • Stroke Other    • Heart attack Mother    • Heart attack Father        Social History     Socioeconomic History   • Marital status:    Tobacco Use   • Smoking status: Never Smoker   • Smokeless tobacco: Former User     Types: Chew   Vaping Use   • Vaping Use: Never used   Substance and Sexual Activity   • Alcohol use: Yes     Comment: 4 drinks per month   • Drug use: No   • Sexual activity: Defer         Review of Systems   Constitutional: Negative for activity change, appetite change, chills, fever and unexpected weight change.   HENT: Negative for congestion, ear pain, hearing loss, rhinorrhea, sinus pressure, sinus pain, sore throat and trouble swallowing.    Eyes: Negative for photophobia, pain, discharge and itching.   Respiratory: Negative for cough, chest tightness, shortness of breath and wheezing.    Cardiovascular: Negative for chest pain, palpitations and  "leg swelling.   Gastrointestinal: Negative for abdominal distention, abdominal pain, blood in stool, constipation, diarrhea and vomiting.        Colonoscopy by Dr Schmid   Endocrine: Negative for cold intolerance, heat intolerance, polydipsia, polyphagia and polyuria.   Genitourinary: Negative for difficulty urinating, dysuria, enuresis, frequency, genital sores, hematuria and urgency.        Followed by Dr Segal   Musculoskeletal: Positive for arthralgias. Negative for gait problem, joint swelling and myalgias.   Skin: Negative for pallor, rash and wound.   Allergic/Immunologic: Negative for immunocompromised state.   Neurological: Negative for tremors, seizures, syncope, speech difficulty, numbness and headaches.   Hematological: Negative for adenopathy.   Psychiatric/Behavioral: Negative for behavioral problems, dysphoric mood, sleep disturbance and suicidal ideas. The patient is not nervous/anxious.        /70   Pulse 74   Temp 97.1 °F (36.2 °C) (Infrared)   Ht 182.9 cm (72.01\")   Wt 77.5 kg (170 lb 12.8 oz)   SpO2 98%   BMI 23.16 kg/m²       Physical Exam  Constitutional:       Appearance: Normal appearance. He is well-developed.   HENT:      Head: Normocephalic and atraumatic.      Right Ear: External ear normal.      Left Ear: External ear normal.      Nose: Nose normal.      Mouth/Throat:      Mouth: Mucous membranes are moist.      Pharynx: Oropharynx is clear.   Eyes:      Extraocular Movements: Extraocular movements intact.      Conjunctiva/sclera: Conjunctivae normal.      Pupils: Pupils are equal, round, and reactive to light.   Cardiovascular:      Rate and Rhythm: Normal rate and regular rhythm.      Heart sounds: Normal heart sounds.   Pulmonary:      Effort: Pulmonary effort is normal.      Breath sounds: Normal breath sounds.   Abdominal:      General: Bowel sounds are normal.      Palpations: Abdomen is soft.   Musculoskeletal:         General: Normal range of motion.      Cervical " back: Normal range of motion and neck supple.   Lymphadenopathy:      Cervical: No cervical adenopathy.   Skin:     General: Skin is warm and dry.   Neurological:      General: No focal deficit present.      Mental Status: He is alert and oriented to person, place, and time.   Psychiatric:         Mood and Affect: Mood normal.         Behavior: Behavior normal.         Thought Content: Thought content normal.         Procedure:      Discussion/Summary:    htn-stable on ACE  paf-rate controlled, on NOAC  Hyperlipidemia- labs on lipitor at goal, counseled on diet  djd-stable on tylenol  Thrombocytopenia/anemia-cbc today stable  TIA-cont rf mod  CKD-recheck today , advised fluids and NSAIDS avoidance  Abnormal glucose-labs at goal  Diverticulosis-increase fiber, asymptomatic     11/23 Labs noted and dw patient    Current Outpatient Medications:   •  ascorbic acid (VITAMIN C) 1000 MG tablet, Take 1,000 mg by mouth Daily., Disp: , Rfl:   •  atorvastatin (LIPITOR) 40 MG tablet, TAKE 1 TABLET EVERY DAY, Disp: 90 tablet, Rfl: 3  •  coenzyme Q10 100 MG capsule, Take 100 mg by mouth Daily., Disp: , Rfl:   •  Ergocalciferol (VITAMIN D2 PO), Take 1 capsule by mouth Daily., Disp: , Rfl:   •  glucosamine-chondroitin 500-400 MG capsule capsule, Take 1 capsule by mouth Daily., Disp: , Rfl:   •  lisinopril (PRINIVIL,ZESTRIL) 5 MG tablet, Take 5 mg by mouth As Needed., Disp: , Rfl:   •  Omega-3 1000 MG capsule, Take 1,000 mg by mouth Daily., Disp: , Rfl:   •  tamsulosin (FLOMAX) 0.4 MG capsule 24 hr capsule, Take 1 capsule by mouth Daily., Disp: , Rfl:   •  Xarelto 15 MG tablet, TAKE 1 TABLET EVERY DAY, Disp: 90 tablet, Rfl: 3  •  Zinc 100 MG tablet, Take 100 mg by mouth Daily., Disp: , Rfl:   •  Diclofenac Sodium (VOLTAREN) 1 % gel gel, Apply  topically to the appropriate area as directed 2 (Two) Times a Day As Needed (hip pain)., Disp: 300 g, Rfl: 3        Diagnoses and all orders for this visit:    1. Other hyperlipidemia  (Primary)  -     Comprehensive Metabolic Panel  -     TSH    2. Essential hypertension  -     CBC (No Diff)    3. Paroxysmal atrial fibrillation (HCC)    4. Stage 3 chronic kidney disease, unspecified whether stage 3a or 3b CKD (HCC)    Other orders  -     Diclofenac Sodium (VOLTAREN) 1 % gel gel; Apply  topically to the appropriate area as directed 2 (Two) Times a Day As Needed (hip pain).  Dispense: 300 g; Refill: 3

## 2021-11-24 LAB
ALBUMIN SERPL-MCNC: 4.3 G/DL (ref 3.5–5.2)
ALBUMIN/GLOB SERPL: 1.7 G/DL
ALP SERPL-CCNC: 112 U/L (ref 39–117)
ALT SERPL W P-5'-P-CCNC: 11 U/L (ref 1–41)
ANION GAP SERPL CALCULATED.3IONS-SCNC: 7.9 MMOL/L (ref 5–15)
AST SERPL-CCNC: 15 U/L (ref 1–40)
BILIRUB SERPL-MCNC: 0.3 MG/DL (ref 0–1.2)
BUN SERPL-MCNC: 22 MG/DL (ref 8–23)
BUN/CREAT SERPL: 12.9 (ref 7–25)
CALCIUM SPEC-SCNC: 8.9 MG/DL (ref 8.6–10.5)
CHLORIDE SERPL-SCNC: 105 MMOL/L (ref 98–107)
CO2 SERPL-SCNC: 26.1 MMOL/L (ref 22–29)
CREAT SERPL-MCNC: 1.71 MG/DL (ref 0.76–1.27)
GFR SERPL CREATININE-BSD FRML MDRD: 38 ML/MIN/1.73
GLOBULIN UR ELPH-MCNC: 2.6 GM/DL
GLUCOSE SERPL-MCNC: 82 MG/DL (ref 65–99)
POTASSIUM SERPL-SCNC: 3.9 MMOL/L (ref 3.5–5.2)
PROT SERPL-MCNC: 6.9 G/DL (ref 6–8.5)
SODIUM SERPL-SCNC: 139 MMOL/L (ref 136–145)
TSH SERPL DL<=0.05 MIU/L-ACNC: 2.06 UIU/ML (ref 0.27–4.2)

## 2021-11-30 PROCEDURE — U0004 COV-19 TEST NON-CDC HGH THRU: HCPCS | Performed by: NURSE PRACTITIONER

## 2021-12-04 PROCEDURE — U0004 COV-19 TEST NON-CDC HGH THRU: HCPCS | Performed by: NURSE PRACTITIONER

## 2021-12-06 ENCOUNTER — TELEPHONE (OUTPATIENT)
Dept: CARDIOLOGY | Facility: CLINIC | Age: 81
End: 2021-12-06

## 2022-01-08 PROCEDURE — 93296 REM INTERROG EVL PM/IDS: CPT | Performed by: INTERNAL MEDICINE

## 2022-01-08 PROCEDURE — 93294 REM INTERROG EVL PM/LDLS PM: CPT | Performed by: INTERNAL MEDICINE

## 2022-01-20 NOTE — PROGRESS NOTES
[facility] Cardiology    Encounter Date: 2022    Patient ID: Pio Baum is a 82 y.o. male.  : 1940     PCP: Lupillo Hill MD       Chief Complaint: PAF      PROBLEM LIST:  1. Paroxysmal atrial fibrillation/sick sinus syndrome:  a. CHADS-VASc = 5 (HTN, Age > 75, h/o Stroke), on Eliquis 5 mg BID.   b. MPS, 2017: EF 58%, negative, low risk study  c. Implantation of a dual-chamber permanent pacemaker by Dr. Nava, 2020.  d. Pericardiocentesis, 2020: Successful pericardiocentesis in a patient with pericardial tamponade/effusion post right-sided pacemaker implantation with approximately 270 cc of dark blood removed from the pericardium. Successful external cardioversion of atrial fibrillation with 200 J shock to sinus rhythm. Normal pacemaker function post pericardiocentesis.  e. Echocardiogram, 2020: There is a small (<1cm) pericardial effusion. Pericardial fluid was drained by the end of the study.  f. Echocardiogram, 2020: EF 50-60%. There is a trivial pericardial effusion.  g. Echocardiogram, 2020:  EF 65%. There is a small (<1cm) circumferential pericardial effusion.  h. Echocardiogram, 10/20/2020: EF 55%. There are myxomatous changes of the mitral valve apparatus present. There is bileaflet mitral valve prolapse present. Trace MR and Mild TR. Calculated right ventricular systolic pressure from tricuspid regurgitation is 26 mmHg. There is a large (>2cm) pericardial effusion. Borderline echo criteria for tamponade  i. Pericardiocentesis, 10/20/2020: Successful pericardiocentesis with removal of approximately 1300 cc of dark venous blood with small clots present reducing the pericardial effusion from 4.1 cm to approximately 1.1 cm via echocardiographic guidance. Successful intracardiac ultrasound monitoring.  j. Echocardiogram, 10/20/2020: There is a moderate (1-2cm) pericardial effusion adjacent to the right ventricle  and left ventricle.  k. Echocardiogram, 10/21/2020: EF 55%. There is a small, mostly posterior, mild to moderate (1 cm) pericardial effusion along the left ventricle. There is no evidence of pericardial tamponade.  l. Echocardiogram, 10/23/2020: EF 55%. There is a small, mostly posterior pericardial effusion which appears less pronounced compared to the study of October 21, 2020. There is no evidence of pericardial tamponade. Cardiac chambers are grossly normal in size.  m. Echocardiogram, 10/26/2020: EF 45%. Left ventricular wall thickness is consistent with concentric hypertrophy. Mild MR. Moderate TR. No significant pericardial effusion is noted. Compared to previous studies the pericardial effusion has almost completely resolved.  2. TIA/CVA:  a. Carotid duplex, 04/20/2016: No significant disease  b. Echocardiogram, 04/20/2016: Normal LV function, positive bubble study. Closure not considered due to need for chronic anticoagulation therapy.  c. Cardiac event monitor showed atrial fibrillation/flutter with intermittent RVR, aberrancy, IVCD/sinus rhythm with PVCs  3. Syncope:  a. 2 week Zio, 05/16/2019: SR, occasional PAC's and PVC's. Occasional short SVT/PAT, 21 runs at 3-20 beats.  b. Echocardiogram, 05/16/2019: EF 55%. Borderline right-sided chamber enlargement. Mild MR/TR, normal RVSP.  c. Dual chamber pacemaker implant 8/24/2020, [employer] for sick sinus syndrome  d. Echo 8/26/2020 LVEF 45%, Mild MR and moderate TR  4. Hypertension  5. Dyslipidemia  6. Oral tobacco abuse  7. History of migraine headaches  8. Chronic kidney disease stage II  9. Surgical history:  a. Tonsillectomy/adenoictomy    History of Present Illness  Patient presents today for a 6 month follow-up with a history of paroxysmal atrial fibrillation, sick sinus syndrome, and cardiac risk factors. Since last visit the patient is no longer taking his lisinopril or metoprolol as he was having some low blood pressures.  He feels better  since coming off these medications.  He will take his lisinopril if his blood pressure is elevated.  Occasionally in the morning it may be 140/80 but he will not take it unless it is higher than 180/90.  He denies any chest pain/pressure/tightness or heaviness.  He has a pacemaker that was implanted in 2020 for sick sinus syndrome by Dr. Nava.  His device transmits from home.  He saw Dr. Nava in September.  His recent transmission in December suggested his burden of atrial fibrillation is actually less than it was in September.  He is anticoagulated with Xarelto and tolerating without reports of active or overt bleeding.  He is requesting samples.  Denies signs or symptoms TIA or CVA.  Allergies   Allergen Reactions   • Flecainide Other (See Comments)     Fatigue, weakness unable to tolerate.    • Flonase [Fluticasone] Irritability     Gets a nose bleed as soon as used   • Penicillins Rash     Rash all over body including mouth/throat          Current Outpatient Medications:   •  ascorbic acid (VITAMIN C) 1000 MG tablet, Take 1,000 mg by mouth Daily., Disp: , Rfl:   •  coenzyme Q10 100 MG capsule, Take 100 mg by mouth Daily., Disp: , Rfl:   •  Diclofenac Sodium (VOLTAREN) 1 % gel gel, Apply  topically to the appropriate area as directed 2 (Two) Times a Day As Needed (hip pain)., Disp: 300 g, Rfl: 3  •  Ergocalciferol (VITAMIN D2 PO), Take 1 capsule by mouth As Needed., Disp: , Rfl:   •  glucosamine-chondroitin 500-400 MG capsule capsule, Take 1 capsule by mouth Daily., Disp: , Rfl:   •  lisinopril (PRINIVIL,ZESTRIL) 5 MG tablet, Take 5 mg by mouth As Needed., Disp: , Rfl:   •  Omega-3 1000 MG capsule, Take 1,000 mg by mouth Daily., Disp: , Rfl:   •  tamsulosin (FLOMAX) 0.4 MG capsule 24 hr capsule, Take 1 capsule by mouth Daily., Disp: , Rfl:   •  Xarelto 15 MG tablet, TAKE 1 TABLET EVERY DAY, Disp: 90 tablet, Rfl: 3  •  Zinc 100 MG tablet, Take 100 mg by mouth Daily., Disp: , Rfl:     The following  "portions of the patient's history were reviewed and updated as appropriate: allergies, current medications, past family history, past medical history, past social history, past surgical history and problem list.    ROS  Review of Systems   Constitution: Negative for chills, fever, fatigue, generalized weakness.   Cardiovascular: Negative for chest pain, dyspnea on exertion, leg swelling, palpitations, orthopnea, and syncope.   Respiratory: Negative for cough, shortness of breath, and wheezing.  HENT: Negative for ear pain, nosebleeds, and tinnitus.  Gastrointestinal: Negative for abdominal pain, constipation, diarrhea, nausea and vomiting.   Genitourinary: No urinary symptoms.  Musculoskeletal: Negative for muscle cramps.  Neurological: Negative for dizziness, headaches, loss of balance, numbness, and symptoms of stroke.  Psychiatric: Normal mental status.     All other systems reviewed and are negative.        Objective:     /70 (BP Location: Right arm, Patient Position: Sitting)   Pulse 70   Ht 182.9 cm (72\")   Wt 73.5 kg (162 lb)   SpO2 96%   BMI 21.97 kg/m²      Physical Exam  Constitutional: Patient appears well-developed and well-nourished.   HENT: HEENT exam unremarkable.   Neck: Neck supple. No JVD present. No carotid bruits.   Cardiovascular: Normal rate, regular rhythm and normal heart sounds. No murmur heard.   2+ symmetric pulses.   Pulmonary/Chest: Breath sounds normal. Does not exhibit tenderness.   Abdominal: Abdomen benign.   Musculoskeletal: Does not exhibit edema.   Neurological: Neurological exam unremarkable.   Vitals reviewed.    Data Review:   Lab Results   Component Value Date    GLUCOSE 82 11/23/2021    BUN 22 11/23/2021    CREATININE 1.71 (H) 11/23/2021    EGFRIFNONA 38 (L) 11/23/2021    BCR 12.9 11/23/2021    K 3.9 11/23/2021    CO2 26.1 11/23/2021    CALCIUM 8.9 11/23/2021    ALBUMIN 4.30 11/23/2021    AST 15 11/23/2021    ALT 11 11/23/2021     Lab Results   Component Value Date "    CHLPL 120 04/22/2021    TRIG 96 04/22/2021    HDL 54 04/22/2021    LDL 48 04/22/2021      Lab Results   Component Value Date    WBC 8.81 11/23/2021    RBC 4.34 11/23/2021    HGB 13.4 11/23/2021    HCT 41.1 11/23/2021    MCV 94.7 11/23/2021     11/23/2021     Lab Results   Component Value Date    TSH 2.060 11/23/2021     Lab Results   Component Value Date    HGBA1C 5.70 (H) 04/22/2021        Procedures       Assessment:      Diagnosis Plan   1. Paroxysmal atrial fibrillation (HCC)   continue anticoagulation with Xarelto   2. Mixed hyperlipidemia   defer therapy due to lack of clinical efficacy in this age group   3 Essential hypertension   recommend to use lisinopril if SBP greater than 160/90   4. Sick sinus syndrome (HCC)   follow-up with Dr. Nava   5. Cardiac pacemaker   normal functioning device on last transmission from home     Plan:   Take dose of lisinopril 5 mg if SBP greater than 160/90.  Allow for more permissive hypertension due to episodes of hypotension and syncope in the past  Continue current medications.   FU in 6 MO, sooner as needed.  Thank you for allowing us to participate in the care of your patient.         HIMA Butt    Part of this note may be an electronic transcription/translation of spoken language to printed text using the Dragon Dictation System.

## 2022-01-21 ENCOUNTER — OFFICE VISIT (OUTPATIENT)
Dept: CARDIOLOGY | Facility: CLINIC | Age: 82
End: 2022-01-21

## 2022-01-21 VITALS
OXYGEN SATURATION: 96 % | SYSTOLIC BLOOD PRESSURE: 126 MMHG | HEIGHT: 72 IN | DIASTOLIC BLOOD PRESSURE: 70 MMHG | HEART RATE: 70 BPM | WEIGHT: 162 LBS | BODY MASS INDEX: 21.94 KG/M2

## 2022-01-21 DIAGNOSIS — I49.5 SICK SINUS SYNDROME: ICD-10-CM

## 2022-01-21 DIAGNOSIS — Z95.0 CARDIAC PACEMAKER: ICD-10-CM

## 2022-01-21 DIAGNOSIS — I10 ESSENTIAL HYPERTENSION: ICD-10-CM

## 2022-01-21 DIAGNOSIS — I48.0 PAROXYSMAL ATRIAL FIBRILLATION: Primary | ICD-10-CM

## 2022-01-21 DIAGNOSIS — E78.2 MIXED HYPERLIPIDEMIA: ICD-10-CM

## 2022-01-21 PROBLEM — Z98.41 S/P RIGHT CATARACT EXTRACTION: Status: ACTIVE | Noted: 2018-09-20

## 2022-01-21 PROBLEM — H25.10 NUCLEAR SENILE CATARACT: Status: ACTIVE | Noted: 2018-07-23

## 2022-01-21 PROBLEM — H35.443: Status: ACTIVE | Noted: 2018-07-23

## 2022-01-21 PROBLEM — H47.093 PERIPAPILLARY ATROPHY OF BOTH EYES: Status: ACTIVE | Noted: 2018-07-23

## 2022-01-21 PROBLEM — H04.123 CHRONIC DRYNESS OF BOTH EYES: Status: ACTIVE | Noted: 2019-04-23

## 2022-01-21 PROCEDURE — 99214 OFFICE O/P EST MOD 30 MIN: CPT | Performed by: INTERNAL MEDICINE

## 2022-02-07 PROCEDURE — U0004 COV-19 TEST NON-CDC HGH THRU: HCPCS | Performed by: FAMILY MEDICINE

## 2022-02-09 ENCOUNTER — TELEPHONE (OUTPATIENT)
Dept: INTERNAL MEDICINE | Facility: CLINIC | Age: 82
End: 2022-02-09

## 2022-02-09 NOTE — TELEPHONE ENCOUNTER
Caller: Willow Baum    Relationship to patient: Self    Best call back number: [phone]    Date of exposure: ???    Date of positive COVID19 test: 020722    Date if possible COVID19 exposure:     COVID19 symptoms: COUGH AT NIGHT, NASAL CONGESTION, WEAKNESS    Date of initial quarantine: 020722    Additional information or concerns: CAN WE GET THE MONOCLONAL ANTIBODIES?  WHAT ELSE DO YOU SUGGEST? ANTI BIOTICS?  HYDROCHLOROTHIAZIDE?     What is the patients preferred pharmacy: [id number] - [place], [place] - [address] [phone] [facility] [phone]

## 2022-02-10 NOTE — PROGRESS NOTES
Clinical Nutrition     Multidisciplinary Rounds      Patient Name: Pio Baum  Date of Encounter: 10/20/20 10:23 EDT  MRN: [id number]  Admission date: 10/19/2020    JOSE. FERN to continue to follow per protocol.     Current diet: NPO Diet  No active supplement orders    Intervention:  Follow treatment plan  Care plan reviewed    Follow up:   Per protocol      Shellie Washington RD  10:23 EDT  Time: 10min       normal mood with appropriate affect

## 2022-03-17 ENCOUNTER — OFFICE VISIT (OUTPATIENT)
Dept: INTERNAL MEDICINE | Facility: CLINIC | Age: 82
End: 2022-03-17

## 2022-03-17 ENCOUNTER — LAB (OUTPATIENT)
Dept: LAB | Facility: HOSPITAL | Age: 82
End: 2022-03-17

## 2022-03-17 VITALS
TEMPERATURE: 96.9 F | BODY MASS INDEX: 22.62 KG/M2 | OXYGEN SATURATION: 98 % | HEART RATE: 82 BPM | SYSTOLIC BLOOD PRESSURE: 130 MMHG | DIASTOLIC BLOOD PRESSURE: 50 MMHG | WEIGHT: 167 LBS | HEIGHT: 72 IN

## 2022-03-17 DIAGNOSIS — I48.0 PAROXYSMAL ATRIAL FIBRILLATION: Primary | ICD-10-CM

## 2022-03-17 DIAGNOSIS — Z00.00 ROUTINE GENERAL MEDICAL EXAMINATION AT A HEALTH CARE FACILITY: ICD-10-CM

## 2022-03-17 DIAGNOSIS — Z12.11 SCREEN FOR COLON CANCER: ICD-10-CM

## 2022-03-17 DIAGNOSIS — N18.30 STAGE 3 CHRONIC KIDNEY DISEASE, UNSPECIFIED WHETHER STAGE 3A OR 3B CKD: ICD-10-CM

## 2022-03-17 DIAGNOSIS — Z00.00 ENCOUNTER FOR MEDICARE ANNUAL WELLNESS EXAM: ICD-10-CM

## 2022-03-17 DIAGNOSIS — D50.8 OTHER IRON DEFICIENCY ANEMIA: ICD-10-CM

## 2022-03-17 DIAGNOSIS — I10 ESSENTIAL HYPERTENSION: ICD-10-CM

## 2022-03-17 DIAGNOSIS — R06.83 SNORING: ICD-10-CM

## 2022-03-17 LAB
DEPRECATED RDW RBC AUTO: 48.6 FL (ref 37–54)
DEVELOPER EXPIRATION DATE: NORMAL
DEVELOPER LOT NUMBER: NORMAL
ERYTHROCYTE [DISTWIDTH] IN BLOOD BY AUTOMATED COUNT: 13.7 % (ref 12.3–15.4)
EXPIRATION DATE: NORMAL
FECAL OCCULT BLOOD SCREEN, POC: NEGATIVE
HCT VFR BLD AUTO: 31.8 % (ref 37.5–51)
HGB BLD-MCNC: 10.4 G/DL (ref 13–17.7)
Lab: NORMAL
MCH RBC QN AUTO: 31.1 PG (ref 26.6–33)
MCHC RBC AUTO-ENTMCNC: 32.7 G/DL (ref 31.5–35.7)
MCV RBC AUTO: 95.2 FL (ref 79–97)
NEGATIVE CONTROL: NEGATIVE
PLATELET # BLD AUTO: 139 10*3/MM3 (ref 140–450)
PMV BLD AUTO: 12.7 FL (ref 6–12)
POSITIVE CONTROL: POSITIVE
RBC # BLD AUTO: 3.34 10*6/MM3 (ref 4.14–5.8)
WBC NRBC COR # BLD: 11.9 10*3/MM3 (ref 3.4–10.8)

## 2022-03-17 PROCEDURE — 84443 ASSAY THYROID STIM HORMONE: CPT | Performed by: INTERNAL MEDICINE

## 2022-03-17 PROCEDURE — 1160F RVW MEDS BY RX/DR IN RCRD: CPT | Performed by: INTERNAL MEDICINE

## 2022-03-17 PROCEDURE — 82270 OCCULT BLOOD FECES: CPT | Performed by: INTERNAL MEDICINE

## 2022-03-17 PROCEDURE — 1126F AMNT PAIN NOTED NONE PRSNT: CPT | Performed by: INTERNAL MEDICINE

## 2022-03-17 PROCEDURE — 80053 COMPREHEN METABOLIC PANEL: CPT | Performed by: INTERNAL MEDICINE

## 2022-03-17 PROCEDURE — 1170F FXNL STATUS ASSESSED: CPT | Performed by: INTERNAL MEDICINE

## 2022-03-17 PROCEDURE — 85027 COMPLETE CBC AUTOMATED: CPT | Performed by: INTERNAL MEDICINE

## 2022-03-17 PROCEDURE — 96160 PT-FOCUSED HLTH RISK ASSMT: CPT | Performed by: INTERNAL MEDICINE

## 2022-03-17 PROCEDURE — G0439 PPPS, SUBSEQ VISIT: HCPCS | Performed by: INTERNAL MEDICINE

## 2022-03-17 PROCEDURE — 83540 ASSAY OF IRON: CPT | Performed by: INTERNAL MEDICINE

## 2022-03-17 NOTE — PROGRESS NOTES
The ABCs of the Annual Wellness Visit  Subsequent Medicare Wellness Visit    Chief Complaint   Patient presents with   • Annual Exam      Subjective       History of Present Illness:  Pio Baum is a 82 y.o. male who presents for a Subsequent Medicare Wellness Visit.    The following portions of the patient's history were reviewed and   updated as appropriate: current medications, past family history, past medical history, past social history, past surgical history and problem list.       Compared to one year ago, the patient feels his physical   health is the same.    Compared to one year ago, the patient feels his mental   health is the same.    Recent Hospitalizations:  He was not admitted to the hospital during the last year.       Current Medical Providers:  Patient Care Team:  Lupillo Hill MD as PCP - General    Outpatient Medications Prior to Visit   Medication Sig Dispense Refill   • ascorbic acid (VITAMIN C) 1000 MG tablet Take 1,000 mg by mouth Daily.     • coenzyme Q10 100 MG capsule Take 100 mg by mouth Daily.     • Ergocalciferol (VITAMIN D2 PO) Take 1 capsule by mouth As Needed.     • glucosamine-chondroitin 500-400 MG capsule capsule Take 1 capsule by mouth Daily.     • lisinopril (PRINIVIL,ZESTRIL) 5 MG tablet Take 5 mg by mouth As Needed.     • Omega-3 1000 MG capsule Take 1,000 mg by mouth Daily.     • tamsulosin (FLOMAX) 0.4 MG capsule 24 hr capsule Take 1 capsule by mouth Daily.     • Xarelto 15 MG tablet TAKE 1 TABLET EVERY DAY 90 tablet 3   • Zinc 100 MG tablet Take 100 mg by mouth Daily.     • Diclofenac Sodium (VOLTAREN) 1 % gel gel Apply  topically to the appropriate area as directed 2 (Two) Times a Day As Needed (hip pain). 300 g 3     No facility-administered medications prior to visit.       No opioid medication identified on active medication list. I have reviewed chart for other potential  high risk medication/s and harmful drug interactions in the  elderly.          Aspirin is not on active medication list.  Aspirin use is contraindicated for this patient due to: current use of Xarelto.  .      Fall Risk Assessment was completed, and patient is at low risk for falls.        Patient Active Problem List   Diagnosis   • Essential hypertension   • PFO (patent foramen ovale)   • Hyperlipidemia   • Tobacco chew use   • CKD (chronic kidney disease) stage 3, GFR 30-59 ml/min ([facility])   • History of TIA (transient ischemic attack) and stroke   • Leukocytosis   • Scalp laceration   • Diverticulosis   • Pre-syncope   • Syncope and collapse   • Sick sinus syndrome ([facility])   • Pericardial effusion   • Cardiac pacemaker   • Chronic dryness of both eyes   • Nuclear senile cataract   • Perforation of tympanic membrane   • Peripapillary atrophy of both eyes   • S/P right cataract extraction   • 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  ACP discussion was held with the patient during this visit. Patient has an advance directive in EMR which is still valid.     Review of Systems   Constitutional: Negative for activity change, appetite change, chills, fever and unexpected weight change.   HENT: Negative for congestion, ear pain, hearing loss, rhinorrhea, sinus pressure, sinus pain, sore throat and trouble swallowing.    Eyes: Negative for photophobia, pain, discharge and itching.   Respiratory: Negative for cough, chest tightness, shortness of breath and wheezing.    Cardiovascular: Negative for chest pain, palpitations and leg swelling.   Gastrointestinal: Negative for abdominal distention, abdominal pain, blood in stool, constipation, diarrhea and vomiting.        Colonoscopy by Dr Schmid  Dark stools   Endocrine: Negative for cold intolerance, heat intolerance, polydipsia, polyphagia and polyuria.   Genitourinary: Negative for difficulty urinating, dysuria, enuresis,  "frequency, genital sores, hematuria and urgency.        Followed by Dr Segal   Musculoskeletal: Positive for arthralgias. Negative for gait problem, joint swelling and myalgias.   Skin: Negative for pallor, rash and wound.   Allergic/Immunologic: Negative for immunocompromised state.   Neurological: Negative for tremors, seizures, syncope, speech difficulty, numbness and headaches.   Hematological: Negative for adenopathy.   Psychiatric/Behavioral: Negative for behavioral problems, dysphoric mood, sleep disturbance and suicidal ideas. The patient is not nervous/anxious.          Objective       Vitals:    03/17/22 1350 03/17/22 1411   BP: 124/64 130/50   BP Location: Left arm    Patient Position: Sitting    Pulse: 82    Temp: 96.9 °F (36.1 °C)    TempSrc: Infrared    SpO2: 98%    Weight: 75.8 kg (167 lb)    Height: 182.9 cm (72.01\")    PainSc: 0-No pain      BMI Readings from Last 1 Encounters:   03/17/22 22.64 kg/m²   BMI is within normal parameters. No follow-up required.    Does the patient have evidence of cognitive impairment? No    Physical Exam  Constitutional:       Appearance: Normal appearance. He is well-developed.   HENT:      Head: Normocephalic and atraumatic.      Right Ear: External ear normal.      Left Ear: External ear normal.      Nose: Nose normal.      Mouth/Throat:      Mouth: Mucous membranes are moist.      Pharynx: Oropharynx is clear.   Eyes:      Extraocular Movements: Extraocular movements intact.      Conjunctiva/sclera: Conjunctivae normal.      Pupils: Pupils are equal, round, and reactive to light.   Cardiovascular:      Rate and Rhythm: Normal rate and regular rhythm.      Heart sounds: Normal heart sounds.   Pulmonary:      Effort: Pulmonary effort is normal.      Breath sounds: Normal breath sounds.   Abdominal:      General: Bowel sounds are normal.      Palpations: Abdomen is soft.   Genitourinary:     Comments: Mildly enlarged prostate  Musculoskeletal:         General: Normal " range of motion.      Cervical back: Normal range of motion and neck supple.   Lymphadenopathy:      Cervical: No cervical adenopathy.   Skin:     General: Skin is warm and dry.   Neurological:      General: No focal deficit present.      Mental Status: He is alert and oriented to person, place, and time.   Psychiatric:         Mood and Affect: Mood normal.         Behavior: Behavior normal.         Thought Content: Thought content normal.                 HEALTH RISK ASSESSMENT    Smoking Status:  Social History     Tobacco Use   Smoking Status Never Smoker   Smokeless Tobacco Former User   • Types: Chew   • Quit date: 2020     Alcohol Consumption:  Social History     Substance and Sexual Activity   Alcohol Use Yes    Comment: 4 drinks per month     Fall Risk Screen:    [employer] Fall Risk Assessment was completed, and patient is at LOW risk for falls.Assessment completed on:3/17/2022    Depression Screening:  PHQ-2/PHQ-9 Depression Screening 3/17/2022   Retired Total Score -   Little Interest or Pleasure in Doing Things 0-->not at all   Feeling Down, Depressed or Hopeless 0-->not at all   PHQ-9: Brief Depression Severity Measure Score 0       Health Habits and Functional and Cognitive Screening:  Functional & Cognitive Status 3/17/2022   Do you have difficulty preparing food and eating? No   Do you have difficulty bathing yourself, getting dressed or grooming yourself? No   Do you have difficulty using the toilet? No   Do you have difficulty moving around from place to place? No   Do you have trouble with steps or getting out of a bed or a chair? No   Current Diet Well Balanced Diet   Dental Exam Not up to date   Eye Exam Not up to date   Exercise (times per week) 7 times per week   Current Exercises Include Walking   Do you need help using the phone?  No   Are you deaf or do you have serious difficulty hearing?  No   Do you need help with transportation? No   Do you need help shopping? No   Do you need help preparing  meals?  No   Do you need help with housework?  No   Do you need help with laundry? No   Do you need help taking your medications? No   Do you need help managing money? No   Do you ever drive or ride in a car without wearing a seat belt? No   Have you felt unusual stress, anger or loneliness in the last month? No   Who do you live with? Spouse   If you need help, do you have trouble finding someone available to you? No   Have you been bothered in the last four weeks by sexual problems? No   Do you have difficulty concentrating, remembering or making decisions? No       Age-appropriate Screening Schedule:  Refer to the list below for future screening recommendations based on patient's age, sex and/or medical conditions. Orders for these recommended tests are listed in the plan section. The patient has been provided with a written plan.    Health Maintenance   Topic Date Due   • TDAP/TD VACCINES (1 - Tdap) Never done   • ZOSTER VACCINE (1 of 2) Never done   • INFLUENZA VACCINE  08/01/2021   • LIPID PANEL  04/22/2022              Assessment/Plan        CMS Preventative Services Quick Reference  Risk Factors Identified During Encounter  Cardiovascular Disease  Immunizations Discussed/Encouraged (specific Immunizations; Td, Influenza and COVID19  Inactivity/Sedentary  Polypharmacy  The above risks/problems have been discussed with the patient.  Follow up actions/plans if indicated are seen below in the Assessment/Plan Section.  Pertinent information has been shared with the patient in the After Visit Summary.    Diagnoses and all orders for this visit:    1. Paroxysmal atrial fibrillation (HCC) (Primary)    2. Essential hypertension  -     Comprehensive Metabolic Panel  -     TSH    3. Stage 3 chronic kidney disease, unspecified whether stage 3a or 3b CKD (HCC)    4. Encounter for Medicare annual wellness exam  -     CBC (No Diff)  -     Comprehensive Metabolic Panel  -     TSH  -     Iron    5. Routine general medical  examination at a health care facility  -     CBC (No Diff)  -     Comprehensive Metabolic Panel  -     TSH  -     Iron    6. Screen for colon cancer    7. Other iron deficiency anemia  -     CBC (No Diff)  -     Iron  -     POC Occult Blood Stool  -     Iron; Future  -     CBC (No Diff); Future    8. Snoring  -     Ambulatory Referral to Sleep Medicine    Other orders  -     omeprazole (priLOSEC) 40 MG capsule; Take 1 capsule by mouth Every Morning.  Dispense: 90 capsule; Refill: 3        Follow Up:   Return in about 4 months (around 7/17/2022) for Recheck.     An After Visit Summary and PPPS were given to the patient.            HME-counseled on diet and exercise, fasting labs soon  htn-stable on ACE  paf-rate controlled, on NOAC  Hyperlipidemia- labs on lipitor at goal, counseled on diet  djd-stable on tylenol  Thrombocytopenia/anemia-cbc today noted, add FE  TIA-cont rf mod  CKD-recheck today stable , advised fluids and NSAIDS avoidance  Abnormal glucose-labs at goal  Diverticulosis-increase fiber, asymptomatic  Dark stools, ?PUD-add PPI, and will recheck CBC in 1 month, to call if worst       11/23 Labs noted and dw patient    Reviewed the following with the patient: advised patient to avoid alcoholic beverages, encouraged patient to exercise 5-7 days per week for 30 minutes at a time, ideal body weight discussed with patient and weight loss encouraged.

## 2022-03-18 LAB
ALBUMIN SERPL-MCNC: 3.9 G/DL (ref 3.5–5.2)
ALBUMIN/GLOB SERPL: 1.5 G/DL
ALP SERPL-CCNC: 100 U/L (ref 39–117)
ALT SERPL W P-5'-P-CCNC: 11 U/L (ref 1–41)
ANION GAP SERPL CALCULATED.3IONS-SCNC: 11.3 MMOL/L (ref 5–15)
AST SERPL-CCNC: 15 U/L (ref 1–40)
BILIRUB SERPL-MCNC: 0.3 MG/DL (ref 0–1.2)
BUN SERPL-MCNC: 40 MG/DL (ref 8–23)
BUN/CREAT SERPL: 24.2 (ref 7–25)
CALCIUM SPEC-SCNC: 9 MG/DL (ref 8.6–10.5)
CHLORIDE SERPL-SCNC: 105 MMOL/L (ref 98–107)
CO2 SERPL-SCNC: 23.7 MMOL/L (ref 22–29)
CREAT SERPL-MCNC: 1.65 MG/DL (ref 0.76–1.27)
EGFRCR SERPLBLD CKD-EPI 2021: 40.7 ML/MIN/1.73
GLOBULIN UR ELPH-MCNC: 2.6 GM/DL
GLUCOSE SERPL-MCNC: 124 MG/DL (ref 65–99)
IRON 24H UR-MRATE: 66 MCG/DL (ref 59–158)
POTASSIUM SERPL-SCNC: 3.7 MMOL/L (ref 3.5–5.2)
PROT SERPL-MCNC: 6.5 G/DL (ref 6–8.5)
SODIUM SERPL-SCNC: 140 MMOL/L (ref 136–145)
TSH SERPL DL<=0.05 MIU/L-ACNC: 1.57 UIU/ML (ref 0.27–4.2)

## 2022-03-18 RX ORDER — OMEPRAZOLE 40 MG/1
40 CAPSULE, DELAYED RELEASE ORAL EVERY MORNING
Qty: 90 CAPSULE | Refills: 3 | Status: SHIPPED | OUTPATIENT
Start: 2022-03-18 | End: 2023-02-15

## 2022-04-09 PROCEDURE — 93296 REM INTERROG EVL PM/IDS: CPT | Performed by: INTERNAL MEDICINE

## 2022-04-09 PROCEDURE — 93294 REM INTERROG EVL PM/LDLS PM: CPT | Performed by: INTERNAL MEDICINE

## 2022-04-20 ENCOUNTER — TELEPHONE (OUTPATIENT)
Dept: CARDIOLOGY | Facility: CLINIC | Age: 82
End: 2022-04-20

## 2022-04-20 NOTE — TELEPHONE ENCOUNTER
Patient called to give you an update. He states that his BP has been lower for the past month. His BP has been 111/58, 115/54 and 113/56. His HR has remained in the 70's. He states that he just has no strength. His is so weak that he can barely walk around to do anything and he is dizzy. He denies pain and states that he actually does not feel bad. He is just concerned because he has no energy.

## 2022-04-21 ENCOUNTER — TELEPHONE (OUTPATIENT)
Dept: INTERNAL MEDICINE | Facility: CLINIC | Age: 82
End: 2022-04-21

## 2022-04-21 NOTE — TELEPHONE ENCOUNTER
Pt called the office b/c he said he is having trouble walking due to his legs being very weak. I made the pt an appt for tomorrow with Garry b/c  is booked. The pt did talk with his cardiologist, but they told him to make an appt with his pcp office to get blood work done.

## 2022-04-22 ENCOUNTER — APPOINTMENT (OUTPATIENT)
Dept: GENERAL RADIOLOGY | Facility: HOSPITAL | Age: 82
End: 2022-04-22

## 2022-04-22 ENCOUNTER — OFFICE VISIT (OUTPATIENT)
Dept: INTERNAL MEDICINE | Facility: CLINIC | Age: 82
End: 2022-04-22

## 2022-04-22 ENCOUNTER — HOSPITAL ENCOUNTER (INPATIENT)
Facility: HOSPITAL | Age: 82
LOS: 5 days | Discharge: HOME | End: 2022-04-27
Attending: EMERGENCY MEDICINE | Admitting: INTERNAL MEDICINE

## 2022-04-22 ENCOUNTER — APPOINTMENT (OUTPATIENT)
Dept: CT IMAGING | Facility: HOSPITAL | Age: 82
End: 2022-04-22

## 2022-04-22 VITALS
HEIGHT: 72 IN | TEMPERATURE: 96.9 F | OXYGEN SATURATION: 95 % | HEART RATE: 69 BPM | WEIGHT: 165 LBS | BODY MASS INDEX: 22.35 KG/M2 | DIASTOLIC BLOOD PRESSURE: 52 MMHG | SYSTOLIC BLOOD PRESSURE: 104 MMHG

## 2022-04-22 DIAGNOSIS — N18.31 STAGE 3A CHRONIC KIDNEY DISEASE: ICD-10-CM

## 2022-04-22 DIAGNOSIS — D64.9 SYMPTOMATIC ANEMIA: ICD-10-CM

## 2022-04-22 DIAGNOSIS — Z86.73 HISTORY OF TIA (TRANSIENT ISCHEMIC ATTACK) AND STROKE: ICD-10-CM

## 2022-04-22 DIAGNOSIS — K92.2 UPPER GI BLEED: Primary | ICD-10-CM

## 2022-04-22 DIAGNOSIS — D50.0 IRON DEFICIENCY ANEMIA DUE TO CHRONIC BLOOD LOSS: ICD-10-CM

## 2022-04-22 DIAGNOSIS — I48.0 PAROXYSMAL ATRIAL FIBRILLATION: ICD-10-CM

## 2022-04-22 DIAGNOSIS — Z79.01 ANTICOAGULATED: ICD-10-CM

## 2022-04-22 DIAGNOSIS — D62 ACUTE BLOOD LOSS ANEMIA: ICD-10-CM

## 2022-04-22 DIAGNOSIS — I10 ESSENTIAL HYPERTENSION: ICD-10-CM

## 2022-04-22 DIAGNOSIS — Z95.0 CARDIAC PACEMAKER: ICD-10-CM

## 2022-04-22 DIAGNOSIS — R53.1 GENERALIZED WEAKNESS: Primary | ICD-10-CM

## 2022-04-22 LAB
ABO GROUP BLD: NORMAL
ALBUMIN SERPL-MCNC: 3.9 G/DL (ref 3.5–5.2)
ALBUMIN/GLOB SERPL: 1.9 G/DL
ALP SERPL-CCNC: 84 U/L (ref 39–117)
ALT SERPL W P-5'-P-CCNC: 12 U/L (ref 1–41)
ANION GAP SERPL CALCULATED.3IONS-SCNC: 10 MMOL/L (ref 5–15)
AST SERPL-CCNC: 14 U/L (ref 1–40)
BASOPHILS # BLD AUTO: 0.07 10*3/MM3 (ref 0–0.2)
BASOPHILS NFR BLD AUTO: 0.6 % (ref 0–1.5)
BILIRUB SERPL-MCNC: 0.3 MG/DL (ref 0–1.2)
BILIRUB UR QL STRIP: NEGATIVE
BLD GP AB SCN SERPL QL: NEGATIVE
BUN SERPL-MCNC: 39 MG/DL (ref 8–23)
BUN/CREAT SERPL: 25.2 (ref 7–25)
CALCIUM SPEC-SCNC: 8.3 MG/DL (ref 8.6–10.5)
CHLORIDE SERPL-SCNC: 106 MMOL/L (ref 98–107)
CLARITY UR: CLEAR
CO2 SERPL-SCNC: 22 MMOL/L (ref 22–29)
COLOR UR: YELLOW
CREAT SERPL-MCNC: 1.55 MG/DL (ref 0.76–1.27)
DEPRECATED RDW RBC AUTO: 51.8 FL (ref 37–54)
EGFRCR SERPLBLD CKD-EPI 2021: 43.9 ML/MIN/1.73
EOSINOPHIL # BLD AUTO: 0.03 10*3/MM3 (ref 0–0.4)
EOSINOPHIL NFR BLD AUTO: 0.3 % (ref 0.3–6.2)
ERYTHROCYTE [DISTWIDTH] IN BLOOD BY AUTOMATED COUNT: 15.7 % (ref 12.3–15.4)
FLUAV RNA RESP QL NAA+PROBE: NOT DETECTED
FLUBV RNA RESP QL NAA+PROBE: NOT DETECTED
GLOBULIN UR ELPH-MCNC: 2.1 GM/DL
GLUCOSE SERPL-MCNC: 109 MG/DL (ref 65–99)
GLUCOSE UR STRIP-MCNC: NEGATIVE MG/DL
HCT VFR BLD AUTO: 19.9 % (ref 37.5–51)
HGB BLD-MCNC: 6.3 G/DL (ref 13–17.7)
HGB UR QL STRIP.AUTO: NEGATIVE
HOLD SPECIMEN: NORMAL
IMM GRANULOCYTES # BLD AUTO: 0.05 10*3/MM3 (ref 0–0.05)
IMM GRANULOCYTES NFR BLD AUTO: 0.4 % (ref 0–0.5)
KETONES UR QL STRIP: ABNORMAL
LEUKOCYTE ESTERASE UR QL STRIP.AUTO: NEGATIVE
LYMPHOCYTES # BLD AUTO: 1.79 10*3/MM3 (ref 0.7–3.1)
LYMPHOCYTES NFR BLD AUTO: 15.3 % (ref 19.6–45.3)
MAGNESIUM SERPL-MCNC: 2.4 MG/DL (ref 1.6–2.4)
MCH RBC QN AUTO: 28.9 PG (ref 26.6–33)
MCHC RBC AUTO-ENTMCNC: 31.7 G/DL (ref 31.5–35.7)
MCV RBC AUTO: 91.3 FL (ref 79–97)
MONOCYTES # BLD AUTO: 0.61 10*3/MM3 (ref 0.1–0.9)
MONOCYTES NFR BLD AUTO: 5.2 % (ref 5–12)
NEUTROPHILS NFR BLD AUTO: 78.2 % (ref 42.7–76)
NEUTROPHILS NFR BLD AUTO: 9.16 10*3/MM3 (ref 1.7–7)
NITRITE UR QL STRIP: NEGATIVE
NRBC BLD AUTO-RTO: 0 /100 WBC (ref 0–0.2)
PH UR STRIP.AUTO: <=5 [PH] (ref 5–8)
PLATELET # BLD AUTO: 184 10*3/MM3 (ref 140–450)
PMV BLD AUTO: 10.7 FL (ref 6–12)
POTASSIUM SERPL-SCNC: 4.1 MMOL/L (ref 3.5–5.2)
PROT SERPL-MCNC: 6 G/DL (ref 6–8.5)
PROT UR QL STRIP: NEGATIVE
QT INTERVAL: 382 MS
QTC INTERVAL: 412 MS
RBC # BLD AUTO: 2.18 10*6/MM3 (ref 4.14–5.8)
RH BLD: POSITIVE
SARS-COV-2 RNA RESP QL NAA+PROBE: NOT DETECTED
SODIUM SERPL-SCNC: 138 MMOL/L (ref 136–145)
SP GR UR STRIP: 1.02 (ref 1–1.03)
T&S EXPIRATION DATE: NORMAL
TROPONIN T SERPL-MCNC: <0.01 NG/ML (ref 0–0.03)
UROBILINOGEN UR QL STRIP: ABNORMAL
WBC NRBC COR # BLD: 11.71 10*3/MM3 (ref 3.4–10.8)
WHOLE BLOOD HOLD SPECIMEN: NORMAL
WHOLE BLOOD HOLD SPECIMEN: NORMAL

## 2022-04-22 PROCEDURE — P9016 RBC LEUKOCYTES REDUCED: HCPCS

## 2022-04-22 PROCEDURE — 0 IOPAMIDOL PER 1 ML: Performed by: EMERGENCY MEDICINE

## 2022-04-22 PROCEDURE — 86901 BLOOD TYPING SEROLOGIC RH(D): CPT | Performed by: PHYSICIAN ASSISTANT

## 2022-04-22 PROCEDURE — 81003 URINALYSIS AUTO W/O SCOPE: CPT | Performed by: EMERGENCY MEDICINE

## 2022-04-22 PROCEDURE — 74178 CT ABD&PLV WO CNTR FLWD CNTR: CPT

## 2022-04-22 PROCEDURE — 36430 TRANSFUSION BLD/BLD COMPNT: CPT

## 2022-04-22 PROCEDURE — 86900 BLOOD TYPING SEROLOGIC ABO: CPT

## 2022-04-22 PROCEDURE — 99223 1ST HOSP IP/OBS HIGH 75: CPT | Performed by: HOSPITALIST

## 2022-04-22 PROCEDURE — 85025 COMPLETE CBC W/AUTO DIFF WBC: CPT

## 2022-04-22 PROCEDURE — 86923 COMPATIBILITY TEST ELECTRIC: CPT

## 2022-04-22 PROCEDURE — 99284 EMERGENCY DEPT VISIT MOD MDM: CPT

## 2022-04-22 PROCEDURE — 93005 ELECTROCARDIOGRAM TRACING: CPT

## 2022-04-22 PROCEDURE — 93005 ELECTROCARDIOGRAM TRACING: CPT | Performed by: EMERGENCY MEDICINE

## 2022-04-22 PROCEDURE — 80053 COMPREHEN METABOLIC PANEL: CPT

## 2022-04-22 PROCEDURE — 84484 ASSAY OF TROPONIN QUANT: CPT

## 2022-04-22 PROCEDURE — 99213 OFFICE O/P EST LOW 20 MIN: CPT | Performed by: PHYSICIAN ASSISTANT

## 2022-04-22 PROCEDURE — 25010000002 PROTHROMBIN COMPLEX CONC HUMAN 500 UNITS KIT: Performed by: HOSPITALIST

## 2022-04-22 PROCEDURE — 99222 1ST HOSP IP/OBS MODERATE 55: CPT | Performed by: PHYSICIAN ASSISTANT

## 2022-04-22 PROCEDURE — 83735 ASSAY OF MAGNESIUM: CPT

## 2022-04-22 PROCEDURE — 71045 X-RAY EXAM CHEST 1 VIEW: CPT

## 2022-04-22 PROCEDURE — 86850 RBC ANTIBODY SCREEN: CPT | Performed by: PHYSICIAN ASSISTANT

## 2022-04-22 PROCEDURE — 87636 SARSCOV2 & INF A&B AMP PRB: CPT | Performed by: HOSPITALIST

## 2022-04-22 PROCEDURE — 86900 BLOOD TYPING SEROLOGIC ABO: CPT | Performed by: PHYSICIAN ASSISTANT

## 2022-04-22 PROCEDURE — 25010000002 PROTHROMBIN COMPLEX CONC HUMAN 1000 UNITS KIT: Performed by: HOSPITALIST

## 2022-04-22 RX ORDER — SODIUM CHLORIDE 0.9 % (FLUSH) 0.9 %
10 SYRINGE (ML) INJECTION EVERY 12 HOURS SCHEDULED
Status: DISCONTINUED | OUTPATIENT
Start: 2022-04-22 | End: 2022-04-27 | Stop reason: HOSPADM

## 2022-04-22 RX ORDER — SODIUM CHLORIDE, SODIUM LACTATE, POTASSIUM CHLORIDE, CALCIUM CHLORIDE 600; 310; 30; 20 MG/100ML; MG/100ML; MG/100ML; MG/100ML
100 INJECTION, SOLUTION INTRAVENOUS CONTINUOUS
Status: DISCONTINUED | OUTPATIENT
Start: 2022-04-22 | End: 2022-04-24

## 2022-04-22 RX ORDER — ACETAMINOPHEN 160 MG/5ML
650 SOLUTION ORAL EVERY 4 HOURS PRN
Status: DISCONTINUED | OUTPATIENT
Start: 2022-04-22 | End: 2022-04-27 | Stop reason: HOSPADM

## 2022-04-22 RX ORDER — ONDANSETRON 4 MG/1
4 TABLET, FILM COATED ORAL EVERY 6 HOURS PRN
Status: DISCONTINUED | OUTPATIENT
Start: 2022-04-22 | End: 2022-04-27 | Stop reason: HOSPADM

## 2022-04-22 RX ORDER — ONDANSETRON 2 MG/ML
4 INJECTION INTRAMUSCULAR; INTRAVENOUS EVERY 6 HOURS PRN
Status: DISCONTINUED | OUTPATIENT
Start: 2022-04-22 | End: 2022-04-27 | Stop reason: HOSPADM

## 2022-04-22 RX ORDER — SODIUM CHLORIDE 0.9 % (FLUSH) 0.9 %
10 SYRINGE (ML) INJECTION AS NEEDED
Status: DISCONTINUED | OUTPATIENT
Start: 2022-04-22 | End: 2022-04-27 | Stop reason: HOSPADM

## 2022-04-22 RX ORDER — PANTOPRAZOLE SODIUM 40 MG/10ML
40 INJECTION, POWDER, LYOPHILIZED, FOR SOLUTION INTRAVENOUS EVERY 12 HOURS SCHEDULED
Status: DISCONTINUED | OUTPATIENT
Start: 2022-04-22 | End: 2022-04-24

## 2022-04-22 RX ORDER — TAMSULOSIN HYDROCHLORIDE 0.4 MG/1
0.4 CAPSULE ORAL DAILY
Status: DISCONTINUED | OUTPATIENT
Start: 2022-04-23 | End: 2022-04-27 | Stop reason: HOSPADM

## 2022-04-22 RX ORDER — PEG-3350, SODIUM SULFATE, SODIUM CHLORIDE, POTASSIUM CHLORIDE, SODIUM ASCORBATE AND ASCORBIC ACID 7.5-2.691G
1000 KIT ORAL
Status: COMPLETED | OUTPATIENT
Start: 2022-04-23 | End: 2022-04-23

## 2022-04-22 RX ORDER — PANTOPRAZOLE SODIUM 40 MG/10ML
80 INJECTION, POWDER, LYOPHILIZED, FOR SOLUTION INTRAVENOUS ONCE
Status: COMPLETED | OUTPATIENT
Start: 2022-04-22 | End: 2022-04-22

## 2022-04-22 RX ORDER — PEG-3350, SODIUM SULFATE, SODIUM CHLORIDE, POTASSIUM CHLORIDE, SODIUM ASCORBATE AND ASCORBIC ACID 7.5-2.691G
1000 KIT ORAL
Status: COMPLETED | OUTPATIENT
Start: 2022-04-22 | End: 2022-04-22

## 2022-04-22 RX ORDER — ATORVASTATIN CALCIUM 40 MG/1
40 TABLET, FILM COATED ORAL DAILY
Status: DISCONTINUED | OUTPATIENT
Start: 2022-04-23 | End: 2022-04-27 | Stop reason: HOSPADM

## 2022-04-22 RX ORDER — ACETAMINOPHEN 325 MG/1
650 TABLET ORAL EVERY 4 HOURS PRN
Status: DISCONTINUED | OUTPATIENT
Start: 2022-04-22 | End: 2022-04-27 | Stop reason: HOSPADM

## 2022-04-22 RX ORDER — ATORVASTATIN CALCIUM 40 MG/1
40 TABLET, FILM COATED ORAL DAILY
COMMUNITY
Start: 2022-03-05 | End: 2022-05-27

## 2022-04-22 RX ORDER — ACETAMINOPHEN 650 MG/1
650 SUPPOSITORY RECTAL EVERY 4 HOURS PRN
Status: DISCONTINUED | OUTPATIENT
Start: 2022-04-22 | End: 2022-04-27 | Stop reason: HOSPADM

## 2022-04-22 RX ADMIN — IOPAMIDOL 100 ML: 755 INJECTION, SOLUTION INTRAVENOUS at 14:50

## 2022-04-22 RX ADMIN — POLYETHYLENE GLYCOL 3350, SODIUM SULFATE, SODIUM CHLORIDE, POTASSIUM CHLORIDE, SODIUM ASCORBATE, AND ASCORBIC ACID 1000 ML: KIT at 20:08

## 2022-04-22 RX ADMIN — PANTOPRAZOLE SODIUM 80 MG: 40 INJECTION, POWDER, FOR SOLUTION INTRAVENOUS at 16:12

## 2022-04-22 NOTE — PROGRESS NOTES
[facility] PRIMARY CARE  [address]  [facility] [address]  Dept: [phone]  Dept Fax: [phone]  Loc: [phone]  Loc Fax: [phone]    Pio Baum  1940    Follow Up Office Visit Note    History of Present Illness:  Patient is an 82-year-old male in today for generalized weakness.  Patient reports this been off and on over the last month but over the last 2 days has been significantly worse.  Patient has had some shortness of breath.  Patient's wife accompanied him in office today.  Patient's wife said that he was in the shower this morning before she got up and when she got up and walked 10 he was barely able to stand and was cool, pale, and somewhat diaphoretic.  Patient's wife wanted to take him to the emergency room this morning but patient did not want to go and wanted to come to his appointment today for further evaluation.  Patient reports severe weakness and unable to walk a few steps without having to stop for rest.  Patient with a known history of sick sinus syndrome, cardiac pacemaker, and anemia.    Weakness - Generalized  Associated symptoms include diaphoresis, fatigue and weakness. Pertinent negatives include no arthralgias, chest pain, chills, congestion, coughing, fever, headaches, myalgias, nausea, rash, sore throat or vomiting.       The following portions of the patient's history were reviewed and updated as appropriate: allergies, current medications, past family history, past medical history, past social history, past surgical history, and problem list.    Medications:    Current Outpatient Medications:   •  ascorbic acid (VITAMIN C) 1000 MG tablet, Take 1,000 mg by mouth Daily., Disp: , Rfl:   •  atorvastatin (LIPITOR) 40 MG tablet, Take 40 mg by mouth Daily., Disp: , Rfl:   •  coenzyme Q10 100 MG capsule, Take 100 mg by mouth Daily., Disp: , Rfl:   •  Ergocalciferol (VITAMIN D2 PO), Take 1 capsule by mouth As Needed.,  Disp: , Rfl:   •  glucosamine-chondroitin 500-400 MG capsule capsule, Take 1 capsule by mouth Daily., Disp: , Rfl:   •  lisinopril (PRINIVIL,ZESTRIL) 5 MG tablet, Take 5 mg by mouth As Needed., Disp: , Rfl:   •  Omega-3 1000 MG capsule, Take 1,000 mg by mouth Daily., Disp: , Rfl:   •  omeprazole (priLOSEC) 40 MG capsule, Take 1 capsule by mouth Every Morning., Disp: 90 capsule, Rfl: 3  •  tamsulosin (FLOMAX) 0.4 MG capsule 24 hr capsule, Take 1 capsule by mouth Daily., Disp: , Rfl:   •  Xarelto 15 MG tablet, TAKE 1 TABLET EVERY DAY, Disp: 90 tablet, Rfl: 3  •  Zinc 100 MG tablet, Take 100 mg by mouth Daily., Disp: , Rfl:     Subjective  Allergies   Allergen Reactions   • Flecainide Other (See Comments)     Fatigue, weakness unable to tolerate.    • Flonase [Fluticasone] Irritability     Gets a nose bleed as soon as used   • Penicillins Rash     Rash all over body including mouth/throat         Past Medical History:   Diagnosis Date   • A-fib (HCC)    • Chronic kidney disease    • History of migraine headaches    • Hyperlipidemia    • Hypertension    • Mitral valve regurgitation    • Pericardial effusion     s/p PPM placement   • PFO (patent foramen ovale)    • Testicular cyst     removal 1970   • TIA (transient ischemic attack)        Past Surgical History:   Procedure Laterality Date   • BASAL CELL CARCINOMA EXCISION  07/2021   • CARDIAC CATHETERIZATION N/A 8/24/2020    Procedure: PERICARDIOCENTESIS;  Surgeon: Dain Nava MD;  Location: [facility] EP INVASIVE LOCATION;  Service: Cardiology;  Laterality: N/A;   • CARDIAC CATHETERIZATION N/A 10/20/2020    Procedure: PERICARDIOCENTESIS;  Surgeon: Dain Nava MD;  Location:  [facility] EP INVASIVE LOCATION;  Service: Cardiology;  Laterality: N/A;   • CARDIAC ELECTROPHYSIOLOGY PROCEDURE N/A 8/24/2020    Procedure: PACEMAKER IMPLANTATION- DC;  Surgeon: Dain Nava MD;  Location:  [facility] EP INVASIVE LOCATION;  Service: Cardiology;  Laterality: N/A;   • CATARACT  EXTRACTION W/ INTRAOCULAR LENS  IMPLANT, BILATERAL     • TONSILLECTOMY AND ADENOIDECTOMY  11 yo       Family History   Problem Relation Age of Onset   • Arthritis Other    • Hyperlipidemia Other    • Stroke Other    • Heart attack Mother    • Heart attack Father         Social History     Socioeconomic History   • Marital status:    Tobacco Use   • Smoking status: Never Smoker   • Smokeless tobacco: Former User     Types: Chew     Quit date: 2020   Vaping Use   • Vaping Use: Never used   Substance and Sexual Activity   • Alcohol use: Yes     Comment: 4 drinks per month   • Drug use: No   • Sexual activity: Defer       Review of Systems   Constitutional: Positive for diaphoresis and fatigue. Negative for activity change, chills, fever and unexpected weight change.   HENT: Negative for congestion, ear pain, postnasal drip, sinus pressure and sore throat.    Eyes: Negative for pain, discharge and redness.   Respiratory: Positive for shortness of breath. Negative for cough and wheezing.    Cardiovascular: Negative for chest pain, palpitations and leg swelling.   Gastrointestinal: Negative for diarrhea, nausea and vomiting.   Endocrine: Negative for cold intolerance and heat intolerance.   Genitourinary: Negative for decreased urine volume and dysuria.   Musculoskeletal: Negative for arthralgias and myalgias.   Skin: Positive for color change and pallor. Negative for rash and wound.   Neurological: Positive for dizziness, weakness and light-headedness. Negative for headaches.   Hematological: Does not bruise/bleed easily.   Psychiatric/Behavioral: Positive for decreased concentration. Negative for confusion, dysphoric mood and sleep disturbance. The patient is not nervous/anxious.          Objective  Vitals:    04/22/22 1016   BP: 104/52   BP Location: Left arm   Patient Position: Sitting   Cuff Size: Adult   Pulse: 69   Temp: 96.9 °F (36.1 °C)   TempSrc: Temporal   SpO2: 95%   Weight: 74.8 kg (165 lb)   Height:  "182.9 cm (72.01\")   PainSc: 0-No pain     Body mass index is 22.37 kg/m².     Physical Exam  Physical Exam  Vitals and nursing note reviewed.   Constitutional:       General: He is not in acute distress.     Appearance: He is ill-appearing and diaphoretic.   HENT:      Head: Normocephalic.      Right Ear: Tympanic membrane, ear canal and external ear normal. There is no impacted cerumen.      Left Ear: Tympanic membrane, ear canal and external ear normal. There is no impacted cerumen.      Nose: No congestion or rhinorrhea.      Mouth/Throat:      Mouth: Mucous membranes are moist.      Pharynx: Oropharynx is clear. No oropharyngeal exudate or posterior oropharyngeal erythema.   Eyes:      General:         Right eye: No discharge.         Left eye: No discharge.      Extraocular Movements: Extraocular movements intact.      Conjunctiva/sclera: Conjunctivae normal.      Pupils: Pupils are equal, round, and reactive to light.   Cardiovascular:      Rate and Rhythm: Normal rate and regular rhythm.      Heart sounds: Normal heart sounds. No murmur heard.    No friction rub. No gallop.   Pulmonary:      Effort: Pulmonary effort is normal. No respiratory distress.      Breath sounds: Normal breath sounds. No wheezing.   Abdominal:      General: Bowel sounds are normal. There is no distension.      Palpations: Abdomen is soft. There is no mass.      Tenderness: There is no abdominal tenderness.   Musculoskeletal:         General: No swelling. Normal range of motion.      Cervical back: Normal range of motion. No tenderness.      Right lower leg: No edema.      Left lower leg: No edema.   Lymphadenopathy:      Cervical: No cervical adenopathy.   Skin:     Coloration: Skin is pale.      Findings: No bruising, erythema or rash.   Neurological:      Mental Status: He is oriented to person, place, and time.      Gait: Gait abnormal.         Diagnostic Data  Procedures    Assessment  Diagnoses and all orders for this " visit:    1. Generalized weakness (Primary)        Plan    1. Generalized weakness (Primary)- worsening, advised to go to ER immediately for further evaluation.  Both patient and wife verbalized understanding of all instructions given and complied.  Report called to ED that patient en route via private vehicle.        No follow-ups on file.    Garry Choi PA-C  04/22/2022

## 2022-04-23 ENCOUNTER — APPOINTMENT (OUTPATIENT)
Dept: GASTROENTEROLOGY | Facility: HOSPITAL | Age: 82
End: 2022-04-23

## 2022-04-23 ENCOUNTER — ANESTHESIA (OUTPATIENT)
Dept: GASTROENTEROLOGY | Facility: HOSPITAL | Age: 82
End: 2022-04-23

## 2022-04-23 ENCOUNTER — ANESTHESIA EVENT (OUTPATIENT)
Dept: GASTROENTEROLOGY | Facility: HOSPITAL | Age: 82
End: 2022-04-23

## 2022-04-23 LAB
ANION GAP SERPL CALCULATED.3IONS-SCNC: 7 MMOL/L (ref 5–15)
BASOPHILS # BLD AUTO: 0.07 10*3/MM3 (ref 0–0.2)
BASOPHILS NFR BLD AUTO: 0.8 % (ref 0–1.5)
BH BB BLOOD EXPIRATION DATE: NORMAL
BH BB BLOOD EXPIRATION DATE: NORMAL
BH BB BLOOD TYPE BARCODE: 5100
BH BB BLOOD TYPE BARCODE: 5100
BH BB DISPENSE STATUS: NORMAL
BH BB DISPENSE STATUS: NORMAL
BH BB PRODUCT CODE: NORMAL
BH BB PRODUCT CODE: NORMAL
BH BB UNIT NUMBER: NORMAL
BH BB UNIT NUMBER: NORMAL
BUN SERPL-MCNC: 35 MG/DL (ref 8–23)
BUN/CREAT SERPL: 26.1 (ref 7–25)
CALCIUM SPEC-SCNC: 8.3 MG/DL (ref 8.6–10.5)
CHLORIDE SERPL-SCNC: 110 MMOL/L (ref 98–107)
CO2 SERPL-SCNC: 23 MMOL/L (ref 22–29)
CREAT SERPL-MCNC: 1.34 MG/DL (ref 0.76–1.27)
CROSSMATCH INTERPRETATION: NORMAL
CROSSMATCH INTERPRETATION: NORMAL
DEPRECATED RDW RBC AUTO: 51.5 FL (ref 37–54)
EGFRCR SERPLBLD CKD-EPI 2021: 52.2 ML/MIN/1.73
EOSINOPHIL # BLD AUTO: 0.1 10*3/MM3 (ref 0–0.4)
EOSINOPHIL NFR BLD AUTO: 1.2 % (ref 0.3–6.2)
ERYTHROCYTE [DISTWIDTH] IN BLOOD BY AUTOMATED COUNT: 15.9 % (ref 12.3–15.4)
GLUCOSE SERPL-MCNC: 89 MG/DL (ref 65–99)
HCT VFR BLD AUTO: 23.1 % (ref 37.5–51)
HGB BLD-MCNC: 7.6 G/DL (ref 13–17.7)
IMM GRANULOCYTES # BLD AUTO: 0.03 10*3/MM3 (ref 0–0.05)
IMM GRANULOCYTES NFR BLD AUTO: 0.3 % (ref 0–0.5)
LYMPHOCYTES # BLD AUTO: 1.86 10*3/MM3 (ref 0.7–3.1)
LYMPHOCYTES NFR BLD AUTO: 21.7 % (ref 19.6–45.3)
MCH RBC QN AUTO: 29.8 PG (ref 26.6–33)
MCHC RBC AUTO-ENTMCNC: 32.9 G/DL (ref 31.5–35.7)
MCV RBC AUTO: 90.6 FL (ref 79–97)
MONOCYTES # BLD AUTO: 0.64 10*3/MM3 (ref 0.1–0.9)
MONOCYTES NFR BLD AUTO: 7.5 % (ref 5–12)
NEUTROPHILS NFR BLD AUTO: 5.89 10*3/MM3 (ref 1.7–7)
NEUTROPHILS NFR BLD AUTO: 68.5 % (ref 42.7–76)
NRBC BLD AUTO-RTO: 0 /100 WBC (ref 0–0.2)
PLATELET # BLD AUTO: 146 10*3/MM3 (ref 140–450)
PMV BLD AUTO: 11 FL (ref 6–12)
POTASSIUM SERPL-SCNC: 4.4 MMOL/L (ref 3.5–5.2)
RBC # BLD AUTO: 2.55 10*6/MM3 (ref 4.14–5.8)
SODIUM SERPL-SCNC: 140 MMOL/L (ref 136–145)
UNIT  ABO: NORMAL
UNIT  ABO: NORMAL
UNIT  RH: NORMAL
UNIT  RH: NORMAL
WBC NRBC COR # BLD: 8.59 10*3/MM3 (ref 3.4–10.8)

## 2022-04-23 PROCEDURE — 91110 GI TRC IMG INTRAL ESOPH-ILE: CPT

## 2022-04-23 PROCEDURE — 45378 DIAGNOSTIC COLONOSCOPY: CPT | Performed by: INTERNAL MEDICINE

## 2022-04-23 PROCEDURE — 25010000002 PROPOFOL 10 MG/ML EMULSION: Performed by: NURSE ANESTHETIST, CERTIFIED REGISTERED

## 2022-04-23 PROCEDURE — 99233 SBSQ HOSP IP/OBS HIGH 50: CPT | Performed by: FAMILY MEDICINE

## 2022-04-23 PROCEDURE — 25010000002 METOCLOPRAMIDE PER 10 MG: Performed by: INTERNAL MEDICINE

## 2022-04-23 PROCEDURE — 85025 COMPLETE CBC W/AUTO DIFF WBC: CPT | Performed by: HOSPITALIST

## 2022-04-23 PROCEDURE — 43235 EGD DIAGNOSTIC BRUSH WASH: CPT | Performed by: INTERNAL MEDICINE

## 2022-04-23 PROCEDURE — 0DJD8ZZ INSPECTION OF LOWER INTESTINAL TRACT, VIA NATURAL OR ARTIFICIAL OPENING ENDOSCOPIC: ICD-10-PCS | Performed by: INTERNAL MEDICINE

## 2022-04-23 PROCEDURE — C1889 IMPLANT/INSERT DEVICE, NOC: HCPCS

## 2022-04-23 PROCEDURE — 80048 BASIC METABOLIC PNL TOTAL CA: CPT | Performed by: HOSPITALIST

## 2022-04-23 RX ORDER — SODIUM CHLORIDE, SODIUM LACTATE, POTASSIUM CHLORIDE, CALCIUM CHLORIDE 600; 310; 30; 20 MG/100ML; MG/100ML; MG/100ML; MG/100ML
9 INJECTION, SOLUTION INTRAVENOUS CONTINUOUS
Status: DISCONTINUED | OUTPATIENT
Start: 2022-04-23 | End: 2022-04-23

## 2022-04-23 RX ORDER — SODIUM CHLORIDE 0.9 % (FLUSH) 0.9 %
10 SYRINGE (ML) INJECTION EVERY 12 HOURS SCHEDULED
Status: DISCONTINUED | OUTPATIENT
Start: 2022-04-23 | End: 2022-04-23 | Stop reason: HOSPADM

## 2022-04-23 RX ORDER — FAMOTIDINE 10 MG/ML
20 INJECTION, SOLUTION INTRAVENOUS ONCE
Status: COMPLETED | OUTPATIENT
Start: 2022-04-23 | End: 2022-04-23

## 2022-04-23 RX ORDER — PROPOFOL 10 MG/ML
VIAL (ML) INTRAVENOUS AS NEEDED
Status: DISCONTINUED | OUTPATIENT
Start: 2022-04-23 | End: 2022-04-23 | Stop reason: SURG

## 2022-04-23 RX ORDER — ONDANSETRON 2 MG/ML
4 INJECTION INTRAMUSCULAR; INTRAVENOUS ONCE AS NEEDED
Status: DISCONTINUED | OUTPATIENT
Start: 2022-04-23 | End: 2022-04-23 | Stop reason: HOSPADM

## 2022-04-23 RX ORDER — SODIUM CHLORIDE 9 MG/ML
INJECTION, SOLUTION INTRAVENOUS CONTINUOUS PRN
Status: DISCONTINUED | OUTPATIENT
Start: 2022-04-23 | End: 2022-04-23 | Stop reason: SURG

## 2022-04-23 RX ORDER — LIDOCAINE HYDROCHLORIDE 10 MG/ML
INJECTION, SOLUTION EPIDURAL; INFILTRATION; INTRACAUDAL; PERINEURAL AS NEEDED
Status: DISCONTINUED | OUTPATIENT
Start: 2022-04-23 | End: 2022-04-23 | Stop reason: SURG

## 2022-04-23 RX ORDER — SODIUM CHLORIDE 0.9 % (FLUSH) 0.9 %
10 SYRINGE (ML) INJECTION AS NEEDED
Status: DISCONTINUED | OUTPATIENT
Start: 2022-04-23 | End: 2022-04-23 | Stop reason: HOSPADM

## 2022-04-23 RX ORDER — MIDAZOLAM HYDROCHLORIDE 1 MG/ML
0.5 INJECTION INTRAMUSCULAR; INTRAVENOUS
Status: DISCONTINUED | OUTPATIENT
Start: 2022-04-23 | End: 2022-04-23 | Stop reason: HOSPADM

## 2022-04-23 RX ORDER — SIMETHICONE 20 MG/.3ML
200 EMULSION ORAL ONCE
Status: COMPLETED | OUTPATIENT
Start: 2022-04-23 | End: 2022-04-23

## 2022-04-23 RX ORDER — METOCLOPRAMIDE HYDROCHLORIDE 5 MG/ML
5 INJECTION INTRAMUSCULAR; INTRAVENOUS ONCE
Status: COMPLETED | OUTPATIENT
Start: 2022-04-23 | End: 2022-04-23

## 2022-04-23 RX ORDER — FAMOTIDINE 20 MG/1
20 TABLET, FILM COATED ORAL ONCE
Status: DISCONTINUED | OUTPATIENT
Start: 2022-04-23 | End: 2022-04-23 | Stop reason: HOSPADM

## 2022-04-23 RX ADMIN — PROPOFOL 50 MG: 10 INJECTION, EMULSION INTRAVENOUS at 09:25

## 2022-04-23 RX ADMIN — METOCLOPRAMIDE 5 MG: 5 INJECTION, SOLUTION INTRAMUSCULAR; INTRAVENOUS at 11:33

## 2022-04-23 RX ADMIN — PEG-3350, SODIUM SULFATE, SODIUM CHLORIDE, POTASSIUM CHLORIDE, SODIUM ASCORBATE AND ASCORBIC ACID 1000 ML: KIT at 04:30

## 2022-04-23 RX ADMIN — SODIUM CHLORIDE: 9 INJECTION, SOLUTION INTRAVENOUS at 09:20

## 2022-04-23 RX ADMIN — Medication 10 ML: at 20:20

## 2022-04-23 RX ADMIN — PANTOPRAZOLE SODIUM 40 MG: 40 INJECTION, POWDER, FOR SOLUTION INTRAVENOUS at 20:20

## 2022-04-23 RX ADMIN — ATORVASTATIN CALCIUM 40 MG: 40 TABLET, FILM COATED ORAL at 13:41

## 2022-04-23 RX ADMIN — SIMETHICONE 200 MG: 20 SUSPENSION/ DROPS ORAL at 10:58

## 2022-04-23 RX ADMIN — PROPOFOL 100 MCG/KG/MIN: 10 INJECTION, EMULSION INTRAVENOUS at 09:25

## 2022-04-23 RX ADMIN — FAMOTIDINE 20 MG: 10 INJECTION INTRAVENOUS at 08:46

## 2022-04-23 RX ADMIN — TAMSULOSIN HYDROCHLORIDE 0.4 MG: 0.4 CAPSULE ORAL at 13:42

## 2022-04-23 RX ADMIN — LIDOCAINE HYDROCHLORIDE 50 MG: 10 INJECTION, SOLUTION EPIDURAL; INFILTRATION; INTRACAUDAL; PERINEURAL at 09:25

## 2022-04-23 NOTE — ANESTHESIA PREPROCEDURE EVALUATION
Anesthesia Evaluation     Patient summary reviewed and Nursing notes reviewed   no history of anesthetic complications:  NPO Solid Status: > 8 hours  NPO Liquid Status: > 2 hours           Airway   Mallampati: II  TM distance: >3 FB  Neck ROM: full  No difficulty expected  Dental - normal exam     Pulmonary - normal exam   (+) a smoker Former,   Cardiovascular - normal exam    ECG reviewed    (+) pacemaker (SSS) pacemaker interrogated 1-3 months ago, hypertension, valvular problems/murmurs MR, dysrhythmias Paroxysmal Atrial Fib, hyperlipidemia,     ROS comment: H/o PFO  Interpretation Summary    · Left ventricular wall thickness is consistent with concentric hypertrophy.  · Estimated left ventricular EF = 45%. Left ventricular systolic function is mildly decreased.  · Mild mitral valve regurgitation is present.  · Moderate tricuspid valve regurgitation is present. Normal RVSP.  · No significant pericardial effusion is noted. Compared to previous studies the pericardial effusion has almost completely resolved.         Neuro/Psych  (+) TIA,    (-) seizures  GI/Hepatic/Renal/Endo    (+)  GI bleeding , renal disease CRI,     Musculoskeletal     Abdominal    Substance History      OB/GYN          Other   blood dyscrasia anemia,     ROS/Med Hx Other: Transfused 2 units overnight                Anesthesia Plan    ASA 4     general   (Propofol)  intravenous induction     Anesthetic plan, all risks, benefits, and alternatives have been provided, discussed and informed consent has been obtained with: patient.    Plan discussed with CRNA.        CODE STATUS:    Code Status (Patient has no pulse and is not breathing): CPR (Attempt to Resuscitate)  Medical Interventions (Patient has pulse or is breathing): Full Support

## 2022-04-23 NOTE — ANESTHESIA POSTPROCEDURE EVALUATION
Patient: Pio Baum    Procedure Summary     Date: 04/23/22 Room / Location: [facility] ENDOSCOPY 3 /  [facility]    Anesthesia Start: 0920 Anesthesia Stop:     Procedures:       COLONOSCOPY (N/A )      ESOPHAGOGASTRODUODENOSCOPY (N/A ) Diagnosis:       Acute blood loss anemia      Iron deficiency anemia due to chronic blood loss      (Acute blood loss anemia [D62])      (Iron deficiency anemia due to chronic blood loss [D50.0])    Surgeons: Brunner, Mark I, MD Provider: Nidhi Liu MD    Anesthesia Type: general ASA Status: 4          Anesthesia Type: general    Vitals  Vitals Value Taken Time   BP     Temp     Pulse 71 04/23/22 0955   Resp     SpO2     Vitals shown include unvalidated device data.        Post Anesthesia Care and Evaluation    Patient location during evaluation: PACU  Patient participation: complete - patient participated  Level of consciousness: awake and alert  Pain management: adequate  Airway patency: patent  Anesthetic complications: No anesthetic complications  PONV Status: none  Cardiovascular status: hemodynamically stable and acceptable  Respiratory status: nonlabored ventilation, acceptable and room air  Hydration status: acceptable

## 2022-04-24 LAB
ANION GAP SERPL CALCULATED.3IONS-SCNC: 8 MMOL/L (ref 5–15)
BASOPHILS # BLD AUTO: 0.08 10*3/MM3 (ref 0–0.2)
BASOPHILS NFR BLD AUTO: 0.8 % (ref 0–1.5)
BUN SERPL-MCNC: 32 MG/DL (ref 8–23)
BUN/CREAT SERPL: 20.9 (ref 7–25)
CALCIUM SPEC-SCNC: 8.2 MG/DL (ref 8.6–10.5)
CHLORIDE SERPL-SCNC: 112 MMOL/L (ref 98–107)
CO2 SERPL-SCNC: 20 MMOL/L (ref 22–29)
CREAT SERPL-MCNC: 1.53 MG/DL (ref 0.76–1.27)
DEPRECATED RDW RBC AUTO: 46.9 FL (ref 37–54)
EGFRCR SERPLBLD CKD-EPI 2021: 44.6 ML/MIN/1.73
EOSINOPHIL # BLD AUTO: 0.12 10*3/MM3 (ref 0–0.4)
EOSINOPHIL NFR BLD AUTO: 1.3 % (ref 0.3–6.2)
ERYTHROCYTE [DISTWIDTH] IN BLOOD BY AUTOMATED COUNT: 15.7 % (ref 12.3–15.4)
GLUCOSE SERPL-MCNC: 89 MG/DL (ref 65–99)
HCT VFR BLD AUTO: 21.7 % (ref 37.5–51)
HGB BLD-MCNC: 7.3 G/DL (ref 13–17.7)
IMM GRANULOCYTES # BLD AUTO: 0.04 10*3/MM3 (ref 0–0.05)
IMM GRANULOCYTES NFR BLD AUTO: 0.4 % (ref 0–0.5)
LYMPHOCYTES # BLD AUTO: 1.94 10*3/MM3 (ref 0.7–3.1)
LYMPHOCYTES NFR BLD AUTO: 20.6 % (ref 19.6–45.3)
MCH RBC QN AUTO: 28.1 PG (ref 26.6–33)
MCHC RBC AUTO-ENTMCNC: 33.6 G/DL (ref 31.5–35.7)
MCV RBC AUTO: 83.5 FL (ref 79–97)
MONOCYTES # BLD AUTO: 0.73 10*3/MM3 (ref 0.1–0.9)
MONOCYTES NFR BLD AUTO: 7.7 % (ref 5–12)
NEUTROPHILS NFR BLD AUTO: 6.52 10*3/MM3 (ref 1.7–7)
NEUTROPHILS NFR BLD AUTO: 69.2 % (ref 42.7–76)
NRBC BLD AUTO-RTO: 0 /100 WBC (ref 0–0.2)
PLATELET # BLD AUTO: 165 10*3/MM3 (ref 140–450)
PMV BLD AUTO: 10.8 FL (ref 6–12)
POTASSIUM SERPL-SCNC: 3.9 MMOL/L (ref 3.5–5.2)
RBC # BLD AUTO: 2.6 10*6/MM3 (ref 4.14–5.8)
SODIUM SERPL-SCNC: 140 MMOL/L (ref 136–145)
WBC NRBC COR # BLD: 9.43 10*3/MM3 (ref 3.4–10.8)

## 2022-04-24 PROCEDURE — 85025 COMPLETE CBC W/AUTO DIFF WBC: CPT | Performed by: FAMILY MEDICINE

## 2022-04-24 PROCEDURE — 80048 BASIC METABOLIC PNL TOTAL CA: CPT | Performed by: FAMILY MEDICINE

## 2022-04-24 PROCEDURE — 99232 SBSQ HOSP IP/OBS MODERATE 35: CPT | Performed by: HOSPITALIST

## 2022-04-24 RX ORDER — PANTOPRAZOLE SODIUM 40 MG/1
40 TABLET, DELAYED RELEASE ORAL
Status: DISCONTINUED | OUTPATIENT
Start: 2022-04-25 | End: 2022-04-27 | Stop reason: HOSPADM

## 2022-04-24 RX ADMIN — PANTOPRAZOLE SODIUM 40 MG: 40 INJECTION, POWDER, FOR SOLUTION INTRAVENOUS at 08:42

## 2022-04-24 RX ADMIN — Medication 10 ML: at 21:32

## 2022-04-24 RX ADMIN — ATORVASTATIN CALCIUM 40 MG: 40 TABLET, FILM COATED ORAL at 08:42

## 2022-04-24 RX ADMIN — TAMSULOSIN HYDROCHLORIDE 0.4 MG: 0.4 CAPSULE ORAL at 08:42

## 2022-04-24 RX ADMIN — Medication 10 ML: at 08:42

## 2022-04-25 ENCOUNTER — ANESTHESIA EVENT (OUTPATIENT)
Dept: GASTROENTEROLOGY | Facility: HOSPITAL | Age: 82
End: 2022-04-25

## 2022-04-25 ENCOUNTER — APPOINTMENT (OUTPATIENT)
Dept: GENERAL RADIOLOGY | Facility: HOSPITAL | Age: 82
End: 2022-04-25

## 2022-04-25 ENCOUNTER — TELEPHONE (OUTPATIENT)
Dept: CARDIOLOGY | Facility: CLINIC | Age: 82
End: 2022-04-25

## 2022-04-25 ENCOUNTER — ANESTHESIA (OUTPATIENT)
Dept: GASTROENTEROLOGY | Facility: HOSPITAL | Age: 82
End: 2022-04-25

## 2022-04-25 LAB
ALBUMIN SERPL-MCNC: 3.2 G/DL (ref 3.5–5.2)
ALBUMIN/GLOB SERPL: 1.9 G/DL
ALP SERPL-CCNC: 74 U/L (ref 39–117)
ALT SERPL W P-5'-P-CCNC: 9 U/L (ref 1–41)
ANION GAP SERPL CALCULATED.3IONS-SCNC: 8 MMOL/L (ref 5–15)
AST SERPL-CCNC: 11 U/L (ref 1–40)
B PARAPERT DNA SPEC QL NAA+PROBE: NOT DETECTED
B PERT DNA SPEC QL NAA+PROBE: NOT DETECTED
BILIRUB SERPL-MCNC: 0.2 MG/DL (ref 0–1.2)
BILIRUB UR QL STRIP: NEGATIVE
BUN SERPL-MCNC: 31 MG/DL (ref 8–23)
BUN/CREAT SERPL: 20 (ref 7–25)
C PNEUM DNA NPH QL NAA+NON-PROBE: NOT DETECTED
CALCIUM SPEC-SCNC: 8.2 MG/DL (ref 8.6–10.5)
CHLORIDE SERPL-SCNC: 108 MMOL/L (ref 98–107)
CLARITY UR: CLEAR
CO2 SERPL-SCNC: 21 MMOL/L (ref 22–29)
COLOR UR: YELLOW
CREAT SERPL-MCNC: 1.55 MG/DL (ref 0.76–1.27)
DEPRECATED RDW RBC AUTO: 45.9 FL (ref 37–54)
DEPRECATED RDW RBC AUTO: 48.2 FL (ref 37–54)
EGFRCR SERPLBLD CKD-EPI 2021: 43.9 ML/MIN/1.73
ERYTHROCYTE [DISTWIDTH] IN BLOOD BY AUTOMATED COUNT: 14.9 % (ref 12.3–15.4)
ERYTHROCYTE [DISTWIDTH] IN BLOOD BY AUTOMATED COUNT: 15.4 % (ref 12.3–15.4)
FLUAV SUBTYP SPEC NAA+PROBE: NOT DETECTED
FLUBV RNA ISLT QL NAA+PROBE: NOT DETECTED
GLOBULIN UR ELPH-MCNC: 1.7 GM/DL
GLUCOSE SERPL-MCNC: 92 MG/DL (ref 65–99)
GLUCOSE UR STRIP-MCNC: NEGATIVE MG/DL
HADV DNA SPEC NAA+PROBE: NOT DETECTED
HCOV 229E RNA SPEC QL NAA+PROBE: NOT DETECTED
HCOV HKU1 RNA SPEC QL NAA+PROBE: NOT DETECTED
HCOV NL63 RNA SPEC QL NAA+PROBE: NOT DETECTED
HCOV OC43 RNA SPEC QL NAA+PROBE: NOT DETECTED
HCT VFR BLD AUTO: 20.4 % (ref 37.5–51)
HCT VFR BLD AUTO: 22.1 % (ref 37.5–51)
HGB BLD-MCNC: 7 G/DL (ref 13–17.7)
HGB BLD-MCNC: 7.4 G/DL (ref 13–17.7)
HGB UR QL STRIP.AUTO: NEGATIVE
HMPV RNA NPH QL NAA+NON-PROBE: NOT DETECTED
HPIV1 RNA ISLT QL NAA+PROBE: NOT DETECTED
HPIV2 RNA SPEC QL NAA+PROBE: NOT DETECTED
HPIV3 RNA NPH QL NAA+PROBE: NOT DETECTED
HPIV4 P GENE NPH QL NAA+PROBE: NOT DETECTED
KETONES UR QL STRIP: NEGATIVE
LEUKOCYTE ESTERASE UR QL STRIP.AUTO: NEGATIVE
M PNEUMO IGG SER IA-ACNC: NOT DETECTED
MAGNESIUM SERPL-MCNC: 1.9 MG/DL (ref 1.6–2.4)
MCH RBC QN AUTO: 28.6 PG (ref 26.6–33)
MCH RBC QN AUTO: 28.8 PG (ref 26.6–33)
MCHC RBC AUTO-ENTMCNC: 33.5 G/DL (ref 31.5–35.7)
MCHC RBC AUTO-ENTMCNC: 34.3 G/DL (ref 31.5–35.7)
MCV RBC AUTO: 84 FL (ref 79–97)
MCV RBC AUTO: 85.3 FL (ref 79–97)
NITRITE UR QL STRIP: NEGATIVE
PH UR STRIP.AUTO: <=5 [PH] (ref 5–8)
PLATELET # BLD AUTO: 182 10*3/MM3 (ref 140–450)
PLATELET # BLD AUTO: 182 10*3/MM3 (ref 140–450)
PMV BLD AUTO: 11 FL (ref 6–12)
PMV BLD AUTO: 11.1 FL (ref 6–12)
POTASSIUM SERPL-SCNC: 3.9 MMOL/L (ref 3.5–5.2)
PROT SERPL-MCNC: 4.9 G/DL (ref 6–8.5)
PROT UR QL STRIP: NEGATIVE
RBC # BLD AUTO: 2.43 10*6/MM3 (ref 4.14–5.8)
RBC # BLD AUTO: 2.59 10*6/MM3 (ref 4.14–5.8)
RHINOVIRUS RNA SPEC NAA+PROBE: NOT DETECTED
RSV RNA NPH QL NAA+NON-PROBE: NOT DETECTED
SARS-COV-2 RDRP RESP QL NAA+PROBE: NORMAL
SARS-COV-2 RNA NPH QL NAA+NON-PROBE: DETECTED
SODIUM SERPL-SCNC: 137 MMOL/L (ref 136–145)
SP GR UR STRIP: 1.02 (ref 1–1.03)
UROBILINOGEN UR QL STRIP: NORMAL
WBC NRBC COR # BLD: 16.33 10*3/MM3 (ref 3.4–10.8)
WBC NRBC COR # BLD: 7.99 10*3/MM3 (ref 3.4–10.8)

## 2022-04-25 PROCEDURE — 0DJD8ZZ INSPECTION OF LOWER INTESTINAL TRACT, VIA NATURAL OR ARTIFICIAL OPENING ENDOSCOPIC: ICD-10-PCS | Performed by: INTERNAL MEDICINE

## 2022-04-25 PROCEDURE — 25010000002 GLUCAGON (HUMAN RECOMBINANT) 1 MG RECONSTITUTED SOLUTION: Performed by: NURSE ANESTHETIST, CERTIFIED REGISTERED

## 2022-04-25 PROCEDURE — 36430 TRANSFUSION BLD/BLD COMPNT: CPT

## 2022-04-25 PROCEDURE — 83735 ASSAY OF MAGNESIUM: CPT

## 2022-04-25 PROCEDURE — P9041 ALBUMIN (HUMAN),5%, 50ML: HCPCS | Performed by: NURSE ANESTHETIST, CERTIFIED REGISTERED

## 2022-04-25 PROCEDURE — 87040 BLOOD CULTURE FOR BACTERIA: CPT | Performed by: HOSPITALIST

## 2022-04-25 PROCEDURE — 87635 SARS-COV-2 COVID-19 AMP PRB: CPT | Performed by: PHYSICIAN ASSISTANT

## 2022-04-25 PROCEDURE — 99232 SBSQ HOSP IP/OBS MODERATE 35: CPT | Performed by: HOSPITALIST

## 2022-04-25 PROCEDURE — 25010000002 ALBUMIN HUMAN 5% PER 50 ML: Performed by: NURSE ANESTHETIST, CERTIFIED REGISTERED

## 2022-04-25 PROCEDURE — 81003 URINALYSIS AUTO W/O SCOPE: CPT | Performed by: PHYSICIAN ASSISTANT

## 2022-04-25 PROCEDURE — 44360 SMALL BOWEL ENDOSCOPY: CPT | Performed by: INTERNAL MEDICINE

## 2022-04-25 PROCEDURE — 25010000002 NA FERRIC GLUC CPLX PER 12.5 MG

## 2022-04-25 PROCEDURE — P9016 RBC LEUKOCYTES REDUCED: HCPCS

## 2022-04-25 PROCEDURE — 25010000002 PHENYLEPHRINE 10 MG/ML SOLUTION: Performed by: NURSE ANESTHETIST, CERTIFIED REGISTERED

## 2022-04-25 PROCEDURE — 85027 COMPLETE CBC AUTOMATED: CPT | Performed by: HOSPITALIST

## 2022-04-25 PROCEDURE — 25010000002 DIPHENHYDRAMINE PER 50 MG: Performed by: HOSPITALIST

## 2022-04-25 PROCEDURE — 0202U NFCT DS 22 TRGT SARS-COV-2: CPT | Performed by: INTERNAL MEDICINE

## 2022-04-25 PROCEDURE — 86923 COMPATIBILITY TEST ELECTRIC: CPT

## 2022-04-25 PROCEDURE — 71045 X-RAY EXAM CHEST 1 VIEW: CPT

## 2022-04-25 PROCEDURE — 86900 BLOOD TYPING SEROLOGIC ABO: CPT

## 2022-04-25 PROCEDURE — 80053 COMPREHEN METABOLIC PANEL: CPT | Performed by: HOSPITALIST

## 2022-04-25 PROCEDURE — 25010000002 PROPOFOL 10 MG/ML EMULSION: Performed by: NURSE ANESTHETIST, CERTIFIED REGISTERED

## 2022-04-25 RX ORDER — PROPOFOL 10 MG/ML
VIAL (ML) INTRAVENOUS AS NEEDED
Status: DISCONTINUED | OUTPATIENT
Start: 2022-04-25 | End: 2022-04-25 | Stop reason: SURG

## 2022-04-25 RX ORDER — LEVOFLOXACIN 750 MG/1
750 TABLET ORAL EVERY OTHER DAY
Status: DISCONTINUED | OUTPATIENT
Start: 2022-04-27 | End: 2022-04-27 | Stop reason: HOSPADM

## 2022-04-25 RX ORDER — ALBUMIN, HUMAN INJ 5% 5 %
SOLUTION INTRAVENOUS CONTINUOUS PRN
Status: DISCONTINUED | OUTPATIENT
Start: 2022-04-25 | End: 2022-04-25 | Stop reason: SURG

## 2022-04-25 RX ORDER — LEVOFLOXACIN 750 MG/1
375 TABLET ORAL EVERY 24 HOURS
Status: DISCONTINUED | OUTPATIENT
Start: 2022-04-25 | End: 2022-04-25

## 2022-04-25 RX ORDER — METRONIDAZOLE 500 MG/1
500 TABLET ORAL EVERY 8 HOURS SCHEDULED
Status: DISCONTINUED | OUTPATIENT
Start: 2022-04-25 | End: 2022-04-27 | Stop reason: HOSPADM

## 2022-04-25 RX ORDER — PHENYLEPHRINE HYDROCHLORIDE 10 MG/ML
INJECTION INTRAVENOUS AS NEEDED
Status: DISCONTINUED | OUTPATIENT
Start: 2022-04-25 | End: 2022-04-25 | Stop reason: SURG

## 2022-04-25 RX ORDER — SODIUM CHLORIDE 9 MG/ML
50 INJECTION, SOLUTION INTRAVENOUS ONCE
Status: COMPLETED | OUTPATIENT
Start: 2022-04-25 | End: 2022-04-25

## 2022-04-25 RX ORDER — LIDOCAINE HYDROCHLORIDE 10 MG/ML
INJECTION, SOLUTION EPIDURAL; INFILTRATION; INTRACAUDAL; PERINEURAL AS NEEDED
Status: DISCONTINUED | OUTPATIENT
Start: 2022-04-25 | End: 2022-04-25 | Stop reason: SURG

## 2022-04-25 RX ORDER — LEVOFLOXACIN 750 MG/1
375 TABLET ORAL ONCE
Status: COMPLETED | OUTPATIENT
Start: 2022-04-25 | End: 2022-04-25

## 2022-04-25 RX ORDER — SODIUM CHLORIDE 9 MG/ML
INJECTION, SOLUTION INTRAVENOUS CONTINUOUS PRN
Status: DISCONTINUED | OUTPATIENT
Start: 2022-04-25 | End: 2022-04-25 | Stop reason: SURG

## 2022-04-25 RX ORDER — DIPHENHYDRAMINE HYDROCHLORIDE 50 MG/ML
25 INJECTION INTRAMUSCULAR; INTRAVENOUS ONCE
Status: COMPLETED | OUTPATIENT
Start: 2022-04-25 | End: 2022-04-25

## 2022-04-25 RX ADMIN — ATORVASTATIN CALCIUM 40 MG: 40 TABLET, FILM COATED ORAL at 10:59

## 2022-04-25 RX ADMIN — PHENYLEPHRINE HYDROCHLORIDE 200 MCG: 10 INJECTION INTRAVENOUS at 09:07

## 2022-04-25 RX ADMIN — METRONIDAZOLE 500 MG: 500 TABLET ORAL at 15:28

## 2022-04-25 RX ADMIN — ALBUMIN HUMAN: 0.05 INJECTION, SOLUTION INTRAVENOUS at 09:04

## 2022-04-25 RX ADMIN — SODIUM CHLORIDE 125 MG: 9 INJECTION, SOLUTION INTRAVENOUS at 12:14

## 2022-04-25 RX ADMIN — LEVOFLOXACIN 375 MG: 750 TABLET, FILM COATED ORAL at 12:14

## 2022-04-25 RX ADMIN — LEVOFLOXACIN 375 MG: 750 TABLET, FILM COATED ORAL at 10:59

## 2022-04-25 RX ADMIN — LIDOCAINE HYDROCHLORIDE 50 MG: 10 INJECTION, SOLUTION EPIDURAL; INFILTRATION; INTRACAUDAL; PERINEURAL at 08:34

## 2022-04-25 RX ADMIN — DIPHENHYDRAMINE HYDROCHLORIDE 25 MG: 50 INJECTION, SOLUTION INTRAMUSCULAR; INTRAVENOUS at 16:32

## 2022-04-25 RX ADMIN — Medication 10 ML: at 10:59

## 2022-04-25 RX ADMIN — PROPOFOL 100 MG: 10 INJECTION, EMULSION INTRAVENOUS at 08:34

## 2022-04-25 RX ADMIN — PHENYLEPHRINE HYDROCHLORIDE 100 MCG: 10 INJECTION INTRAVENOUS at 09:11

## 2022-04-25 RX ADMIN — GLUCAGON HYDROCHLORIDE 0.5 MG: KIT at 08:42

## 2022-04-25 RX ADMIN — METRONIDAZOLE 500 MG: 500 TABLET ORAL at 22:34

## 2022-04-25 RX ADMIN — SODIUM CHLORIDE 50 ML/HR: 9 INJECTION, SOLUTION INTRAVENOUS at 07:06

## 2022-04-25 RX ADMIN — PHENYLEPHRINE HYDROCHLORIDE 200 MCG: 10 INJECTION INTRAVENOUS at 09:14

## 2022-04-25 RX ADMIN — PROPOFOL 140 MCG/KG/MIN: 10 INJECTION, EMULSION INTRAVENOUS at 08:34

## 2022-04-25 RX ADMIN — SODIUM CHLORIDE: 9 INJECTION, SOLUTION INTRAVENOUS at 08:30

## 2022-04-25 RX ADMIN — TAMSULOSIN HYDROCHLORIDE 0.4 MG: 0.4 CAPSULE ORAL at 10:59

## 2022-04-25 RX ADMIN — ACETAMINOPHEN 650 MG: 325 TABLET ORAL at 16:29

## 2022-04-25 RX ADMIN — Medication 10 ML: at 20:21

## 2022-04-25 RX ADMIN — PHENYLEPHRINE HYDROCHLORIDE 200 MCG: 10 INJECTION INTRAVENOUS at 09:01

## 2022-04-25 RX ADMIN — PANTOPRAZOLE SODIUM 40 MG: 40 TABLET, DELAYED RELEASE ORAL at 10:59

## 2022-04-25 NOTE — TELEPHONE ENCOUNTER
Patient is currently in the hospital for an upper GI bleed. His wife is very concerned and would like you to call her. You can reach her at [phone].

## 2022-04-25 NOTE — ANESTHESIA PREPROCEDURE EVALUATION
Anesthesia Evaluation                  Airway   Mallampati: II  Dental      Pulmonary    Cardiovascular     (+) hypertension, dysrhythmias,       Neuro/Psych  GI/Hepatic/Renal/Endo    (+)  GI bleeding ,     Musculoskeletal     Abdominal    Substance History      OB/GYN          Other                        Anesthesia Plan    ASA 3     general     intravenous induction     Anesthetic plan, all risks, benefits, and alternatives have been provided, discussed and informed consent has been obtained with: patient.    Plan discussed with CRNA.        CODE STATUS:    Code Status (Patient has no pulse and is not breathing): CPR (Attempt to Resuscitate)  Medical Interventions (Patient has pulse or is breathing): Full Support

## 2022-04-25 NOTE — ANESTHESIA POSTPROCEDURE EVALUATION
Patient: Pio Baum    Procedure Summary     Date: 04/25/22 Room / Location:  [facility] /  [facility]    Anesthesia Start: 0830 Anesthesia Stop: 0917    Procedure: ENTEROSCOPY SMALL BOWEL (N/A ) Diagnosis:     Surgeons: Brunner, Mark I, MD Provider: Cesario Arenas MD    Anesthesia Type: general ASA Status: 3          Anesthesia Type: general    Vitals  No vitals data found for the desired time range.          Post Anesthesia Care and Evaluation    Patient location during evaluation: PACU  Patient participation: complete - patient participated  Level of consciousness: awake and alert  Pain management: adequate  Airway patency: patent  Anesthetic complications: No anesthetic complications  PONV Status: none  Cardiovascular status: hemodynamically stable and acceptable  Respiratory status: nonlabored ventilation, acceptable and nasal cannula  Hydration status: acceptable

## 2022-04-26 ENCOUNTER — APPOINTMENT (OUTPATIENT)
Dept: CARDIOLOGY | Facility: HOSPITAL | Age: 82
End: 2022-04-26

## 2022-04-26 LAB
ABO GROUP BLD: NORMAL
ALBUMIN SERPL-MCNC: 3 G/DL (ref 3.5–5.2)
ALBUMIN/GLOB SERPL: 1.7 G/DL
ALP SERPL-CCNC: 64 U/L (ref 39–117)
ALT SERPL W P-5'-P-CCNC: 9 U/L (ref 1–41)
ANION GAP SERPL CALCULATED.3IONS-SCNC: 13 MMOL/L (ref 5–15)
AST SERPL-CCNC: 15 U/L (ref 1–40)
BH BB BLOOD EXPIRATION DATE: NORMAL
BH BB BLOOD TYPE BARCODE: 5100
BH BB DISPENSE STATUS: NORMAL
BH BB PRODUCT CODE: NORMAL
BH BB UNIT NUMBER: NORMAL
BILIRUB SERPL-MCNC: 0.5 MG/DL (ref 0–1.2)
BLD GP AB SCN SERPL QL: NEGATIVE
BUN SERPL-MCNC: 33 MG/DL (ref 8–23)
BUN/CREAT SERPL: 18.3 (ref 7–25)
CA-I SERPL ISE-MCNC: 1.21 MMOL/L (ref 1.12–1.32)
CALCIUM SPEC-SCNC: 7.8 MG/DL (ref 8.6–10.5)
CHLORIDE SERPL-SCNC: 107 MMOL/L (ref 98–107)
CO2 SERPL-SCNC: 19 MMOL/L (ref 22–29)
CREAT SERPL-MCNC: 1.8 MG/DL (ref 0.76–1.27)
CROSSMATCH INTERPRETATION: NORMAL
D-LACTATE SERPL-SCNC: 1.3 MMOL/L (ref 0.5–2)
D-LACTATE SERPL-SCNC: 2.4 MMOL/L (ref 0.5–2)
DEPRECATED RDW RBC AUTO: 51.5 FL (ref 37–54)
EGFRCR SERPLBLD CKD-EPI 2021: 36.7 ML/MIN/1.73
ERYTHROCYTE [DISTWIDTH] IN BLOOD BY AUTOMATED COUNT: 17 % (ref 12.3–15.4)
GLOBULIN UR ELPH-MCNC: 1.8 GM/DL
GLUCOSE SERPL-MCNC: 122 MG/DL (ref 65–99)
HCT VFR BLD AUTO: 21.8 % (ref 37.5–51)
HCT VFR BLD AUTO: 21.8 % (ref 37.5–51)
HCT VFR BLD AUTO: 23.7 % (ref 37.5–51)
HCT VFR BLD AUTO: 25.4 % (ref 37.5–51)
HGB BLD-MCNC: 7.5 G/DL (ref 13–17.7)
HGB BLD-MCNC: 7.5 G/DL (ref 13–17.7)
HGB BLD-MCNC: 7.8 G/DL (ref 13–17.7)
HGB BLD-MCNC: 8.5 G/DL (ref 13–17.7)
MCH RBC QN AUTO: 29 PG (ref 26.6–33)
MCHC RBC AUTO-ENTMCNC: 34.4 G/DL (ref 31.5–35.7)
MCV RBC AUTO: 84.2 FL (ref 79–97)
PLATELET # BLD AUTO: 164 10*3/MM3 (ref 140–450)
PMV BLD AUTO: 10.7 FL (ref 6–12)
POTASSIUM SERPL-SCNC: 3.6 MMOL/L (ref 3.5–5.2)
PROCALCITONIN SERPL-MCNC: 1.16 NG/ML (ref 0–0.25)
PROT SERPL-MCNC: 4.8 G/DL (ref 6–8.5)
RBC # BLD AUTO: 2.59 10*6/MM3 (ref 4.14–5.8)
RH BLD: POSITIVE
SODIUM SERPL-SCNC: 139 MMOL/L (ref 136–145)
T&S EXPIRATION DATE: NORMAL
UNIT  ABO: NORMAL
UNIT  RH: NORMAL
WBC NRBC COR # BLD: 22.41 10*3/MM3 (ref 3.4–10.8)

## 2022-04-26 PROCEDURE — 86900 BLOOD TYPING SEROLOGIC ABO: CPT | Performed by: INTERNAL MEDICINE

## 2022-04-26 PROCEDURE — 86850 RBC ANTIBODY SCREEN: CPT | Performed by: INTERNAL MEDICINE

## 2022-04-26 PROCEDURE — 85018 HEMOGLOBIN: CPT | Performed by: PHYSICIAN ASSISTANT

## 2022-04-26 PROCEDURE — 85014 HEMATOCRIT: CPT | Performed by: PHYSICIAN ASSISTANT

## 2022-04-26 PROCEDURE — 85027 COMPLETE CBC AUTOMATED: CPT | Performed by: PHYSICIAN ASSISTANT

## 2022-04-26 PROCEDURE — 83605 ASSAY OF LACTIC ACID: CPT | Performed by: PHYSICIAN ASSISTANT

## 2022-04-26 PROCEDURE — 36430 TRANSFUSION BLD/BLD COMPNT: CPT

## 2022-04-26 PROCEDURE — 86901 BLOOD TYPING SEROLOGIC RH(D): CPT | Performed by: INTERNAL MEDICINE

## 2022-04-26 PROCEDURE — 82330 ASSAY OF CALCIUM: CPT | Performed by: INTERNAL MEDICINE

## 2022-04-26 PROCEDURE — 80053 COMPREHEN METABOLIC PANEL: CPT | Performed by: PHYSICIAN ASSISTANT

## 2022-04-26 PROCEDURE — 93306 TTE W/DOPPLER COMPLETE: CPT

## 2022-04-26 PROCEDURE — P9016 RBC LEUKOCYTES REDUCED: HCPCS

## 2022-04-26 PROCEDURE — 99232 SBSQ HOSP IP/OBS MODERATE 35: CPT | Performed by: INTERNAL MEDICINE

## 2022-04-26 PROCEDURE — 86900 BLOOD TYPING SEROLOGIC ABO: CPT

## 2022-04-26 PROCEDURE — 25010000002 NA FERRIC GLUC CPLX PER 12.5 MG: Performed by: HOSPITALIST

## 2022-04-26 PROCEDURE — 84145 PROCALCITONIN (PCT): CPT | Performed by: PHYSICIAN ASSISTANT

## 2022-04-26 PROCEDURE — 86923 COMPATIBILITY TEST ELECTRIC: CPT

## 2022-04-26 PROCEDURE — 93306 TTE W/DOPPLER COMPLETE: CPT | Performed by: INTERNAL MEDICINE

## 2022-04-26 RX ORDER — POTASSIUM CHLORIDE 750 MG/1
20 CAPSULE, EXTENDED RELEASE ORAL ONCE
Status: COMPLETED | OUTPATIENT
Start: 2022-04-26 | End: 2022-04-26

## 2022-04-26 RX ADMIN — POTASSIUM CHLORIDE 20 MEQ: 750 CAPSULE, EXTENDED RELEASE ORAL at 14:48

## 2022-04-26 RX ADMIN — SODIUM CHLORIDE 125 MG: 9 INJECTION, SOLUTION INTRAVENOUS at 10:54

## 2022-04-26 RX ADMIN — METRONIDAZOLE 500 MG: 500 TABLET ORAL at 14:48

## 2022-04-26 RX ADMIN — ATORVASTATIN CALCIUM 40 MG: 40 TABLET, FILM COATED ORAL at 08:33

## 2022-04-26 RX ADMIN — METRONIDAZOLE 500 MG: 500 TABLET ORAL at 08:33

## 2022-04-26 RX ADMIN — PANTOPRAZOLE SODIUM 40 MG: 40 TABLET, DELAYED RELEASE ORAL at 06:25

## 2022-04-26 RX ADMIN — Medication 10 ML: at 08:34

## 2022-04-26 RX ADMIN — TAMSULOSIN HYDROCHLORIDE 0.4 MG: 0.4 CAPSULE ORAL at 08:33

## 2022-04-26 RX ADMIN — METRONIDAZOLE 500 MG: 500 TABLET ORAL at 21:04

## 2022-04-26 RX ADMIN — Medication 10 ML: at 21:05

## 2022-04-27 ENCOUNTER — READMISSION MANAGEMENT (OUTPATIENT)
Dept: CALL CENTER | Facility: HOSPITAL | Age: 82
End: 2022-04-27

## 2022-04-27 VITALS
DIASTOLIC BLOOD PRESSURE: 75 MMHG | WEIGHT: 175 LBS | SYSTOLIC BLOOD PRESSURE: 145 MMHG | OXYGEN SATURATION: 99 % | BODY MASS INDEX: 23.7 KG/M2 | TEMPERATURE: 98.1 F | HEIGHT: 72 IN | RESPIRATION RATE: 16 BRPM | HEART RATE: 71 BPM

## 2022-04-27 PROBLEM — K57.12 DIVERTICULITIS OF JEJUNUM: Status: ACTIVE | Noted: 2022-04-27

## 2022-04-27 LAB
ANION GAP SERPL CALCULATED.3IONS-SCNC: 9 MMOL/L (ref 5–15)
ASCENDING AORTA: 3.7 CM
BASOPHILS # BLD AUTO: 0.05 10*3/MM3 (ref 0–0.2)
BASOPHILS NFR BLD AUTO: 0.4 % (ref 0–1.5)
BH BB BLOOD EXPIRATION DATE: NORMAL
BH BB BLOOD TYPE BARCODE: 5100
BH BB DISPENSE STATUS: NORMAL
BH BB PRODUCT CODE: NORMAL
BH BB UNIT NUMBER: NORMAL
BH CV ECHO MEAS - AO MAX PG: 4.9 MMHG
BH CV ECHO MEAS - AO MEAN PG: 2.5 MMHG
BH CV ECHO MEAS - AO ROOT DIAM: 3.5 CM
BH CV ECHO MEAS - AO V2 MAX: 110.9 CM/SEC
BH CV ECHO MEAS - AO V2 VTI: 23.2 CM
BH CV ECHO MEAS - AVA(I,D): 2.36 CM2
BH CV ECHO MEAS - EDV(CUBED): 118.8 ML
BH CV ECHO MEAS - EDV(MOD-SP2): 66 ML
BH CV ECHO MEAS - EDV(MOD-SP4): 118 ML
BH CV ECHO MEAS - EF(MOD-BP): 53 %
BH CV ECHO MEAS - EF(MOD-SP2): 42.4 %
BH CV ECHO MEAS - EF(MOD-SP4): 54.2 %
BH CV ECHO MEAS - ESV(CUBED): 37.5 ML
BH CV ECHO MEAS - ESV(MOD-SP2): 38 ML
BH CV ECHO MEAS - ESV(MOD-SP4): 54 ML
BH CV ECHO MEAS - FS: 31.9 %
BH CV ECHO MEAS - IVS/LVPW: 1.06 CM
BH CV ECHO MEAS - IVSD: 0.97 CM
BH CV ECHO MEAS - LA DIMENSION: 3.6 CM
BH CV ECHO MEAS - LAT PEAK E' VEL: 11.2 CM/SEC
BH CV ECHO MEAS - LV MASS(C)D: 163.5 GRAMS
BH CV ECHO MEAS - LV MAX PG: 3.1 MMHG
BH CV ECHO MEAS - LV MEAN PG: 1.35 MMHG
BH CV ECHO MEAS - LV V1 MAX: 88.1 CM/SEC
BH CV ECHO MEAS - LV V1 VTI: 16.3 CM
BH CV ECHO MEAS - LVIDD: 4.9 CM
BH CV ECHO MEAS - LVIDS: 3.3 CM
BH CV ECHO MEAS - LVOT AREA: 3.4 CM2
BH CV ECHO MEAS - LVOT DIAM: 2.07 CM
BH CV ECHO MEAS - LVPWD: 0.91 CM
BH CV ECHO MEAS - MED PEAK E' VEL: 8.01 CM/SEC
BH CV ECHO MEAS - MV A MAX VEL: 58.3 CM/SEC
BH CV ECHO MEAS - MV DEC SLOPE: 251 CM/SEC2
BH CV ECHO MEAS - MV DEC TIME: 0.32 MSEC
BH CV ECHO MEAS - MV E MAX VEL: 59.6 CM/SEC
BH CV ECHO MEAS - MV E/A: 1.02
BH CV ECHO MEAS - MV MAX PG: 2.34 MMHG
BH CV ECHO MEAS - MV MEAN PG: 1.35 MMHG
BH CV ECHO MEAS - MV V2 VTI: 22.6 CM
BH CV ECHO MEAS - MVA(VTI): 2.42 CM2
BH CV ECHO MEAS - PA ACC SLOPE: 270 CM/SEC2
BH CV ECHO MEAS - PA ACC TIME: 0.19 SEC
BH CV ECHO MEAS - PA PR(ACCEL): -5 MMHG
BH CV ECHO MEAS - PA V2 MAX: 102.1 CM/SEC
BH CV ECHO MEAS - RAP SYSTOLE: 3 MMHG
BH CV ECHO MEAS - RVSP: 21.5 MMHG
BH CV ECHO MEAS - SV(LVOT): 54.7 ML
BH CV ECHO MEAS - SV(MOD-SP2): 28 ML
BH CV ECHO MEAS - SV(MOD-SP4): 64 ML
BH CV ECHO MEAS - TAPSE (>1.6): 2 CM
BH CV ECHO MEAS - TR MAX PG: 18.5 MMHG
BH CV ECHO MEAS - TR MAX VEL: 214.1 CM/SEC
BH CV ECHO MEASUREMENTS AVERAGE E/E' RATIO: 6.21
BH CV VAS BP LEFT ARM: NORMAL MMHG
BH CV XLRA - RV BASE: 4.4 CM
BH CV XLRA - RV LENGTH: 7.6 CM
BH CV XLRA - RV MID: 4 CM
BH CV XLRA - TDI S': 10.4 CM/SEC
BUN SERPL-MCNC: 28 MG/DL (ref 8–23)
BUN/CREAT SERPL: 17.6 (ref 7–25)
CALCIUM SPEC-SCNC: 8.5 MG/DL (ref 8.6–10.5)
CHLORIDE SERPL-SCNC: 110 MMOL/L (ref 98–107)
CO2 SERPL-SCNC: 20 MMOL/L (ref 22–29)
CREAT SERPL-MCNC: 1.59 MG/DL (ref 0.76–1.27)
CROSSMATCH INTERPRETATION: NORMAL
DEPRECATED RDW RBC AUTO: 53.5 FL (ref 37–54)
EGFRCR SERPLBLD CKD-EPI 2021: 42.5 ML/MIN/1.73
EOSINOPHIL # BLD AUTO: 0.3 10*3/MM3 (ref 0–0.4)
EOSINOPHIL NFR BLD AUTO: 2.4 % (ref 0.3–6.2)
ERYTHROCYTE [DISTWIDTH] IN BLOOD BY AUTOMATED COUNT: 17.2 % (ref 12.3–15.4)
GLUCOSE SERPL-MCNC: 96 MG/DL (ref 65–99)
HCT VFR BLD AUTO: 27.7 % (ref 37.5–51)
HGB BLD-MCNC: 9.1 G/DL (ref 13–17.7)
IMM GRANULOCYTES # BLD AUTO: 0.04 10*3/MM3 (ref 0–0.05)
IMM GRANULOCYTES NFR BLD AUTO: 0.3 % (ref 0–0.5)
IVRT: 111 MSEC
LEFT ATRIUM VOLUME INDEX: 41.3 ML/M2
LV EF 2D ECHO EST: 50 %
LYMPHOCYTES # BLD AUTO: 1.69 10*3/MM3 (ref 0.7–3.1)
LYMPHOCYTES NFR BLD AUTO: 13.6 % (ref 19.6–45.3)
MAXIMAL PREDICTED HEART RATE: 136 BPM
MCH RBC QN AUTO: 27.8 PG (ref 26.6–33)
MCHC RBC AUTO-ENTMCNC: 32.9 G/DL (ref 31.5–35.7)
MCV RBC AUTO: 84.7 FL (ref 79–97)
MONOCYTES # BLD AUTO: 1.03 10*3/MM3 (ref 0.1–0.9)
MONOCYTES NFR BLD AUTO: 8.3 % (ref 5–12)
NEUTROPHILS NFR BLD AUTO: 75 % (ref 42.7–76)
NEUTROPHILS NFR BLD AUTO: 9.36 10*3/MM3 (ref 1.7–7)
NRBC BLD AUTO-RTO: 0 /100 WBC (ref 0–0.2)
PLATELET # BLD AUTO: 194 10*3/MM3 (ref 140–450)
PMV BLD AUTO: 10.8 FL (ref 6–12)
POTASSIUM SERPL-SCNC: 3.8 MMOL/L (ref 3.5–5.2)
RBC # BLD AUTO: 3.27 10*6/MM3 (ref 4.14–5.8)
SODIUM SERPL-SCNC: 139 MMOL/L (ref 136–145)
STRESS TARGET HR: 116 BPM
UNIT  ABO: NORMAL
UNIT  RH: NORMAL
WBC NRBC COR # BLD: 12.47 10*3/MM3 (ref 3.4–10.8)

## 2022-04-27 PROCEDURE — 99239 HOSP IP/OBS DSCHRG MGMT >30: CPT | Performed by: INTERNAL MEDICINE

## 2022-04-27 PROCEDURE — 85025 COMPLETE CBC W/AUTO DIFF WBC: CPT | Performed by: INTERNAL MEDICINE

## 2022-04-27 PROCEDURE — 80048 BASIC METABOLIC PNL TOTAL CA: CPT | Performed by: INTERNAL MEDICINE

## 2022-04-27 PROCEDURE — 99231 SBSQ HOSP IP/OBS SF/LOW 25: CPT | Performed by: PHYSICIAN ASSISTANT

## 2022-04-27 RX ORDER — METRONIDAZOLE 500 MG/1
500 TABLET ORAL EVERY 8 HOURS SCHEDULED
Qty: 15 TABLET | Refills: 0 | Status: SHIPPED | OUTPATIENT
Start: 2022-04-27 | End: 2022-05-02

## 2022-04-27 RX ORDER — SACCHAROMYCES BOULARDII 250 MG
250 CAPSULE ORAL 2 TIMES DAILY
Qty: 14 CAPSULE | Refills: 0 | Status: SHIPPED | OUTPATIENT
Start: 2022-04-27 | End: 2022-05-04

## 2022-04-27 RX ORDER — LEVOFLOXACIN 750 MG/1
750 TABLET ORAL EVERY OTHER DAY
Qty: 2 TABLET | Refills: 0 | Status: SHIPPED | OUTPATIENT
Start: 2022-04-29 | End: 2022-05-02

## 2022-04-27 RX ADMIN — PANTOPRAZOLE SODIUM 40 MG: 40 TABLET, DELAYED RELEASE ORAL at 05:18

## 2022-04-27 RX ADMIN — METRONIDAZOLE 500 MG: 500 TABLET ORAL at 05:18

## 2022-04-27 RX ADMIN — LEVOFLOXACIN 750 MG: 750 TABLET, FILM COATED ORAL at 08:37

## 2022-04-27 RX ADMIN — Medication 10 ML: at 08:39

## 2022-04-27 RX ADMIN — TAMSULOSIN HYDROCHLORIDE 0.4 MG: 0.4 CAPSULE ORAL at 08:45

## 2022-04-27 RX ADMIN — ATORVASTATIN CALCIUM 40 MG: 40 TABLET, FILM COATED ORAL at 08:37

## 2022-04-27 NOTE — OUTREACH NOTE
Prep Survey    Flowsheet Row Responses   [facility] patient discharged from? [facility]   Is LACE score < 7 ? No   Emergency Room discharge w/ pulse ox? No   Eligibility [facility]   Date of Admission 04/22/22   Date of Discharge 04/27/22   Discharge Disposition Home or Self Care   Discharge diagnosis Upper GI bleed,    Acute blood loss anemia    Does the patient have one of the following disease processes/diagnoses(primary or secondary)? Other   Does the patient have Home health ordered? No   Is there a DME ordered? No   Prep survey completed? Yes          ERIN SOLIS - Registered Nurse

## 2022-04-28 ENCOUNTER — TRANSITIONAL CARE MANAGEMENT TELEPHONE ENCOUNTER (OUTPATIENT)
Dept: CALL CENTER | Facility: HOSPITAL | Age: 82
End: 2022-04-28

## 2022-04-28 NOTE — OUTREACH NOTE
Call Center TCM Note    Flowsheet Row Responses   [facility] patient discharged from? [place]   Does the patient have one of the following disease processes/diagnoses(primary or secondary)? Other   TCM attempt successful? Yes   Call start time 1152   Call end time 1153   Discharge diagnosis Upper GI bleed,    Acute blood loss anemia    Meds reviewed with patient/caregiver? Yes   Is the patient having any side effects they believe may be caused by any medication additions or changes? No   Does the patient have all medications ordered at discharge? Yes   Is the patient taking all medications as directed (includes completed medication regime)? Yes   Comments regarding appointments Gastroenterologist on 5/11/22   Does the patient have a primary care provider?  Yes   Does the patient have an appointment with their PCP within 7 days of discharge? Yes   Comments regarding PCP [facility] on 5/2/22 with PCP    Has the patient kept scheduled appointments due by today? N/A   Psychosocial issues? No   Did the patient receive a copy of their discharge instructions? Yes   Nursing interventions Reviewed instructions with patient   What is the patient's perception of their health status since discharge? Improving   Is the patient/caregiver able to teach back signs and symptoms related to disease process for when to call PCP? Yes   Is the patient/caregiver able to teach back signs and symptoms related to disease process for when to call 911? Yes   Is the patient/caregiver able to teach back the hierarchy of who to call/visit for symptoms/problems? PCP, Specialist, Home health nurse, Urgent Care, ED, 911 Yes   If the patient is a current smoker, are they able to teach back resources for cessation? Not a smoker   TCM call completed? Yes   Wrap up additional comments Patient had no concerns at this time          Raiza Mccain RN    4/28/2022, 11:55 EDT

## 2022-04-29 ENCOUNTER — TELEPHONE (OUTPATIENT)
Dept: INTERNAL MEDICINE | Facility: CLINIC | Age: 82
End: 2022-04-29

## 2022-04-29 DIAGNOSIS — K92.2 UPPER GASTROINTESTINAL HEMORRHAGE: Primary | ICD-10-CM

## 2022-04-29 NOTE — TELEPHONE ENCOUNTER
Caller: Pio Baum    Relationship: Self    Best call back number: [phone]     What orders are you requesting (i.e. lab or imaging): BLOOD WORK    In what timeframe would the patient need to come in: BY WED    Where will you receive your lab/imaging services: N/A    Additional notes: N/A

## 2022-04-30 LAB
BACTERIA SPEC AEROBE CULT: NORMAL
BACTERIA SPEC AEROBE CULT: NORMAL

## 2022-05-02 ENCOUNTER — LAB (OUTPATIENT)
Dept: LAB | Facility: HOSPITAL | Age: 82
End: 2022-05-02

## 2022-05-02 ENCOUNTER — OFFICE VISIT (OUTPATIENT)
Dept: INTERNAL MEDICINE | Facility: CLINIC | Age: 82
End: 2022-05-02

## 2022-05-02 VITALS
RESPIRATION RATE: 16 BRPM | OXYGEN SATURATION: 99 % | DIASTOLIC BLOOD PRESSURE: 70 MMHG | WEIGHT: 169.8 LBS | HEART RATE: 72 BPM | BODY MASS INDEX: 23 KG/M2 | SYSTOLIC BLOOD PRESSURE: 132 MMHG | HEIGHT: 72 IN

## 2022-05-02 DIAGNOSIS — K92.2 UPPER GASTROINTESTINAL HEMORRHAGE: ICD-10-CM

## 2022-05-02 DIAGNOSIS — K92.2 UPPER GI BLEED: Primary | ICD-10-CM

## 2022-05-02 DIAGNOSIS — K92.2 UPPER GI BLEED: ICD-10-CM

## 2022-05-02 LAB
BASOPHILS # BLD AUTO: 0.1 10*3/MM3 (ref 0–0.2)
BASOPHILS NFR BLD AUTO: 1.3 % (ref 0–1.5)
DEPRECATED RDW RBC AUTO: 51.8 FL (ref 37–54)
EOSINOPHIL # BLD AUTO: 0.09 10*3/MM3 (ref 0–0.4)
EOSINOPHIL NFR BLD AUTO: 1.2 % (ref 0.3–6.2)
ERYTHROCYTE [DISTWIDTH] IN BLOOD BY AUTOMATED COUNT: 15.9 % (ref 12.3–15.4)
HCT VFR BLD AUTO: 32 % (ref 37.5–51)
HGB BLD-MCNC: 10.2 G/DL (ref 13–17.7)
IMM GRANULOCYTES # BLD AUTO: 0.02 10*3/MM3 (ref 0–0.05)
IMM GRANULOCYTES NFR BLD AUTO: 0.3 % (ref 0–0.5)
LYMPHOCYTES # BLD AUTO: 1.83 10*3/MM3 (ref 0.7–3.1)
LYMPHOCYTES NFR BLD AUTO: 23.8 % (ref 19.6–45.3)
MCH RBC QN AUTO: 28.3 PG (ref 26.6–33)
MCHC RBC AUTO-ENTMCNC: 31.9 G/DL (ref 31.5–35.7)
MCV RBC AUTO: 88.6 FL (ref 79–97)
MONOCYTES # BLD AUTO: 0.66 10*3/MM3 (ref 0.1–0.9)
MONOCYTES NFR BLD AUTO: 8.6 % (ref 5–12)
NEUTROPHILS NFR BLD AUTO: 4.99 10*3/MM3 (ref 1.7–7)
NEUTROPHILS NFR BLD AUTO: 64.8 % (ref 42.7–76)
NRBC BLD AUTO-RTO: 0 /100 WBC (ref 0–0.2)
PLATELET # BLD AUTO: 247 10*3/MM3 (ref 140–450)
PMV BLD AUTO: 11.1 FL (ref 6–12)
RBC # BLD AUTO: 3.61 10*6/MM3 (ref 4.14–5.8)
WBC NRBC COR # BLD: 7.69 10*3/MM3 (ref 3.4–10.8)

## 2022-05-02 PROCEDURE — 99495 TRANSJ CARE MGMT MOD F2F 14D: CPT | Performed by: PHYSICIAN ASSISTANT

## 2022-05-02 PROCEDURE — 85025 COMPLETE CBC W/AUTO DIFF WBC: CPT

## 2022-05-02 PROCEDURE — 80053 COMPREHEN METABOLIC PANEL: CPT

## 2022-05-02 PROCEDURE — 83540 ASSAY OF IRON: CPT | Performed by: INTERNAL MEDICINE

## 2022-05-02 PROCEDURE — 1111F DSCHRG MED/CURRENT MED MERGE: CPT | Performed by: PHYSICIAN ASSISTANT

## 2022-05-02 PROCEDURE — 36415 COLL VENOUS BLD VENIPUNCTURE: CPT

## 2022-05-02 NOTE — PROGRESS NOTES
"Transitional Care Follow Up Visit  Subjective     Pio Baum is a 82 y.o. male who presents for a transitional care management visit.    Within 48 business hours after discharge our office contacted him via telephone to coordinate his care and needs.      I reviewed and discussed the details of that call along with the discharge summary, hospital problems, inpatient lab results, inpatient diagnostic studies, and consultation reports with Pio.     Current outpatient and discharge medications have been reconciled for the patient.  Reviewed by: Garry Choi PA-C      Date of TCM Phone Call 4/27/2022   [facility]   Date of Admission 4/22/2022   Date of Discharge 4/27/2022   Discharge Disposition Home or Self Care     Risk for Readmission (LACE) Score: 9 (4/27/2022  6:01 AM)      Patient is an 82-year-old male in today for hospital discharge follow-up for upper GI bleed.  Hospital course was as follows: \"Pio Baum is a 82 y.o. male with with paroxysmal atrial fibrillation/SSS s/p pacemaker on Xarelto who presented with 1 month of progressive weakness. Found to be anemic with suspected GI bleed on admission.  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  He was started on PO levaquin and metronidazole and will complete a course of antibiotics at discharge.  Following the endoscopy, he did develop a fever and leukocytosis.  COVID test was positive but it was discovered that patient had COVID within the last 90 days and he did not have any COVID symptoms.  I suspect that the fever and leukocytosis were related to the diverticulitis and manipulation with endoscopy.  Leukocytosis was improving and patient remained afebrile and asymptomatic on antibiotics.  GI has recommended holding Xarelto for one week and follow up with Dr. Nava to discuss Watchman device (he has " "an appointment scheduled in June).  Patient received a total of 3 units PRBC as well as IV iron with improvement in hemoglobin to >9 at the time of discharge.  Recommend rechecking hemoglobin in 1 week.\"  Patient reports taking all medications given to him upon hospital discharge as directed without any problems or side effects.  Reports overall feeling much better.  Medication reconciliation done in office today.       The following portions of the patient's history were reviewed and updated as appropriate: allergies, current medications, past family history, past medical history, past social history, past surgical history and problem list.    Review of Systems   Constitutional: Negative for activity change, chills, fatigue, fever and unexpected weight change.   HENT: Negative for congestion, ear pain, postnasal drip, sinus pressure and sore throat.    Eyes: Negative for pain, discharge and redness.   Respiratory: Negative for cough, shortness of breath and wheezing.    Cardiovascular: Negative for chest pain, palpitations and leg swelling.   Gastrointestinal: Negative for diarrhea, nausea and vomiting.   Endocrine: Negative for cold intolerance and heat intolerance.   Genitourinary: Negative for decreased urine volume and dysuria.   Musculoskeletal: Negative for arthralgias and myalgias.   Skin: Negative for rash and wound.   Neurological: Negative for dizziness, light-headedness and headaches.   Hematological: Does not bruise/bleed easily.   Psychiatric/Behavioral: Negative for confusion, dysphoric mood and sleep disturbance. The patient is not nervous/anxious.        Objective   Physical Exam  Vitals and nursing note reviewed.   Constitutional:       General: He is not in acute distress.     Appearance: He is not ill-appearing.   HENT:      Head: Normocephalic.      Right Ear: Tympanic membrane, ear canal and external ear normal. There is no impacted cerumen.      Left Ear: Tympanic membrane, ear canal and " external ear normal. There is no impacted cerumen.      Nose: No congestion or rhinorrhea.      Mouth/Throat:      Mouth: Mucous membranes are moist.      Pharynx: Oropharynx is clear. No oropharyngeal exudate or posterior oropharyngeal erythema.   Eyes:      General:         Right eye: No discharge.         Left eye: No discharge.      Extraocular Movements: Extraocular movements intact.      Conjunctiva/sclera: Conjunctivae normal.      Pupils: Pupils are equal, round, and reactive to light.   Cardiovascular:      Rate and Rhythm: Normal rate and regular rhythm.      Heart sounds: Normal heart sounds. No murmur heard.    No friction rub. No gallop.   Pulmonary:      Effort: Pulmonary effort is normal. No respiratory distress.      Breath sounds: Normal breath sounds. No wheezing.   Abdominal:      General: Bowel sounds are normal. There is no distension.      Palpations: Abdomen is soft. There is no mass.      Tenderness: There is no abdominal tenderness.   Musculoskeletal:         General: No swelling. Normal range of motion.      Cervical back: Normal range of motion. No tenderness.      Right lower leg: No edema.      Left lower leg: No edema.   Lymphadenopathy:      Cervical: No cervical adenopathy.   Skin:     Findings: No bruising, erythema or rash.   Neurological:      Mental Status: He is oriented to person, place, and time.      Gait: Gait normal.   Psychiatric:         Mood and Affect: Mood normal.         Behavior: Behavior normal.         Thought Content: Thought content normal.         Judgment: Judgment normal.         Assessment/Plan     1. Upper GI bleed (Primary)- overall much better and feeling much better.  Advised if symptoms/condition does not continue to improve or worsen to go back to ER immediately. Patient verbalized understanding of all instructions given and complied.  Follow-up with GI and cardiology as directed.

## 2022-05-03 LAB
ALBUMIN SERPL-MCNC: 3.9 G/DL (ref 3.5–5.2)
ALBUMIN/GLOB SERPL: 1.8 G/DL
ALP SERPL-CCNC: 89 U/L (ref 39–117)
ALT SERPL W P-5'-P-CCNC: 18 U/L (ref 1–41)
ANION GAP SERPL CALCULATED.3IONS-SCNC: 8.7 MMOL/L (ref 5–15)
AST SERPL-CCNC: 24 U/L (ref 1–40)
BILIRUB SERPL-MCNC: <0.2 MG/DL (ref 0–1.2)
BUN SERPL-MCNC: 24 MG/DL (ref 8–23)
BUN/CREAT SERPL: 14.2 (ref 7–25)
CALCIUM SPEC-SCNC: 8.5 MG/DL (ref 8.6–10.5)
CHLORIDE SERPL-SCNC: 107 MMOL/L (ref 98–107)
CO2 SERPL-SCNC: 23.3 MMOL/L (ref 22–29)
CREAT SERPL-MCNC: 1.69 MG/DL (ref 0.76–1.27)
EGFRCR SERPLBLD CKD-EPI 2021: 39.5 ML/MIN/1.73
GLOBULIN UR ELPH-MCNC: 2.2 GM/DL
GLUCOSE SERPL-MCNC: 98 MG/DL (ref 65–99)
POTASSIUM SERPL-SCNC: 4.4 MMOL/L (ref 3.5–5.2)
PROT SERPL-MCNC: 6.1 G/DL (ref 6–8.5)
SODIUM SERPL-SCNC: 139 MMOL/L (ref 136–145)

## 2022-05-04 RX ORDER — FERROUS SULFATE 325(65) MG
325 TABLET ORAL
Qty: 30 TABLET | Refills: 2 | Status: SHIPPED | OUTPATIENT
Start: 2022-05-04 | End: 2023-02-15

## 2022-05-05 ENCOUNTER — READMISSION MANAGEMENT (OUTPATIENT)
Dept: CALL CENTER | Facility: HOSPITAL | Age: 82
End: 2022-05-05

## 2022-05-05 NOTE — OUTREACH NOTE
Medical Week 2 Survey    Flowsheet Row Responses   [facility] patient discharged from? [facility]   Does the patient have one of the following disease processes/diagnoses(primary or secondary)? Other   Week 2 attempt successful? Yes   Call start time 1558   Discharge diagnosis Upper GI bleed,    Acute blood loss anemia    Call end time 1600   Meds reviewed with patient/caregiver? Yes   Is the patient taking all medications as directed (includes completed medication regime)? Yes   Medication comments New prescription for Iron supplement sent to Mail order, will start tomorrow with ABO for 7 days.   Comments regarding appointments Gastroenterologist on 5/11/22   Does the patient have a primary care provider?  Yes   Does the patient have an appointment with their PCP within 7 days of discharge? Yes   Has the patient kept scheduled appointments due by today? Yes   Has home health visited the patient within 72 hours of discharge? N/A   Psychosocial issues? No   Did the patient receive a copy of their discharge instructions? Yes   Nursing interventions Reviewed instructions with patient   What is the patient's perception of their health status since discharge? Improving   Is the patient/caregiver able to teach back signs and symptoms related to disease process for when to call PCP? Yes   Is the patient/caregiver able to teach back signs and symptoms related to disease process for when to call 911? Yes   Is the patient/caregiver able to teach back the hierarchy of who to call/visit for symptoms/problems? PCP, Specialist, Home health nurse, Urgent Care, ED, 911 Yes   If the patient is a current smoker, are they able to teach back resources for cessation? Not a smoker   Week 2 Call Completed? Yes   Wrap up additional comments Pt. states he is still a little weak, had blood work done at his PCP and he has ordered an iron supplement. Will be starting new antibiotic tomorrow for 7 days.           MAYA SOLIS - Registered  Nurse

## 2022-05-11 ENCOUNTER — OFFICE VISIT (OUTPATIENT)
Dept: GASTROENTEROLOGY | Facility: CLINIC | Age: 82
End: 2022-05-11

## 2022-05-11 VITALS
HEIGHT: 72 IN | HEART RATE: 65 BPM | DIASTOLIC BLOOD PRESSURE: 66 MMHG | WEIGHT: 167.8 LBS | SYSTOLIC BLOOD PRESSURE: 142 MMHG | TEMPERATURE: 98.4 F | BODY MASS INDEX: 22.73 KG/M2

## 2022-05-11 DIAGNOSIS — K57.90 DIVERTICULOSIS: ICD-10-CM

## 2022-05-11 DIAGNOSIS — K92.2 UPPER GI BLEED: Primary | ICD-10-CM

## 2022-05-11 PROCEDURE — 99212 OFFICE O/P EST SF 10 MIN: CPT | Performed by: NURSE PRACTITIONER

## 2022-05-11 NOTE — PROGRESS NOTES
GASTROENTEROLOGY OFFICE NOTE  Pio Baum  [id number]  1940    CARE TEAM  Patient Care Team:  Lupillo Hill MD as PCP - General    Referring Provider: Lupillo Hill MD    Chief Complaint   Patient presents with   • GI Problem        HISTORY OF PRESENT ILLNESS:  Mr. Baum presents in hospital follow-up after recent upper GI bleed presumably secondary to a jejunal diverticulum in the setting of anticoagulation.  He was transfused with 3 units of blood and treated for jejunal diverticulitis.  On discharge his hemoglobin was around 9 and is improving hemoglobin on 5/2 noted to be 10.2.  He shows no signs of GI bleeding.  He denies hematemesis, rectal bleeding or melanotic stool.    He has an appointment with his cardiologist next week to discuss watchman.    PAST MEDICAL HISTORY  Past Medical History:   Diagnosis Date   • A-fib (HCC)    • Chronic kidney disease    • History of migraine headaches    • Hyperlipidemia    • Hypertension    • Mitral valve regurgitation    • Pericardial effusion     s/p PPM placement   • PFO (patent foramen ovale)    • Testicular cyst     removal 1970   • TIA (transient ischemic attack)         PAST SURGICAL HISTORY  Past Surgical History:   Procedure Laterality Date   • BASAL CELL CARCINOMA EXCISION  07/2021   • CARDIAC CATHETERIZATION N/A 8/24/2020    Procedure: PERICARDIOCENTESIS;  Surgeon: Dain Nava MD;  Location:  [facility];  Service: Cardiology;  Laterality: N/A;   • CARDIAC CATHETERIZATION N/A 10/20/2020    Procedure: PERICARDIOCENTESIS;  Surgeon: Dain Nava MD;  Location:  [facility];  Service: Cardiology;  Laterality: N/A;   • CARDIAC ELECTROPHYSIOLOGY PROCEDURE N/A 8/24/2020    Procedure: PACEMAKER IMPLANTATION- DC;  Surgeon: Dain Nava MD;  Location:  [facility];  Service: Cardiology;  Laterality: N/A;   • CATARACT EXTRACTION W/ INTRAOCULAR LENS  IMPLANT, BILATERAL     • COLONOSCOPY  N/A 4/23/2022    Procedure: COLONOSCOPY;  Surgeon: Brunner, Mark I, MD;  Location:  [facility];  Service: Gastroenterology;  Laterality: N/A;   • ENDOSCOPY N/A 4/23/2022    Procedure: ESOPHAGOGASTRODUODENOSCOPY;  Surgeon: Brunner, Mark I, MD;  Location:  [facility];  Service: Gastroenterology;  Laterality: N/A;   • ENTEROSCOPY SMALL BOWEL N/A 4/25/2022    Procedure: ENTEROSCOPY SMALL BOWEL;  Surgeon: Brunner, Mark I, MD;  Location:  [facility];  Service: Gastroenterology;  Laterality: N/A;   • TONSILLECTOMY AND ADENOIDECTOMY  9 yo        MEDICATIONS:    Current Outpatient Medications:   •  ascorbic acid (VITAMIN C) 1000 MG tablet, Take 1,000 mg by mouth Daily., Disp: , Rfl:   •  atorvastatin (LIPITOR) 40 MG tablet, Take 40 mg by mouth Daily., Disp: , Rfl:   •  coenzyme Q10 100 MG capsule, Take 100 mg by mouth Daily., Disp: , Rfl:   •  Ergocalciferol (VITAMIN D2 PO), Take 1 capsule by mouth As Needed., Disp: , Rfl:   •  ferrous sulfate 325 (65 FE) MG tablet, Take 1 tablet by mouth Daily With Breakfast. Take with orange juice or vitamin C, Disp: 30 tablet, Rfl: 2  •  glucosamine-chondroitin 500-400 MG capsule capsule, Take 1 capsule by mouth Daily., Disp: , Rfl:   •  lisinopril (PRINIVIL,ZESTRIL) 5 MG tablet, Take 5 mg by mouth As Needed., Disp: , Rfl:   •  Omega-3 1000 MG capsule, Take 1,000 mg by mouth Daily., Disp: , Rfl:   •  omeprazole (priLOSEC) 40 MG capsule, Take 1 capsule by mouth Every Morning., Disp: 90 capsule, Rfl: 3  •  rivaroxaban (Xarelto) 15 MG tablet, Take 1 tablet by mouth Daily. Do not resume until 5/4/2022, Disp: 90 tablet, Rfl: 3  •  tamsulosin (FLOMAX) 0.4 MG capsule 24 hr capsule, Take 1 capsule by mouth Daily., Disp: , Rfl:   •  Zinc 100 MG tablet, Take 100 mg by mouth Daily., Disp: , Rfl:     ALLERGIES  Allergies   Allergen Reactions   • Penicillins Rash, Anaphylaxis and Unknown (See Comments)     Rash all over body including mouth/throat   Rash all over body including  "mouth/throat    • Flecainide Other (See Comments) and Unknown (See Comments)     Fatigue, weakness unable to tolerate.   Fatigue, weakness unable to tolerate.   Fatigue, weakness unable to tolerate.    • Fluticasone Irritability and Unknown (See Comments)     Gets a nose bleed as soon as used  Other reaction(s): Irritability  Gets a nose bleed as soon as used       FAMILY HISTORY:  Family History   Problem Relation Age of Onset   • Arthritis Other    • Hyperlipidemia Other    • Stroke Other    • Heart attack Mother    • Heart attack Father        SOCIAL HISTORY  Social History     Socioeconomic History   • Marital status:    Tobacco Use   • Smoking status: Never Smoker   • Smokeless tobacco: Former User     Types: Chew     Quit date: 2020   Vaping Use   • Vaping Use: Never used   Substance and Sexual Activity   • Alcohol use: Yes     Comment: 4 drinks per month   • Drug use: No   • Sexual activity: Defer       PHYSICAL EXAM   /66 (BP Location: Left arm, Patient Position: Sitting, Cuff Size: Adult)   Pulse 65   Temp 98.4 °F (36.9 °C) (Temporal)   Ht 182.9 cm (72\")   Wt 76.1 kg (167 lb 12.8 oz)   BMI 22.76 kg/m²   Physical Exam  Constitutional:       Appearance: Normal appearance.   HENT:      Head: Normocephalic and atraumatic.   Pulmonary:      Effort: Pulmonary effort is normal.   Neurological:      Mental Status: He is alert and oriented to person, place, and time.   Psychiatric:         Mood and Affect: Mood normal.         Thought Content: Thought content normal.           Results Review:  9 d ago   (5/2/22) 2 wk ago   (4/27/22) 2 wk ago   (4/26/22) 2 wk ago   (4/26/22) 2 wk ago   (4/26/22) 2 wk ago   (4/26/22) 2 wk ago   (4/25/22)    WBC   3.40 - 10.80 10*3/mm3 7.69  12.47 High     22.41 High    16.33 High     RBC   4.14 - 5.80 10*6/mm3 3.61 Low   3.27 Low     2.59 Low    2.43 Low     Hemoglobin   13.0 - 17.7 g/dL 10.2 Low   9.1 Low   8.5 Low   7.8 Low   7.5 Low   7.5 Low   7.0 Low   "   Hematocrit   37.5 - 51.0 % 32.0 Low   27.7 Low   25.4 Low   23.7 Low   21.8 Low   21.8 Low   20.4 Low Critical     MCV   79.0 - 97.0 fL 88.6  84.7    84.2   84.0    MCH   26.6 - 33.0 pg 28.3  27.8    29.0   28.8    MCHC   31.5 - 35.7 g/dL 31.9  32.9    34.4   34.3    RDW   12.3 - 15.4 % 15.9 High   17.2 High     17.0 High    14.9    RDW-SD   37.0 - 54.0 fl 51.8  53.5    51.5   45.9    MPV   6.0 - 12.0 fL 11.1  10.8    10.7   11.0    Platelets   140 - 450 10*3/mm3 247  194    164   182          ASSESSMENT / PLAN  1.  Upper GI bleed  2.  Diverticulosis  Now resolved  Hemoglobin stable  No indication for further intervention or management at this time      I discussed the patients findings and my recommendations with patient    Ayaan Evans, APRN

## 2022-05-13 ENCOUNTER — PATIENT ROUNDING (BHMG ONLY) (OUTPATIENT)
Dept: GASTROENTEROLOGY | Facility: CLINIC | Age: 82
End: 2022-05-13

## 2022-05-27 RX ORDER — ATORVASTATIN CALCIUM 40 MG/1
TABLET, FILM COATED ORAL
Qty: 90 TABLET | Refills: 1 | Status: SHIPPED | OUTPATIENT
Start: 2022-05-27 | End: 2023-01-03

## 2022-06-01 ENCOUNTER — LAB (OUTPATIENT)
Dept: LAB | Facility: HOSPITAL | Age: 82
End: 2022-06-01

## 2022-06-01 ENCOUNTER — TELEPHONE (OUTPATIENT)
Dept: INTERNAL MEDICINE | Facility: CLINIC | Age: 82
End: 2022-06-01

## 2022-06-01 DIAGNOSIS — D64.9 ANEMIA, UNSPECIFIED TYPE: Primary | ICD-10-CM

## 2022-06-01 DIAGNOSIS — I10 ESSENTIAL HYPERTENSION: ICD-10-CM

## 2022-06-01 NOTE — TELEPHONE ENCOUNTER
Provider: JACINTO     Caller: RADHA VIRK     Relationship to Patient: SPOUSE     Phone Number: [phone]    Reason for Call: PATIENT WAS IN THE HOSPITAL IN April AND WAS DIAGNOSED WITH LOW IRON.  THE PATIENT GOT BLOOD INFUSIONS.  PATIENT REPORTS EXTREME FATIGUE AND THE PATIENT'S SPOUSE IS ASKING IF HE NEEDS TO HAVE HIS IRON CHECKED AGAIN.

## 2022-06-02 LAB
BUN SERPL-MCNC: 25 MG/DL (ref 8–23)
BUN/CREAT SERPL: 15.3 (ref 7–25)
CALCIUM SERPL-MCNC: 8.7 MG/DL (ref 8.6–10.5)
CHLORIDE SERPL-SCNC: 108 MMOL/L (ref 98–107)
CO2 SERPL-SCNC: 19.9 MMOL/L (ref 22–29)
CREAT SERPL-MCNC: 1.63 MG/DL (ref 0.76–1.27)
EGFRCR SERPLBLD CKD-EPI 2021: 41.3 ML/MIN/1.73
ERYTHROCYTE [DISTWIDTH] IN BLOOD BY AUTOMATED COUNT: 15.7 % (ref 12.3–15.4)
GLUCOSE SERPL-MCNC: 99 MG/DL (ref 65–99)
HCT VFR BLD AUTO: 33.5 % (ref 37.5–51)
HGB BLD-MCNC: 10.8 G/DL (ref 13–17.7)
IRON SERPL-MCNC: 25 MCG/DL (ref 59–158)
MCH RBC QN AUTO: 27.5 PG (ref 26.6–33)
MCHC RBC AUTO-ENTMCNC: 32.2 G/DL (ref 31.5–35.7)
MCV RBC AUTO: 85.2 FL (ref 79–97)
PLATELET # BLD AUTO: 144 10*3/MM3 (ref 140–450)
POTASSIUM SERPL-SCNC: 4.2 MMOL/L (ref 3.5–5.2)
RBC # BLD AUTO: 3.93 10*6/MM3 (ref 4.14–5.8)
SODIUM SERPL-SCNC: 139 MMOL/L (ref 136–145)
WBC # BLD AUTO: 6.05 10*3/MM3 (ref 3.4–10.8)

## 2022-06-15 ENCOUNTER — OFFICE VISIT (OUTPATIENT)
Dept: SLEEP MEDICINE | Facility: HOSPITAL | Age: 82
End: 2022-06-15

## 2022-06-15 VITALS
DIASTOLIC BLOOD PRESSURE: 66 MMHG | HEIGHT: 72 IN | BODY MASS INDEX: 22.75 KG/M2 | OXYGEN SATURATION: 97 % | HEART RATE: 72 BPM | SYSTOLIC BLOOD PRESSURE: 147 MMHG | WEIGHT: 168 LBS

## 2022-06-15 DIAGNOSIS — R06.83 SNORING: Primary | ICD-10-CM

## 2022-06-15 DIAGNOSIS — G47.33 OBSTRUCTIVE SLEEP APNEA, ADULT: ICD-10-CM

## 2022-06-15 DIAGNOSIS — I48.0 PAROXYSMAL ATRIAL FIBRILLATION: ICD-10-CM

## 2022-06-15 PROCEDURE — 99203 OFFICE O/P NEW LOW 30 MIN: CPT | Performed by: INTERNAL MEDICINE

## 2022-06-29 ENCOUNTER — OFFICE VISIT (OUTPATIENT)
Dept: CARDIOLOGY | Facility: CLINIC | Age: 82
End: 2022-06-29

## 2022-06-29 VITALS
WEIGHT: 165 LBS | SYSTOLIC BLOOD PRESSURE: 130 MMHG | BODY MASS INDEX: 22.35 KG/M2 | HEART RATE: 70 BPM | OXYGEN SATURATION: 99 % | HEIGHT: 72 IN | DIASTOLIC BLOOD PRESSURE: 68 MMHG

## 2022-06-29 DIAGNOSIS — I10 PRIMARY HYPERTENSION: ICD-10-CM

## 2022-06-29 DIAGNOSIS — I48.0 PAROXYSMAL ATRIAL FIBRILLATION: Primary | ICD-10-CM

## 2022-06-29 DIAGNOSIS — R55 VASOVAGAL SYNCOPE: ICD-10-CM

## 2022-06-29 PROCEDURE — 93280 PM DEVICE PROGR EVAL DUAL: CPT | Performed by: INTERNAL MEDICINE

## 2022-06-29 PROCEDURE — 99214 OFFICE O/P EST MOD 30 MIN: CPT | Performed by: INTERNAL MEDICINE

## 2022-06-29 NOTE — PROGRESS NOTES
Pio Baum  1940  [phone]      06/29/2022      [facility] CARDIOLOGY     Lupillo Hill MD  [address]  [place] [address]    Chief Complaint   Patient presents with   • Atrial Fibrillation   • Fatigue       Problem List:       1. Paroxysmal atrial fibrillation:  a. CHADS-VASc = 5 (HTN, Age > 75, h/o Stroke), on Eliquis 5 mg BID.   b. Implantation of a dual-chamber permanent pacemaker by Dr. Nava, 08/24/2020. s/p ericardiocentesis, 08/24/2020  c. Echocardiogram, 08/25/2020:  EF 65%. There is a small (<1cm) circumferential pericardial effusion.  d. Echocardiogram, 10/20/2020: EF 55%. There are myxomatous changes of the mitral valve apparatus present. There is bileaflet mitral valve prolapse present. Trace MR and Mild TR. Calculated right ventricular systolic pressure from tricuspid regurgitation is 26 mmHg. There is a large (>2cm) pericardial effusion. Borderline echo criteria for tamponade  e. Pericardiocentesis, 10/20/2020.  f. Echocardiogram, 10/23/2020: EF 55%. There is a small, mostly posterior pericardial effusion which appears less pronounced compared to the study of October 21, 2020. There is no evidence of pericardial tamponade. Cardiac chambers are grossly normal in size.  g. Echocardiogram, 10/26/2020: EF 45%. Left ventricular wall thickness is consistent with concentric hypertrophy. Mild MR. Moderate TR. No significant pericardial effusion is noted.  2. TIA/CVA:  a. Carotid duplex, 04/20/2016: No significant disease  b. Echocardiogram, 04/20/2016: Normal LV function, positive bubble study. Closure not considered due to need for chronic anticoagulation therapy.  c. Cardiac event monitor showed atrial fibrillation/flutter with intermittent RVR, aberrancy, IVCD/sinus rhythm with PVCs  3. Syncope:  a. 2 week Zio, 05/16/2019: SR, occasional PAC's and PVC's. Occasional short SVT/PAT, 21 runs at 3-20 beats.  b. Echocardiogram, 05/16/2019: EF 55%.  Borderline right-sided chamber enlargement. Mild MR/TR, normal RVSP.  4. GI bleed   1. 3 u PRBC/2 iron infusions: GI evaluation with no obvious sources: NL EGD, colonscopy + diverticulosis + pill camera  5. Hypertension  6. Dyslipidemia  7. Oral tobacco abuse  8. History of migraine headaches  9. Chronic kidney disease stage II  10. Surgical history:  a. Tonsillectomy/adenoictomy    Allergies  Allergies   Allergen Reactions   • Penicillins Rash, Anaphylaxis and Unknown (See Comments)     Rash all over body including mouth/throat   Rash all over body including mouth/throat    • Flecainide Other (See Comments) and Unknown (See Comments)     Fatigue, weakness unable to tolerate.   Fatigue, weakness unable to tolerate.   Fatigue, weakness unable to tolerate.    • Fluticasone Irritability and Unknown (See Comments)     Gets a nose bleed as soon as used  Other reaction(s): Irritability  Gets a nose bleed as soon as used       Current Medications    Current Outpatient Medications:   •  ascorbic acid (VITAMIN C) 1000 MG tablet, Take 1,000 mg by mouth Daily., Disp: , Rfl:   •  atorvastatin (LIPITOR) 40 MG tablet, TAKE 1 TABLET EVERY DAY, Disp: 90 tablet, Rfl: 1  •  coenzyme Q10 100 MG capsule, Take 100 mg by mouth Daily., Disp: , Rfl:   •  Ergocalciferol (VITAMIN D2 PO), Take 1 capsule by mouth As Needed., Disp: , Rfl:   •  ferrous sulfate 325 (65 FE) MG tablet, Take 1 tablet by mouth Daily With Breakfast. Take with orange juice or vitamin C, Disp: 30 tablet, Rfl: 2  •  glucosamine-chondroitin 500-400 MG capsule capsule, Take 1 capsule by mouth Daily., Disp: , Rfl:   •  KRILL OIL PO, Take 1 tablet by mouth Daily., Disp: , Rfl:   •  lisinopril (PRINIVIL,ZESTRIL) 5 MG tablet, Take 5 mg by mouth As Needed., Disp: , Rfl:   •  Omega-3 1000 MG capsule, Take 1,000 mg by mouth Daily., Disp: , Rfl:   •  omeprazole (priLOSEC) 40 MG capsule, Take 1 capsule by mouth Every Morning., Disp: 90 capsule, Rfl: 3  •  rivaroxaban (Xarelto) 15  "MG tablet, Take 1 tablet by mouth Daily. Do not resume until 5/4/2022, Disp: 90 tablet, Rfl: 3  •  tamsulosin (FLOMAX) 0.4 MG capsule 24 hr capsule, Take 1 capsule by mouth Daily., Disp: , Rfl:   •  Zinc 100 MG tablet, Take 100 mg by mouth Daily., Disp: , Rfl:     History of Present Illness     Pt presents for follow up of PAF/CVA/HTN/VVS. Since the pt has seen us in clinic last, pt admitted for GI bleed in April. Had 3 u prbc and two iron infusions. Had EGD/colonscopy/pill camera. + Covid test in hospital. + diverticulitis. denies any syncope, SOB, CP, LH, and dizziness. Denies any hospitalizations, ER visits, bleeding, or TIA/CVA symptoms. Overall feels ok. Slowly improving.  Blood pressure and heart rates have been stable at home. Back on xarelto    ROS:  General:  + fatigue, No weight gain or loss  Cardiovascular:  Denies CP, PND, syncope, near syncope, edema or palpitations.  Pulmonary:  Denies HINTON, cough, or wheezing    Vitals:    06/29/22 1526   BP: 130/68   BP Location: Left arm   Patient Position: Sitting   Pulse: 70   SpO2: 99%   Weight: 74.8 kg (165 lb)   Height: 182.9 cm (72\")       PE:  General: NAD  Neck: no JVD, no carotid bruits, no TM  Heart: RRR, NL S1, S2, S4 present, no rubs, murmurs  Lungs: CTA, no wheezes, rhonchi, or rales  Abd: soft, non-tender, NL BS  Ext: No musculoskeletal deformities, no edema, cyanosis, or clubbing  Psych: normal mood and affect    Diagnostic Data:  Procedures      No diagnosis found.    PM Interrogation: NL PM fxn, Nl battery fxn, 88% right atrial paced.  1% RV paced.  Less than 1% atrial fibrillation.  Longest episode 19 hours in duration.  7 years left on his battery.    Plan:    1. GI bleed: no obvious source: needs Watchman WENDY occlusion.  Paroxysmal Atrial Fibrillation; less than 1% by interrogation  - CHADS-VASc = 5 (HTN, Age > 75, h/o Stroke), continue Xarelto 15 mg daily      2. Syncope: prob VVS and hypotension: improved off BBL: monitor for now.      3. " HTN  -Well controlled on current medications.     F/up in 6 months

## 2022-06-30 ENCOUNTER — TELEPHONE (OUTPATIENT)
Dept: CARDIOLOGY | Facility: HOSPITAL | Age: 82
End: 2022-06-30

## 2022-07-02 NOTE — PROGRESS NOTES
Chief Complaint  Snoring and possible sleep disordered breathing.    Subjective        Pio Baum presents to [facility] for the evaluation of snoring and possible sleep disordered breathing.  His primary care physician is Dr. Hill.  He is seen in person in the sleep clinic.  History of Present Illness  Patient's had problems with atrial fibrillation in the August 2020.  He is required a pacemaker.  He says he snored some since he got his pacemaker and also had apneas noted.  He admits that he probably always had some snoring.  He denies awakening gasping for breath.  He says he is usually rested in the morning.  He denies any morning headaches.  He denies breaking his nose or having trouble breathing through his nose.    He will fall asleep during the day for an hour or 2.  He has a history of anemia and has been receiving iron infusions.  He says he frequently falls asleep after eating.  He occasionally kicks his legs at night.  He denies having chronic pain that keeps him awake but does have some hip pain.    He goes to bed between 930 and 10 PM.  He will fall asleep in 5 minutes.  He awakens 2-4 times during the night.  He thinks he gets about 9 hours of sleep and usually feels rested.  He denies any history of hypertension.  He denies any diabetes.  He does have a history of atrial fibrillation.  He denies coronary artery disease.    Past medical history:    Allergies: Penicillin Flonase and seasonal environmental allergies    Habits: Smoking: He is chewed tobacco occasionally since 1950.    Alcohol: He has 1 drink couple of times per month    Caffeine: He has 2 cups of coffee and 2 servings of tea per day but says he usually has decaf    Medical illnesses: He has a history of atrial fibrillation.  He has had arthritis.  He has a TIA in 2016 he has a history of renal insufficiency and anemia.    Medications:ascorbic acid (VITAMIN C) 1000 MG tablet    atorvastatin  "(LIPITOR) 40 MG tablet    coenzyme Q10 100 MG capsule    Ergocalciferol (VITAMIN D2 PO)    ferrous sulfate 325 (65 FE) MG tablet    glucosamine-chondroitin 500-400 MG capsule capsule    KRILL OIL PO    lisinopril (PRINIVIL,ZESTRIL) 5 MG tablet    Omega-3 1000 MG capsule    omeprazole (priLOSEC) 40 MG capsule    rivaroxaban (Xarelto) 15 MG tablet    tamsulosin (FLOMAX) 0.4 MG capsule 24 hr capsule    Zinc 100 MG tablet      Surgeries: He has had a pacemaker, wisdom teeth extraction, tonsillectomy    Family history: Positive for heart disease    Review of systems: Positives include anal bleeding, decreased libido, wound, environmental allergies, dizziness.  Other systems are reviewed and reported as negative.    [place] score is 5/24  Objective   Vital Signs:  /66   Pulse 72   Ht 182.9 cm (72\")   Wt 76.2 kg (168 lb)   SpO2 97%   BMI 22.78 kg/m²   Estimated body mass index is 22.78 kg/m² as calculated from the following:    Height as of this encounter: 182.9 cm (72\").    Weight as of this encounter: 76.2 kg (168 lb).          Physical Exam patient appears to be awake and alert.  He does not appear to be in acute respiratory distress.    He is normocephalic.  He has Mallampati class II anatomy.    Lungs are clear.    Cardiac exam revealed normal S1-S2.    Extremities showed no edema.  Result Review :         Assessment and Plan   Diagnoses and all orders for this visit:    1. Snoring (Primary)  -     Home Sleep Study; Future    2. Obstructive sleep apnea, adult  -     Home Sleep Study; Future    3. Paroxysmal atrial fibrillation (HCC)    Patient gives a history of snoring and frequently nonrestorative sleep.  He has a history of atrial fibrillation.  He gives a fairly good story for obstructive sleep apnea.  We will plan to proceed to home sleep testing.  We have discussed potential therapies including CPAP, weight control, oral appliance, and surgery.  We have discussed the long-term consequences of " untreated obstructive sleep apnea.  These include hypertension, diabetes, heart disease, stroke and dementia.  He is encouraged to achieve ideal body weight.  He is encouraged to avoid alcohol and sedatives close to bedtime.  He is encouraged to practice lateral position sleep.  We will plan to see him back after his study.       I spent 35 minutes caring for Pio on this date of service. This time includes time spent by me in the following activities:obtaining and/or reviewing a separately obtained history, performing a medically appropriate examination and/or evaluation , counseling and educating the patient/family/caregiver, ordering medications, tests, or procedures and documenting information in the medical record  Follow Up   Return for Follow up after study, Next scheduled follow-up.  Patient was given instructions and counseling regarding his condition or for health maintenance advice. Please see specific information pulled into the AVS if appropriate.   Jean-Paul Prieto MD [place] [facility]  Sleep Medicine  Pulmonary and Critical Care Medicine

## 2022-07-05 ENCOUNTER — TELEPHONE (OUTPATIENT)
Dept: INTERNAL MEDICINE | Facility: CLINIC | Age: 82
End: 2022-07-05

## 2022-07-05 DIAGNOSIS — K92.2 UPPER GI BLEED: Primary | ICD-10-CM

## 2022-07-05 DIAGNOSIS — I48.0 PAROXYSMAL ATRIAL FIBRILLATION: ICD-10-CM

## 2022-07-05 NOTE — TELEPHONE ENCOUNTER
----- Message from Lupillo Hill MD sent at 7/5/2022  1:02 PM EDT -----  Can you print out what I need to addend so when he comes in I will type out  ----- Message -----  From: Lupillo Hill MD  Sent: 7/5/2022   1:01 PM EDT  To: Lupillo Hill MD, Meena Peterson RN    Thanks for the update  I will make the addendum to his 7/21 visit note forthcoming  ----- Message -----  From: Meena Peterson RN  Sent: 7/5/2022   9:55 AM EDT  To: Lupillo Hill MD    Hope your Tues is going well.  Mr Baum is scheduled for a Watchman implant in Oct.  Shared decision-making is required.  If you agree he is a good candidate would you mind to provide the following documentation after his 7/21 visit:    Pio Baum read the information in the SDM tool, Preventing Stroke in Atrial Fibrillation: A Patient's Guide to Shared Decision Making.  The patient has decided to pursue Watchman device implantation.   They are a suitable candidate for short-term blood thinner therapy, but is unable to take long-term anticoagulation due to a history of GI bleeding.     Do not hesitate to call if you have any questions.    Thank you,   Meena Peterson, RN   Watchman Coordinator   [phone]

## 2022-07-21 ENCOUNTER — OFFICE VISIT (OUTPATIENT)
Dept: INTERNAL MEDICINE | Facility: CLINIC | Age: 82
End: 2022-07-21

## 2022-07-21 ENCOUNTER — LAB (OUTPATIENT)
Dept: LAB | Facility: HOSPITAL | Age: 82
End: 2022-07-21

## 2022-07-21 ENCOUNTER — TELEPHONE (OUTPATIENT)
Dept: INTERNAL MEDICINE | Facility: CLINIC | Age: 82
End: 2022-07-21

## 2022-07-21 VITALS
WEIGHT: 166 LBS | DIASTOLIC BLOOD PRESSURE: 70 MMHG | HEIGHT: 72 IN | BODY MASS INDEX: 22.48 KG/M2 | TEMPERATURE: 96.9 F | HEART RATE: 76 BPM | OXYGEN SATURATION: 98 % | SYSTOLIC BLOOD PRESSURE: 130 MMHG

## 2022-07-21 DIAGNOSIS — K92.2 UPPER GI BLEED: ICD-10-CM

## 2022-07-21 DIAGNOSIS — L72.0 EPIDERMAL CYST: ICD-10-CM

## 2022-07-21 DIAGNOSIS — K57.90 DIVERTICULOSIS: ICD-10-CM

## 2022-07-21 DIAGNOSIS — I10 ESSENTIAL HYPERTENSION: Primary | ICD-10-CM

## 2022-07-21 DIAGNOSIS — I48.0 PAROXYSMAL ATRIAL FIBRILLATION: ICD-10-CM

## 2022-07-21 DIAGNOSIS — N18.30 STAGE 3 CHRONIC KIDNEY DISEASE, UNSPECIFIED WHETHER STAGE 3A OR 3B CKD: ICD-10-CM

## 2022-07-21 DIAGNOSIS — I10 ESSENTIAL HYPERTENSION: ICD-10-CM

## 2022-07-21 DIAGNOSIS — E78.2 MIXED HYPERLIPIDEMIA: ICD-10-CM

## 2022-07-21 LAB
DEPRECATED RDW RBC AUTO: 54.2 FL (ref 37–54)
ERYTHROCYTE [DISTWIDTH] IN BLOOD BY AUTOMATED COUNT: 17 % (ref 12.3–15.4)
HCT VFR BLD AUTO: 38.9 % (ref 37.5–51)
HGB BLD-MCNC: 12.2 G/DL (ref 13–17.7)
MCH RBC QN AUTO: 27.5 PG (ref 26.6–33)
MCHC RBC AUTO-ENTMCNC: 31.4 G/DL (ref 31.5–35.7)
MCV RBC AUTO: 87.6 FL (ref 79–97)
PLATELET # BLD AUTO: 150 10*3/MM3 (ref 140–450)
PMV BLD AUTO: 10.9 FL (ref 6–12)
RBC # BLD AUTO: 4.44 10*6/MM3 (ref 4.14–5.8)
WBC NRBC COR # BLD: 9.22 10*3/MM3 (ref 3.4–10.8)

## 2022-07-21 PROCEDURE — 36415 COLL VENOUS BLD VENIPUNCTURE: CPT

## 2022-07-21 PROCEDURE — 80053 COMPREHEN METABOLIC PANEL: CPT

## 2022-07-21 PROCEDURE — 83540 ASSAY OF IRON: CPT

## 2022-07-21 PROCEDURE — 99214 OFFICE O/P EST MOD 30 MIN: CPT | Performed by: INTERNAL MEDICINE

## 2022-07-21 PROCEDURE — 85027 COMPLETE CBC AUTOMATED: CPT

## 2022-07-21 NOTE — TELEPHONE ENCOUNTER
Caller: Pio Baum    Relationship to patient: Self    Best call back number: [phone]    Patient is needing: TO KNOW THE NAME OF THE PROVIDER DR CASEY RECOMMENDED TO HAVE CYSTS REMOVED    Called and questions answered

## 2022-07-21 NOTE — PROGRESS NOTES
Patient is a 82 y.o. male who is here for a follow up of hyperlipidemia and hypertension.  Chief Complaint   Patient presents with   • Hyperlipidemia   • Hypertension         HPI:    Here for mgmt of HTN and PAF and hyperlipidemia.  Still feels weak and unable to exercise like he used to.  Frequent dizziness with change in position.  BP has been good.  No HAs.  No CP.  No abdominal pains.  No palpitations.     History:     Patient Active Problem List   Diagnosis   • Essential hypertension   • PFO (patent foramen ovale)   • Hyperlipidemia   • Tobacco chew use   • CKD (chronic kidney disease) stage 3, GFR 30-59 ml/min ([facility])   • History of TIA (transient ischemic attack) and stroke   • Leukocytosis   • Scalp laceration   • Diverticulosis   • Pre-syncope   • Syncope and collapse   • Sick sinus syndrome ([facility])   • Pericardial effusion   • Cardiac pacemaker   • Chronic dryness of both eyes   • Nuclear senile cataract   • Perforation of tympanic membrane   • Peripapillary atrophy of both eyes   • S/P right cataract extraction   • 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   • Paroxysmal atrial fibrillation ([facility])   • Upper GI bleed   • Acute blood loss anemia   • Diverticulitis of jejunum       Past Medical History:   Diagnosis Date   • A-fib ([facility])    • Chronic kidney disease    • History of migraine headaches    • Hyperlipidemia    • Hypertension    • Mitral valve regurgitation    • Pericardial effusion     s/p PPM placement   • PFO (patent foramen ovale)    • Testicular cyst     removal 1970   • TIA (transient ischemic attack)        Past Surgical History:   Procedure Laterality Date   • BASAL CELL CARCINOMA EXCISION  07/2021   • CARDIAC CATHETERIZATION N/A 08/24/2020    Procedure: PERICARDIOCENTESIS;  Surgeon: Dain Nava MD;  Location: [facility] INVASIVE LOCATION;  Service: Cardiology;  Laterality: N/A;   • CARDIAC CATHETERIZATION N/A 10/20/2020    Procedure: PERICARDIOCENTESIS;  Surgeon: Elijah  Dain DURANT MD;  Location:  [facility]VAS[facility];  Service: Cardiology;  Laterality: N/A;   • CARDIAC ELECTROPHYSIOLOGY PROCEDURE N/A 08/24/2020    Procedure: PACEMAKER IMPLANTATION- DC;  Surgeon: Dain Nava MD;  Location:  [facility];  Service: Cardiology;  Laterality: N/A;   • CATARACT EXTRACTION W/ INTRAOCULAR LENS  IMPLANT, BILATERAL     • COLONOSCOPY N/A 04/23/2022    Procedure: COLONOSCOPY;  Surgeon: Brunner, Mark I, MD;  Location:  [facility];  Service: Gastroenterology;  Laterality: N/A;   • ENDOSCOPY N/A 04/23/2022    Procedure: ESOPHAGOGASTRODUODENOSCOPY;  Surgeon: Brunner, Mark I, MD;  Location:  [facility];  Service: Gastroenterology;  Laterality: N/A;   • ENTEROSCOPY SMALL BOWEL N/A 04/25/2022    Procedure: ENTEROSCOPY SMALL BOWEL;  Surgeon: Brunner, Mark I, MD;  Location:  [facility];  Service: Gastroenterology;  Laterality: N/A;   • TONSILLECTOMY AND ADENOIDECTOMY  9 yo   • WISDOM TOOTH EXTRACTION         Current Outpatient Medications on File Prior to Visit   Medication Sig   • ascorbic acid (VITAMIN C) 1000 MG tablet Take 1,000 mg by mouth Daily.   • atorvastatin (LIPITOR) 40 MG tablet TAKE 1 TABLET EVERY DAY   • coenzyme Q10 100 MG capsule Take 100 mg by mouth Daily.   • Ergocalciferol (VITAMIN D2 PO) Take 1 capsule by mouth As Needed.   • ferrous sulfate 325 (65 FE) MG tablet Take 1 tablet by mouth Daily With Breakfast. Take with orange juice or vitamin C   • glucosamine-chondroitin 500-400 MG capsule capsule Take 1 capsule by mouth Daily.   • KRILL OIL PO Take 1 tablet by mouth Daily.   • lisinopril (PRINIVIL,ZESTRIL) 5 MG tablet Take 5 mg by mouth As Needed.   • Omega-3 1000 MG capsule Take 1,000 mg by mouth Daily.   • omeprazole (priLOSEC) 40 MG capsule Take 1 capsule by mouth Every Morning.   • rivaroxaban (Xarelto) 15 MG tablet Take 1 tablet by mouth Daily. Do not resume until 5/4/2022   • tamsulosin (FLOMAX) 0.4 MG capsule 24 hr capsule Take 1  capsule by mouth Daily.   • Zinc 100 MG tablet Take 100 mg by mouth Daily.     No current facility-administered medications on file prior to visit.       Family History   Problem Relation Age of Onset   • Heart disease Mother    • Heart attack Mother    • Heart disease Father    • Heart attack Father    • Arthritis Other    • Hyperlipidemia Other    • Stroke Other        Social History     Socioeconomic History   • Marital status:    Tobacco Use   • Smoking status: Never Smoker   • Smokeless tobacco: Current User     Types: Chew     Last attempt to quit: 2020   Vaping Use   • Vaping Use: Never used   Substance and Sexual Activity   • Alcohol use: Yes     Alcohol/week: 4.0 standard drinks     Types: 4 Cans of beer per week     Comment: 4 drinks per month   • Drug use: No   • Sexual activity: Not Currently     Partners: Female     Birth control/protection: None         Review of Systems   Constitutional: Negative for activity change, appetite change, chills, fever and unexpected weight change.   HENT: Negative for congestion, ear pain, hearing loss, rhinorrhea, sinus pressure, sinus pain, sore throat and trouble swallowing.    Eyes: Negative for photophobia, pain, discharge and itching.   Respiratory: Negative for cough, chest tightness, shortness of breath and wheezing.    Cardiovascular: Negative for chest pain, palpitations and leg swelling.   Gastrointestinal: Negative for abdominal distention, abdominal pain, blood in stool, constipation, diarrhea and vomiting.        Colonoscopy by Dr Schmid   Endocrine: Negative for cold intolerance, heat intolerance, polydipsia, polyphagia and polyuria.   Genitourinary: Negative for difficulty urinating, dysuria, enuresis, frequency, genital sores, hematuria and urgency.        Followed by Dr Segal   Musculoskeletal: Positive for arthralgias. Negative for gait problem, joint swelling and myalgias.   Skin: Negative for pallor, rash and wound.   Allergic/Immunologic:  "Negative for immunocompromised state.   Neurological: Positive for dizziness and light-headedness. Negative for tremors, seizures, syncope, speech difficulty, numbness and headaches.   Hematological: Negative for adenopathy.   Psychiatric/Behavioral: Negative for behavioral problems, dysphoric mood, sleep disturbance and suicidal ideas. The patient is not nervous/anxious.        /70 (BP Location: Left arm, Patient Position: Sitting)   Pulse 76   Temp 96.9 °F (36.1 °C) (Infrared)   Ht 182.9 cm (72.01\")   Wt 75.3 kg (166 lb)   SpO2 98%   BMI 22.51 kg/m²       Physical Exam  Constitutional:       Appearance: Normal appearance. He is well-developed.   HENT:      Head: Normocephalic and atraumatic.      Right Ear: External ear normal.      Left Ear: External ear normal.      Nose: Nose normal.      Mouth/Throat:      Mouth: Mucous membranes are moist.      Pharynx: Oropharynx is clear.   Eyes:      Extraocular Movements: Extraocular movements intact.      Conjunctiva/sclera: Conjunctivae normal.      Pupils: Pupils are equal, round, and reactive to light.   Cardiovascular:      Rate and Rhythm: Normal rate and regular rhythm.      Heart sounds: Normal heart sounds.   Pulmonary:      Effort: Pulmonary effort is normal.      Breath sounds: Normal breath sounds.   Abdominal:      General: Bowel sounds are normal.      Palpations: Abdomen is soft.   Genitourinary:     Comments: Mildly enlarged prostate  Musculoskeletal:         General: Normal range of motion.      Cervical back: Normal range of motion and neck supple.   Lymphadenopathy:      Cervical: No cervical adenopathy.   Skin:     General: Skin is warm and dry.   Neurological:      General: No focal deficit present.      Mental Status: He is alert and oriented to person, place, and time.   Psychiatric:         Mood and Affect: Mood normal.         Behavior: Behavior normal.         Thought Content: Thought content normal. "         Procedure:      Discussion/Summary:    htn-stable off meds  paf-rate controlled, on NOAC  Hyperlipidemia- labs on lipitor at goal, counseled on diet  djd-stable on tylenol  Thrombocytopenia/anemia-cbc today soon  TIA-cont rf mod  CKD-recheck today stable , advised fluids and NSAIDS avoidance  Abnormal glucose-labs at goal  Diverticulosis-increase fiber, asymptomatic  Hx of UGI bleed-check iron, would be candidate of Watchman sec to high risk on NOAC for recurrent bleed        prior Labs noted and dw patient    Current Outpatient Medications:   •  ascorbic acid (VITAMIN C) 1000 MG tablet, Take 1,000 mg by mouth Daily., Disp: , Rfl:   •  atorvastatin (LIPITOR) 40 MG tablet, TAKE 1 TABLET EVERY DAY, Disp: 90 tablet, Rfl: 1  •  coenzyme Q10 100 MG capsule, Take 100 mg by mouth Daily., Disp: , Rfl:   •  Ergocalciferol (VITAMIN D2 PO), Take 1 capsule by mouth As Needed., Disp: , Rfl:   •  ferrous sulfate 325 (65 FE) MG tablet, Take 1 tablet by mouth Daily With Breakfast. Take with orange juice or vitamin C, Disp: 30 tablet, Rfl: 2  •  glucosamine-chondroitin 500-400 MG capsule capsule, Take 1 capsule by mouth Daily., Disp: , Rfl:   •  KRILL OIL PO, Take 1 tablet by mouth Daily., Disp: , Rfl:   •  lisinopril (PRINIVIL,ZESTRIL) 5 MG tablet, Take 5 mg by mouth As Needed., Disp: , Rfl:   •  Omega-3 1000 MG capsule, Take 1,000 mg by mouth Daily., Disp: , Rfl:   •  omeprazole (priLOSEC) 40 MG capsule, Take 1 capsule by mouth Every Morning., Disp: 90 capsule, Rfl: 3  •  rivaroxaban (Xarelto) 15 MG tablet, Take 1 tablet by mouth Daily. Do not resume until 5/4/2022, Disp: 90 tablet, Rfl: 3  •  tamsulosin (FLOMAX) 0.4 MG capsule 24 hr capsule, Take 1 capsule by mouth Daily., Disp: , Rfl:   •  Zinc 100 MG tablet, Take 100 mg by mouth Daily., Disp: , Rfl:         Diagnoses and all orders for this visit:    1. Essential hypertension (Primary)  -     Comprehensive Metabolic Panel; Future    2. Mixed hyperlipidemia    3.  Paroxysmal atrial fibrillation (HCC)    4. Diverticulosis    5. Upper GI bleed  -     CBC (No Diff); Future  -     Iron; Future    6. Stage 3 chronic kidney disease, unspecified whether stage 3a or 3b CKD (HCC)

## 2022-07-22 LAB
ALBUMIN SERPL-MCNC: 4.4 G/DL (ref 3.5–5.2)
ALBUMIN/GLOB SERPL: 1.7 G/DL
ALP SERPL-CCNC: 104 U/L (ref 39–117)
ALT SERPL W P-5'-P-CCNC: 12 U/L (ref 1–41)
ANION GAP SERPL CALCULATED.3IONS-SCNC: 7.9 MMOL/L (ref 5–15)
AST SERPL-CCNC: 14 U/L (ref 1–40)
BILIRUB SERPL-MCNC: 0.3 MG/DL (ref 0–1.2)
BUN SERPL-MCNC: 24 MG/DL (ref 8–23)
BUN/CREAT SERPL: 14.6 (ref 7–25)
CALCIUM SPEC-SCNC: 9 MG/DL (ref 8.6–10.5)
CHLORIDE SERPL-SCNC: 106 MMOL/L (ref 98–107)
CO2 SERPL-SCNC: 24.1 MMOL/L (ref 22–29)
CREAT SERPL-MCNC: 1.64 MG/DL (ref 0.76–1.27)
EGFRCR SERPLBLD CKD-EPI 2021: 41 ML/MIN/1.73
GLOBULIN UR ELPH-MCNC: 2.6 GM/DL
GLUCOSE SERPL-MCNC: 88 MG/DL (ref 65–99)
IRON 24H UR-MRATE: 41 MCG/DL (ref 59–158)
POTASSIUM SERPL-SCNC: 4 MMOL/L (ref 3.5–5.2)
PROT SERPL-MCNC: 7 G/DL (ref 6–8.5)
SODIUM SERPL-SCNC: 138 MMOL/L (ref 136–145)

## 2022-09-16 ENCOUNTER — OFFICE VISIT (OUTPATIENT)
Dept: CARDIOLOGY | Facility: CLINIC | Age: 82
End: 2022-09-16

## 2022-09-16 VITALS
BODY MASS INDEX: 22.48 KG/M2 | WEIGHT: 166 LBS | HEART RATE: 80 BPM | HEIGHT: 72 IN | OXYGEN SATURATION: 97 % | SYSTOLIC BLOOD PRESSURE: 126 MMHG | DIASTOLIC BLOOD PRESSURE: 66 MMHG

## 2022-09-16 DIAGNOSIS — I48.0 PAROXYSMAL ATRIAL FIBRILLATION: Primary | ICD-10-CM

## 2022-09-16 DIAGNOSIS — I10 ESSENTIAL HYPERTENSION: ICD-10-CM

## 2022-09-16 DIAGNOSIS — E78.2 MIXED HYPERLIPIDEMIA: ICD-10-CM

## 2022-09-16 PROCEDURE — 99214 OFFICE O/P EST MOD 30 MIN: CPT | Performed by: INTERNAL MEDICINE

## 2022-09-16 NOTE — PROGRESS NOTES
[facility] Cardiology    Encounter Date: 2022    Patient ID: Pio Baum is a 82 y.o. male.  : 1940     PCP: Lupillo Hill MD       Chief Complaint: Paroxysmal atrial fibrillation      PROBLEM LIST:  1. Paroxysmal atrial fibrillation/sick sinus syndrome:  a. CHADS-VASc = 5 (HTN, Age > 75, h/o Stroke), on Eliquis 5 mg BID.   b. MPS, 2017: EF 58%, negative, low risk study  c. Implantation of a dual-chamber permanent pacemaker by Dr. Nava, 2020.  d. Pericardiocentesis, 2020: Successful pericardiocentesis in a patient with pericardial tamponade/effusion post right-sided pacemaker implantation with approximately 270 cc of dark blood removed from the pericardium. Successful external cardioversion of atrial fibrillation with 200 J shock to sinus rhythm. Normal pacemaker function post pericardiocentesis.  e. Echo, 2020: There is a small (<1cm) pericardial effusion. Pericardial fluid was drained by the end of the study.  f. Echo, 2020: EF 50-60%. There is a trivial pericardial effusion.  g. Echo, 2020:  EF 65%. There is a small (<1cm) circumferential pericardial effusion.  h. Echo, 10/20/2020: EF 55%. There are myxomatous changes of the mitral valve apparatus present. There is bileaflet mitral valve prolapse present. Trace MR and Mild TR. Calculated right ventricular systolic pressure from tricuspid regurgitation is 26 mmHg. There is a large (>2cm) pericardial effusion. Borderline echo criteria for tamponade  i. Pericardiocentesis, 10/20/2020: Successful pericardiocentesis with removal of approximately 1300 cc of dark venous blood with small clots present reducing the pericardial effusion from 4.1 cm to approximately 1.1 cm via echocardiographic guidance. Successful intracardiac ultrasound monitoring.  j. Echo, 10/20/2020: There is a moderate (1-2cm) pericardial effusion adjacent to the right ventricle and left  ventricle.  k. Echo, 10/21/2020: EF 55%. There is a small, mostly posterior, mild to moderate (1 cm) pericardial effusion along the left ventricle. There is no evidence of pericardial tamponade.  l. Echo, 10/23/2020: EF 55%. There is a small, mostly posterior pericardial effusion which appears less pronounced compared to the study of October 21, 2020. There is no evidence of pericardial tamponade. Cardiac chambers are grossly normal in size.  m. Echo, 10/26/2020: EF 45%. Left ventricular wall thickness is consistent with concentric hypertrophy. Mild MR. Moderate TR. No significant pericardial effusion is noted. Compared to previous studies the pericardial effusion has almost completely resolved.  n. Echo, 04/26/2022: EF 50%. Mild right-sided chamber enlargement. Mild mitral regurgitation. Trace aortic insufficiency. Mild tricuspid regurgitation with normal RVSP.  2. TIA/CVA:  a. Carotid duplex, 04/20/2016: No significant disease  b. Echo, 04/20/2016: Normal LV function, positive bubble study. Closure not considered due to need for chronic anticoagulation therapy.  c. Cardiac event monitor showed atrial fibrillation/flutter with intermittent RVR, aberrancy, IVCD/sinus rhythm with PVCs  3. Syncope:  a. 2 week Zio, 05/16/2019: SR, occasional PAC's and PVC's. Occasional short SVT/PAT, 21 runs at 3-20 beats.  b. Echo, 05/16/2019: EF 55%. Borderline right-sided chamber enlargement. Mild MR/TR, normal RVSP.  4. Hypertension  5. Dyslipidemia  6. Oral tobacco abuse  7. History of migraine headaches  8. Chronic kidney disease stage II  9. Surgical history:  a. Tonsillectomy/adenoictomy    History of Present Illness  Patient presents today for a follow-up with a history of paroxysmal atrial fibrillation and cardiac risk factors. Since last visit, patient has been doing well overall from a cardiovascular standpoint. He is scheduled for a Watchman device procedure with Dr. Nava. He states that he feels sore around where his  pacemaker which was placed on the right side to avoid trauma to the device since he has a left-sided golf swing however he is right-handed and uses the right hand to shoot his gun which pushes on his pacemaker.   He otherwise feels well, remains active and busy.  Works in the yard and goes for walks.  Has no current complaints of chest pain shortness of breath edema palpitations dizziness or syncope.       Allergies   Allergen Reactions   • Penicillins Rash, Anaphylaxis and Unknown (See Comments)     Rash all over body including mouth/throat   Rash all over body including mouth/throat    • Flecainide Other (See Comments) and Unknown (See Comments)     Fatigue, weakness unable to tolerate.   Fatigue, weakness unable to tolerate.   Fatigue, weakness unable to tolerate.    • Fluticasone Irritability and Unknown (See Comments)     Gets a nose bleed as soon as used  Other reaction(s): Irritability  Gets a nose bleed as soon as used         Current Outpatient Medications:   •  ascorbic acid (VITAMIN C) 1000 MG tablet, Take 1,000 mg by mouth Daily., Disp: , Rfl:   •  atorvastatin (LIPITOR) 40 MG tablet, TAKE 1 TABLET EVERY DAY, Disp: 90 tablet, Rfl: 1  •  coenzyme Q10 100 MG capsule, Take 100 mg by mouth Daily., Disp: , Rfl:   •  Ergocalciferol (VITAMIN D2 PO), Take 1 capsule by mouth As Needed., Disp: , Rfl:   •  ferrous sulfate 325 (65 FE) MG tablet, Take 1 tablet by mouth Daily With Breakfast. Take with orange juice or vitamin C, Disp: 30 tablet, Rfl: 2  •  glucosamine-chondroitin 500-400 MG capsule capsule, Take 1 capsule by mouth Daily., Disp: , Rfl:   •  KRILL OIL PO, Take 1 tablet by mouth Daily., Disp: , Rfl:   •  lisinopril (PRINIVIL,ZESTRIL) 5 MG tablet, Take 5 mg by mouth As Needed., Disp: , Rfl:   •  Omega-3 1000 MG capsule, Take 1,000 mg by mouth Daily., Disp: , Rfl:   •  omeprazole (priLOSEC) 40 MG capsule, Take 1 capsule by mouth Every Morning., Disp: 90 capsule, Rfl: 3  •  rivaroxaban (Xarelto) 15 MG  "tablet, Take 1 tablet by mouth Daily. Do not resume until 5/4/2022, Disp: 90 tablet, Rfl: 3  •  tamsulosin (FLOMAX) 0.4 MG capsule 24 hr capsule, Take 1 capsule by mouth Daily., Disp: , Rfl:   •  Zinc 100 MG tablet, Take 100 mg by mouth Daily., Disp: , Rfl:     The following portions of the patient's history were reviewed and updated as appropriate: allergies, current medications, past family history, past medical history, past social history, past surgical history and problem list.    ROS  Review of Systems   Constitution: Negative for chills, fever, fatigue, generalized weakness.   Cardiovascular: Negative for chest pain, dyspnea on exertion, leg swelling, palpitations, orthopnea, and syncope.   Respiratory: Negative for cough, shortness of breath, and wheezing.  HENT: Negative for ear pain, nosebleeds, and tinnitus.  Gastrointestinal: Negative for abdominal pain, constipation, diarrhea, nausea and vomiting.   Genitourinary: No urinary symptoms.  Musculoskeletal: Negative for muscle cramps.  Neurological: Negative for dizziness, headaches, loss of balance, numbness, and symptoms of stroke.  Psychiatric: Normal mental status.     All other systems reviewed and are negative.        Objective:     /66 (BP Location: Left arm, Patient Position: Sitting)   Pulse 80   Ht 182.9 cm (72\")   Wt 75.3 kg (166 lb)   SpO2 97%   BMI 22.51 kg/m²      Physical Exam  Constitutional: Patient appears well-developed and well-nourished.   HENT: HEENT exam unremarkable.   Neck: Neck supple. No JVD present. No carotid bruits.   Cardiovascular: Normal rate, regular rhythm and normal heart sounds. No murmur heard.   2+ symmetric pulses.   Pulmonary/Chest: Breath sounds normal. Does not exhibit tenderness.  Site of pacemaker is intact no evidence of skin breakdown however he has thin skin over the device.  Abdominal: Abdomen benign.   Musculoskeletal: Does not exhibit edema.   Neurological: Neurological exam unremarkable.   Vitals " reviewed.    Data Review:   Lab Results   Component Value Date    GLUCOSE 88 07/21/2022    BUN 24 (H) 07/21/2022    CREATININE 1.64 (H) 07/21/2022    BCR 14.6 07/21/2022     07/21/2022    K 4.0 07/21/2022    CO2 24.1 07/21/2022    CALCIUM 9.0 07/21/2022    ALBUMIN 4.40 07/21/2022    AST 14 07/21/2022    ALT 12 07/21/2022     Lab Results   Component Value Date    CHLPL 120 04/22/2021    TRIG 96 04/22/2021    HDL 54 04/22/2021    LDL 48 04/22/2021      Lab Results   Component Value Date    WBC 9.22 07/21/2022    RBC 4.44 07/21/2022    HGB 12.2 (L) 07/21/2022    HCT 38.9 07/21/2022    MCV 87.6 07/21/2022     07/21/2022     Lab Results   Component Value Date    TSH 1.570 03/17/2022        Procedures             Assessment:      Diagnosis Plan   1. Paroxysmal atrial fibrillation (HCC), status post PPM.  Stable and asymptomatic. Continue on Xarelto 15 mg daily for stroke prophylaxis.  His rate is controlled without medications.  Device monitored by EP.  Patient scheduled for watchman left atrial appendage occlusion device   2. Essential hypertension  Well controlled. On lisinopril 5 mg PRN which he does not require frequently.    3. Mixed hyperlipidemia  Well controlled. LDL 48 on 04/22/2021. On atorvastatin 40 mg daily.      Plan:   Stable cardiac status.  No current angina or CHF.  Pacemaker site discomfort is reported, patient advised to avoid rubbing or manipulating the site to avoid skin trauma and discuss this further with Dr. Nava at next visit to see if his pacemaker can be reimplanted deeper.  Continue current medications.   FU in 12 MO, sooner as needed.  Thank you for allowing us to participate in the care of your patient.     Scribed for Yahaira Carson MD by Dali Camejo. 9/16/2022 15:07 EDT    I, Yahaira Carson MD, personally performed the services described in this documentation as scribed by the above named individual in my presence, and it is both accurate and complete.  9/16/2022  15:40  EDT      Please note that portions of this note may have been completed with a voice recognition program. Efforts were made to edit the dictations, but occasionally words are mistranscribed.

## 2022-09-22 ENCOUNTER — LAB (OUTPATIENT)
Dept: LAB | Facility: HOSPITAL | Age: 82
End: 2022-09-22

## 2022-09-22 DIAGNOSIS — Z79.899 HIGH RISK MEDICATION USE: ICD-10-CM

## 2022-09-22 DIAGNOSIS — Z79.899 HIGH RISK MEDICATION USE: Primary | ICD-10-CM

## 2022-09-28 LAB — NICKEL SERPL-MCNC: <2.5 UG/L (ref 0.6–7.5)

## 2022-10-08 PROCEDURE — 93294 REM INTERROG EVL PM/LDLS PM: CPT | Performed by: INTERNAL MEDICINE

## 2022-10-08 PROCEDURE — 93296 REM INTERROG EVL PM/IDS: CPT | Performed by: INTERNAL MEDICINE

## 2022-11-04 ENCOUNTER — APPOINTMENT (OUTPATIENT)
Dept: PREADMISSION TESTING | Facility: HOSPITAL | Age: 82
End: 2022-11-04

## 2022-11-21 ENCOUNTER — LAB (OUTPATIENT)
Dept: LAB | Facility: HOSPITAL | Age: 82
End: 2022-11-21

## 2022-11-21 ENCOUNTER — OFFICE VISIT (OUTPATIENT)
Dept: INTERNAL MEDICINE | Facility: CLINIC | Age: 82
End: 2022-11-21

## 2022-11-21 VITALS
BODY MASS INDEX: 22.75 KG/M2 | OXYGEN SATURATION: 98 % | HEIGHT: 72 IN | SYSTOLIC BLOOD PRESSURE: 100 MMHG | DIASTOLIC BLOOD PRESSURE: 70 MMHG | HEART RATE: 71 BPM | TEMPERATURE: 97.8 F | WEIGHT: 168 LBS

## 2022-11-21 DIAGNOSIS — I48.0 PAROXYSMAL ATRIAL FIBRILLATION: ICD-10-CM

## 2022-11-21 DIAGNOSIS — E78.2 MIXED HYPERLIPIDEMIA: ICD-10-CM

## 2022-11-21 DIAGNOSIS — N18.30 STAGE 3 CHRONIC KIDNEY DISEASE, UNSPECIFIED WHETHER STAGE 3A OR 3B CKD: ICD-10-CM

## 2022-11-21 DIAGNOSIS — I10 ESSENTIAL HYPERTENSION: Primary | ICD-10-CM

## 2022-11-21 DIAGNOSIS — D64.9 ANEMIA, UNSPECIFIED TYPE: ICD-10-CM

## 2022-11-21 LAB
DEPRECATED RDW RBC AUTO: 43.9 FL (ref 37–54)
ERYTHROCYTE [DISTWIDTH] IN BLOOD BY AUTOMATED COUNT: 13 % (ref 12.3–15.4)
HCT VFR BLD AUTO: 41.2 % (ref 37.5–51)
HGB BLD-MCNC: 13.9 G/DL (ref 13–17.7)
MCH RBC QN AUTO: 31 PG (ref 26.6–33)
MCHC RBC AUTO-ENTMCNC: 33.7 G/DL (ref 31.5–35.7)
MCV RBC AUTO: 92 FL (ref 79–97)
PLATELET # BLD AUTO: 173 10*3/MM3 (ref 140–450)
PMV BLD AUTO: 12 FL (ref 6–12)
RBC # BLD AUTO: 4.48 10*6/MM3 (ref 4.14–5.8)
WBC NRBC COR # BLD: 10.6 10*3/MM3 (ref 3.4–10.8)

## 2022-11-21 PROCEDURE — 80053 COMPREHEN METABOLIC PANEL: CPT | Performed by: INTERNAL MEDICINE

## 2022-11-21 PROCEDURE — 85027 COMPLETE CBC AUTOMATED: CPT | Performed by: INTERNAL MEDICINE

## 2022-11-21 PROCEDURE — 83540 ASSAY OF IRON: CPT | Performed by: INTERNAL MEDICINE

## 2022-11-21 PROCEDURE — 99214 OFFICE O/P EST MOD 30 MIN: CPT | Performed by: INTERNAL MEDICINE

## 2022-11-21 NOTE — PROGRESS NOTES
Patient is a 82 y.o. male who is here for a follow up of   Chief Complaint   Patient presents with   • Hypertension     4 month follow up          HPI:    Here for mgmt of iron def anemia and CKD.  Feels a lot better.  No dizziness or lightheadedness.  No abdominal pains.  No fever or chills.  No HAs.  No sob or increase in baseline HINTON.      History:     Patient Active Problem List   Diagnosis   • Essential hypertension   • PFO (patent foramen ovale)   • Hyperlipidemia   • Tobacco chew use   • CKD (chronic kidney disease) stage 3, GFR 30-59 ml/min ([facility])   • History of TIA (transient ischemic attack) and stroke   • Leukocytosis   • Scalp laceration   • Diverticulosis   • Pre-syncope   • Syncope and collapse   • Sick sinus syndrome ([facility])   • Pericardial effusion   • Cardiac pacemaker   • Chronic dryness of both eyes   • Nuclear senile cataract   • Perforation of tympanic membrane   • Peripapillary atrophy of both eyes   • S/P right cataract extraction   • 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   • Paroxysmal atrial fibrillation ([facility])   • Upper GI bleed   • Acute blood loss anemia   • Diverticulitis of jejunum       Past Medical History:   Diagnosis Date   • A-fib ([facility])    • Chronic kidney disease    • History of migraine headaches    • Hyperlipidemia    • Hypertension    • Mitral valve regurgitation    • Pericardial effusion     s/p PPM placement   • PFO (patent foramen ovale)    • Testicular cyst     removal 1970   • TIA (transient ischemic attack)        Past Surgical History:   Procedure Laterality Date   • BASAL CELL CARCINOMA EXCISION  07/2021   • CARDIAC CATHETERIZATION N/A 08/24/2020    Procedure: PERICARDIOCENTESIS;  Surgeon: Dain Nava MD;  Location:  [facility] LOCATION;  Service: Cardiology;  Laterality: N/A;   • CARDIAC CATHETERIZATION N/A 10/20/2020    Procedure: PERICARDIOCENTESIS;  Surgeon: Dain Nava MD;  Location: [facility] LOCATION;  Service:  Cardiology;  Laterality: N/A;   • CARDIAC ELECTROPHYSIOLOGY PROCEDURE N/A 08/24/2020    Procedure: PACEMAKER IMPLANTATION- DC;  Surgeon: Dain Nava MD;  Location:  [facility];  Service: Cardiology;  Laterality: N/A;   • CATARACT EXTRACTION W/ INTRAOCULAR LENS  IMPLANT, BILATERAL     • COLONOSCOPY N/A 04/23/2022    Procedure: COLONOSCOPY;  Surgeon: Brunner, Mark I, MD;  Location:  [facility];  Service: Gastroenterology;  Laterality: N/A;   • CYST REMOVAL      right arm   • ENDOSCOPY N/A 04/23/2022    Procedure: ESOPHAGOGASTRODUODENOSCOPY;  Surgeon: Brunner, Mark I, MD;  Location:  [facility];  Service: Gastroenterology;  Laterality: N/A;   • ENTEROSCOPY SMALL BOWEL N/A 04/25/2022    Procedure: ENTEROSCOPY SMALL BOWEL;  Surgeon: Brunner, Mark I, MD;  Location:  [facility];  Service: Gastroenterology;  Laterality: N/A;   • TONSILLECTOMY AND ADENOIDECTOMY  11 yo   • WISDOM TOOTH EXTRACTION         Current Outpatient Medications on File Prior to Visit   Medication Sig   • ascorbic acid (VITAMIN C) 1000 MG tablet Take 1,000 mg by mouth Daily.   • atorvastatin (LIPITOR) 40 MG tablet TAKE 1 TABLET EVERY DAY   • coenzyme Q10 100 MG capsule Take 100 mg by mouth Daily.   • Ergocalciferol (VITAMIN D2 PO) Take 1 capsule by mouth As Needed.   • ferrous sulfate 325 (65 FE) MG tablet Take 1 tablet by mouth Daily With Breakfast. Take with orange juice or vitamin C   • glucosamine-chondroitin 500-400 MG capsule capsule Take 1 capsule by mouth Daily.   • KRILL OIL PO Take 1 tablet by mouth Daily.   • lisinopril (PRINIVIL,ZESTRIL) 5 MG tablet Take 5 mg by mouth As Needed.   • Omega-3 1000 MG capsule Take 1,000 mg by mouth Daily.   • omeprazole (priLOSEC) 40 MG capsule Take 1 capsule by mouth Every Morning.   • rivaroxaban (Xarelto) 15 MG tablet Take 1 tablet by mouth Daily. Do not resume until 5/4/2022   • tamsulosin (FLOMAX) 0.4 MG capsule 24 hr capsule Take 1 capsule by mouth Daily.   • Zinc 100  MG tablet Take 100 mg by mouth Daily.   • co-enzyme Q-10 30 MG capsule Take 30 mg by mouth Daily.     No current facility-administered medications on file prior to visit.       Family History   Problem Relation Age of Onset   • Heart disease Mother    • Heart attack Mother    • Heart disease Father    • Heart attack Father    • Arthritis Other    • Hyperlipidemia Other    • Stroke Other        Social History     Socioeconomic History   • Marital status:    Tobacco Use   • Smoking status: Never   • Smokeless tobacco: Current     Types: Chew     Last attempt to quit: 2020   Vaping Use   • Vaping Use: Never used   Substance and Sexual Activity   • Alcohol use: Yes     Alcohol/week: 4.0 standard drinks     Types: 4 Cans of beer per week     Comment: 4 drinks per month   • Drug use: No   • Sexual activity: Not Currently     Partners: Female     Birth control/protection: None         Review of Systems   Constitutional: Negative for activity change, appetite change, chills, fever and unexpected weight change.   HENT: Negative for congestion, ear pain, hearing loss, rhinorrhea, sinus pressure, sinus pain, sore throat and trouble swallowing.    Eyes: Negative for photophobia, pain, discharge and itching.   Respiratory: Negative for cough, chest tightness, shortness of breath and wheezing.    Cardiovascular: Negative for chest pain, palpitations and leg swelling.   Gastrointestinal: Negative for abdominal distention, abdominal pain, blood in stool, constipation, diarrhea and vomiting.        Colonoscopy by Dr Schmid   Endocrine: Negative for cold intolerance, heat intolerance, polydipsia, polyphagia and polyuria.   Genitourinary: Negative for difficulty urinating, dysuria, enuresis, frequency, genital sores, hematuria and urgency.        Followed by Dr Segal   Musculoskeletal: Positive for arthralgias. Negative for gait problem, joint swelling and myalgias.   Skin: Negative for pallor, rash and wound.  "  Allergic/Immunologic: Negative for immunocompromised state.   Neurological: Positive for dizziness and light-headedness. Negative for tremors, seizures, syncope, speech difficulty, numbness and headaches.   Hematological: Negative for adenopathy.   Psychiatric/Behavioral: Negative for behavioral problems, dysphoric mood, sleep disturbance and suicidal ideas. The patient is not nervous/anxious.        /70   Pulse 71   Temp 97.8 °F (36.6 °C)   Ht 182.9 cm (72\")   Wt 76.2 kg (168 lb)   SpO2 98%   BMI 22.78 kg/m²       Physical Exam  Constitutional:       Appearance: Normal appearance. He is well-developed.   HENT:      Head: Normocephalic and atraumatic.      Right Ear: External ear normal.      Left Ear: External ear normal.      Nose: Nose normal.      Mouth/Throat:      Mouth: Mucous membranes are moist.      Pharynx: Oropharynx is clear.   Eyes:      Extraocular Movements: Extraocular movements intact.      Conjunctiva/sclera: Conjunctivae normal.      Pupils: Pupils are equal, round, and reactive to light.   Cardiovascular:      Rate and Rhythm: Normal rate and regular rhythm.      Heart sounds: Normal heart sounds.   Pulmonary:      Effort: Pulmonary effort is normal.      Breath sounds: Normal breath sounds.   Abdominal:      General: Bowel sounds are normal.      Palpations: Abdomen is soft.   Musculoskeletal:         General: Normal range of motion.      Cervical back: Normal range of motion and neck supple.   Lymphadenopathy:      Cervical: No cervical adenopathy.   Skin:     General: Skin is warm and dry.   Neurological:      General: No focal deficit present.      Mental Status: He is alert and oriented to person, place, and time.   Psychiatric:         Mood and Affect: Mood normal.         Behavior: Behavior normal.         Thought Content: Thought content normal.         Procedure:      Discussion/Summary:    htn-stable off meds  paf-rate controlled, on NOAC  Hyperlipidemia- labs on " lipitor at goal, counseled on diet  djd-stable on tylenol  Thrombocytopenia/anemia-cbc today better  TIA-cont rf mod  CKD-recheck today , advised fluids and NSAIDS avoidance  Abnormal glucose-labs at goal  Diverticulosis-increase fiber, asymptomatic  Hx of UGI bleed-check iron, would be candidate of Watchman sec to high risk on NOAC for recurrent bleed    11/21 labs noted and dw patient       Current Outpatient Medications:   •  ascorbic acid (VITAMIN C) 1000 MG tablet, Take 1,000 mg by mouth Daily., Disp: , Rfl:   •  atorvastatin (LIPITOR) 40 MG tablet, TAKE 1 TABLET EVERY DAY, Disp: 90 tablet, Rfl: 1  •  coenzyme Q10 100 MG capsule, Take 100 mg by mouth Daily., Disp: , Rfl:   •  Ergocalciferol (VITAMIN D2 PO), Take 1 capsule by mouth As Needed., Disp: , Rfl:   •  ferrous sulfate 325 (65 FE) MG tablet, Take 1 tablet by mouth Daily With Breakfast. Take with orange juice or vitamin C, Disp: 30 tablet, Rfl: 2  •  glucosamine-chondroitin 500-400 MG capsule capsule, Take 1 capsule by mouth Daily., Disp: , Rfl:   •  KRILL OIL PO, Take 1 tablet by mouth Daily., Disp: , Rfl:   •  lisinopril (PRINIVIL,ZESTRIL) 5 MG tablet, Take 5 mg by mouth As Needed., Disp: , Rfl:   •  Omega-3 1000 MG capsule, Take 1,000 mg by mouth Daily., Disp: , Rfl:   •  omeprazole (priLOSEC) 40 MG capsule, Take 1 capsule by mouth Every Morning., Disp: 90 capsule, Rfl: 3  •  rivaroxaban (Xarelto) 15 MG tablet, Take 1 tablet by mouth Daily. Do not resume until 5/4/2022, Disp: 90 tablet, Rfl: 3  •  tamsulosin (FLOMAX) 0.4 MG capsule 24 hr capsule, Take 1 capsule by mouth Daily., Disp: , Rfl:   •  Zinc 100 MG tablet, Take 100 mg by mouth Daily., Disp: , Rfl:   •  co-enzyme Q-10 30 MG capsule, Take 30 mg by mouth Daily., Disp: , Rfl:         Diagnoses and all orders for this visit:    1. Essential hypertension (Primary)    2. Mixed hyperlipidemia    3. Paroxysmal atrial fibrillation (HCC)    4. Stage 3 chronic kidney disease, unspecified whether stage 3a  or 3b CKD (HCC)  -     Comprehensive Metabolic Panel    5. Anemia, unspecified type  -     CBC (No Diff)  -     Iron

## 2022-11-22 LAB
ALBUMIN SERPL-MCNC: 4.3 G/DL (ref 3.5–5.2)
ALBUMIN/GLOB SERPL: 1.7 G/DL
ALP SERPL-CCNC: 113 U/L (ref 39–117)
ALT SERPL W P-5'-P-CCNC: 14 U/L (ref 1–41)
ANION GAP SERPL CALCULATED.3IONS-SCNC: 9 MMOL/L (ref 5–15)
AST SERPL-CCNC: 21 U/L (ref 1–40)
BILIRUB SERPL-MCNC: 0.4 MG/DL (ref 0–1.2)
BUN SERPL-MCNC: 24 MG/DL (ref 8–23)
BUN/CREAT SERPL: 15.5 (ref 7–25)
CALCIUM SPEC-SCNC: 9.4 MG/DL (ref 8.6–10.5)
CHLORIDE SERPL-SCNC: 104 MMOL/L (ref 98–107)
CO2 SERPL-SCNC: 26 MMOL/L (ref 22–29)
CREAT SERPL-MCNC: 1.55 MG/DL (ref 0.76–1.27)
EGFRCR SERPLBLD CKD-EPI 2021: 43.9 ML/MIN/1.73
GLOBULIN UR ELPH-MCNC: 2.6 GM/DL
GLUCOSE SERPL-MCNC: 83 MG/DL (ref 65–99)
IRON 24H UR-MRATE: 76 MCG/DL (ref 59–158)
POTASSIUM SERPL-SCNC: 4.4 MMOL/L (ref 3.5–5.2)
PROT SERPL-MCNC: 6.9 G/DL (ref 6–8.5)
SODIUM SERPL-SCNC: 139 MMOL/L (ref 136–145)

## 2023-01-03 RX ORDER — ATORVASTATIN CALCIUM 40 MG/1
TABLET, FILM COATED ORAL
Qty: 90 TABLET | Refills: 1 | Status: SHIPPED | OUTPATIENT
Start: 2023-01-03

## 2023-01-07 PROCEDURE — 93296 REM INTERROG EVL PM/IDS: CPT | Performed by: INTERNAL MEDICINE

## 2023-01-07 PROCEDURE — 93294 REM INTERROG EVL PM/LDLS PM: CPT | Performed by: INTERNAL MEDICINE

## 2023-02-15 ENCOUNTER — OFFICE VISIT (OUTPATIENT)
Dept: CARDIOLOGY | Facility: CLINIC | Age: 83
End: 2023-02-15
Payer: MEDICARE

## 2023-02-15 VITALS
SYSTOLIC BLOOD PRESSURE: 140 MMHG | WEIGHT: 170.2 LBS | OXYGEN SATURATION: 99 % | HEART RATE: 71 BPM | BODY MASS INDEX: 23.05 KG/M2 | HEIGHT: 72 IN | DIASTOLIC BLOOD PRESSURE: 80 MMHG

## 2023-02-15 DIAGNOSIS — I48.0 PAROXYSMAL ATRIAL FIBRILLATION: Primary | ICD-10-CM

## 2023-02-15 DIAGNOSIS — K92.2 UPPER GI BLEED: ICD-10-CM

## 2023-02-15 DIAGNOSIS — I10 ESSENTIAL HYPERTENSION: ICD-10-CM

## 2023-02-15 DIAGNOSIS — I49.5 SSS (SICK SINUS SYNDROME): ICD-10-CM

## 2023-02-15 DIAGNOSIS — R55 SYNCOPE AND COLLAPSE: ICD-10-CM

## 2023-02-15 PROCEDURE — 99213 OFFICE O/P EST LOW 20 MIN: CPT | Performed by: INTERNAL MEDICINE

## 2023-02-15 PROCEDURE — 93280 PM DEVICE PROGR EVAL DUAL: CPT | Performed by: INTERNAL MEDICINE

## 2023-02-15 NOTE — PROGRESS NOTES
Pio Baum  1940  [phone]    02/15/2023    [facility]     Referring Provider: No ref. provider found     Lupillo Hill MD  [address] [facility] [address]  [facility] [address]    Chief Complaint   Patient presents with   • Paroxysmal atrial fibrillation   • Syncope and collapse       Problem List:  1. Paroxysmal atrial fibrillation:  a. CHADS-VASc = 5 (HTN, Age > 75, h/o Stroke), on Eliquis 5 mg BID.   b. Implantation of a dual-chamber permanent pacemaker by Dr. Nava, 08/24/2020. s/p ericardiocentesis, 08/24/2020  c. Echocardiogram, 08/25/2020:  EF 65%. There is a small (<1cm) circumferential pericardial effusion.  d. Echocardiogram, 10/20/2020: EF 55%. There are myxomatous changes of the mitral valve apparatus present. There is bileaflet mitral valve prolapse present. Trace MR and Mild TR. Calculated right ventricular systolic pressure from tricuspid regurgitation is 26 mmHg. There is a large (>2cm) pericardial effusion. Borderline echo criteria for tamponade  e. Pericardiocentesis, 10/20/2020.  f. Echocardiogram, 10/23/2020: EF 55%. There is a small, mostly posterior pericardial effusion which appears less pronounced compared to the study of October 21, 2020. There is no evidence of pericardial tamponade. Cardiac chambers are grossly normal in size.  g. Echocardiogram, 10/26/2020: EF 45%. Left ventricular wall thickness is consistent with concentric hypertrophy. Mild MR. Moderate TR. No significant pericardial effusion is noted.  2. TIA/CVA:  a. Carotid duplex, 04/20/2016: No significant disease  b. Echocardiogram, 04/20/2016: Normal LV function, positive bubble study. Closure not considered due to need for chronic anticoagulation therapy.  c. Cardiac event monitor showed atrial fibrillation/flutter with intermittent RVR, aberrancy, IVCD/sinus rhythm with PVCs  3. Syncope:  a. 2 week Zio, 05/16/2019: SR, occasional PAC's and PVC's. Occasional short  SVT/PAT, 21 runs at 3-20 beats.  b. Echocardiogram, 05/16/2019: EF 55%. Borderline right-sided chamber enlargement. Mild MR/TR, normal RVSP.  4. GI bleed   1. 3 u PRBC/2 iron infusions: GI evaluation with no obvious sources: NL EGD, colonscopy + diverticulosis + pill camera  5. Hypertension  6. Dyslipidemia  7. Oral tobacco abuse  8. History of migraine headaches  9. Chronic kidney disease stage II  10. Surgical history:  a. Tonsillectomy/adenoictomy    Allergies  Allergies   Allergen Reactions   • Penicillins Anaphylaxis, Rash and Unknown (See Comments)     Rash all over body including mouth/throat    • Flecainide Other (See Comments) and Unknown (See Comments)     Fatigue, weakness unable to tolerate.    • Fluticasone Irritability and Unknown (See Comments)     Gets a nose bleed as soon as used  Other reaction(s): Irritability       Current Medications    Current Outpatient Medications:   •  ascorbic acid (VITAMIN C) 1000 MG tablet, Take 1,000 mg by mouth Daily., Disp: , Rfl:   •  atorvastatin (LIPITOR) 40 MG tablet, TAKE 1 TABLET EVERY DAY, Disp: 90 tablet, Rfl: 1  •  coenzyme Q10 100 MG capsule, Take 100 mg by mouth Daily., Disp: , Rfl:   •  glucosamine-chondroitin 500-400 MG capsule capsule, Take 1 capsule by mouth Daily., Disp: , Rfl:   •  KRILL OIL PO, Take 1 tablet by mouth Daily., Disp: , Rfl:   •  lisinopril (PRINIVIL,ZESTRIL) 5 MG tablet, Take 5 mg by mouth As Needed., Disp: , Rfl:   •  Omega-3 1000 MG capsule, Take 1,000 mg by mouth Daily., Disp: , Rfl:   •  rivaroxaban (Xarelto) 15 MG tablet, Take 1 tablet by mouth Daily. Do not resume until 5/4/2022, Disp: 90 tablet, Rfl: 3  •  tamsulosin (FLOMAX) 0.4 MG capsule 24 hr capsule, Take 1 capsule by mouth Daily., Disp: , Rfl:   •  Zinc 100 MG tablet, Take 100 mg by mouth Daily., Disp: , Rfl:     History of Present Illness     Pt presents for follow up of PAF/CVA/HTN/VVS. Since we last saw the pt, pt denies any AF episodes, SOB, CP, LH, and dizziness. Denies  "any hospitalizations, ER visits, bleeding, or TIA/CVA symptoms. Overall feels well. BP/HR stable.    ROS:  General:  Denies fatigue, weight gain or loss  Cardiovascular:  Denies CP, PND, syncope, near syncope, edema or palpitations.  Pulmonary:  Denies HINTON, cough, or wheezing      Vitals:    02/15/23 1501   BP: 140/80   BP Location: Right arm   Patient Position: Sitting   Cuff Size: Adult   Pulse: 71   SpO2: 99%   Weight: 77.2 kg (170 lb 3.2 oz)   Height: 182.9 cm (72\")     Body mass index is 23.08 kg/m².  PE:  General: NAD  Neck: no JVD, no carotid bruits, no TM  Heart RRR, NL S1, S2, S4 present, no rubs, murmurs  Lungs: CTA, no wheezes, rhonchi, or rales  Abd: soft, non-tender, NL BS  Ext: No musculoskeletal deformities, no edema, cyanosis, or clubbing  Psych: normal mood and affect    Diagnostic Data:    PM Interrogation: NL PM fxn, Nl battery fxn, 88% right atrial paced. 2% RV paced.  Less than 1% atrial fibrillation.   7 years left on his battery.    Procedures    1. Paroxysmal atrial fibrillation (HCC)    2. SSS (sick sinus syndrome) (HCC)    3. Upper GI bleed    4. Syncope and collapse    5. Essential hypertension        Plan:     1. PAF<1% PAF on interrogation: doing well    2. GI bleed: no obvious source: needs Watchman WENDY occlusion.  Paroxysmal Atrial Fibrillation; less than 1% by interrogation  - CHADS-VASc = 5 (HTN, Age > 75, h/o Stroke), continue Xarelto 15 mg daily       3. Syncope: prob VVS and hypotension: improved off BBL: monitor for now.      4. HTN  -Well controlled on current medications.      F/up in 6 months    Scribed for Dain Nava MD by HIMA Trinidad. 2/15/2023  15:13 EST     I, Dain Nava MD, personally performed the services described in this documentation as scribed by the above named individual in my presence, and it is both accurate and complete.  2/15/2023  15:13 EST      "

## 2023-02-16 ENCOUNTER — TELEPHONE (OUTPATIENT)
Dept: CARDIOLOGY | Facility: CLINIC | Age: 83
End: 2023-02-16
Payer: MEDICARE

## 2023-02-16 NOTE — TELEPHONE ENCOUNTER
Patient's wife called and would like to know if patient is cleared for his upcoming dental work with [facility].

## 2023-02-22 NOTE — TELEPHONE ENCOUNTER
I called patient's wife to update her on this, she states that he had dental work done yesterday and everything went well.

## 2023-03-03 ENCOUNTER — LAB (OUTPATIENT)
Dept: LAB | Facility: HOSPITAL | Age: 83
End: 2023-03-03
Payer: MEDICARE

## 2023-03-03 ENCOUNTER — OFFICE VISIT (OUTPATIENT)
Dept: INTERNAL MEDICINE | Facility: CLINIC | Age: 83
End: 2023-03-03
Payer: MEDICARE

## 2023-03-03 VITALS
SYSTOLIC BLOOD PRESSURE: 126 MMHG | BODY MASS INDEX: 23.13 KG/M2 | HEART RATE: 66 BPM | WEIGHT: 170.8 LBS | HEIGHT: 72 IN | OXYGEN SATURATION: 100 % | DIASTOLIC BLOOD PRESSURE: 70 MMHG

## 2023-03-03 DIAGNOSIS — I10 ESSENTIAL HYPERTENSION: Primary | ICD-10-CM

## 2023-03-03 DIAGNOSIS — N18.30 STAGE 3 CHRONIC KIDNEY DISEASE, UNSPECIFIED WHETHER STAGE 3A OR 3B CKD: ICD-10-CM

## 2023-03-03 DIAGNOSIS — E78.2 MIXED HYPERLIPIDEMIA: ICD-10-CM

## 2023-03-03 DIAGNOSIS — K57.90 DIVERTICULOSIS: ICD-10-CM

## 2023-03-03 PROCEDURE — 99214 OFFICE O/P EST MOD 30 MIN: CPT | Performed by: INTERNAL MEDICINE

## 2023-03-03 NOTE — PROGRESS NOTES
Patient is a 83 y.o. male who is here for a follow up of hypertension.  Chief Complaint   Patient presents with   • Hypertension         HPI:    Here for mgmt of hyperlipidemia and CKD.  Does not have the energy level he used to have.  No CP.  No HINTON.  No abdominal pains.  No LE edema.  No dizziness or lightheadedness.  No palpitations.      History:     Patient Active Problem List   Diagnosis   • Essential hypertension   • PFO (patent foramen ovale)   • Hyperlipidemia   • Tobacco chew use   • CKD (chronic kidney disease) stage 3, GFR 30-59 ml/min ([facility])   • History of TIA (transient ischemic attack) and stroke   • Leukocytosis   • Scalp laceration   • Diverticulosis   • Pre-syncope   • Syncope and collapse   • Sick sinus syndrome ([facility])   • Pericardial effusion   • Cardiac pacemaker   • Chronic dryness of both eyes   • Nuclear senile cataract   • Perforation of tympanic membrane   • Peripapillary atrophy of both eyes   • S/P right cataract extraction   • 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   • Paroxysmal atrial fibrillation ([facility])   • Upper GI bleed   • Acute blood loss anemia   • Diverticulitis of jejunum       Past Medical History:   Diagnosis Date   • A-fib ([facility])    • Chronic kidney disease    • History of migraine headaches    • Hyperlipidemia    • Hypertension    • Mitral valve regurgitation    • Pericardial effusion     s/p PPM placement   • PFO (patent foramen ovale)    • Testicular cyst     removal 1970   • TIA (transient ischemic attack)        Past Surgical History:   Procedure Laterality Date   • BASAL CELL CARCINOMA EXCISION  07/2021   • CARDIAC CATHETERIZATION N/A 08/24/2020    Procedure: PERICARDIOCENTESIS;  Surgeon: Dain Nava MD;  Location:  [facility];  Service: Cardiology;  Laterality: N/A;   • CARDIAC CATHETERIZATION N/A 10/20/2020    Procedure: PERICARDIOCENTESIS;  Surgeon: Dain Nava MD;  Location:  [facility];  Service: Cardiology;   Laterality: N/A;   • CARDIAC ELECTROPHYSIOLOGY PROCEDURE N/A 08/24/2020    Procedure: PACEMAKER IMPLANTATION- DC;  Surgeon: Dain Nava MD;  Location:  [facility];  Service: Cardiology;  Laterality: N/A;   • CATARACT EXTRACTION W/ INTRAOCULAR LENS  IMPLANT, BILATERAL     • COLONOSCOPY N/A 04/23/2022    Procedure: COLONOSCOPY;  Surgeon: Brunner, Mark I, MD;  Location:  [facility];  Service: Gastroenterology;  Laterality: N/A;   • CYST REMOVAL      right arm   • ENDOSCOPY N/A 04/23/2022    Procedure: ESOPHAGOGASTRODUODENOSCOPY;  Surgeon: Brunner, Mark I, MD;  Location:  [facility];  Service: Gastroenterology;  Laterality: N/A;   • ENTEROSCOPY SMALL BOWEL N/A 04/25/2022    Procedure: ENTEROSCOPY SMALL BOWEL;  Surgeon: Brunner, Mark I, MD;  Location:  [facility];  Service: Gastroenterology;  Laterality: N/A;   • TONSILLECTOMY AND ADENOIDECTOMY  11 yo   • WISDOM TOOTH EXTRACTION         Current Outpatient Medications on File Prior to Visit   Medication Sig   • ascorbic acid (VITAMIN C) 1000 MG tablet Take 1 tablet by mouth Daily.   • atorvastatin (LIPITOR) 40 MG tablet TAKE 1 TABLET EVERY DAY   • coenzyme Q10 100 MG capsule Take 1 capsule by mouth Daily.   • glucosamine-chondroitin 500-400 MG capsule capsule Take 1 capsule by mouth Daily.   • KRILL OIL PO Take 1 tablet by mouth Daily.   • lisinopril (PRINIVIL,ZESTRIL) 5 MG tablet Take 1 tablet by mouth As Needed.   • Omega-3 1000 MG capsule Take 1 capsule by mouth Daily.   • rivaroxaban (Xarelto) 15 MG tablet Take 1 tablet by mouth Daily. Do not resume until 5/4/2022   • tamsulosin (FLOMAX) 0.4 MG capsule 24 hr capsule Take 1 capsule by mouth Daily.   • Zinc 100 MG tablet Take 100 mg by mouth Daily.     No current facility-administered medications on file prior to visit.       Family History   Problem Relation Age of Onset   • Heart disease Mother    • Heart attack Mother    • Heart disease Father    • Heart attack Father    •  Arthritis Other    • Hyperlipidemia Other    • Stroke Other        Social History     Socioeconomic History   • Marital status:    Tobacco Use   • Smoking status: Never   • Smokeless tobacco: Current     Types: Chew     Last attempt to quit: 2020   Vaping Use   • Vaping Use: Never used   Substance and Sexual Activity   • Alcohol use: Yes     Alcohol/week: 4.0 standard drinks     Types: 4 Cans of beer per week     Comment: 4 drinks per month   • Drug use: No   • Sexual activity: Not Currently     Partners: Female     Birth control/protection: None         Review of Systems   Constitutional: Positive for fatigue. Negative for activity change, appetite change, chills, fever and unexpected weight change.   HENT: Negative for congestion, ear pain, hearing loss, rhinorrhea, sinus pressure, sinus pain, sore throat and trouble swallowing.    Eyes: Negative for photophobia, pain, discharge and itching.   Respiratory: Negative for cough, chest tightness, shortness of breath and wheezing.    Cardiovascular: Negative for chest pain, palpitations and leg swelling.   Gastrointestinal: Negative for abdominal distention, abdominal pain, blood in stool, constipation, diarrhea and vomiting.        Colonoscopy by Dr Schmid   Endocrine: Negative for cold intolerance, heat intolerance, polydipsia, polyphagia and polyuria.   Genitourinary: Negative for difficulty urinating, dysuria, enuresis, frequency, genital sores, hematuria and urgency.        Followed by Dr Segal   Musculoskeletal: Positive for arthralgias. Negative for gait problem, joint swelling and myalgias.   Skin: Negative for pallor, rash and wound.   Allergic/Immunologic: Negative for immunocompromised state.   Neurological: Positive for weakness. Negative for tremors, seizures, syncope, speech difficulty, numbness and headaches.   Hematological: Negative for adenopathy.   Psychiatric/Behavioral: Negative for behavioral problems, dysphoric mood, sleep disturbance and  "suicidal ideas. The patient is not nervous/anxious.        /70   Pulse 66   Ht 182.9 cm (72.01\")   Wt 77.5 kg (170 lb 12.8 oz)   SpO2 100%   BMI 23.16 kg/m²       Physical Exam  Constitutional:       Appearance: Normal appearance. He is well-developed.   HENT:      Head: Normocephalic and atraumatic.      Right Ear: External ear normal.      Left Ear: External ear normal.      Nose: Nose normal.      Mouth/Throat:      Mouth: Mucous membranes are moist.      Pharynx: Oropharynx is clear.   Eyes:      Extraocular Movements: Extraocular movements intact.      Conjunctiva/sclera: Conjunctivae normal.      Pupils: Pupils are equal, round, and reactive to light.   Cardiovascular:      Rate and Rhythm: Normal rate and regular rhythm.      Heart sounds: Normal heart sounds.   Pulmonary:      Effort: Pulmonary effort is normal.      Breath sounds: Normal breath sounds.   Abdominal:      General: Bowel sounds are normal.      Palpations: Abdomen is soft.   Musculoskeletal:         General: Normal range of motion.      Cervical back: Normal range of motion and neck supple.   Lymphadenopathy:      Cervical: No cervical adenopathy.   Skin:     General: Skin is warm and dry.   Neurological:      General: No focal deficit present.      Mental Status: He is alert and oriented to person, place, and time.   Psychiatric:         Mood and Affect: Mood normal.         Behavior: Behavior normal.         Thought Content: Thought content normal.         Procedure:      Discussion/Summary:    htn-stable off meds  paf-rate controlled, on NOAC  Hyperlipidemia- labs on lipitor at goal, counseled on diet  djd-stable on tylenol  Thrombocytopenia/anemia-cbc today normal  TIA-cont rf mod  CKD-recheck today improved, advised fluids and NSAIDS avoidance  Abnormal glucose-labs at goal  Diverticulosis-increase fiber, asymptomatic  Hx of UGI bleed-check iron, would be candidate of Watchman sec to high risk on NOAC for recurrent " bleed     3/3 labs noted and dw patient    Current Outpatient Medications:   •  ascorbic acid (VITAMIN C) 1000 MG tablet, Take 1 tablet by mouth Daily., Disp: , Rfl:   •  atorvastatin (LIPITOR) 40 MG tablet, TAKE 1 TABLET EVERY DAY, Disp: 90 tablet, Rfl: 1  •  coenzyme Q10 100 MG capsule, Take 1 capsule by mouth Daily., Disp: , Rfl:   •  glucosamine-chondroitin 500-400 MG capsule capsule, Take 1 capsule by mouth Daily., Disp: , Rfl:   •  KRILL OIL PO, Take 1 tablet by mouth Daily., Disp: , Rfl:   •  lisinopril (PRINIVIL,ZESTRIL) 5 MG tablet, Take 1 tablet by mouth As Needed., Disp: , Rfl:   •  Omega-3 1000 MG capsule, Take 1 capsule by mouth Daily., Disp: , Rfl:   •  rivaroxaban (Xarelto) 15 MG tablet, Take 1 tablet by mouth Daily. Do not resume until 5/4/2022, Disp: 90 tablet, Rfl: 3  •  tamsulosin (FLOMAX) 0.4 MG capsule 24 hr capsule, Take 1 capsule by mouth Daily., Disp: , Rfl:   •  Zinc 100 MG tablet, Take 100 mg by mouth Daily., Disp: , Rfl:         Diagnoses and all orders for this visit:    1. Essential hypertension (Primary)  -     CBC (No Diff)    2. Mixed hyperlipidemia  -     Comprehensive Metabolic Panel  -     Lipid Panel  -     TSH    3. Diverticulosis    4. Stage 3 chronic kidney disease, unspecified whether stage 3a or 3b CKD ([facility])

## 2023-03-04 LAB
ALBUMIN SERPL-MCNC: 4.2 G/DL (ref 3.5–5.2)
ALBUMIN/GLOB SERPL: 1.8 G/DL
ALP SERPL-CCNC: 118 U/L (ref 39–117)
ALT SERPL-CCNC: 12 U/L (ref 1–41)
AST SERPL-CCNC: 13 U/L (ref 1–40)
BILIRUB SERPL-MCNC: 0.4 MG/DL (ref 0–1.2)
BUN SERPL-MCNC: 25 MG/DL (ref 8–23)
BUN/CREAT SERPL: 17.1 (ref 7–25)
CALCIUM SERPL-MCNC: 8.9 MG/DL (ref 8.6–10.5)
CHLORIDE SERPL-SCNC: 107 MMOL/L (ref 98–107)
CHOLEST SERPL-MCNC: 128 MG/DL (ref 0–200)
CO2 SERPL-SCNC: 25.8 MMOL/L (ref 22–29)
CREAT SERPL-MCNC: 1.46 MG/DL (ref 0.76–1.27)
EGFRCR SERPLBLD CKD-EPI 2021: 46.8 ML/MIN/1.73
ERYTHROCYTE [DISTWIDTH] IN BLOOD BY AUTOMATED COUNT: 12.2 % (ref 12.3–15.4)
GLOBULIN SER CALC-MCNC: 2.3 GM/DL
GLUCOSE SERPL-MCNC: 91 MG/DL (ref 65–99)
HCT VFR BLD AUTO: 40 % (ref 37.5–51)
HDLC SERPL-MCNC: 66 MG/DL (ref 40–60)
HGB BLD-MCNC: 13.8 G/DL (ref 13–17.7)
LDLC SERPL CALC-MCNC: 52 MG/DL (ref 0–100)
MCH RBC QN AUTO: 32 PG (ref 26.6–33)
MCHC RBC AUTO-ENTMCNC: 34.5 G/DL (ref 31.5–35.7)
MCV RBC AUTO: 92.8 FL (ref 79–97)
PLATELET # BLD AUTO: 162 10*3/MM3 (ref 140–450)
POTASSIUM SERPL-SCNC: 4.3 MMOL/L (ref 3.5–5.2)
PROT SERPL-MCNC: 6.5 G/DL (ref 6–8.5)
RBC # BLD AUTO: 4.31 10*6/MM3 (ref 4.14–5.8)
SODIUM SERPL-SCNC: 141 MMOL/L (ref 136–145)
TRIGL SERPL-MCNC: 41 MG/DL (ref 0–150)
TSH SERPL DL<=0.005 MIU/L-ACNC: 1.29 UIU/ML (ref 0.27–4.2)
VLDLC SERPL CALC-MCNC: 10 MG/DL (ref 5–40)
WBC # BLD AUTO: 9.06 10*3/MM3 (ref 3.4–10.8)

## 2023-03-22 ENCOUNTER — DOCUMENTATION (OUTPATIENT)
Dept: CARDIOLOGY | Facility: HOSPITAL | Age: 83
End: 2023-03-22
Payer: MEDICARE

## 2023-03-22 DIAGNOSIS — K92.2 UPPER GI BLEED: ICD-10-CM

## 2023-03-22 DIAGNOSIS — I48.0 PAROXYSMAL ATRIAL FIBRILLATION: Primary | ICD-10-CM

## 2023-03-22 NOTE — PROGRESS NOTES
PRE-WATCHMAN HISTORY  Pio Baum 1940   [address] [place] [address]   [phone] (home) [phone] (work)    Referring MD: Dr. Hill  Information obtained from: [x] Medical record review  [x] Patient report  Scheduled for: Watchman implant on 4/3/23 with Dr. Nava  [facility] Visit: Dr. Hill    History & Risk Factors (prior to Watchman implant)  [id number] Risk Factors:  Congestive HF     [x] No   [] Yes  LV Dysfunction   [x] No   [] Yes  Hypertension      [] No   [x] Yes  Diabetes    [x] No   [] Yes  Stroke       [] No   [x] Yes  TIA       [] No   [x] Yes  Thromboembolism    [x] No   [] Yes  Vascular Disease   [x] No   [] Yes     HAS-BLED Risk Factors:  HTN (uncontrolled) [x] No  [] Yes  Abnormal Renal Fxn  [] No  [x] Yes  Abnormal Liver Fxn   [x] No  [] Yes  Stroke            [] No  [x] Yes  Bleeding event [] No  [x] Yes-GIB  Labile INR      [x] No  [] Yes  Alcohol  [] No  [x] Yes- maybe weekly  Antiplatelets      [x] No  [] Yes  NSAIDS  [x] No  [] Yes    Additional Stroke & Bleeding Risk Factors:  Increased Fall Risk   [x] No  [] Yes  Bleeding Event         [] No  [x] Yes      If Yes, Type      [] Intracranial [] Epistaxis   [x] GI   [] Other    Rhythm History:  AFIBTYPE: paroxysmal    Valvular AFib        [x] No  [] Yes  Attempt at AFib Termination:  [x] No  [] Yes  Atrial Flutter    [x] No  [] Yes  WENDY Intervention    [x] No  [] Yes    Additional History & Risk Factors:  Cardiomyopathy   [x] No  [] Yes  Chronic Lung Disease  [x] No  [] Yes  Coronary Artery Disease  [x] No  [] Yes  Sleep Apnea    [x] No  [] Yes  Epicardial Approach Considered [x] No  [] Yes    Diagnostic Studies:  Last Echo:  [x] TTE Date: 4/27/22                  EF: 50%              LA: 3.6cm             VHD: mild MR, trace AI          Pre-procedure blood thinner medications:  Xarelto- hold dose night before.    -Wife reports hypervolemia with previous device implant.       03/22/23 13:53 EDT   [facility]  Revised 1.31.2017

## 2023-03-31 ENCOUNTER — TELEPHONE (OUTPATIENT)
Dept: CARDIOLOGY | Facility: CLINIC | Age: 83
End: 2023-03-31
Payer: MEDICARE

## 2023-03-31 NOTE — TELEPHONE ENCOUNTER
"Patient phoned and states he is dealing with an abscess tooth.  Says it was removed and cleaned \"down to the bone.\"  He requested to move LAAO to Aug.  Schedulers updated.    Meena Peterson RN    "

## 2023-04-08 PROCEDURE — 93296 REM INTERROG EVL PM/IDS: CPT | Performed by: INTERNAL MEDICINE

## 2023-04-08 PROCEDURE — 93294 REM INTERROG EVL PM/LDLS PM: CPT | Performed by: INTERNAL MEDICINE

## 2023-08-28 ENCOUNTER — HOSPITAL ENCOUNTER (EMERGENCY)
Facility: HOSPITAL | Age: 83
Discharge: HOME | End: 2023-08-28
Attending: EMERGENCY MEDICINE | Admitting: EMERGENCY MEDICINE
Payer: MEDICARE

## 2023-08-28 ENCOUNTER — APPOINTMENT (OUTPATIENT)
Dept: CT IMAGING | Facility: HOSPITAL | Age: 83
End: 2023-08-28
Payer: MEDICARE

## 2023-08-28 VITALS
BODY MASS INDEX: 22.35 KG/M2 | WEIGHT: 165 LBS | OXYGEN SATURATION: 100 % | HEIGHT: 72 IN | HEART RATE: 70 BPM | RESPIRATION RATE: 18 BRPM | SYSTOLIC BLOOD PRESSURE: 173 MMHG | DIASTOLIC BLOOD PRESSURE: 89 MMHG | TEMPERATURE: 97.9 F

## 2023-08-28 DIAGNOSIS — R55 NEAR SYNCOPE: Primary | ICD-10-CM

## 2023-08-28 LAB
ALBUMIN SERPL-MCNC: 4 G/DL (ref 3.5–5.2)
ALBUMIN/GLOB SERPL: 1.7 G/DL
ALP SERPL-CCNC: 107 U/L (ref 39–117)
ALT SERPL W P-5'-P-CCNC: 10 U/L (ref 1–41)
ANION GAP SERPL CALCULATED.3IONS-SCNC: 9 MMOL/L (ref 5–15)
AST SERPL-CCNC: 13 U/L (ref 1–40)
BASOPHILS # BLD AUTO: 0.08 10*3/MM3 (ref 0–0.2)
BASOPHILS NFR BLD AUTO: 0.9 % (ref 0–1.5)
BILIRUB SERPL-MCNC: 0.3 MG/DL (ref 0–1.2)
BUN SERPL-MCNC: 24 MG/DL (ref 8–23)
BUN/CREAT SERPL: 14.1 (ref 7–25)
CALCIUM SPEC-SCNC: 8.6 MG/DL (ref 8.6–10.5)
CHLORIDE SERPL-SCNC: 107 MMOL/L (ref 98–107)
CO2 SERPL-SCNC: 25 MMOL/L (ref 22–29)
CREAT SERPL-MCNC: 1.7 MG/DL (ref 0.76–1.27)
DEPRECATED RDW RBC AUTO: 44.4 FL (ref 37–54)
EGFRCR SERPLBLD CKD-EPI 2021: 39 ML/MIN/1.73
EOSINOPHIL # BLD AUTO: 0.06 10*3/MM3 (ref 0–0.4)
EOSINOPHIL NFR BLD AUTO: 0.7 % (ref 0.3–6.2)
ERYTHROCYTE [DISTWIDTH] IN BLOOD BY AUTOMATED COUNT: 12.8 % (ref 12.3–15.4)
GLOBULIN UR ELPH-MCNC: 2.3 GM/DL
GLUCOSE SERPL-MCNC: 99 MG/DL (ref 65–99)
HCT VFR BLD AUTO: 40.8 % (ref 37.5–51)
HGB BLD-MCNC: 13.8 G/DL (ref 13–17.7)
HOLD SPECIMEN: NORMAL
IMM GRANULOCYTES # BLD AUTO: 0.03 10*3/MM3 (ref 0–0.05)
IMM GRANULOCYTES NFR BLD AUTO: 0.3 % (ref 0–0.5)
LYMPHOCYTES # BLD AUTO: 1.43 10*3/MM3 (ref 0.7–3.1)
LYMPHOCYTES NFR BLD AUTO: 15.7 % (ref 19.6–45.3)
MAGNESIUM SERPL-MCNC: 2.6 MG/DL (ref 1.6–2.4)
MCH RBC QN AUTO: 31.7 PG (ref 26.6–33)
MCHC RBC AUTO-ENTMCNC: 33.8 G/DL (ref 31.5–35.7)
MCV RBC AUTO: 93.8 FL (ref 79–97)
MONOCYTES # BLD AUTO: 0.58 10*3/MM3 (ref 0.1–0.9)
MONOCYTES NFR BLD AUTO: 6.4 % (ref 5–12)
NEUTROPHILS NFR BLD AUTO: 6.95 10*3/MM3 (ref 1.7–7)
NEUTROPHILS NFR BLD AUTO: 76 % (ref 42.7–76)
NRBC BLD AUTO-RTO: 0 /100 WBC (ref 0–0.2)
PLATELET # BLD AUTO: 148 10*3/MM3 (ref 140–450)
PMV BLD AUTO: 11.3 FL (ref 6–12)
POTASSIUM SERPL-SCNC: 3.8 MMOL/L (ref 3.5–5.2)
PROT SERPL-MCNC: 6.3 G/DL (ref 6–8.5)
QT INTERVAL: 400 MS
QTC INTERVAL: 432 MS
RBC # BLD AUTO: 4.35 10*6/MM3 (ref 4.14–5.8)
SODIUM SERPL-SCNC: 141 MMOL/L (ref 136–145)
TROPONIN T SERPL HS-MCNC: 16 NG/L
WBC NRBC COR # BLD: 9.13 10*3/MM3 (ref 3.4–10.8)
WHOLE BLOOD HOLD COAG: NORMAL
WHOLE BLOOD HOLD SPECIMEN: NORMAL

## 2023-08-28 PROCEDURE — 96375 TX/PRO/DX INJ NEW DRUG ADDON: CPT

## 2023-08-28 PROCEDURE — 80053 COMPREHEN METABOLIC PANEL: CPT | Performed by: EMERGENCY MEDICINE

## 2023-08-28 PROCEDURE — 36415 COLL VENOUS BLD VENIPUNCTURE: CPT

## 2023-08-28 PROCEDURE — 93005 ELECTROCARDIOGRAM TRACING: CPT | Performed by: EMERGENCY MEDICINE

## 2023-08-28 PROCEDURE — 93005 ELECTROCARDIOGRAM TRACING: CPT

## 2023-08-28 PROCEDURE — 70450 CT HEAD/BRAIN W/O DYE: CPT

## 2023-08-28 PROCEDURE — 96374 THER/PROPH/DIAG INJ IV PUSH: CPT

## 2023-08-28 PROCEDURE — 84484 ASSAY OF TROPONIN QUANT: CPT | Performed by: EMERGENCY MEDICINE

## 2023-08-28 PROCEDURE — 83735 ASSAY OF MAGNESIUM: CPT | Performed by: EMERGENCY MEDICINE

## 2023-08-28 PROCEDURE — 99284 EMERGENCY DEPT VISIT MOD MDM: CPT

## 2023-08-28 PROCEDURE — 85025 COMPLETE CBC W/AUTO DIFF WBC: CPT | Performed by: EMERGENCY MEDICINE

## 2023-08-28 RX ORDER — SODIUM CHLORIDE 0.9 % (FLUSH) 0.9 %
10 SYRINGE (ML) INJECTION AS NEEDED
Status: DISCONTINUED | OUTPATIENT
Start: 2023-08-28 | End: 2023-08-28 | Stop reason: HOSPADM

## 2023-08-28 RX ADMIN — SODIUM CHLORIDE 500 ML: 9 INJECTION, SOLUTION INTRAVENOUS at 17:06

## 2023-08-28 NOTE — ED PROVIDER NOTES
[place]    EMERGENCY DEPARTMENT ENCOUNTER      Pt Name: Pio Baum  MRN: [id number]  YOB: 1940  Date of evaluation: 8/28/2023  Provider: Hamlet Titus DO    CHIEF COMPLAINT       Chief Complaint   Patient presents with    Syncope     1546  HPI  Stated Reason for Visit: PT WAS WORKING OUTSIDE AND FELT WEAK AND FELL TO THE GROUND AFTER FEELING DIZZY. History Obtained From: patient;EMS Additional Details:  ML       HISTORY OF PRESENT ILLNESS  (Location/Symptom, Timing/Onset, Context/Setting, Quality, Duration, Modifying Factors, Severity.)   Pio Baum is a 83 y.o. male who presents to the emergency department for evaluation by EMS secondary to a concern for syncopal episode which occurred just prior to arrival.  The patient was working outside in the yard was quite hot outside got lightheaded and dizzy, felt tunneling of his vision, fell down to his knees, his wife was there with him, helped him down to the ground.  Denies any head injury, chest pain or palpitations during this incident.  Patient does follow with cardiologist, Dr. Carson electrophysiologist Dr. Nava.  He states he did not have any chest pain, has a history of atrial fibrillation, pacer states he has been take his medication including Xarelto as prescribed.  Not had any issues with headache, vision changes, unilateral weakness, numbness or tingling.  No recent illness, nausea vomiting or diarrhea, denies abdominal pain, no chest pain or difficulty breathing.  He was told his blood pressure was slightly orthostatic during initial evaluation but states he is returned back to his baseline, does not have any other acute complaints at this time.    Nursing notes were reviewed.      PAST MEDICAL HISTORY     Past Medical History:   Diagnosis Date    A-fib     Chronic kidney disease     History of migraine headaches     Hyperlipidemia     Hypertension     Mitral valve regurgitation     Pericardial effusion      s/p PPM placement    PFO (patent foramen ovale)     Testicular cyst     removal 1970    TIA (transient ischemic attack)          SURGICAL HISTORY       Past Surgical History:   Procedure Laterality Date    BASAL CELL CARCINOMA EXCISION  07/2021    CARDIAC CATHETERIZATION N/A 08/24/2020    Procedure: PERICARDIOCENTESIS;  Surgeon: Dain Nava MD;  Location:  [facility];  Service: Cardiology;  Laterality: N/A;    CARDIAC CATHETERIZATION N/A 10/20/2020    Procedure: PERICARDIOCENTESIS;  Surgeon: Dain Nava MD;  Location:  [facility];  Service: Cardiology;  Laterality: N/A;    CARDIAC ELECTROPHYSIOLOGY PROCEDURE N/A 08/24/2020    Procedure: PACEMAKER IMPLANTATION- DC;  Surgeon: Dain Nava MD;  Location:  [facility];  Service: Cardiology;  Laterality: N/A;    CATARACT EXTRACTION W/ INTRAOCULAR LENS  IMPLANT, BILATERAL      COLONOSCOPY N/A 04/23/2022    Procedure: COLONOSCOPY;  Surgeon: Brunner, Mark I, MD;  Location:  [facility];  Service: Gastroenterology;  Laterality: N/A;    CYST REMOVAL      right arm    ENDOSCOPY N/A 04/23/2022    Procedure: ESOPHAGOGASTRODUODENOSCOPY;  Surgeon: Brunner, Mark I, MD;  Location:  [facility];  Service: Gastroenterology;  Laterality: N/A;    ENTEROSCOPY SMALL BOWEL N/A 04/25/2022    Procedure: ENTEROSCOPY SMALL BOWEL;  Surgeon: Brunner, Mark I, MD;  Location:  [facility];  Service: Gastroenterology;  Laterality: N/A;    TONSILLECTOMY AND ADENOIDECTOMY  9 yo    WISDOM TOOTH EXTRACTION           CURRENT MEDICATIONS       Current Facility-Administered Medications:     sodium chloride 0.9 % bolus 500 mL, 500 mL, Intravenous, Once, Hamlet Titus DO, Last Rate: 1,000 mL/hr at 08/28/23 1706, 500 mL at 08/28/23 1706    sodium chloride 0.9 % flush 10 mL, 10 mL, Intravenous, PRN, Hamlet Titus DO    Current Outpatient Medications:     ascorbic acid (VITAMIN C) 1000 MG tablet, Take 1 tablet  "by mouth Daily., Disp: , Rfl:     atorvastatin (LIPITOR) 40 MG tablet, TAKE 1 TABLET EVERY DAY, Disp: 90 tablet, Rfl: 1    coenzyme Q10 100 MG capsule, Take 1 capsule by mouth Daily., Disp: , Rfl:     glucosamine-chondroitin 500-400 MG capsule capsule, Take 1 capsule by mouth Daily., Disp: , Rfl:     KRILL OIL PO, Take 1 tablet by mouth Daily., Disp: , Rfl:     lisinopril (PRINIVIL,ZESTRIL) 5 MG tablet, Take 1 tablet by mouth As Needed., Disp: , Rfl:     Omega-3 1000 MG capsule, Take 1 capsule by mouth Daily., Disp: , Rfl:     rivaroxaban (Xarelto) 15 MG tablet, Take 1 tablet by mouth Daily. Do not resume until 5/4/2022, Disp: 90 tablet, Rfl: 3    tamsulosin (FLOMAX) 0.4 MG capsule 24 hr capsule, Take 1 capsule by mouth Daily., Disp: , Rfl:     Zinc 100 MG tablet, Take 100 mg by mouth Daily., Disp: , Rfl:     ALLERGIES     Penicillins, Flecainide, and Fluticasone    FAMILY HISTORY       Family History   Problem Relation Age of Onset    Heart disease Mother     Heart attack Mother     Heart disease Father     Heart attack Father     Arthritis Other     Hyperlipidemia Other     Stroke Other           SOCIAL HISTORY       Social History     Socioeconomic History    Marital status:    Tobacco Use    Smoking status: Never    Smokeless tobacco: Current     Types: Chew     Last attempt to quit: 2020   Vaping Use    Vaping Use: Never used   Substance and Sexual Activity    Alcohol use: Yes     Alcohol/week: 4.0 standard drinks     Types: 4 Cans of beer per week     Comment: 4 drinks per month    Drug use: No    Sexual activity: Not Currently     Partners: Female     Birth control/protection: None         PHYSICAL EXAM    (up to 7 for level 4, 8 or more for level 5)     Vitals:    08/28/23 1541 08/28/23 1544 08/28/23 1710   BP:  163/90 (!) 181/89   Pulse: 70 70 70   Resp: 18 18    Temp:  97.9 °F (36.6 °C)    TempSrc:  Oral    SpO2: 97% 100% 100%   Weight:  74.8 kg (165 lb)    Height:  182.9 cm (72\")        Physical " Exam  General : Patient is awake, alert, oriented, in no acute distress, nontoxic appearing  HEENT: Pupils are equally round, EOMI, conjunctivae clear  Neck: Neck is supple, full range of motion, trachea midline  Cardiac: Heart regular rate, rhythm, no murmurs, rubs, or gallops  Lungs: Lungs are clear to auscultation, there is no wheezing, rhonchi, or rales. There is no use of accessory muscles  Abdomen: Abdomen is soft, nontender, nondistended. There are no firm or pulsatile masses, no rebound rigidity or guarding  Musculoskeletal: 5 out of 5 strength in all 4 extremities.  No focal muscle deficits are appreciated  Neuro: GCS 15, no focal neurological deficit examination, motor intact, sensory intact, level of consciousness is normal  Dermatology: There are superficial abrasions corrugations of bilateral knees, no open or significant laceration is appreciated, skin is warm and dry  Psych: Mentation is grossly normal, cognition is grossly normal. Affect is appropriate      DIAGNOSTIC RESULTS     EKG:  All EKGs are interpreted by the Emergency Department Physician who either signs or Co-signs this chart in the absence of a cardiologist.    ECG 12 Lead ED Triage Standing Order; Syncope   Final Result   Test Reason : ED Triage Standing Order~   Blood Pressure :   */*   mmHG   Vent. Rate :  70 BPM     Atrial Rate : 535 BPM      P-R Int : 314 ms          QRS Dur : 122 ms       QT Int : 400 ms       P-R-T Axes : -49  42  16 degrees      QTc Int : 432 ms      Atrial-paced rhythm with prolonged AV conduction   Left ventricular hypertrophy with QRS widening   Cannot rule out Septal infarct , age undetermined   Abnormal ECG   When compared with ECG of 22-APR-2022 12:08,   Minimal criteria for Septal infarct are now present   Confirmed by KAREN SPAULDING MD ([id number]) on 8/28/2023 4:01:19 PM      Referred By:            Confirmed By: KAREN SPAULDING MD          RADIOLOGY:     [] Radiologist's Report Reviewed:  CT Head Without  Contrast   Final Result   Impression:   Stable exam without acute intracranial process identified.            Electronically Signed: Gunner Esparza MD     8/28/2023 3:36 PM CDT     Workstation ID: [id number]          I ordered and independently reviewed the above noted radiographic studies.      I viewed images of CT head which showed no acute intracranial process per my independent interpretation.    See radiologist's dictation for official interpretation.      ED BEDSIDE ULTRASOUND:   Performed by ED Physician - none    LABS:    I have reviewed and interpreted all of the currently available lab results from this visit (if applicable):  Results for orders placed or performed during the hospital encounter of 08/28/23   Comprehensive Metabolic Panel    Specimen: Blood   Result Value Ref Range    Glucose 99 65 - 99 mg/dL    BUN 24 (H) 8 - 23 mg/dL    Creatinine 1.70 (H) 0.76 - 1.27 mg/dL    Sodium 141 136 - 145 mmol/L    Potassium 3.8 3.5 - 5.2 mmol/L    Chloride 107 98 - 107 mmol/L    CO2 25.0 22.0 - 29.0 mmol/L    Calcium 8.6 8.6 - 10.5 mg/dL    Total Protein 6.3 6.0 - 8.5 g/dL    Albumin 4.0 3.5 - 5.2 g/dL    ALT (SGPT) 10 1 - 41 U/L    AST (SGOT) 13 1 - 40 U/L    Alkaline Phosphatase 107 39 - 117 U/L    Total Bilirubin 0.3 0.0 - 1.2 mg/dL    Globulin 2.3 gm/dL    A/G Ratio 1.7 g/dL    BUN/Creatinine Ratio 14.1 7.0 - 25.0    Anion Gap 9.0 5.0 - 15.0 mmol/L    eGFR 39.0 (L) >60.0 mL/min/1.73   Magnesium    Specimen: Blood   Result Value Ref Range    Magnesium 2.6 (H) 1.6 - 2.4 mg/dL   Single High Sensitivity Troponin T    Specimen: Blood   Result Value Ref Range    HS Troponin T 16 (H) <15 ng/L   CBC Auto Differential    Specimen: Blood   Result Value Ref Range    WBC 9.13 3.40 - 10.80 10*3/mm3    RBC 4.35 4.14 - 5.80 10*6/mm3    Hemoglobin 13.8 13.0 - 17.7 g/dL    Hematocrit 40.8 37.5 - 51.0 %    MCV 93.8 79.0 - 97.0 fL    MCH 31.7 26.6 - 33.0 pg    MCHC 33.8 31.5 - 35.7 g/dL    RDW 12.8 12.3 - 15.4 %    RDW-SD 44.4  37.0 - 54.0 fl    MPV 11.3 6.0 - 12.0 fL    Platelets 148 140 - 450 10*3/mm3    Neutrophil % 76.0 42.7 - 76.0 %    Lymphocyte % 15.7 (L) 19.6 - 45.3 %    Monocyte % 6.4 5.0 - 12.0 %    Eosinophil % 0.7 0.3 - 6.2 %    Basophil % 0.9 0.0 - 1.5 %    Immature Grans % 0.3 0.0 - 0.5 %    Neutrophils, Absolute 6.95 1.70 - 7.00 10*3/mm3    Lymphocytes, Absolute 1.43 0.70 - 3.10 10*3/mm3    Monocytes, Absolute 0.58 0.10 - 0.90 10*3/mm3    Eosinophils, Absolute 0.06 0.00 - 0.40 10*3/mm3    Basophils, Absolute 0.08 0.00 - 0.20 10*3/mm3    Immature Grans, Absolute 0.03 0.00 - 0.05 10*3/mm3    nRBC 0.0 0.0 - 0.2 /100 WBC   ECG 12 Lead ED Triage Standing Order; Syncope   Result Value Ref Range    QT Interval 400 ms    QTC Interval 432 ms   Green Top (Gel)   Result Value Ref Range    Extra Tube Hold for add-ons.    Lavender Top   Result Value Ref Range    Extra Tube hold for add-on    Gold Top - SST   Result Value Ref Range    Extra Tube Hold for add-ons.    Light Blue Top   Result Value Ref Range    Extra Tube Hold for add-ons.         If labs were ordered, I independently reviewed the results and considered them in treating the patient.      EMERGENCY DEPARTMENT COURSE and DIFFERENTIAL DIAGNOSIS/MDM:   Vitals:  AS OF 17:23 EDT    BP - (!) 181/89  HR - 70  TEMP - 97.9 °F (36.6 °C) (Oral)  O2 SATS - 100%      Orders placed during this visit:  Orders Placed This Encounter   Procedures    CT Head Without Contrast    [place] Draw    Comprehensive Metabolic Panel    Magnesium    Single High Sensitivity Troponin T    CBC Auto Differential    Ambulatory Referral to [facility]    NPO Diet NPO Type: Strict NPO    Undress & Gown    Continuous Pulse Oximetry    Vital Signs    Orthostatic Blood Pressure    Oxygen Therapy- Nasal Cannula; Titrate 1-6 LPM Per SpO2; 90 - 95%    POC Glucose Once    ECG 12 Lead ED Triage Standing Order; Syncope    Insert Peripheral IV    CBC & Differential    Green Top (Gel)     Lavender Top    Gold Top - SST    Gray Top    Light Blue Top       All labs have been independently reviewed by me.  All radiology studies have been reviewed by me and the radiologist dictating the report.  All EKG's have been independently viewed and interpreted by me.      Discussion below represents my analysis of pertinent findings related to patient's condition, differential diagnosis, treatment plan and final disposition.    Differential diagnosis:  The differential diagnosis associated with the patient's presentation includes: Syncope, vasovagal, orthostatic hypotension, bradycardia, paroxysmal atrial fibrillation/arrhythmia, electrolyte abnormality    Additional sources  Discussed/ obtained information from independent historians:   [] Spouse  [] Parent  [] Family member  [] Friend  [x] EMS   [] Other:    External (non-ED) record review:   [] Inpatient record:   [] Office record:   [] Outpatient record:   [] Prior Outpatient labs:   [] Prior Outpatient radiology:   [] Primary Care record:   [] Outside ED record:   [] Other:     Patient's care impacted by:   [] Diabetes  [] Hypertension  [] CHF  [] Hyperlipidemia  [] Coronary Artery Disease   [] COPD   [] Cancer   [] Tobacco Abuse   [] Substance Abuse    [] Other:     Care significantly affected by Social Determinants of Health (housing and economic circumstances, unemployment)    [] Yes     [x] No   If yes, Patient's care significantly limited by Social Determinants of Health including:   [] Inadequate housing   [] Low income   [] Alcoholism and drug addiction in family   [] Problems related to primary support group   [] Unemployment   [] Problems related to employment   [] Other Social Determinants of Health:       MEDICATIONS ADMINISTERED IN ED:  Medications   sodium chloride 0.9 % flush 10 mL (has no administration in time range)   sodium chloride 0.9 % bolus 500 mL (500 mL Intravenous New Bag 8/28/23 1706)              This is a pleasant 83-year-old male  who has history of paroxysmal atrial fibrillation, currently paced at 70 bpm who had a appears to be a near syncope/syncopal episode prior to arrival.  The patient is nontoxic-appearing, completely back to his normal baseline, does not have any acute complaints.  We discussed obtaining IV, labs and imaging for further assessment.  Results as above.  Blood labs and imaging unremarkable.  Patient has a history of chronic renal sufficiency baseline creatinine of 1.70 which is where he is today.  CT scan imaging, chest x-ray and reevaluations did not reveal any significant normalities, patient blood pressure and vitals have remained stable.  On reassessment he states he feels well, like to be discharged home.  Place a referral to follow-up through our heart valve chest pain clinic for further assessment, return to the ED with any worsening symptoms or further concerns in the meantime.    I had a discussion with the patient/family regarding diagnosis, diagnostic results, treatment plan, and medications.  The patient/family indicated understanding of these instructions.  I spent adequate time at the bedside preceding discharge necessary to personally discuss the aftercare instructions, giving patient education, providing explanations of the results of our evaluations/findings, and my decision making to assure that the patient/family understand the plan of care.  Time was allotted to answer questions at that time and throughout the ED course.  Emphasis was placed on timely follow-up after discharge.  I also discussed the potential for the development of an acute emergent condition requiring further evaluation, admission, or even surgical intervention. I discussed that we found nothing during the visit today indicating the need for further workup, admission, or the presence of an unstable medical condition.  I encouraged the patient to return to the emergency department immediately for ANY concerns, worsening, new  complaints, or if symptoms persist and unable to seek follow-up in a timely fashion.  The patient/family expressed understanding and agreement with this plan.  The patient will follow-up with their PCP in 1-2 days for reevaluation.     PROCEDURES:  Procedures    CRITICAL CARE TIME    Total Critical Care time was 0 minutes, excluding separately reportable procedures.   There was a high probability of clinically significant/life threatening deterioration in the patient's condition which required my urgent intervention.      FINAL IMPRESSION      1. Near syncope          DISPOSITION/PLAN     ED Disposition       ED Disposition   Discharge    Condition   Stable    Comment   --               PATIENT REFERRED TO:  Lupillo Hill MD  [address]  [address]  [facility] [address]  [phone]    In 2 days      [facility] EMERGENCY DEPARTMENT  [address] [facility] [id number] [phone]    If symptoms worsen    [facility] CARDIOLOGY  [address]  [address]  [place] [id number] [phone]  Schedule an appointment as soon as possible for a visit         DISCHARGE MEDICATIONS:     Medication List        CONTINUE taking these medications      ascorbic acid 1000 MG tablet  Commonly known as: VITAMIN C     atorvastatin 40 MG tablet  Commonly known as: LIPITOR  TAKE 1 TABLET EVERY DAY     coenzyme Q10 100 MG capsule     glucosamine-chondroitin 500-400 MG capsule capsule     KRILL OIL PO     lisinopril 5 MG tablet  Commonly known as: PRINIVIL,ZESTRIL     Omega-3 1000 MG capsule     rivaroxaban 15 MG tablet  Commonly known as: Xarelto  Take 1 tablet by mouth Daily. Do not resume until 5/4/2022     tamsulosin 0.4 MG capsule 24 hr capsule  Commonly known as: FLOMAX     Zinc 100 MG tablet                  Comment: Please note this report has been produced using speech recognition software.      Hamlet Titus DO  Attending Emergency Physician          Ariela, Hamlet Turner, DO  08/28/23 1726

## 2023-09-03 NOTE — PLAN OF CARE
Problem: Patient Care Overview  Goal: Plan of Care Review  Outcome: Ongoing (interventions implemented as appropriate)    Goal: Individualization and Mutuality  Outcome: Ongoing (interventions implemented as appropriate)    Goal: Discharge Needs Assessment  Outcome: Ongoing (interventions implemented as appropriate)    Goal: Interprofessional Rounds/Family Conf  Outcome: Ongoing (interventions implemented as appropriate)      Problem: Fall Risk (Adult)  Goal: Identify Related Risk Factors and Signs and Symptoms  Outcome: Ongoing (interventions implemented as appropriate)    Goal: Absence of Fall  Outcome: Ongoing (interventions implemented as appropriate)         Warm

## 2023-09-14 ENCOUNTER — OFFICE VISIT (OUTPATIENT)
Dept: CARDIOLOGY | Facility: HOSPITAL | Age: 83
End: 2023-09-14
Payer: MEDICARE

## 2023-09-14 VITALS
OXYGEN SATURATION: 99 % | WEIGHT: 167.8 LBS | TEMPERATURE: 97.8 F | HEIGHT: 72 IN | BODY MASS INDEX: 22.73 KG/M2 | HEART RATE: 70 BPM | SYSTOLIC BLOOD PRESSURE: 164 MMHG | DIASTOLIC BLOOD PRESSURE: 76 MMHG | RESPIRATION RATE: 18 BRPM

## 2023-09-22 ENCOUNTER — OFFICE VISIT (OUTPATIENT)
Dept: CARDIOLOGY | Facility: CLINIC | Age: 83
End: 2023-09-22
Payer: MEDICARE

## 2023-09-22 VITALS
WEIGHT: 167 LBS | HEIGHT: 72 IN | SYSTOLIC BLOOD PRESSURE: 130 MMHG | OXYGEN SATURATION: 97 % | HEART RATE: 78 BPM | BODY MASS INDEX: 22.62 KG/M2 | DIASTOLIC BLOOD PRESSURE: 78 MMHG

## 2023-09-22 DIAGNOSIS — I48.0 PAROXYSMAL ATRIAL FIBRILLATION: Primary | ICD-10-CM

## 2023-09-22 DIAGNOSIS — E78.2 MIXED HYPERLIPIDEMIA: ICD-10-CM

## 2023-09-22 DIAGNOSIS — I10 ESSENTIAL HYPERTENSION: ICD-10-CM

## 2023-09-22 RX ORDER — ATORVASTATIN CALCIUM 20 MG/1
20 TABLET, FILM COATED ORAL DAILY
Qty: 90 TABLET | Refills: 3 | Status: SHIPPED | OUTPATIENT
Start: 2023-09-22

## 2023-09-22 NOTE — PROGRESS NOTES
[facility] Cardiology    Encounter Date: 2023    Patient ID: Pio Baum is a 83 y.o. male.  : 1940     PCP: Lupillo Hill MD       Chief Complaint: PAF, Hypertension, and Hyperlipidemia      PROBLEM LIST:  Paroxysmal atrial fibrillation/sick sinus syndrome:  CHADS-VASc = 5 (HTN, Age > 75, h/o Stroke), on Eliquis 5 mg BID.   MPS, 2017: EF 58%, negative, low risk study  Implantation of a dual-chamber permanent pacemaker by Dr. Nava, 2020.  Pericardiocentesis, 2020: Successful pericardiocentesis in a patient with pericardial tamponade/effusion post right-sided pacemaker implantation with approximately 270 cc of dark blood removed from the pericardium. Successful external cardioversion of atrial fibrillation with 200 J shock to sinus rhythm. Normal pacemaker function post pericardiocentesis.  Echo, 2020: There is a small (<1cm) pericardial effusion. Pericardial fluid was drained by the end of the study.  Echo, 2020: EF 50-60%. There is a trivial pericardial effusion.  Echo, 2020:  EF 65%. There is a small (<1cm) circumferential pericardial effusion.  Echo, 10/20/2020: EF 55%. There are myxomatous changes of the mitral valve apparatus present. There is bileaflet mitral valve prolapse present. Trace MR and Mild TR. Calculated right ventricular systolic pressure from tricuspid regurgitation is 26 mmHg. There is a large (>2cm) pericardial effusion. Borderline echo criteria for tamponade  Pericardiocentesis, 10/20/2020: Successful pericardiocentesis with removal of approximately 1300 cc of dark venous blood with small clots present reducing the pericardial effusion from 4.1 cm to approximately 1.1 cm via echocardiographic guidance. Successful intracardiac ultrasound monitoring.  Echo, 10/20/2020: There is a moderate (1-2cm) pericardial effusion adjacent to the right ventricle and left ventricle.  Echo, 10/21/2020: EF 55%.  There is a small, mostly posterior, mild to moderate (1 cm) pericardial effusion along the left ventricle. There is no evidence of pericardial tamponade.  Echo, 10/23/2020: EF 55%. There is a small, mostly posterior pericardial effusion which appears less pronounced compared to the study of October 21, 2020. There is no evidence of pericardial tamponade. Cardiac chambers are grossly normal in size.  Echo, 10/26/2020: EF 45%. Left ventricular wall thickness is consistent with concentric hypertrophy. Mild MR. Moderate TR. No significant pericardial effusion is noted. Compared to previous studies the pericardial effusion has almost completely resolved.  Echo, 04/26/2022: EF 50%. Mild right-sided chamber enlargement. Mild mitral regurgitation. Trace aortic insufficiency. Mild tricuspid regurgitation with normal RVSP.  TIA/CVA:  Carotid duplex, 04/20/2016: No significant disease  Echo, 04/20/2016: Normal LV function, positive bubble study. Closure not considered due to need for chronic anticoagulation therapy.  Cardiac event monitor showed atrial fibrillation/flutter with intermittent RVR, aberrancy, IVCD/sinus rhythm with PVCs  Near syncope/syncope:  2 week Zio, 05/16/2019: SR, occasional PAC's and PVC's. Occasional short SVT/PAT, 21 runs at 3-20 beats.  Echo, 05/16/2019: EF 55%. Borderline right-sided chamber enlargement. Mild MR/TR, normal RVSP.  ER presentation 08/28/2023, near syncope.  Negative CT brain.  Hypertension  Dyslipidemia  Oral tobacco abuse  History of migraine headaches  Chronic kidney disease stage II  Surgical history:  Tonsillectomy/adenoictomy    History of Present Illness  Patient presents today for a one year follow-up with a history of PAF and cardiac risk factors. Since last visit, he was working outside a month ago in the heat and became dizzy then fell to his knees. Patient presented at the ER after doing a head CT and blood work, he was told that no significant cardiac problem was identified  "and he was dehydrated and sent home. He questions who will see him once [employer] insurance is discontinued from [facility]. Patient denies chest pain, shortness of breath, orthopnea, palpitations, edema, dizziness, and denies any recurrent episodes of near syncope or syncope.       Allergies   Allergen Reactions    Penicillins Anaphylaxis, Rash and Unknown (See Comments)     Rash all over body including mouth/throat     Flecainide Other (See Comments) and Unknown (See Comments)     Fatigue, weakness unable to tolerate.     Fluticasone Irritability and Unknown (See Comments)     Gets a nose bleed as soon as used  Other reaction(s): Irritability         Current Outpatient Medications:     ascorbic acid (VITAMIN C) 1000 MG tablet, Take 1 tablet by mouth Daily., Disp: , Rfl:     atorvastatin (LIPITOR) 40 MG tablet, TAKE 1 TABLET EVERY DAY, Disp: 90 tablet, Rfl: 1    coenzyme Q10 100 MG capsule, Take 1 capsule by mouth Daily., Disp: , Rfl:     glucosamine-chondroitin 500-400 MG capsule capsule, Take 1 capsule by mouth Daily., Disp: , Rfl:     Omega-3 1000 MG capsule, Take 1 capsule by mouth Daily., Disp: , Rfl:     rivaroxaban (Xarelto) 15 MG tablet, Take 1 tablet by mouth Daily. Do not resume until 5/4/2022 (Patient taking differently: Take 1 tablet by mouth Daily.), Disp: 90 tablet, Rfl: 3    tamsulosin (FLOMAX) 0.4 MG capsule 24 hr capsule, Take 1 capsule by mouth Daily., Disp: , Rfl:     Zinc 100 MG tablet, Take 100 mg by mouth Daily., Disp: , Rfl:     The following portions of the patient's history were reviewed and updated as appropriate: allergies, current medications, past family history, past medical history, past social history, past surgical history and problem list.    ROS  Review of Systems   14 point ROS negative except for that listed in the HPI.         Objective:     /78 (BP Location: Right arm, Patient Position: Sitting)   Pulse 78   Ht 182.9 cm (72.01\")   Wt 75.8 kg (167 lb)   SpO2 97%   " BMI 22.64 kg/m²      Physical Exam  Constitutional: Patient appears well-developed and well-nourished.   HENT: HEENT exam unremarkable.   Neck: Neck supple. No JVD present. No carotid bruits.   Cardiovascular: Normal rate, regular rhythm and normal heart sounds. No murmur heard.   2+ symmetric pulses.   Pulmonary/Chest: Breath sounds normal. Does not exhibit tenderness.   Abdominal: Abdomen benign.   Musculoskeletal: Does not exhibit edema.   Neurological: Neurological exam unremarkable.   Vitals reviewed.    Data Review:   Lab Results   Component Value Date    GLUCOSE 99 08/28/2023    BUN 24 (H) 08/28/2023    CREATININE 1.70 (H) 08/28/2023    EGFR 39.0 (L) 08/28/2023    BCR 14.1 08/28/2023     08/28/2023    K 3.8 08/28/2023    CO2 25.0 08/28/2023    CALCIUM 8.6 08/28/2023    ALBUMIN 4.0 08/28/2023    AST 13 08/28/2023    ALT 10 08/28/2023     Lab Results   Component Value Date    CHOL 132 07/11/2023    CHLPL 128 03/03/2023    TRIG 50 07/11/2023    HDL 68 (H) 07/11/2023    LDL 53 07/11/2023      Lab Results   Component Value Date    WBC 9.13 08/28/2023    RBC 4.35 08/28/2023    HGB 13.8 08/28/2023    HCT 40.8 08/28/2023    MCV 93.8 08/28/2023     08/28/2023     Lab Results   Component Value Date    TSH 1.500 07/11/2023     Pacemaker interrogation showed normal dual-chamber pacemaker function, 88% atrial paced, 1% ventricular paced.  Rare AT/SVT episodes longest 5 minutes 38 seconds, no arrhythmia events at the time of his symptoms causing ER presentation.  Assessment:      Diagnosis Plan   1. Paroxysmal atrial fibrillation/sick sinus syndrome Overall stable and asymptomatic, no significant recurrences, normal device function.  Continue Xarelto for anticoagulation.  Follow-up with Dr. Nava for close monitoring.   2. Essential hypertension  Well controlled. 130/78 mmHg today.  Continue to monitor.  He is not on any medications at present.   3. Mixed hyperlipidemia  Well controlled. Last HDL/LDL 68/53  on 07/11/2023. Continue on atorvastatin 20 mg daily for hyperlipidemia.      Plan:   Stable cardiac status.  No current angina or CHF symptoms.  No signs recurrence of near syncope or syncope.  Device checked, patient reassured that no significant correlating arrhythmias noted.  Encouraged to maintain good hydration.  Continue current medications.   Patient advised to discuss with insurance regarding his continuity of care with us.  If that is not a possibility we will help him locate another cardiologist.  FU in 6 MO, sooner as needed.  Thank you for allowing us to participate in the care of your patient.       Scribed for Yahaira Carson MD by Jaelyn Renteria. 9/22/2023 13:11 EDT    I, Yahaira Carson MD, personally performed the services described in this documentation as scribed by the above named individual in my presence, and it is both accurate and complete.  9/22/2023  15:45 EDT      Please note that portions of this note may have been completed with a voice recognition program. Efforts were made to edit the dictations, but occasionally words are mistranscribed.

## 2023-10-07 PROCEDURE — 93296 REM INTERROG EVL PM/IDS: CPT | Performed by: INTERNAL MEDICINE

## 2023-10-07 PROCEDURE — 93294 REM INTERROG EVL PM/LDLS PM: CPT | Performed by: INTERNAL MEDICINE

## 2023-12-08 ENCOUNTER — TELEPHONE (OUTPATIENT)
Dept: CARDIOLOGY | Facility: CLINIC | Age: 83
End: 2023-12-08
Payer: MEDICARE

## 2024-01-05 NOTE — TELEPHONE ENCOUNTER
Caller: RADHA VIRK    Relationship: Emergency Contact    Best call back number: [phone]    Requested Prescriptions:   Requested Prescriptions     Pending Prescriptions Disp Refills    rivaroxaban (Xarelto) 15 MG tablet 90 tablet 3     Sig: Take 1 tablet by mouth Daily. Do not resume until 5/4/2022        Pharmacy where request should be sent: [facility] #[id number] - [place], [place] - [address] [phone] [facility] [phone] FX     Last office visit with prescribing clinician: 9/22/2023   Last telemedicine visit with prescribing clinician: Visit date not found   Next office visit with prescribing clinician: 5/31/2024     Additional details provided by patient:     Does the patient have less than a 3 day supply:  [x] Yes  [] No    Would you like a call back once the refill request has been completed: [x] Yes [] No    If the office needs to give you a call back, can they leave a voicemail: [] Yes [x] No    Noemy Kunz Rep   01/05/24 15:53 EST

## 2024-01-11 ENCOUNTER — LAB (OUTPATIENT)
Dept: INTERNAL MEDICINE | Facility: CLINIC | Age: 84
End: 2024-01-11
Payer: MEDICARE

## 2024-01-11 ENCOUNTER — OFFICE VISIT (OUTPATIENT)
Dept: INTERNAL MEDICINE | Facility: CLINIC | Age: 84
End: 2024-01-11
Payer: MEDICARE

## 2024-01-11 VITALS
DIASTOLIC BLOOD PRESSURE: 80 MMHG | HEART RATE: 69 BPM | WEIGHT: 175 LBS | OXYGEN SATURATION: 98 % | HEIGHT: 72 IN | SYSTOLIC BLOOD PRESSURE: 140 MMHG | BODY MASS INDEX: 23.7 KG/M2

## 2024-01-11 DIAGNOSIS — E78.2 MIXED HYPERLIPIDEMIA: Primary | ICD-10-CM

## 2024-01-11 DIAGNOSIS — I10 ESSENTIAL HYPERTENSION: ICD-10-CM

## 2024-01-11 DIAGNOSIS — Z12.5 SCREENING FOR PROSTATE CANCER: ICD-10-CM

## 2024-01-11 DIAGNOSIS — N18.30 STAGE 3 CHRONIC KIDNEY DISEASE, UNSPECIFIED WHETHER STAGE 3A OR 3B CKD: ICD-10-CM

## 2024-01-11 DIAGNOSIS — K57.90 DIVERTICULOSIS: ICD-10-CM

## 2024-01-11 PROCEDURE — 36415 COLL VENOUS BLD VENIPUNCTURE: CPT | Performed by: INTERNAL MEDICINE

## 2024-01-11 NOTE — PROGRESS NOTES
Patient is a 83 y.o. male who is here for a follow up of hyperlipidemia and hypertension.  Chief Complaint   Patient presents with    Hyperlipidemia    Hypertension         HPI:    Here for mgmt of HTN and hyperlipidemia.  Continues to be off BP meds.  Had a drop in his statin dose.  No dizziness or lightheadedness.  No abdominal pains.  No GI bleeding.     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    Paroxysmal atrial fibrillation    Upper GI bleed    Acute blood loss anemia    Diverticulitis of jejunum       Past Medical History:   Diagnosis Date    A-fib     Chronic kidney disease     History of migraine headaches     Hyperlipidemia     Hypertension     Mitral valve regurgitation     Pericardial effusion     s/p PPM placement    PFO (patent foramen ovale)     Testicular cyst     removal 1970    TIA (transient ischemic attack)        Past Surgical History:   Procedure Laterality Date    BASAL CELL CARCINOMA EXCISION  07/2021    CARDIAC CATHETERIZATION N/A 08/24/2020    Procedure: PERICARDIOCENTESIS;  Surgeon: Dain Nava MD;  Location:  [facility];  Service: Cardiology;  Laterality: N/A;    CARDIAC CATHETERIZATION N/A 10/20/2020    Procedure: PERICARDIOCENTESIS;  Surgeon: Dain Nava MD;  Location:  [facility];  Service: Cardiology;  Laterality: N/A;    CARDIAC  ELECTROPHYSIOLOGY PROCEDURE N/A 08/24/2020    Procedure: PACEMAKER IMPLANTATION- DC;  Surgeon: Dain Nava MD;  Location:  [facility];  Service: Cardiology;  Laterality: N/A;    CATARACT EXTRACTION W/ INTRAOCULAR LENS  IMPLANT, BILATERAL      COLONOSCOPY N/A 04/23/2022    Procedure: COLONOSCOPY;  Surgeon: Brunner, Mark I, MD;  Location:  [facility];  Service: Gastroenterology;  Laterality: N/A;    CYST REMOVAL      right arm    ENDOSCOPY N/A 04/23/2022    Procedure: ESOPHAGOGASTRODUODENOSCOPY;  Surgeon: Brunner, Mark I, MD;  Location:  [facility];  Service: Gastroenterology;  Laterality: N/A;    ENTEROSCOPY SMALL BOWEL N/A 04/25/2022    Procedure: ENTEROSCOPY SMALL BOWEL;  Surgeon: Brunner, Mark I, MD;  Location:  [facility];  Service: Gastroenterology;  Laterality: N/A;    TONSILLECTOMY AND ADENOIDECTOMY  9 yo    WISDOM TOOTH EXTRACTION         Current Outpatient Medications on File Prior to Visit   Medication Sig    ascorbic acid (VITAMIN C) 1000 MG tablet Take 1 tablet by mouth Daily.    atorvastatin (LIPITOR) 20 MG tablet Take 1 tablet by mouth Daily.    coenzyme Q10 100 MG capsule Take 1 capsule by mouth Daily.    glucosamine-chondroitin 500-400 MG capsule capsule Take 1 capsule by mouth Daily.    Omega-3 1000 MG capsule Take 1 capsule by mouth Daily.    rivaroxaban (Xarelto) 15 MG tablet Take 1 tablet by mouth Daily. Do not resume until 5/4/2022    tamsulosin (FLOMAX) 0.4 MG capsule 24 hr capsule Take 1 capsule by mouth Daily.    Zinc 100 MG tablet Take 100 mg by mouth Daily.     No current facility-administered medications on file prior to visit.       Family History   Problem Relation Age of Onset    Heart disease Mother     Heart attack Mother     Heart disease Father     Heart attack Father     Heart disease Brother     Arthritis Other     Hyperlipidemia Other     Stroke Other        Social History     Socioeconomic History    Marital status:    Tobacco Use     "Smoking status: Never    Smokeless tobacco: Current     Types: Chew   Vaping Use    Vaping Use: Never used    Passive vaping exposure: Yes   Substance and Sexual Activity    Alcohol use: Yes     Alcohol/week: 4.0 standard drinks of alcohol     Types: 4 Cans of beer per week     Comment: 4 drinks per month    Drug use: No    Sexual activity: Not Currently     Partners: Female     Birth control/protection: None         Review of Systems   Constitutional:  Negative for activity change, appetite change, chills, fever and unexpected weight change.   HENT:  Negative for congestion, ear pain, hearing loss, rhinorrhea, sinus pressure, sinus pain, sore throat and trouble swallowing.    Eyes:  Negative for photophobia, pain, discharge and itching.   Respiratory:  Negative for cough, chest tightness, shortness of breath and wheezing.    Cardiovascular:  Negative for chest pain, palpitations and leg swelling.   Gastrointestinal:  Negative for abdominal distention, abdominal pain, blood in stool, constipation, diarrhea and vomiting.        Colonoscopy by Dr Schmid   Endocrine: Negative for cold intolerance, heat intolerance, polydipsia, polyphagia and polyuria.   Genitourinary:  Negative for difficulty urinating, dysuria, enuresis, frequency, genital sores, hematuria and urgency.        Followed by Dr Segal   Musculoskeletal:  Positive for arthralgias. Negative for gait problem, joint swelling and myalgias.   Skin:  Negative for pallor, rash and wound.   Allergic/Immunologic: Negative for immunocompromised state.   Neurological:  Positive for weakness. Negative for tremors, seizures, syncope, speech difficulty, numbness and headaches.   Hematological:  Negative for adenopathy. Bruises/bleeds easily.   Psychiatric/Behavioral:  Negative for behavioral problems, dysphoric mood, sleep disturbance and suicidal ideas. The patient is not nervous/anxious.        /80   Pulse 69   Ht 182.9 cm (72.01\")   Wt 79.4 kg (175 lb)   SpO2 " 98%   BMI 23.73 kg/m²       Physical Exam  Constitutional:       Appearance: Normal appearance. He is well-developed.   HENT:      Head: Normocephalic and atraumatic.      Right Ear: External ear normal.      Left Ear: External ear normal.      Nose: Nose normal.      Mouth/Throat:      Mouth: Mucous membranes are moist.      Pharynx: Oropharynx is clear.   Eyes:      Extraocular Movements: Extraocular movements intact.      Conjunctiva/sclera: Conjunctivae normal.      Pupils: Pupils are equal, round, and reactive to light.   Cardiovascular:      Rate and Rhythm: Normal rate and regular rhythm.      Heart sounds: Normal heart sounds.   Pulmonary:      Effort: Pulmonary effort is normal.      Breath sounds: Normal breath sounds.   Abdominal:      General: Bowel sounds are normal.      Palpations: Abdomen is soft.   Musculoskeletal:         General: Normal range of motion.      Cervical back: Normal range of motion and neck supple.   Lymphadenopathy:      Cervical: No cervical adenopathy.   Skin:     General: Skin is warm and dry.      Comments: Scant eccymosis   Neurological:      General: No focal deficit present.      Mental Status: He is alert and oriented to person, place, and time.   Psychiatric:         Mood and Affect: Mood normal.         Behavior: Behavior normal.         Thought Content: Thought content normal.         Procedure:      Discussion/Summary:    htn-stable off meds, advised goal of 150/80  paf-rate controlled, on NOAC  Hyperlipidemia- labs on lipitor at goal, counseled on diet  djd-stable on tylenol  Thrombocytopenia/anemia-cbc normal  TIA-cont rf mod  CKD-recheck today stable, advised fluids and NSAIDS avoidance  Abnormal glucose-labs today noted  Diverticulosis-increase fiber, asymptomatic  Hx of UGI bleed-would be candidate of [facility] sec to high risk on NOAC for recurrent bleed     1/11 labs noted and dw patient    Current Outpatient Medications:     ascorbic acid (VITAMIN C) 1000 MG  tablet, Take 1 tablet by mouth Daily., Disp: , Rfl:     atorvastatin (LIPITOR) 20 MG tablet, Take 1 tablet by mouth Daily., Disp: 90 tablet, Rfl: 3    coenzyme Q10 100 MG capsule, Take 1 capsule by mouth Daily., Disp: , Rfl:     glucosamine-chondroitin 500-400 MG capsule capsule, Take 1 capsule by mouth Daily., Disp: , Rfl:     Omega-3 1000 MG capsule, Take 1 capsule by mouth Daily., Disp: , Rfl:     rivaroxaban (Xarelto) 15 MG tablet, Take 1 tablet by mouth Daily. Do not resume until 5/4/2022, Disp: 30 tablet, Rfl: 0    tamsulosin (FLOMAX) 0.4 MG capsule 24 hr capsule, Take 1 capsule by mouth Daily., Disp: , Rfl:     Zinc 100 MG tablet, Take 100 mg by mouth Daily., Disp: , Rfl:         Diagnoses and all orders for this visit:    1. Mixed hyperlipidemia (Primary)  -     Comprehensive Metabolic Panel  -     Lipid Panel  -     TSH    2. Essential hypertension  -     CBC (No Diff)    3. Diverticulosis    4. Stage 3 chronic kidney disease, unspecified whether stage 3a or 3b CKD    5. Screening for prostate cancer  -     PSA Screen

## 2024-01-12 LAB
ALBUMIN SERPL-MCNC: 4 G/DL (ref 3.7–4.7)
ALBUMIN/GLOB SERPL: 1.9 {RATIO} (ref 1.2–2.2)
ALP SERPL-CCNC: 105 IU/L (ref 44–121)
ALT SERPL-CCNC: 8 IU/L (ref 0–44)
AST SERPL-CCNC: 14 IU/L (ref 0–40)
BILIRUB SERPL-MCNC: 0.7 MG/DL (ref 0–1.2)
BUN SERPL-MCNC: 24 MG/DL (ref 8–27)
BUN/CREAT SERPL: 15 (ref 10–24)
CALCIUM SERPL-MCNC: 9 MG/DL (ref 8.6–10.2)
CHLORIDE SERPL-SCNC: 107 MMOL/L (ref 96–106)
CHOLEST SERPL-MCNC: 139 MG/DL (ref 100–199)
CO2 SERPL-SCNC: 22 MMOL/L (ref 20–29)
CREAT SERPL-MCNC: 1.62 MG/DL (ref 0.76–1.27)
EGFRCR SERPLBLD CKD-EPI 2021: 41 ML/MIN/1.73
ERYTHROCYTE [DISTWIDTH] IN BLOOD BY AUTOMATED COUNT: 12.4 % (ref 11.6–15.4)
GLOBULIN SER CALC-MCNC: 2.1 G/DL (ref 1.5–4.5)
GLUCOSE SERPL-MCNC: 94 MG/DL (ref 70–99)
HCT VFR BLD AUTO: 40.3 % (ref 37.5–51)
HDLC SERPL-MCNC: 69 MG/DL
HGB BLD-MCNC: 13.5 G/DL (ref 13–17.7)
LDLC SERPL CALC-MCNC: 58 MG/DL (ref 0–99)
MCH RBC QN AUTO: 30.8 PG (ref 26.6–33)
MCHC RBC AUTO-ENTMCNC: 33.5 G/DL (ref 31.5–35.7)
MCV RBC AUTO: 92 FL (ref 79–97)
PLATELET # BLD AUTO: 159 X10E3/UL (ref 150–450)
POTASSIUM SERPL-SCNC: 3.9 MMOL/L (ref 3.5–5.2)
PROT SERPL-MCNC: 6.1 G/DL (ref 6–8.5)
PSA SERPL-MCNC: 2.3 NG/ML (ref 0–4)
RBC # BLD AUTO: 4.38 X10E6/UL (ref 4.14–5.8)
SODIUM SERPL-SCNC: 142 MMOL/L (ref 134–144)
TRIGL SERPL-MCNC: 53 MG/DL (ref 0–149)
TSH SERPL DL<=0.005 MIU/L-ACNC: 1.68 UIU/ML (ref 0.45–4.5)
VLDLC SERPL CALC-MCNC: 12 MG/DL (ref 5–40)
WBC # BLD AUTO: 6.5 X10E3/UL (ref 3.4–10.8)

## 2024-02-28 ENCOUNTER — OFFICE VISIT (OUTPATIENT)
Dept: CARDIOLOGY | Facility: CLINIC | Age: 84
End: 2024-02-28
Payer: MEDICARE

## 2024-02-28 VITALS
OXYGEN SATURATION: 96 % | WEIGHT: 177.2 LBS | BODY MASS INDEX: 24 KG/M2 | HEIGHT: 72 IN | SYSTOLIC BLOOD PRESSURE: 142 MMHG | HEART RATE: 70 BPM | DIASTOLIC BLOOD PRESSURE: 84 MMHG

## 2024-02-28 DIAGNOSIS — R55 SYNCOPE AND COLLAPSE: ICD-10-CM

## 2024-02-28 DIAGNOSIS — I48.0 PAROXYSMAL ATRIAL FIBRILLATION: Primary | ICD-10-CM

## 2024-02-28 DIAGNOSIS — I10 ESSENTIAL HYPERTENSION: ICD-10-CM

## 2024-02-28 PROCEDURE — 93280 PM DEVICE PROGR EVAL DUAL: CPT | Performed by: PHYSICIAN ASSISTANT

## 2024-02-28 PROCEDURE — 99213 OFFICE O/P EST LOW 20 MIN: CPT | Performed by: PHYSICIAN ASSISTANT

## 2024-02-28 PROCEDURE — 1159F MED LIST DOCD IN RCRD: CPT | Performed by: PHYSICIAN ASSISTANT

## 2024-02-28 PROCEDURE — 1160F RVW MEDS BY RX/DR IN RCRD: CPT | Performed by: PHYSICIAN ASSISTANT

## 2024-02-28 NOTE — PROGRESS NOTES
Pio Baum  1940  [phone]      02/28/202      [facility] CARDIOLOGY     Lupillo Hill MD  [address] [place] [id number]    Chief Complaint   Patient presents with    Paroxysmal atrial fibrillation    Syncope and collapse         PROBLEM LIST:  Paroxysmal atrial fibrillation/sick sinus syndrome:  CHADS-VASc = 5 (HTN, Age > 75, h/o Stroke), on Eliquis 5 mg BID.   MPS, 01/17/2017: EF 58%, negative, low risk study  Implantation of a dual-chamber permanent pacemaker by Dr. Nava, 08/24/2020.  Pericardiocentesis, 08/24/2020: Successful pericardiocentesis in a patient with pericardial tamponade/effusion post right-sided pacemaker implantation with approximately 270 cc of dark blood removed from the pericardium. Successful external cardioversion of atrial fibrillation with 200 J shock to sinus rhythm. Normal pacemaker function post pericardiocentesis.  Echo, 08/24/2020: There is a small (<1cm) pericardial effusion. Pericardial fluid was drained by the end of the study.  Echo, 08/24/2020: EF 50-60%. There is a trivial pericardial effusion.  Echo, 08/25/2020:  EF 65%. There is a small (<1cm) circumferential pericardial effusion.  Echo, 10/20/2020: EF 55%. There are myxomatous changes of the mitral valve apparatus present. There is bileaflet mitral valve prolapse present. Trace MR and Mild TR. Calculated right ventricular systolic pressure from tricuspid regurgitation is 26 mmHg. There is a large (>2cm) pericardial effusion. Borderline echo criteria for tamponade  Pericardiocentesis, 10/20/2020: Successful pericardiocentesis with removal of approximately 1300 cc of dark venous blood with small clots present reducing the pericardial effusion from 4.1 cm to approximately 1.1 cm via echocardiographic guidance. Successful intracardiac ultrasound monitoring.  Echo, 10/20/2020: There is a moderate (1-2cm) pericardial effusion adjacent to the right ventricle and left  ventricle.  Echo, 10/21/2020: EF 55%. There is a small, mostly posterior, mild to moderate (1 cm) pericardial effusion along the left ventricle. There is no evidence of pericardial tamponade.  Echo, 10/23/2020: EF 55%. There is a small, mostly posterior pericardial effusion which appears less pronounced compared to the study of October 21, 2020. There is no evidence of pericardial tamponade. Cardiac chambers are grossly normal in size.  Echo, 10/26/2020: EF 45%. Left ventricular wall thickness is consistent with concentric hypertrophy. Mild MR. Moderate TR. No significant pericardial effusion is noted. Compared to previous studies the pericardial effusion has almost completely resolved.  Echo, 04/26/2022: EF 50%. Mild right-sided chamber enlargement. Mild mitral regurgitation. Trace aortic insufficiency. Mild tricuspid regurgitation with normal RVSP.  TIA/CVA:  Carotid duplex, 04/20/2016: No significant disease  Echo, 04/20/2016: Normal LV function, positive bubble study. Closure not considered due to need for chronic anticoagulation therapy.  Cardiac event monitor showed atrial fibrillation/flutter with intermittent RVR, aberrancy, IVCD/sinus rhythm with PVCs  Near syncope/syncope:  2 week Zio, 05/16/2019: SR, occasional PAC's and PVC's. Occasional short SVT/PAT, 21 runs at 3-20 beats.  Echo, 05/16/2019: EF 55%. Borderline right-sided chamber enlargement. Mild MR/TR, normal RVSP.  ER presentation 08/28/2023, near syncope.  Negative CT brain.  Hypertension  Dyslipidemia  Oral tobacco abuse  History of migraine headaches  Chronic kidney disease stage II  Surgical history:  Tonsillectomy/adenoictomy         Allergies  Allergies   Allergen Reactions    Penicillins Anaphylaxis, Rash and Unknown (See Comments)     Rash all over body including mouth/throat     Flecainide Other (See Comments) and Unknown (See Comments)     Fatigue, weakness unable to tolerate.     Fluticasone Irritability and Unknown (See Comments)      "Gets a nose bleed as soon as used  Other reaction(s): Irritability       Current Medications    Current Outpatient Medications:     ascorbic acid (VITAMIN C) 1000 MG tablet, Take 1 tablet by mouth Daily., Disp: , Rfl:     atorvastatin (LIPITOR) 20 MG tablet, Take 1 tablet by mouth Daily., Disp: 90 tablet, Rfl: 3    coenzyme Q10 100 MG capsule, Take 1 capsule by mouth Daily., Disp: , Rfl:     glucosamine-chondroitin 500-400 MG capsule capsule, Take 1 capsule by mouth Daily., Disp: , Rfl:     Omega-3 1000 MG capsule, Take 1 capsule by mouth Daily., Disp: , Rfl:     rivaroxaban (Xarelto) 15 MG tablet, Take 1 tablet by mouth Daily., Disp: 90 tablet, Rfl: 2    tamsulosin (FLOMAX) 0.4 MG capsule 24 hr capsule, Take 1 capsule by mouth Daily., Disp: , Rfl:     Zinc 100 MG tablet, Take 100 mg by mouth Daily., Disp: , Rfl:     History of Present Illness     Pt presents for follow up of PAF, bradycardia, PM check, and VVS .Since the pt has seen us in clinic last, pt denies any syncope, SOB, CP, LH, and dizziness. He has had mild atrial fibrillation episodes a couple of times, but overall feeling well. Denies any hospitalizations, ER visits, bleeding, or TIA/CVA symptoms. Overall feels well. BP is well controlled.     ROS:  General:  Denies fatigue, weight gain or loss  Cardiovascular:  Denies CP, PND, syncope, near syncope, edema or palpitations.  Pulmonary:  Denies HINTON, cough, or wheezing    Vitals:    02/28/24 1544   BP: 142/84   BP Location: Right arm   Patient Position: Sitting   Pulse: 70   SpO2: 96%   Weight: 80.4 kg (177 lb 3.2 oz)   Height: 182.9 cm (72\")       PE:  General: NAD  Neck: no JVD, no carotid bruits, no TM  Heart: RRR, NL S1, S2, S4 present, no rubs, murmurs  Lungs: CTA, no wheezes, rhonchi, or rales  Abd: soft, non-tender, NL BS  Ext: No musculoskeletal deformities, no edema, cyanosis, or clubbing  Psych: normal mood and affect    Diagnostic Data:  Procedures           1. Paroxysmal atrial fibrillation  "   2. Syncope and collapse    3. Essential hypertension        PM Interrogation: NL PM fxn, Nl battery fxn, Less than 1% AFIB. Battery 5.5     Plan:  1. PAF<1% PAF on interrogation: doing well     2. Anticoagulation: GI bleed previously with no obvious source: no current bleeding. Tolerating Xarelto currently. He has deferred Watchman Device.     - CHADS-VASc = 5 (HTN, Age > 75, h/o Stroke), continue Xarelto 15 mg daily       3. Syncope: prob VVS and hypotension: improved off BBL: monitor for now.      4. HTN  -Well controlled on current medications.     F/up in 6 months    Electronically signed by KENN Loyd, 02/28/24, 4:05 PM EST.       (1) other risk factor (includes escalating BMI, pack-years of smoking, diabetes requiring insulin, chemotherapy, female gender and length of surgery)

## 2024-03-15 ENCOUNTER — TELEPHONE (OUTPATIENT)
Dept: CARDIOLOGY | Facility: CLINIC | Age: 84
End: 2024-03-15
Payer: MEDICARE

## 2024-03-24 NOTE — PLAN OF CARE
Pt arrived from ED in stable condition; HR in the mid-high 50's at this time; complains of hunger, food tray ordered, otherwise no other complaints or needs at this time; pt is cooperative with care; bed alarm on; will continue to monitor  Problem: Patient Care Overview  Goal: Plan of Care Review  Outcome: Ongoing (interventions implemented as appropriate)  Flowsheets (Taken 8/21/2020 1801)  Plan of Care Reviewed With: patient;spouse      PAST SURGICAL HISTORY:  No significant past surgical history

## 2024-05-31 ENCOUNTER — OFFICE VISIT (OUTPATIENT)
Dept: CARDIOLOGY | Facility: CLINIC | Age: 84
End: 2024-05-31
Payer: MEDICARE

## 2024-05-31 VITALS
BODY MASS INDEX: 23.43 KG/M2 | HEART RATE: 70 BPM | SYSTOLIC BLOOD PRESSURE: 130 MMHG | HEIGHT: 72 IN | WEIGHT: 173 LBS | OXYGEN SATURATION: 98 % | DIASTOLIC BLOOD PRESSURE: 62 MMHG

## 2024-05-31 DIAGNOSIS — I10 ESSENTIAL HYPERTENSION: ICD-10-CM

## 2024-05-31 DIAGNOSIS — I48.0 PAROXYSMAL ATRIAL FIBRILLATION: Primary | ICD-10-CM

## 2024-05-31 DIAGNOSIS — E78.5 DYSLIPIDEMIA: ICD-10-CM

## 2024-05-31 NOTE — PROGRESS NOTES
[facility] Card[facility]    Encounter Date: 2024    Patient ID: Pio Baum is a 84 y.o. male.  : 1940     PCP: Lupillo Hill MD       Chief Complaint: Paroxysmal atrial fibrillation      PROBLEM LIST:  Paroxysmal atrial fibrillation/sick sinus syndrome:  CHADS-VASc = 5 (HTN, Age > 75, h/o Stroke), on Eliquis 5 mg BID.   MPS, 2017: EF 58%, negative, low risk study  Implantation of a dual-chamber permanent pacemaker by Dr. Nava, 2020.  Pericardiocentesis, 2020: Successful pericardiocentesis in a patient with pericardial tamponade/effusion post right-sided pacemaker implantation with approximately 270 cc of dark blood removed from the pericardium. Successful external cardioversion of atrial fibrillation with 200 J shock to sinus rhythm. Normal pacemaker function post pericardiocentesis.  Echo, 2020: There is a small (<1cm) pericardial effusion. Pericardial fluid was drained by the end of the study.  Echo, 2020: EF 50-60%. There is a trivial pericardial effusion.  Echo, 2020:  EF 65%. There is a small (<1cm) circumferential pericardial effusion.  Echo, 10/20/2020: EF 55%. There are myxomatous changes of the mitral valve apparatus present. There is bileaflet mitral valve prolapse present. Trace MR and Mild TR. Calculated right ventricular systolic pressure from tricuspid regurgitation is 26 mmHg. There is a large (>2cm) pericardial effusion. Borderline echo criteria for tamponade  Pericardiocentesis, 10/20/2020: Successful pericardiocentesis with removal of approximately 1300 cc of dark venous blood with small clots present reducing the pericardial effusion from 4.1 cm to approximately 1.1 cm via echocardiographic guidance. Successful intracardiac ultrasound monitoring.  Echo, 10/20/2020: There is a moderate (1-2cm) pericardial effusion adjacent to the right ventricle and left ventricle.  Echo, 10/21/2020: EF 55%. There is a  small, mostly posterior, mild to moderate (1 cm) pericardial effusion along the left ventricle. There is no evidence of pericardial tamponade.  Echo, 10/23/2020: EF 55%. There is a small, mostly posterior pericardial effusion which appears less pronounced compared to the study of October 21, 2020. There is no evidence of pericardial tamponade. Cardiac chambers are grossly normal in size.  Echo, 10/26/2020: EF 45%. Left ventricular wall thickness is consistent with concentric hypertrophy. Mild MR. Moderate TR. No significant pericardial effusion is noted. Compared to previous studies the pericardial effusion has almost completely resolved.  Echo, 04/26/2022: EF 50%. Mild right-sided chamber enlargement. Mild mitral regurgitation. Trace aortic insufficiency. Mild tricuspid regurgitation with normal RVSP.  TIA/CVA:  Carotid duplex, 04/20/2016: No significant disease  Echo, 04/20/2016: Normal LV function, positive bubble study. Closure not considered due to need for chronic anticoagulation therapy.  Cardiac event monitor showed atrial fibrillation/flutter with intermittent RVR, aberrancy, IVCD/sinus rhythm with PVCs  Near syncope/syncope:  2 week Zio, 05/16/2019: SR, occasional PAC's and PVC's. Occasional short SVT/PAT, 21 runs at 3-20 beats.  Echo, 05/16/2019: EF 55%. Borderline right-sided chamber enlargement. Mild MR/TR, normal RVSP.  ER presentation 08/28/2023, near syncope.  Negative CT brain.  Hypertension  Dyslipidemia  Oral tobacco abuse  History of migraine headaches  Chronic kidney disease stage II  Surgical history:  Tonsillectomy/adenoictomy    History of Present Illness  Patient presents today for a follow-up with a history of PAF and cardiac risk factors. Since last visit, patient has been doing well overall from a cardiovascular standpoint. He has not had any recent hospital admissions or ED visits. Patient stays busy and active by doing yard work. He denies chest pain, shortness of breath, orthopnea,  "palpitations, edema, dizziness, and syncope.     Allergies   Allergen Reactions    Penicillins Anaphylaxis, Rash and Unknown (See Comments)     Rash all over body including mouth/throat     Flecainide Other (See Comments) and Unknown (See Comments)     Fatigue, weakness unable to tolerate.     Fluticasone Irritability and Unknown (See Comments)     Gets a nose bleed as soon as used  Other reaction(s): Irritability         Current Outpatient Medications:     ascorbic acid (VITAMIN C) 1000 MG tablet, Take 1 tablet by mouth Daily., Disp: , Rfl:     atorvastatin (LIPITOR) 20 MG tablet, Take 1 tablet by mouth Daily., Disp: 90 tablet, Rfl: 3    coenzyme Q10 100 MG capsule, Take 1 capsule by mouth Daily., Disp: , Rfl:     glucosamine-chondroitin 500-400 MG capsule capsule, Take 1 capsule by mouth Daily., Disp: , Rfl:     Omega-3 1000 MG capsule, Take 1 capsule by mouth Daily., Disp: , Rfl:     rivaroxaban (Xarelto) 15 MG tablet, Take 1 tablet by mouth Daily., Disp: 90 tablet, Rfl: 2    tamsulosin (FLOMAX) 0.4 MG capsule 24 hr capsule, Take 1 capsule by mouth Daily., Disp: , Rfl:     Zinc 100 MG tablet, Take 100 mg by mouth Daily., Disp: , Rfl:     The following portions of the patient's history were reviewed and updated as appropriate: allergies, current medications, past family history, past medical history, past social history, past surgical history and problem list.    ROS  Review of Systems   14 point ROS negative except for that listed in the HPI.         Objective:     /62 (BP Location: Left arm, Patient Position: Sitting)   Pulse 70   Ht 182.9 cm (72\")   Wt 78.5 kg (173 lb)   SpO2 98%   BMI 23.46 kg/m²      Physical Exam  Constitutional: Patient appears well-developed and well-nourished.   HENT: HEENT exam unremarkable.   Neck: Neck supple. No JVD present. No carotid bruits.   Cardiovascular: Normal rate, regular rhythm and normal heart sounds. No murmur heard.   2+ symmetric pulses.   Pulmonary/Chest: " Breath sounds normal. Does not exhibit tenderness.   Abdominal: Abdomen benign.   Musculoskeletal: Does not exhibit edema.   Neurological: Neurological exam unremarkable.   Vitals reviewed.    Data Review:   Lab Results   Component Value Date    GLUCOSE 94 01/11/2024    BUN 24 01/11/2024    CREATININE 1.62 (H) 01/11/2024    EGFR 39.0 (L) 08/28/2023    BCR 15 01/11/2024     01/11/2024    K 3.9 01/11/2024    CO2 22 01/11/2024    CALCIUM 9.0 01/11/2024    ALBUMIN 4.0 01/11/2024    AST 14 01/11/2024    ALT 8 01/11/2024     Lab Results   Component Value Date    CHLPL 139 01/11/2024    TRIG 53 01/11/2024    HDL 69 01/11/2024    LDL 58 01/11/2024      Lab Results   Component Value Date    WBC 6.5 01/11/2024    RBC 4.38 01/11/2024    HGB 13.5 01/11/2024    HCT 40.3 01/11/2024    MCV 92 01/11/2024     01/11/2024     Lab Results   Component Value Date    TSH 1.680 01/11/2024        Procedures       Advance Care Planning   ACP discussion was declined by the patient. Patient does not have an advance directive, declines further assistance.           Assessment:      Diagnosis Plan   1. Paroxysmal atrial fibrillation  Stable and asymptomatic. Continue on Xarelto 15 mg daily for stroke prophylaxis.  Rate controlled without medications      2. Essential hypertension  Well controlled with lifestyle changes.       3. Dyslipidemia  Well controlled. Continue on atorvastatin 20 mg daily for hyperlipidemia.         Plan:   Stable cardiac status.  Active lifestyle, no current angina or CHF symptoms.  No symptoms of palpitations  Continue current medications.   Encouraged to maintain regular activity and heart healthy diet.  FU in 6 MO, sooner as needed.  Thank you for allowing us to participate in the care of your patient.     Scribed for Yahaira Carson MD by Jaelyn Renteria. 5/31/2024 14:20 EDT    I, Yahaira Carson MD, personally performed the services described in this documentation as scribed by the above named individual in my  presence, and it is both accurate and complete.  5/31/2024  14:22 EDT      Please note that portions of this note may have been completed with a voice recognition program. Efforts were made to edit the dictations, but occasionally words are mistranscribed.

## 2024-06-12 RX ORDER — ATORVASTATIN CALCIUM 20 MG/1
20 TABLET, FILM COATED ORAL DAILY
Qty: 90 TABLET | Refills: 3 | Status: SHIPPED | OUTPATIENT
Start: 2024-06-12

## 2024-06-25 RX ORDER — ATORVASTATIN CALCIUM 20 MG/1
20 TABLET, FILM COATED ORAL DAILY
Qty: 90 TABLET | Refills: 3 | Status: SHIPPED | OUTPATIENT
Start: 2024-06-25

## 2024-07-12 ENCOUNTER — LAB (OUTPATIENT)
Dept: INTERNAL MEDICINE | Facility: CLINIC | Age: 84
End: 2024-07-12
Payer: MEDICARE

## 2024-07-12 ENCOUNTER — OFFICE VISIT (OUTPATIENT)
Dept: INTERNAL MEDICINE | Facility: CLINIC | Age: 84
End: 2024-07-12
Payer: MEDICARE

## 2024-07-12 VITALS
BODY MASS INDEX: 22.89 KG/M2 | WEIGHT: 169 LBS | SYSTOLIC BLOOD PRESSURE: 120 MMHG | DIASTOLIC BLOOD PRESSURE: 70 MMHG | HEART RATE: 69 BPM | OXYGEN SATURATION: 97 % | HEIGHT: 72 IN

## 2024-07-12 DIAGNOSIS — I10 ESSENTIAL HYPERTENSION: Primary | ICD-10-CM

## 2024-07-12 DIAGNOSIS — I48.0 PAROXYSMAL ATRIAL FIBRILLATION: ICD-10-CM

## 2024-07-12 DIAGNOSIS — K57.90 DIVERTICULOSIS: ICD-10-CM

## 2024-07-12 DIAGNOSIS — N18.30 STAGE 3 CHRONIC KIDNEY DISEASE, UNSPECIFIED WHETHER STAGE 3A OR 3B CKD: ICD-10-CM

## 2024-07-12 DIAGNOSIS — Z00.00 ROUTINE GENERAL MEDICAL EXAMINATION AT A HEALTH CARE FACILITY: ICD-10-CM

## 2024-07-12 DIAGNOSIS — R35.0 BENIGN PROSTATIC HYPERPLASIA WITH URINARY FREQUENCY: ICD-10-CM

## 2024-07-12 DIAGNOSIS — Z00.00 ENCOUNTER FOR MEDICARE ANNUAL WELLNESS EXAM: ICD-10-CM

## 2024-07-12 DIAGNOSIS — N40.1 BENIGN PROSTATIC HYPERPLASIA WITH URINARY FREQUENCY: ICD-10-CM

## 2024-07-12 LAB
DEPRECATED RDW RBC AUTO: 41.4 FL (ref 37–54)
ERYTHROCYTE [DISTWIDTH] IN BLOOD BY AUTOMATED COUNT: 12.3 % (ref 12.3–15.4)
HCT VFR BLD AUTO: 40.2 % (ref 37.5–51)
HGB BLD-MCNC: 13.7 G/DL (ref 13–17.7)
MCH RBC QN AUTO: 31.6 PG (ref 26.6–33)
MCHC RBC AUTO-ENTMCNC: 34.1 G/DL (ref 31.5–35.7)
MCV RBC AUTO: 92.6 FL (ref 79–97)
PLATELET # BLD AUTO: 179 10*3/MM3 (ref 140–450)
PMV BLD AUTO: 11.3 FL (ref 6–12)
RBC # BLD AUTO: 4.34 10*6/MM3 (ref 4.14–5.8)
WBC NRBC COR # BLD AUTO: 8.74 10*3/MM3 (ref 3.4–10.8)

## 2024-07-12 PROCEDURE — 36415 COLL VENOUS BLD VENIPUNCTURE: CPT | Performed by: INTERNAL MEDICINE

## 2024-07-12 PROCEDURE — 80061 LIPID PANEL: CPT | Performed by: INTERNAL MEDICINE

## 2024-07-12 PROCEDURE — 99397 PER PM REEVAL EST PAT 65+ YR: CPT | Performed by: INTERNAL MEDICINE

## 2024-07-12 PROCEDURE — 84443 ASSAY THYROID STIM HORMONE: CPT | Performed by: INTERNAL MEDICINE

## 2024-07-12 PROCEDURE — 85027 COMPLETE CBC AUTOMATED: CPT | Performed by: INTERNAL MEDICINE

## 2024-07-12 PROCEDURE — 1126F AMNT PAIN NOTED NONE PRSNT: CPT | Performed by: INTERNAL MEDICINE

## 2024-07-12 PROCEDURE — 99213 OFFICE O/P EST LOW 20 MIN: CPT | Performed by: INTERNAL MEDICINE

## 2024-07-12 PROCEDURE — 1170F FXNL STATUS ASSESSED: CPT | Performed by: INTERNAL MEDICINE

## 2024-07-12 PROCEDURE — 80053 COMPREHEN METABOLIC PANEL: CPT | Performed by: INTERNAL MEDICINE

## 2024-07-12 PROCEDURE — 1160F RVW MEDS BY RX/DR IN RCRD: CPT | Performed by: INTERNAL MEDICINE

## 2024-07-12 PROCEDURE — 1159F MED LIST DOCD IN RCRD: CPT | Performed by: INTERNAL MEDICINE

## 2024-07-12 PROCEDURE — G0439 PPPS, SUBSEQ VISIT: HCPCS | Performed by: INTERNAL MEDICINE

## 2024-07-12 RX ORDER — FINASTERIDE 5 MG/1
5 TABLET, FILM COATED ORAL DAILY
Qty: 90 TABLET | Refills: 3 | Status: SHIPPED | OUTPATIENT
Start: 2024-07-12

## 2024-07-12 NOTE — PROGRESS NOTES
The ABCs of the Annual Wellness Visit  Subsequent Medicare Wellness Visit    Chief Complaint   Patient presents with    Annual Exam      Subjective    History of Present Illness:  Pio Baum is a 84 y.o. male who presents for a Subsequent Medicare Wellness Visit.    Today c/o 1 year hx of increased nocturia.  Getting every 2 hours to urinate.  No issues during the day time.  No dysuria or hematuria.  Followed by Urology and has been on alpha blocker.     The following portions of the patient's history were reviewed and   updated as appropriate: current medications, past family history, past medical history, past social history, past surgical history, and problem list.     Compared to one year ago, the patient feels his physical   health is better.    Compared to one year ago, the patient feels his mental   health is better.    Recent Hospitalizations:  He was not admitted to the hospital during the last year.       Current Medical Providers:  Patient Care Team:  Lupillo Hill MD as PCP - General  Dain Nava MD as Consulting Physician (Cardiac Electrophysiology)  Rex Cristobal PA as Physician Assistant (Cardiology)    Outpatient Medications Prior to Visit   Medication Sig Dispense Refill    ascorbic acid (VITAMIN C) 1000 MG tablet Take 1 tablet by mouth Daily.      atorvastatin (LIPITOR) 20 MG tablet Take 1 tablet by mouth Daily. 90 tablet 3    coenzyme Q10 100 MG capsule Take 1 capsule by mouth Daily.      glucosamine-chondroitin 500-400 MG capsule capsule Take 1 capsule by mouth Daily.      Omega-3 1000 MG capsule Take 1 capsule by mouth Daily.      rivaroxaban (Xarelto) 15 MG tablet Take 1 tablet by mouth Daily. 90 tablet 2    tamsulosin (FLOMAX) 0.4 MG capsule 24 hr capsule Take 1 capsule by mouth Daily.      Zinc 100 MG tablet Take 100 mg by mouth Daily.       No facility-administered medications prior to visit.       No opioid medication identified on active medication list. I have  reviewed chart for other potential  high risk medication/s and harmful drug interactions in the elderly.        Aspirin is not on active medication list.  Aspirin use is contraindicated for this patient due to: current use of Eliquis.  .    Fall Risk Assessment was completed, and patient is at low risk for falls.      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  ACP discussion was held with the patient during this visit. Patient has an advance directive in EMR which is still valid.     Review of Systems   Constitutional:  Negative for activity change, appetite change, chills, fever and unexpected weight change.   HENT:  Negative for congestion, ear pain, hearing loss, rhinorrhea, sinus pressure, sinus pain, sore throat and trouble swallowing.    Eyes:  Negative for photophobia, pain, discharge and itching.   Respiratory:  Negative for cough, chest tightness, shortness of breath and wheezing.    Cardiovascular:  Negative for chest pain, palpitations and leg swelling.   Gastrointestinal:  Negative for abdominal distention, abdominal pain, blood in stool, constipation, diarrhea and vomiting.        Colonoscopy by Dr Schmid  "  Endocrine: Negative for cold intolerance, heat intolerance, polydipsia, polyphagia and polyuria.   Genitourinary:  Negative for difficulty urinating, dysuria, enuresis, frequency, genital sores, hematuria and urgency.        Followed by Dr Segal  nocturia   Musculoskeletal:  Positive for arthralgias. Negative for gait problem, joint swelling and myalgias.   Skin:  Negative for pallor, rash and wound.   Allergic/Immunologic: Negative for immunocompromised state.   Neurological:  Negative for tremors, seizures, syncope, speech difficulty, numbness and headaches.   Hematological:  Negative for adenopathy. Bruises/bleeds easily.   Psychiatric/Behavioral:  Negative for behavioral problems, dysphoric mood, sleep disturbance and suicidal ideas. The patient is not nervous/anxious.          Objective       Vitals:    07/12/24 1403 07/12/24 1432   BP: 134/62 120/70   BP Location: Left arm    Patient Position: Sitting    Pulse: 69    SpO2: 97%    Weight: 76.7 kg (169 lb)    Height: 182.9 cm (72.01\")    PainSc: 0-No pain      BMI Readings from Last 1 Encounters:   07/12/24 22.92 kg/m²   BMI is within normal parameters. No follow-up required.    Does the patient have evidence of cognitive impairment? No    Physical Exam  Constitutional:       Appearance: Normal appearance. He is well-developed.   HENT:      Head: Normocephalic and atraumatic.      Right Ear: External ear normal.      Left Ear: External ear normal.      Nose: Nose normal.      Mouth/Throat:      Mouth: Mucous membranes are moist.      Pharynx: Oropharynx is clear.   Eyes:      Extraocular Movements: Extraocular movements intact.      Conjunctiva/sclera: Conjunctivae normal.      Pupils: Pupils are equal, round, and reactive to light.   Cardiovascular:      Rate and Rhythm: Normal rate and regular rhythm.      Heart sounds: Normal heart sounds.   Pulmonary:      Effort: Pulmonary effort is normal.      Breath sounds: Normal breath sounds.   Abdominal:      " General: Bowel sounds are normal.      Palpations: Abdomen is soft.   Musculoskeletal:         General: Normal range of motion.      Cervical back: Normal range of motion and neck supple.   Lymphadenopathy:      Cervical: No cervical adenopathy.   Skin:     General: Skin is warm and dry.      Comments: Scant eccymosis   Neurological:      General: No focal deficit present.      Mental Status: He is alert and oriented to person, place, and time.   Psychiatric:         Mood and Affect: Mood normal.         Behavior: Behavior normal.         Thought Content: Thought content normal.                 HEALTH RISK ASSESSMENT    Smoking Status:  Social History     Tobacco Use   Smoking Status Never    Passive exposure: Current   Smokeless Tobacco Current    Types: Chew     Alcohol Consumption:  Social History     Substance and Sexual Activity   Alcohol Use Yes    Alcohol/week: 4.0 standard drinks of alcohol    Types: 4 Cans of beer per week    Comment: 4 drinks per month     Fall Risk Screen:    ALYSHA Fall Risk Assessment was completed, and patient is at LOW risk for falls.Assessment completed on:2024    Depression Screenin/12/2024     2:00 PM   PHQ-2/PHQ-9 Depression Screening   Little Interest or Pleasure in Doing Things 0-->not at all   Feeling Down, Depressed or Hopeless 0-->not at all   PHQ-9: Brief Depression Severity Measure Score 0       Health Habits and Functional and Cognitive Screenin/12/2024     2:00 PM   Functional & Cognitive Status   Do you have difficulty preparing food and eating? No   Do you have difficulty bathing yourself, getting dressed or grooming yourself? No   Do you have difficulty using the toilet? No   Do you have difficulty moving around from place to place? No   Do you have trouble with steps or getting out of a bed or a chair? No   Current Diet Well Balanced Diet   Dental Exam Up to date   Eye Exam Up to date   Exercise (times per week) 7 times per week   Current  Exercises Include Walking   Do you need help using the phone?  No   Are you deaf or do you have serious difficulty hearing?  No   Do you need help to go to places out of walking distance? No   Do you need help shopping? No   Do you need help preparing meals?  No   Do you need help with housework?  No   Do you need help with laundry? No   Do you need help taking your medications? No   Do you need help managing money? No   Do you ever drive or ride in a car without wearing a seat belt? No   Have you felt unusual stress, anger or loneliness in the last month? No   Who do you live with? Spouse   If you need help, do you have trouble finding someone available to you? No   Have you been bothered in the last four weeks by sexual problems? No   Do you have difficulty concentrating, remembering or making decisions? No       Age-appropriate Screening Schedule:  Refer to the list below for future screening recommendations based on patient's age, sex and/or medical conditions. Orders for these recommended tests are listed in the plan section. The patient has been provided with a written plan.    Health Maintenance   Topic Date Due    TDAP/TD VACCINES (1 - Tdap) Never done    ZOSTER VACCINE (1 of 2) Never done    RSV Vaccine - Adults (1 - 1-dose 60+ series) Never done    COVID-19 Vaccine (1 - 2023-24 season) Never done    INFLUENZA VACCINE  08/01/2024    LIPID PANEL  01/11/2025    ANNUAL WELLNESS VISIT  07/12/2025    Pneumococcal Vaccine 65+  Completed              Assessment & Plan     CMS Preventative Services Quick Reference  Risk Factors Identified During Encounter  Immunizations Discussed/Encouraged: Tdap, Shingrix, COVID19, and RSV (Respiratory Syncytial Virus)  Inactivity/Sedentary: Patient was advised to exercise at least 150 minutes a week per CDC recommendations.  Polypharmacy: Medication List reviewed and Medications are appropriate for patient  The above risks/problems have been discussed with the patient.  Follow up  actions/plans if indicated are seen below in the Assessment/Plan Section.  Pertinent information has been shared with the patient in the After Visit Summary.    Diagnoses and all orders for this visit:    1. Essential hypertension (Primary)    2. Paroxysmal atrial fibrillation    3. Diverticulosis    4. Stage 3 chronic kidney disease, unspecified whether stage 3a or 3b CKD    5. Encounter for Medicare annual wellness exam  -     CBC (No Diff)  -     Comprehensive Metabolic Panel  -     Lipid Panel  -     TSH    6. Routine general medical examination at a health care facility  -     CBC (No Diff)  -     Comprehensive Metabolic Panel  -     Lipid Panel  -     TSH    7. Benign prostatic hyperplasia with urinary frequency  -     finasteride (Proscar) 5 MG tablet; Take 1 tablet by mouth Daily.  Dispense: 90 tablet; Refill: 3        Follow Up:   Return in about 4 months (around 11/12/2024) for Recheck.     An After Visit Summary and PPPS were given to the patient.           HME-counseled on diet and exercise, fasting labs today  htn-stable off meds, advised goal of 150/80  paf-rate controlled, on NOAC  Hyperlipidemia- labs on lipitor today at goal, counseled on diet  djd-stable on tylenol  Thrombocytopenia/anemia-cbc today dw patient  TIA-cont rf mod  CKD-recheck today stable, advised fluids and NSAIDS avoidance  Abnormal glucose-labs today   Diverticulosis-increase fiber, asymptomatic  Hx of UGI bleed-would be candidate of Watchman sec to high risk on NOAC for recurrent bleed  BPH-psa noted, add finasteride to flomax     7/12 labs noted and dw patient    Reviewed the following with the patient: advised patient to avoid alcoholic beverages, encouraged patient to exercise 5-7 days per week for 30 minutes at a time, ideal body weight discussed with patient, and weight loss encouraged.

## 2024-07-13 LAB
ALBUMIN SERPL-MCNC: 4.3 G/DL (ref 3.5–5.2)
ALBUMIN/GLOB SERPL: 1.9 G/DL
ALP SERPL-CCNC: 102 U/L (ref 39–117)
ALT SERPL W P-5'-P-CCNC: 9 U/L (ref 1–41)
ANION GAP SERPL CALCULATED.3IONS-SCNC: 11.3 MMOL/L (ref 5–15)
AST SERPL-CCNC: 16 U/L (ref 1–40)
BILIRUB SERPL-MCNC: 0.4 MG/DL (ref 0–1.2)
BUN SERPL-MCNC: 21 MG/DL (ref 8–23)
BUN/CREAT SERPL: 12.1 (ref 7–25)
CALCIUM SPEC-SCNC: 9.2 MG/DL (ref 8.6–10.5)
CHLORIDE SERPL-SCNC: 109 MMOL/L (ref 98–107)
CHOLEST SERPL-MCNC: 130 MG/DL (ref 0–200)
CO2 SERPL-SCNC: 21.7 MMOL/L (ref 22–29)
CREAT SERPL-MCNC: 1.74 MG/DL (ref 0.76–1.27)
EGFRCR SERPLBLD CKD-EPI 2021: 37.7 ML/MIN/1.73
GLOBULIN UR ELPH-MCNC: 2.3 GM/DL
GLUCOSE SERPL-MCNC: 107 MG/DL (ref 65–99)
HDLC SERPL-MCNC: 64 MG/DL (ref 40–60)
LDLC SERPL CALC-MCNC: 47 MG/DL (ref 0–100)
LDLC/HDLC SERPL: 0.71 {RATIO}
POTASSIUM SERPL-SCNC: 3.6 MMOL/L (ref 3.5–5.2)
PROT SERPL-MCNC: 6.6 G/DL (ref 6–8.5)
SODIUM SERPL-SCNC: 142 MMOL/L (ref 136–145)
TRIGL SERPL-MCNC: 102 MG/DL (ref 0–150)
TSH SERPL DL<=0.05 MIU/L-ACNC: 1.04 UIU/ML (ref 0.27–4.2)
VLDLC SERPL-MCNC: 19 MG/DL (ref 5–40)

## 2024-09-23 RX ORDER — RIVAROXABAN 15 MG/1
15 TABLET, FILM COATED ORAL DAILY
Qty: 90 TABLET | Refills: 2 | Status: SHIPPED | OUTPATIENT
Start: 2024-09-23

## 2024-09-23 NOTE — PROGRESS NOTES
[facility] at [facility]  IP Progress Note      Chief Complaint/Reason for service: Recurrent near syncope and syncope with severe symptomatic bradycardia    Subjective   Subjective: The patient is lying in bed talking to his wife.  States he feels well.  He had questions about the pacemaker insertion tomorrow.  He denies chest pain or shortness of breath    Past medical, surgical, social and family history reviewed in the patient's electronic medical record.    Objective     Vital Sign Min/Max for last 24 hours  Temp  Min: 97.5 °F (36.4 °C)  Max: 98.2 °F (36.8 °C)   BP  Min: 141/76  Max: 153/85   Pulse  Min: 39  Max: 58   Resp  Min: 18  Max: 18   SpO2  Min: 94 %  Max: 100 %   No data recorded    No intake or output data in the 24 hours ending 08/23/20 0914          Current Facility-Administered Medications:   •  atorvastatin (LIPITOR) tablet 40 mg, 40 mg, Oral, Nightly, Chad Moya MD, 40 mg at 08/22/20 2336  •  guaiFENesin (MUCINEX) 12 hr tablet 600 mg, 600 mg, Oral, Q12H, Chad Moya MD, Stopped at 08/21/20 1628  •  heparin (porcine) 5000 UNIT/ML injection 5,000 Units, 5,000 Units, Subcutaneous, Q8H, Chad Moya MD, 5,000 Units at 08/23/20 0549  •  melatonin tablet 5 mg, 5 mg, Oral, Nightly PRN, Chad Moya MD  •  ondansetron (ZOFRAN) injection 2 mg, 2 mg, Intravenous, Q4H PRN, Chad Moya MD  •  sodium chloride 0.9 % flush 10 mL, 10 mL, Intravenous, PRN, Sourav Velasco MD, 10 mL at 08/22/20 2337  •  tamsulosin (FLOMAX) 24 hr capsule 0.4 mg, 0.4 mg, Oral, Daily, Chad Moya MD, 0.4 mg at 08/22/20 0918    Physical Exam: General well-developed well-nourished male who looks younger than stated age resting heart rate 45        HEENT: No JVP       Respiratory: Clear anteriorly       Cardiovascular: Bradycardic without murmur and no edema          Neuro: Facial expressions are symmetrical moves all 4 extremities       Skin: Warm and  dry with no edema to palpation       Psych: Pleasant affect    Results Review: The heart rates ranging from 39-50.  Most readings are in the low 40s.  Blood pressure is good.  Intake exceeds output by 515.  No blood work was obtained today.    Radiology Results:  Imaging Results (Last 72 Hours)     Procedure Component Value Units Date/Time    XR Chest 1 View [[id number]] Collected:  08/21/20 1353     Updated:  08/21/20 1357    Narrative:       EXAMINATION: XR CHEST 1 VW-      INDICATION: Weak/Dizzy/AMS triage protocol      COMPARISON: May 7, 2019     FINDINGS: Mild cardiac enlargement. No pleural effusion or pneumothorax.  No focal consolidation.       Impression:       Mild cardiac enlargement without evidence of additional  acute cardiopulmonary abnormality.     This report was finalized on 8/21/2020 1:54 PM by Rex Covington.             EKG: Sinus bradycardia    ECHO: Previous known normal EF    Tele: Significant sinus bradycardia with heart rates 39 to low 40s    Assessment   Assessment/Plan: 1 syncope with significant symptomatic bradycardia-the patient will receive dual-chamber pacemaker implantation tomorrow with Dr. Nava.  Will DC heparin after midnight.  He was on Eliquis at home for PAF which is on hold    Chad Moya MD  08/23/20  09:14     stated

## 2024-11-15 ENCOUNTER — OFFICE VISIT (OUTPATIENT)
Dept: INTERNAL MEDICINE | Facility: CLINIC | Age: 84
End: 2024-11-15
Payer: MEDICARE

## 2024-11-15 VITALS
SYSTOLIC BLOOD PRESSURE: 130 MMHG | HEIGHT: 72 IN | OXYGEN SATURATION: 98 % | BODY MASS INDEX: 23.24 KG/M2 | WEIGHT: 171.6 LBS | DIASTOLIC BLOOD PRESSURE: 78 MMHG | HEART RATE: 58 BPM

## 2024-11-15 DIAGNOSIS — R35.0 BENIGN PROSTATIC HYPERPLASIA WITH URINARY FREQUENCY: ICD-10-CM

## 2024-11-15 DIAGNOSIS — E78.5 DYSLIPIDEMIA: ICD-10-CM

## 2024-11-15 DIAGNOSIS — K57.90 DIVERTICULOSIS: ICD-10-CM

## 2024-11-15 DIAGNOSIS — N40.1 BENIGN PROSTATIC HYPERPLASIA WITH URINARY FREQUENCY: ICD-10-CM

## 2024-11-15 DIAGNOSIS — N18.30 STAGE 3 CHRONIC KIDNEY DISEASE, UNSPECIFIED WHETHER STAGE 3A OR 3B CKD: ICD-10-CM

## 2024-11-15 DIAGNOSIS — I10 ESSENTIAL HYPERTENSION: ICD-10-CM

## 2024-11-15 DIAGNOSIS — I48.0 PAROXYSMAL ATRIAL FIBRILLATION: Primary | ICD-10-CM

## 2024-11-15 NOTE — PROGRESS NOTES
Patient is a 84 y.o. male who is here for a follow up of hyperlipidemia and hypertension.  Chief Complaint   Patient presents with    Hyperlipidemia    Hypertension         HPI:    Here for mgmt of HTN and hyperlipidemia.  Dealing with arthritis.  Using tylenol prn.  No dizziness or lightheadedness.  No abdominal pains.  Appetite is good.  Energy level is good.  No HAs.     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    Paroxysmal atrial fibrillation    Upper GI bleed    Acute blood loss anemia    Diverticulitis of jejunum    Benign prostatic hyperplasia with urinary frequency       Past Medical History:   Diagnosis Date    A-fib     Chronic kidney disease     History of migraine headaches     Hyperlipidemia     Hypertension     Mitral valve regurgitation     Pericardial effusion     s/p PPM placement    PFO (patent foramen ovale)     Testicular cyst     removal 1970    TIA (transient ischemic attack)        Past Surgical History:   Procedure Laterality Date    BASAL CELL CARCINOMA EXCISION  07/2021    CARDIAC CATHETERIZATION N/A 08/24/2020    Procedure: PERICARDIOCENTESIS;  Surgeon: Dain Nava MD;  Location: [facility] EP INVASIVE LOCATION;  Service: Cardiology;  Laterality: N/A;    CARDIAC CATHETERIZATION N/A 10/20/2020    Procedure: PERICARDIOCENTESIS;  Surgeon: Dain Nava MD;  Location: [facility] EP INVASIVE  LOCATION;  Service: Cardiology;  Laterality: N/A;    CARDIAC ELECTROPHYSIOLOGY PROCEDURE N/A 08/24/2020    Procedure: PACEMAKER IMPLANTATION- DC;  Surgeon: Dain Nava MD;  Location:  [facility] EP INVASIVE LOCATION;  Service: Cardiology;  Laterality: N/A;    CATARACT EXTRACTION W/ INTRAOCULAR LENS  IMPLANT, BILATERAL      COLONOSCOPY N/A 04/23/2022    Procedure: COLONOSCOPY;  Surgeon: Brunner, Mark I, MD;  Location:  [facility];  Service: Gastroenterology;  Laterality: N/A;    CYST REMOVAL      right arm    ENDOSCOPY N/A 04/23/2022    Procedure: ESOPHAGOGASTRODUODENOSCOPY;  Surgeon: Brunner, Mark I, MD;  Location:  [facility];  Service: Gastroenterology;  Laterality: N/A;    ENTEROSCOPY SMALL BOWEL N/A 04/25/2022    Procedure: ENTEROSCOPY SMALL BOWEL;  Surgeon: Brunner, Mark I, MD;  Location:  [facility];  Service: Gastroenterology;  Laterality: N/A;    TONSILLECTOMY AND ADENOIDECTOMY  9 yo    WISDOM TOOTH EXTRACTION         Current Outpatient Medications on File Prior to Visit   Medication Sig    ascorbic acid (VITAMIN C) 1000 MG tablet Take 1 tablet by mouth Daily.    atorvastatin (LIPITOR) 20 MG tablet Take 1 tablet by mouth Daily.    coenzyme Q10 100 MG capsule Take 1 capsule by mouth Daily.    finasteride (Proscar) 5 MG tablet Take 1 tablet by mouth Daily.    glucosamine-chondroitin 500-400 MG capsule capsule Take 1 capsule by mouth Daily.    Omega-3 1000 MG capsule Take 1 capsule by mouth Daily.    tamsulosin (FLOMAX) 0.4 MG capsule 24 hr capsule Take 1 capsule by mouth Daily.    Xarelto 15 MG tablet TAKE 1 TABLET BY MOUTH DAILY.    Zinc 100 MG tablet Take 100 mg by mouth Daily.     No current facility-administered medications on file prior to visit.       Family History   Problem Relation Age of Onset    Heart disease Mother     Heart attack Mother     Heart disease Father     Heart attack Father     Heart disease Brother     Arthritis Other     Hyperlipidemia Other     Stroke Other         Social History     Socioeconomic History    Marital status:    Tobacco Use    Smoking status: Never     Passive exposure: Current    Smokeless tobacco: Current     Types: Chew   Vaping Use    Vaping status: Never Used    Passive vaping exposure: Yes   Substance and Sexual Activity    Alcohol use: Yes     Alcohol/week: 4.0 standard drinks of alcohol     Types: 4 Cans of beer per week     Comment: 4 drinks per month    Drug use: No    Sexual activity: Not Currently     Partners: Female     Birth control/protection: None         Review of Systems   Constitutional:  Negative for activity change, appetite change, chills, fever and unexpected weight change.   HENT:  Negative for congestion, ear pain, hearing loss, rhinorrhea, sinus pressure, sinus pain, sore throat and trouble swallowing.    Eyes:  Negative for photophobia, pain, discharge and itching.   Respiratory:  Negative for cough, chest tightness, shortness of breath and wheezing.    Cardiovascular:  Negative for chest pain, palpitations and leg swelling.   Gastrointestinal:  Negative for abdominal distention, abdominal pain, blood in stool, constipation, diarrhea and vomiting.        Colonoscopy by Dr Schmid   Endocrine: Negative for cold intolerance, heat intolerance, polydipsia, polyphagia and polyuria.   Genitourinary:  Negative for difficulty urinating, dysuria, enuresis, frequency, genital sores, hematuria and urgency.        Followed by Dr Segal  nocturia   Musculoskeletal:  Positive for arthralgias. Negative for gait problem, joint swelling and myalgias.   Skin:  Negative for pallor, rash and wound.   Allergic/Immunologic: Negative for immunocompromised state.   Neurological:  Negative for tremors, seizures, syncope, speech difficulty, numbness and headaches.   Hematological:  Negative for adenopathy. Bruises/bleeds easily.   Psychiatric/Behavioral:  Negative for behavioral problems, dysphoric mood, sleep disturbance and suicidal ideas. The  "patient is not nervous/anxious.        /78   Pulse 58   Ht 182.9 cm (72.01\")   Wt 77.8 kg (171 lb 9.6 oz)   SpO2 98%   BMI 23.27 kg/m²       Physical Exam  Constitutional:       Appearance: Normal appearance. He is well-developed.   HENT:      Head: Normocephalic and atraumatic.      Right Ear: External ear normal.      Left Ear: External ear normal.      Nose: Nose normal.      Mouth/Throat:      Mouth: Mucous membranes are moist.      Pharynx: Oropharynx is clear.   Eyes:      Extraocular Movements: Extraocular movements intact.      Conjunctiva/sclera: Conjunctivae normal.      Pupils: Pupils are equal, round, and reactive to light.   Cardiovascular:      Rate and Rhythm: Normal rate and regular rhythm.      Heart sounds: Normal heart sounds.   Pulmonary:      Effort: Pulmonary effort is normal.      Breath sounds: Normal breath sounds.   Abdominal:      General: Bowel sounds are normal.      Palpations: Abdomen is soft.   Musculoskeletal:         General: Normal range of motion.      Cervical back: Normal range of motion and neck supple.   Lymphadenopathy:      Cervical: No cervical adenopathy.   Skin:     General: Skin is warm and dry.      Comments: Scant eccymosis   Neurological:      General: No focal deficit present.      Mental Status: He is alert and oriented to person, place, and time.   Psychiatric:         Mood and Affect: Mood normal.         Behavior: Behavior normal.         Thought Content: Thought content normal.         Procedure:      Historical Data:    htn-stable off meds, advised goal of 150/80  paf-rate controlled, on NOAC  Hyperlipidemia- labs on lipitor at goal, counseled on diet  djd-stable on tylenol  Thrombocytopenia/anemia-cbc dw patient  TIA-cont rf mod  CKD-recheck stable, advised fluids and NSAIDS avoidance  Abnormal glucose-labs at goal  Diverticulosis-increase fiber, asymptomatic  Hx of UGI bleed-would be candidate of Watchman sec to high risk on NOAC for recurrent " bleed  BPH-psa noted, cont flomax     7/12 labs noted and dw patient    Current Outpatient Medications:     ascorbic acid (VITAMIN C) 1000 MG tablet, Take 1 tablet by mouth Daily., Disp: , Rfl:     atorvastatin (LIPITOR) 20 MG tablet, Take 1 tablet by mouth Daily., Disp: 90 tablet, Rfl: 3    coenzyme Q10 100 MG capsule, Take 1 capsule by mouth Daily., Disp: , Rfl:     finasteride (Proscar) 5 MG tablet, Take 1 tablet by mouth Daily., Disp: 90 tablet, Rfl: 3    glucosamine-chondroitin 500-400 MG capsule capsule, Take 1 capsule by mouth Daily., Disp: , Rfl:     Omega-3 1000 MG capsule, Take 1 capsule by mouth Daily., Disp: , Rfl:     tamsulosin (FLOMAX) 0.4 MG capsule 24 hr capsule, Take 1 capsule by mouth Daily., Disp: , Rfl:     Xarelto 15 MG tablet, TAKE 1 TABLET BY MOUTH DAILY., Disp: 90 tablet, Rfl: 2    Zinc 100 MG tablet, Take 100 mg by mouth Daily., Disp: , Rfl:         Diagnoses and all orders for this visit:    1. Paroxysmal atrial fibrillation (Primary)    2. Essential hypertension    3. Dyslipidemia    4. Diverticulosis    5. Stage 3 chronic kidney disease, unspecified whether stage 3a or 3b CKD    6. Benign prostatic hyperplasia with urinary frequency

## 2025-01-08 ENCOUNTER — OFFICE VISIT (OUTPATIENT)
Dept: CARDIOLOGY | Facility: CLINIC | Age: 85
End: 2025-01-08
Payer: MEDICARE

## 2025-01-08 VITALS
BODY MASS INDEX: 26.98 KG/M2 | DIASTOLIC BLOOD PRESSURE: 62 MMHG | SYSTOLIC BLOOD PRESSURE: 132 MMHG | HEART RATE: 71 BPM | HEIGHT: 68 IN | WEIGHT: 178 LBS | OXYGEN SATURATION: 97 %

## 2025-01-08 DIAGNOSIS — Z86.73 HISTORY OF TIA (TRANSIENT ISCHEMIC ATTACK) AND STROKE: ICD-10-CM

## 2025-01-08 DIAGNOSIS — I48.0 PAROXYSMAL ATRIAL FIBRILLATION: Primary | ICD-10-CM

## 2025-01-08 DIAGNOSIS — R55 SYNCOPE AND COLLAPSE: ICD-10-CM

## 2025-01-08 DIAGNOSIS — I10 ESSENTIAL HYPERTENSION: ICD-10-CM

## 2025-01-08 NOTE — PROGRESS NOTES
Pio Baum  1940  [phone]    01/08/2025    [facility] CARDIOLOGY     Referring Provider: No ref. provider found     Lupillo Hill MD  [address]  [facility] [address]    Chief Complaint   Patient presents with    Paroxysmal atrial fibrillation       Problem List:  Paroxysmal atrial fibrillation:  CHADS-VASc = 5 (HTN, Age > 75, h/o Stroke), on Eliquis 5 mg BID.   Implantation of a dual-chamber permanent pacemaker by Dr. Nava, 08/24/2020. s/p ericardiocentesis, 08/24/2020  Echocardiogram, 08/25/2020:  EF 65%. There is a small (<1cm) circumferential pericardial effusion.  Echocardiogram, 10/20/2020: EF 55%. There are myxomatous changes of the mitral valve apparatus present. There is bileaflet mitral valve prolapse present. Trace MR and Mild TR. Calculated right ventricular systolic pressure from tricuspid regurgitation is 26 mmHg. There is a large (>2cm) pericardial effusion. Borderline echo criteria for tamponade  Pericardiocentesis, 10/20/2020.  Echocardiogram, 10/23/2020: EF 55%. There is a small, mostly posterior pericardial effusion which appears less pronounced compared to the study of October 21, 2020. There is no evidence of pericardial tamponade. Cardiac chambers are grossly normal in size.  Echocardiogram, 10/26/2020: EF 45%. Left ventricular wall thickness is consistent with concentric hypertrophy. Mild MR. Moderate TR. No significant pericardial effusion is noted.  TIA/CVA:  Carotid duplex, 04/20/2016: No significant disease  Echocardiogram, 04/20/2016: Normal LV function, positive bubble study. Closure not considered due to need for chronic anticoagulation therapy.  Cardiac event monitor showed atrial fibrillation/flutter with intermittent RVR, aberrancy, IVCD/sinus rhythm with PVCs  Syncope:  2 week Zio, 05/16/2019: SR, occasional PAC's and PVC's. Occasional short SVT/PAT, 21 runs at 3-20 beats.  Echocardiogram, 05/16/2019: EF 55%. Borderline  right-sided chamber enlargement. Mild MR/TR, normal RVSP.  GI bleed   3 u PRBC/2 iron infusions: GI evaluation with no obvious sources: NL EGD, colonscopy + diverticulosis + pill camera  Hypertension  Dyslipidemia  Oral tobacco abuse  History of migraine headaches  Chronic kidney disease stage II  Surgical history:  Tonsillectomy/adenoictomy    Allergies  Allergies   Allergen Reactions    Penicillins Anaphylaxis, Rash and Unknown (See Comments)     Rash all over body including mouth/throat     Flecainide Other (See Comments) and Unknown (See Comments)     Fatigue, weakness unable to tolerate.     Fluticasone Irritability and Unknown (See Comments)     Gets a nose bleed as soon as used  Other reaction(s): Irritability       Current Medications    Current Outpatient Medications:     ascorbic acid (VITAMIN C) 1000 MG tablet, Take 1 tablet by mouth Daily., Disp: , Rfl:     atorvastatin (LIPITOR) 20 MG tablet, Take 1 tablet by mouth Daily., Disp: 90 tablet, Rfl: 3    coenzyme Q10 100 MG capsule, Take 1 capsule by mouth Daily., Disp: , Rfl:     glucosamine-chondroitin 500-400 MG capsule capsule, Take 1 capsule by mouth Daily., Disp: , Rfl:     Omega-3 1000 MG capsule, Take 1 capsule by mouth Daily., Disp: , Rfl:     tamsulosin (FLOMAX) 0.4 MG capsule 24 hr capsule, Take 1 capsule by mouth Daily., Disp: , Rfl:     Xarelto 15 MG tablet, TAKE 1 TABLET BY MOUTH DAILY., Disp: 90 tablet, Rfl: 2    Zinc 100 MG tablet, Take 100 mg by mouth Daily., Disp: , Rfl:     History of Present Illness     Pt presents for follow up of AF/SSS/HTN/GI bleed. Since we last saw the pt, pt denies any AF episodes, SOB, CP, LH, and dizziness.  No syncope or near syncope.  Denies any hospitalizations, ER visits, bleeding, or TIA/CVA symptoms. Overall feels well. No Syncope. No GI bleed.  BP stable at home        Vitals:    01/08/25 1350   BP: 132/62   BP Location: Left arm   Patient Position: Sitting   Pulse: 71   SpO2: 97%   Weight: 80.7 kg (178 lb)  "  Height: 172.7 cm (68\")     Body mass index is 27.06 kg/m².  PE:  General: NAD  Neck: no JVD, no carotid bruits, no TM  Heart RRR, NL S1, S2, S4 present, no rubs, murmurs  Lungs: CTA, no wheezes, rhonchi, or rales  Abd: soft, non-tender, NL BS  Ext: No musculoskeletal deformities, no edema, cyanosis, or clubbing  Psych: normal mood and affect    Diagnostic Data:      Procedures    PM Interrogation: NL PM fxn, Nl battery fxn, 88% right atrial paced. 1% RV paced.  Less than 1% atrial fibrillation.   5 years left on his battery        1. Paroxysmal atrial fibrillation    2. Syncope and collapse    3. History of TIA (transient ischemic attack) and stroke    4. Essential hypertension          Plan:    1. PAF: <1% PAF on interrogation: doing well     2. GI bleed: no obvious source: needs Watchman WENDY occlusion. Pt to think about it.  Paroxysmal Atrial Fibrillation; less than 1% by interrogation  - CHADS-VASc = 5 (HTN, Age > 75, h/o Stroke), continue Xarelto 15 mg daily       3. Syncope: prob VVS and hypotension: improved off BBL: monitor for now.      4. HTN  -Well controlled on current medications.     F/up in 12 months      "

## 2025-01-31 NOTE — TELEPHONE ENCOUNTER
Lab Results   Component Value Date    WBC 8.74 07/12/2024    HGB 13.7 07/12/2024    HCT 40.2 07/12/2024    MCV 92.6 07/12/2024     07/12/2024        no pain

## 2025-02-06 ENCOUNTER — OFFICE VISIT (OUTPATIENT)
Dept: CARDIOLOGY | Facility: CLINIC | Age: 85
End: 2025-02-06
Payer: MEDICARE

## 2025-02-06 VITALS
DIASTOLIC BLOOD PRESSURE: 80 MMHG | HEART RATE: 70 BPM | WEIGHT: 178 LBS | OXYGEN SATURATION: 99 % | HEIGHT: 71 IN | BODY MASS INDEX: 24.92 KG/M2 | SYSTOLIC BLOOD PRESSURE: 144 MMHG

## 2025-02-06 DIAGNOSIS — I10 ESSENTIAL HYPERTENSION: ICD-10-CM

## 2025-02-06 DIAGNOSIS — Z95.0 CARDIAC PACEMAKER: ICD-10-CM

## 2025-02-06 DIAGNOSIS — E78.5 DYSLIPIDEMIA: ICD-10-CM

## 2025-02-06 DIAGNOSIS — I48.0 PAROXYSMAL ATRIAL FIBRILLATION: Primary | ICD-10-CM

## 2025-02-06 PROCEDURE — 93000 ELECTROCARDIOGRAM COMPLETE: CPT | Performed by: INTERNAL MEDICINE

## 2025-02-06 PROCEDURE — 99214 OFFICE O/P EST MOD 30 MIN: CPT | Performed by: INTERNAL MEDICINE

## 2025-02-06 PROCEDURE — 1160F RVW MEDS BY RX/DR IN RCRD: CPT | Performed by: INTERNAL MEDICINE

## 2025-02-06 PROCEDURE — 1159F MED LIST DOCD IN RCRD: CPT | Performed by: INTERNAL MEDICINE

## 2025-02-06 RX ORDER — ATORVASTATIN CALCIUM 20 MG/1
20 TABLET, FILM COATED ORAL DAILY
Qty: 90 TABLET | Refills: 3 | Status: SHIPPED | OUTPATIENT
Start: 2025-02-06

## 2025-02-06 NOTE — PROGRESS NOTES
[facility] Cardiology    Encounter Date: 2025    Patient ID: Pio Baum is a 85 y.o. male.  : 1940     PCP: Lupillo Hill MD       Chief Complaint: Paroxysmal atrial fibrillation      PROBLEM LIST:  Paroxysmal atrial fibrillation/sick sinus syndrome:  CHADS-VASc = 5 (HTN, Age > 75, h/o Stroke), on Eliquis 5 mg BID.   MPS, 2017: EF 58%, negative, low risk study  Implantation of a dual-chamber permanent pacemaker by Dr. Nava, 2020.  Pericardiocentesis, 2020: Successful pericardiocentesis in a patient with pericardial tamponade/effusion post right-sided pacemaker implantation with approximately 270 cc of dark blood removed from the pericardium. Successful external cardioversion of atrial fibrillation with 200 J shock to sinus rhythm. Normal pacemaker function post pericardiocentesis.  Echo, 2020: There is a small (<1cm) pericardial effusion. Pericardial fluid was drained by the end of the study.  Echo, 2020: EF 50-60%. There is a trivial pericardial effusion.  Echo, 2020:  EF 65%. There is a small (<1cm) circumferential pericardial effusion.  Echo, 10/20/2020: EF 55%. There are myxomatous changes of the mitral valve apparatus present. There is bileaflet mitral valve prolapse present. Trace MR and Mild TR. Calculated right ventricular systolic pressure from tricuspid regurgitation is 26 mmHg. There is a large (>2cm) pericardial effusion. Borderline echo criteria for tamponade  Pericardiocentesis, 10/20/2020: Successful pericardiocentesis with removal of approximately 1300 cc of dark venous blood with small clots present reducing the pericardial effusion from 4.1 cm to approximately 1.1 cm via echocardiographic guidance. Successful intracardiac ultrasound monitoring.  Echo, 10/20/2020: There is a moderate (1-2cm) pericardial effusion adjacent to the right ventricle and left ventricle.  Echo, 10/21/2020: EF 55%. There is a  small, mostly posterior, mild to moderate (1 cm) pericardial effusion along the left ventricle. There is no evidence of pericardial tamponade.  Echo, 10/23/2020: EF 55%. There is a small, mostly posterior pericardial effusion which appears less pronounced compared to the study of October 21, 2020. There is no evidence of pericardial tamponade. Cardiac chambers are grossly normal in size.  Echo, 10/26/2020: EF 45%. Left ventricular wall thickness is consistent with concentric hypertrophy. Mild MR. Moderate TR. No significant pericardial effusion is noted. Compared to previous studies the pericardial effusion has almost completely resolved.  Echo, 04/26/2022: EF 50%. Mild right-sided chamber enlargement. Mild mitral regurgitation. Trace aortic insufficiency. Mild tricuspid regurgitation with normal RVSP.  TIA/CVA:  Carotid duplex, 04/20/2016: No significant disease  Echo, 04/20/2016: Normal LV function, positive bubble study. Closure not considered due to need for chronic anticoagulation therapy.  Cardiac event monitor showed atrial fibrillation/flutter with intermittent RVR, aberrancy, IVCD/sinus rhythm with PVCs  Near syncope/syncope:  2 week Zio, 05/16/2019: SR, occasional PAC's and PVC's. Occasional short SVT/PAT, 21 runs at 3-20 beats.  Echo, 05/16/2019: EF 55%. Borderline right-sided chamber enlargement. Mild MR/TR, normal RVSP.  ER presentation 08/28/2023, near syncope.  Negative CT brain.  Hypertension  Dyslipidemia  Oral tobacco abuse  History of migraine headaches  Chronic kidney disease stage II  Surgical history:  Tonsillectomy/adenoictomy    History of Present Illness  Patient presents today for a follow-up with a history of PAF and cardiac risk factors. Since last visit, the patient has done very well from cardiac standpoint.  Remains active and busy currently working in his garage and in the shrubs.  Has no complaints of chest pain shortness of breath edema palpitations dizziness lightheadedness or  "syncope.  States that in the past his blood pressure had been running low and he was taken off all antihypertensive medications.  He monitors his blood pressure at home and states that this is typically 140 or less.    Allergies   Allergen Reactions    Penicillins Anaphylaxis, Rash and Unknown (See Comments)     Rash all over body including mouth/throat     Flecainide Other (See Comments) and Unknown (See Comments)     Fatigue, weakness unable to tolerate.     Fluticasone Irritability, Unknown (See Comments) and Other (See Comments)     Gets a nose bleed as soon as used    Other reaction(s): Irritability         Current Outpatient Medications:     ascorbic acid (VITAMIN C) 1000 MG tablet, Take 1 tablet by mouth Daily., Disp: , Rfl:     atorvastatin (LIPITOR) 20 MG tablet, Take 1 tablet by mouth Daily., Disp: 90 tablet, Rfl: 3    coenzyme Q10 100 MG capsule, Take 1 capsule by mouth Daily., Disp: , Rfl:     glucosamine-chondroitin 500-400 MG capsule capsule, Take 1 capsule by mouth Daily., Disp: , Rfl:     Omega-3 1000 MG capsule, Take 1 capsule by mouth Daily., Disp: , Rfl:     rivaroxaban (Xarelto) 15 MG tablet, Take 1 tablet by mouth Daily., Disp: 90 tablet, Rfl: 3    tamsulosin (FLOMAX) 0.4 MG capsule 24 hr capsule, Take 1 capsule by mouth Daily., Disp: , Rfl:     Zinc 100 MG tablet, Take 100 mg by mouth Daily., Disp: , Rfl:     The following portions of the patient's history were reviewed and updated as appropriate: allergies, current medications, past family history, past medical history, past social history, past surgical history and problem list.    ROS  Review of Systems   14 point ROS negative except for that listed in the HPI.          Objective:     /80   Pulse 70   Ht 180.3 cm (71\")   Wt 80.7 kg (178 lb)   SpO2 99%   BMI 24.83 kg/m²      Physical Exam  General: No apparent distress.  Neck: no JVD.  Chest:No respiratory distress, breath sounds are normal. No wheezes,  rhonchi or " rales.  Cardiovascular: Normal S1 and S2, no murmur, gallop or rub.    Extremities: No edema.      Data Review:   Lab Results   Component Value Date    GLUCOSE 107 (H) 07/12/2024    BUN 21 07/12/2024    CREATININE 1.74 (H) 07/12/2024    BCR 12.1 07/12/2024     07/12/2024    K 3.6 07/12/2024    CO2 21.7 (L) 07/12/2024    CALCIUM 9.2 07/12/2024    ALBUMIN 4.3 07/12/2024    AST 16 07/12/2024    ALT 9 07/12/2024     Lab Results   Component Value Date    CHOL 130 07/12/2024    CHLPL 139 01/11/2024    TRIG 102 07/12/2024    HDL 64 (H) 07/12/2024    LDL 47 07/12/2024      Lab Results   Component Value Date    WBC 8.74 07/12/2024    RBC 4.34 07/12/2024    HGB 13.7 07/12/2024    HCT 40.2 07/12/2024    MCV 92.6 07/12/2024     07/12/2024     Lab Results   Component Value Date    TSH 1.040 07/12/2024            ECG 12 Lead    Date/Time: 2/6/2025 2:30 PM  Performed by: Yahaira Carson MD    Authorized by: Yahaira Carson MD  Comparison: compared with previous ECG from 8/28/2023  Similar to previous ECG  Rhythm: paced  Comments: Atrial paced rhythm, incomplete RBBB, nonspecific ST changes           Advance Care Planning   ACP discussion was held with the patient during this visit. Patient has an advance directive in EMR which is still valid.            Assessment:      Diagnosis Plan   1. Paroxysmal atrial fibrillation  ECG 12 Lead reviewed, patient is in atrial paced rhythm, continue Xarelto for anticoagulation.      2. Essential hypertension  Blood pressure was elevated on today's visit, patient advised to start checking it at home every morning and at night with goal blood pressure of less than 140/85.      3. Dyslipidemia  Excellent control, continue Lipitor.      4. Cardiac pacemaker  Followed by EP.        Plan:   Stable cardiac status, no angina or CHF symptoms, no symptoms of atrial fibrillation.  Blood pressure was elevated, patient advised to monitor it every morning and at bedtime and to maintain a log, he  recommended goal blood pressure of less than 140/85 and patient to contact us in case of persistent elevation above 140 and we will consider adding antihypertensive therapy again.  Continue current medications.   FU in 12 MO, sooner as needed.  Thank you for allowing us to participate in the care of your patient.     Yahaira Carson MD, FAC, [facility]      Please note that portions of this note may have been completed with a voice recognition program. Efforts were made to edit the dictations, but occasionally words are mistranscribed.

## 2025-05-07 ENCOUNTER — TELEPHONE (OUTPATIENT)
Dept: CARDIOLOGY | Facility: CLINIC | Age: 85
End: 2025-05-07
Payer: MEDICARE

## 2025-05-07 NOTE — TELEPHONE ENCOUNTER
Have received request for cardiac clearance for pt to have tooth extraction and instructions on holding Xarelto before/after procedure. Dentist is asking if there are any restrictions on using local anesthetic with epinephrine.

## 2025-05-07 NOTE — TELEPHONE ENCOUNTER
Also received secure chat from Heidi TORRES that if pt does not have artificial valve he does not need prophylactic antibiotics.

## 2025-05-07 NOTE — TELEPHONE ENCOUNTER
Ok to proceed and use local anesthesia. Can hold Xarelto 48 hours prior and 24 hours after as long as he is not in atrial fibrillation.

## 2025-07-09 LAB
MC_CV_MDC_IDC_RATE_1: 160
MC_CV_MDC_IDC_ZONE_ID: 1
MDC_IDC_MSMT_BATTERY_REMAINING_LONGEVITY: 48 MO
MDC_IDC_MSMT_BATTERY_REMAINING_PERCENTAGE: 78 %
MDC_IDC_MSMT_BATTERY_STATUS: NORMAL
MDC_IDC_MSMT_LEADCHNL_RA_DTM: NORMAL
MDC_IDC_MSMT_LEADCHNL_RA_IMPEDANCE_VALUE: 540
MDC_IDC_MSMT_LEADCHNL_RA_PACING_THRESHOLD_AMPLITUDE: 0.4
MDC_IDC_MSMT_LEADCHNL_RA_PACING_THRESHOLD_POLARITY: NORMAL
MDC_IDC_MSMT_LEADCHNL_RA_PACING_THRESHOLD_PULSEWIDTH: 0.4
MDC_IDC_MSMT_LEADCHNL_RA_SENSING_INTR_AMPL: 5.1
MDC_IDC_MSMT_LEADCHNL_RV_DTM: NORMAL
MDC_IDC_MSMT_LEADCHNL_RV_IMPEDANCE_VALUE: 677
MDC_IDC_MSMT_LEADCHNL_RV_PACING_THRESHOLD_AMPLITUDE: 1
MDC_IDC_MSMT_LEADCHNL_RV_PACING_THRESHOLD_POLARITY: NORMAL
MDC_IDC_MSMT_LEADCHNL_RV_PACING_THRESHOLD_PULSEWIDTH: 0.4
MDC_IDC_MSMT_LEADCHNL_RV_SENSING_INTR_AMPL: 12
MDC_IDC_PG_IMPLANT_DTM: NORMAL
MDC_IDC_PG_MFG: NORMAL
MDC_IDC_PG_MODEL: NORMAL
MDC_IDC_PG_SERIAL: NORMAL
MDC_IDC_PG_TYPE: NORMAL
MDC_IDC_SESS_DTM: NORMAL
MDC_IDC_SESS_TYPE: NORMAL
MDC_IDC_SET_BRADY_AT_MODE_SWITCH_RATE: 170
MDC_IDC_SET_BRADY_LOWRATE: 70
MDC_IDC_SET_BRADY_MAX_SENSOR_RATE: 130
MDC_IDC_SET_BRADY_MAX_TRACKING_RATE: 130
MDC_IDC_SET_BRADY_MODE: NORMAL
MDC_IDC_SET_BRADY_PAV_DELAY: 220
MDC_IDC_SET_BRADY_SAV_DELAY: 220
MDC_IDC_SET_LEADCHNL_RA_PACING_AMPLITUDE: 2
MDC_IDC_SET_LEADCHNL_RA_PACING_POLARITY: NORMAL
MDC_IDC_SET_LEADCHNL_RA_PACING_PULSEWIDTH: 0.4
MDC_IDC_SET_LEADCHNL_RA_SENSING_POLARITY: NORMAL
MDC_IDC_SET_LEADCHNL_RA_SENSING_SENSITIVITY: 0.25
MDC_IDC_SET_LEADCHNL_RV_PACING_AMPLITUDE: 2
MDC_IDC_SET_LEADCHNL_RV_PACING_POLARITY: NORMAL
MDC_IDC_SET_LEADCHNL_RV_PACING_PULSEWIDTH: 0.4
MDC_IDC_SET_LEADCHNL_RV_SENSING_POLARITY: NORMAL
MDC_IDC_SET_LEADCHNL_RV_SENSING_SENSITIVITY: 0.6
MDC_IDC_SET_ZONE_STATUS: NORMAL
MDC_IDC_SET_ZONE_TYPE: NORMAL
MDC_IDC_STAT_AT_BURDEN_PERCENT: 1
MDC_IDC_STAT_BRADY_RA_PERCENT_PACED: 90
MDC_IDC_STAT_BRADY_RV_PERCENT_PACED: 1

## 2025-07-15 ENCOUNTER — OFFICE VISIT (OUTPATIENT)
Dept: INTERNAL MEDICINE | Age: 85
End: 2025-07-15
Payer: MEDICARE

## 2025-07-15 VITALS
OXYGEN SATURATION: 99 % | BODY MASS INDEX: 23.52 KG/M2 | DIASTOLIC BLOOD PRESSURE: 70 MMHG | HEART RATE: 68 BPM | WEIGHT: 168 LBS | HEIGHT: 71 IN | SYSTOLIC BLOOD PRESSURE: 122 MMHG

## 2025-07-15 DIAGNOSIS — I48.0 PAROXYSMAL ATRIAL FIBRILLATION: Primary | ICD-10-CM

## 2025-07-15 DIAGNOSIS — Z00.00 ROUTINE GENERAL MEDICAL EXAMINATION AT A HEALTH CARE FACILITY: ICD-10-CM

## 2025-07-15 DIAGNOSIS — N18.30 STAGE 3 CHRONIC KIDNEY DISEASE, UNSPECIFIED WHETHER STAGE 3A OR 3B CKD: ICD-10-CM

## 2025-07-15 DIAGNOSIS — I10 ESSENTIAL HYPERTENSION: ICD-10-CM

## 2025-07-15 DIAGNOSIS — E78.5 DYSLIPIDEMIA: ICD-10-CM

## 2025-07-15 DIAGNOSIS — R73.09 ABNORMAL GLUCOSE: ICD-10-CM

## 2025-07-15 DIAGNOSIS — K57.90 DIVERTICULOSIS: ICD-10-CM

## 2025-07-15 DIAGNOSIS — Z12.5 SCREENING FOR PROSTATE CANCER: ICD-10-CM

## 2025-07-15 DIAGNOSIS — Z00.00 ENCOUNTER FOR MEDICARE ANNUAL WELLNESS EXAM: ICD-10-CM

## 2025-07-15 LAB
ALBUMIN SERPL-MCNC: 4.2 G/DL (ref 3.5–5.2)
ALBUMIN/GLOB SERPL: 1.7 G/DL
ALP SERPL-CCNC: 106 U/L (ref 39–117)
ALT SERPL W P-5'-P-CCNC: 13 U/L (ref 1–41)
ANION GAP SERPL CALCULATED.3IONS-SCNC: 10.2 MMOL/L (ref 5–15)
AST SERPL-CCNC: 20 U/L (ref 1–40)
BILIRUB SERPL-MCNC: 0.3 MG/DL (ref 0–1.2)
BUN SERPL-MCNC: 32 MG/DL (ref 8–23)
BUN/CREAT SERPL: 18.3 (ref 7–25)
CALCIUM SPEC-SCNC: 9.2 MG/DL (ref 8.6–10.5)
CHLORIDE SERPL-SCNC: 104 MMOL/L (ref 98–107)
CHOLEST SERPL-MCNC: 130 MG/DL (ref 0–200)
CO2 SERPL-SCNC: 24.8 MMOL/L (ref 22–29)
CREAT SERPL-MCNC: 1.75 MG/DL (ref 0.76–1.27)
DEPRECATED RDW RBC AUTO: 44.2 FL (ref 37–54)
EGFRCR SERPLBLD CKD-EPI 2021: 37.2 ML/MIN/1.73
ERYTHROCYTE [DISTWIDTH] IN BLOOD BY AUTOMATED COUNT: 12.6 % (ref 12.3–15.4)
GLOBULIN UR ELPH-MCNC: 2.5 GM/DL
GLUCOSE SERPL-MCNC: 82 MG/DL (ref 65–99)
HCT VFR BLD AUTO: 44 % (ref 37.5–51)
HDLC SERPL-MCNC: 66 MG/DL (ref 40–60)
HGB BLD-MCNC: 14.6 G/DL (ref 13–17.7)
LDLC SERPL CALC-MCNC: 49 MG/DL (ref 0–100)
LDLC/HDLC SERPL: 0.74 {RATIO}
MCH RBC QN AUTO: 31.9 PG (ref 26.6–33)
MCHC RBC AUTO-ENTMCNC: 33.2 G/DL (ref 31.5–35.7)
MCV RBC AUTO: 96.3 FL (ref 79–97)
PLATELET # BLD AUTO: 177 10*3/MM3 (ref 140–450)
PMV BLD AUTO: 11.4 FL (ref 6–12)
POTASSIUM SERPL-SCNC: 4.5 MMOL/L (ref 3.5–5.2)
PROT SERPL-MCNC: 6.7 G/DL (ref 6–8.5)
PSA SERPL-MCNC: 2.1 NG/ML (ref 0–4)
RBC # BLD AUTO: 4.57 10*6/MM3 (ref 4.14–5.8)
SODIUM SERPL-SCNC: 139 MMOL/L (ref 136–145)
TRIGL SERPL-MCNC: 75 MG/DL (ref 0–150)
TSH SERPL DL<=0.05 MIU/L-ACNC: 1.24 UIU/ML (ref 0.27–4.2)
VIT B12 BLD-MCNC: 381 PG/ML (ref 211–946)
VLDLC SERPL-MCNC: 15 MG/DL (ref 5–40)
WBC NRBC COR # BLD AUTO: 8.77 10*3/MM3 (ref 3.4–10.8)

## 2025-07-15 PROCEDURE — 83036 HEMOGLOBIN GLYCOSYLATED A1C: CPT | Performed by: INTERNAL MEDICINE

## 2025-07-15 PROCEDURE — 1170F FXNL STATUS ASSESSED: CPT | Performed by: INTERNAL MEDICINE

## 2025-07-15 PROCEDURE — 82607 VITAMIN B-12: CPT | Performed by: INTERNAL MEDICINE

## 2025-07-15 PROCEDURE — G0439 PPPS, SUBSEQ VISIT: HCPCS | Performed by: INTERNAL MEDICINE

## 2025-07-15 PROCEDURE — 85027 COMPLETE CBC AUTOMATED: CPT | Performed by: INTERNAL MEDICINE

## 2025-07-15 PROCEDURE — 96160 PT-FOCUSED HLTH RISK ASSMT: CPT | Performed by: INTERNAL MEDICINE

## 2025-07-15 PROCEDURE — G0103 PSA SCREENING: HCPCS | Performed by: INTERNAL MEDICINE

## 2025-07-15 PROCEDURE — 1159F MED LIST DOCD IN RCRD: CPT | Performed by: INTERNAL MEDICINE

## 2025-07-15 PROCEDURE — 36415 COLL VENOUS BLD VENIPUNCTURE: CPT | Performed by: INTERNAL MEDICINE

## 2025-07-15 PROCEDURE — 1160F RVW MEDS BY RX/DR IN RCRD: CPT | Performed by: INTERNAL MEDICINE

## 2025-07-15 PROCEDURE — 1126F AMNT PAIN NOTED NONE PRSNT: CPT | Performed by: INTERNAL MEDICINE

## 2025-07-15 PROCEDURE — 80061 LIPID PANEL: CPT | Performed by: INTERNAL MEDICINE

## 2025-07-15 PROCEDURE — 84443 ASSAY THYROID STIM HORMONE: CPT | Performed by: INTERNAL MEDICINE

## 2025-07-15 PROCEDURE — 80053 COMPREHEN METABOLIC PANEL: CPT | Performed by: INTERNAL MEDICINE

## 2025-07-15 RX ORDER — PREDNISONE 2.5 MG/1
2.5 TABLET ORAL DAILY
COMMUNITY
Start: 2025-05-20

## 2025-07-15 NOTE — PROGRESS NOTES
The ABCs of the Annual Wellness Visit  Subsequent Medicare Wellness Visit    Chief Complaint   Patient presents with   • Annual Exam      Subjective    History of Present Illness:  Pio Baum is a 85 y.o. male who presents for a Subsequent Medicare Wellness Visit.    The following portions of the patient's history were reviewed and   updated as appropriate: current medications, past family history, past medical history, past social history, past surgical history, and problem list.     Compared to one year ago, the patient feels his physical   health is better.    Compared to one year ago, the patient feels his mental   health is better.    Recent Hospitalizations:  He was not admitted to the hospital during the last year.       Current Medical Providers:  Patient Care Team:  Lupillo Hill MD as PCP - General  Dain Nava MD as Consulting Physician (Cardiac Electrophysiology)  Rex Cristobal PA as Physician Assistant (Cardiology)  Yahaira Carson MD as Consulting Physician (Cardiology)  Aby Mello PA-C as Physician Assistant (Physician Assistant)    Outpatient Medications Prior to Visit   Medication Sig Dispense Refill   • ascorbic acid (VITAMIN C) 1000 MG tablet Take 1 tablet by mouth Daily.     • atorvastatin (LIPITOR) 20 MG tablet Take 1 tablet by mouth Daily. 90 tablet 3   • coenzyme Q10 100 MG capsule Take 1 capsule by mouth Daily.     • Omega-3 1000 MG capsule Take 1 capsule by mouth Daily.     • predniSONE (DELTASONE) 2.5 MG tablet Take 1 tablet by mouth Daily.     • rivaroxaban (Xarelto) 15 MG tablet Take 1 tablet by mouth Daily. 90 tablet 3   • tamsulosin (FLOMAX) 0.4 MG capsule 24 hr capsule Take 1 capsule by mouth Daily.     • Zinc 100 MG tablet Take 100 mg by mouth Daily.     • glucosamine-chondroitin 500-400 MG capsule capsule Take 1 capsule by mouth Daily.       No facility-administered medications prior to visit.       No opioid medication identified on active  medication list. I have reviewed chart for other potential  high risk medication/s and harmful drug interactions in the elderly.        Aspirin is not on active medication list.  Aspirin use is contraindicated for this patient due to: current use of Xarelto.  .    Fall Risk Assessment was completed, and patient is at low risk for falls.      Patient Active Problem List   Diagnosis   • Essential hypertension   • PFO (patent foramen ovale)   • Dyslipidemia   • Tobacco chew use   • CKD (chronic kidney disease) stage 3, GFR 30-59 ml/min   • History of TIA (transient ischemic attack) and stroke   • Leukocytosis   • Scalp laceration   • Diverticulosis   • Pre-syncope   • Syncope and collapse   • Sick sinus syndrome   • Pericardial effusion   • Cardiac pacemaker   • Chronic dryness of both eyes   • Nuclear senile cataract   • Perforation of tympanic membrane   • Peripapillary atrophy of both eyes   • S/P right cataract extraction   • 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  ACP discussion was held with the patient during this visit. Patient has an advance directive in EMR which is still valid.     Review of Systems   Constitutional:  Negative for activity change, appetite change, chills, fever and unexpected weight change.   HENT:  Negative for congestion, ear pain, hearing loss, rhinorrhea, sinus pressure, sinus pain, sore throat and trouble swallowing.    Eyes:  Negative for photophobia, pain, discharge and itching.   Respiratory:  Negative for cough, chest tightness, shortness of breath and wheezing.    Cardiovascular:  Negative for chest pain, palpitations and leg swelling.   Gastrointestinal:  Negative for abdominal distention, abdominal pain, blood in stool, constipation, diarrhea  "and vomiting.        Colonoscopy by Dr Schmid   Endocrine: Negative for cold intolerance, heat intolerance, polydipsia, polyphagia and polyuria.   Genitourinary:  Negative for difficulty urinating, dysuria, enuresis, frequency, genital sores, hematuria and urgency.        Followed by Dr Segla  nocturia   Musculoskeletal:  Positive for arthralgias. Negative for gait problem, joint swelling and myalgias.   Skin:  Negative for pallor, rash and wound.   Allergic/Immunologic: Negative for immunocompromised state.   Neurological:  Negative for tremors, seizures, syncope, speech difficulty, numbness and headaches.   Hematological:  Negative for adenopathy. Bruises/bleeds easily.   Psychiatric/Behavioral:  Negative for behavioral problems, dysphoric mood, sleep disturbance and suicidal ideas. The patient is not nervous/anxious.          Objective       Vitals:    07/15/25 1501   BP: 122/70   BP Location: Right arm   Patient Position: Sitting   Pulse: 68   SpO2: 99%   Weight: 76.2 kg (168 lb)   Height: 180.3 cm (70.98\")   PainSc: 0-No pain     BMI Readings from Last 1 Encounters:   07/15/25 23.44 kg/m²   BMI is within normal parameters. No follow-up required.    Does the patient have evidence of cognitive impairment? No    Physical Exam  Constitutional:       Appearance: Normal appearance. He is well-developed.   HENT:      Head: Normocephalic and atraumatic.      Right Ear: External ear normal.      Left Ear: External ear normal.      Nose: Nose normal.      Mouth/Throat:      Mouth: Mucous membranes are moist.      Pharynx: Oropharynx is clear.   Eyes:      Extraocular Movements: Extraocular movements intact.      Conjunctiva/sclera: Conjunctivae normal.      Pupils: Pupils are equal, round, and reactive to light.   Cardiovascular:      Rate and Rhythm: Normal rate and regular rhythm.      Heart sounds: Normal heart sounds.   Pulmonary:      Effort: Pulmonary effort is normal.      Breath sounds: Normal breath sounds. "   Abdominal:      General: Bowel sounds are normal.      Palpations: Abdomen is soft.   Musculoskeletal:         General: Normal range of motion.      Cervical back: Normal range of motion and neck supple.   Lymphadenopathy:      Cervical: No cervical adenopathy.   Skin:     General: Skin is warm and dry.      Comments: Scant eccymosis   Neurological:      General: No focal deficit present.      Mental Status: He is alert and oriented to person, place, and time.   Psychiatric:         Mood and Affect: Mood normal.         Behavior: Behavior normal.         Thought Content: Thought content normal.     Lab Results   Component Value Date    TRIG 75 07/15/2025    HDL 66 (H) 07/15/2025    LDL 49 07/15/2025    VLDL 15 07/15/2025    HGBA1C 5.40 07/15/2025            HEALTH RISK ASSESSMENT    Smoking Status:  Social History     Tobacco Use   Smoking Status Never   • Passive exposure: Current   Smokeless Tobacco Current   • Types: Chew     Alcohol Consumption:  Social History     Substance and Sexual Activity   Alcohol Use Yes   • Alcohol/week: 4.0 standard drinks of alcohol   • Types: 4 Cans of beer per week    Comment: 4 drinks per month     Fall Risk Screen:    STEADI Fall Risk Assessment was completed, and patient is at LOW risk for falls.Assessment completed on:7/15/2025    Depression Screenin/15/2025     3:00 PM   PHQ-2/PHQ-9 Depression Screening   Little interest or pleasure in doing things Not at all   Feeling down, depressed, or hopeless Not at all       Health Habits and Functional and Cognitive Screenin/15/2025     3:00 PM   Functional & Cognitive Status   Do you have difficulty preparing food and eating? No   Do you have difficulty bathing yourself, getting dressed or grooming yourself? No   Do you have difficulty using the toilet? No   Do you have difficulty moving around from place to place? No   Do you have trouble with steps or getting out of a bed or a chair? No   Current Diet Well Balanced  Diet   Dental Exam Up to date   Eye Exam Up to date   Exercise (times per week) 3 times per week   Current Exercises Include Yard Work   Do you need help using the phone?  No   Are you deaf or do you have serious difficulty hearing?  No   Do you need help to go to places out of walking distance? No   Do you need help shopping? No   Do you need help preparing meals?  No   Do you need help with housework?  No   Do you need help with laundry? No   Do you need help taking your medications? No   Do you need help managing money? No   Do you ever drive or ride in a car without wearing a seat belt? No   Have you felt unusual fatigue (could be tiredness), stress, anger or loneliness in the last month? No   Who do you live with? Spouse   If you need help, do you have trouble finding someone available to you? No   Have you been bothered in the last four weeks by sexual problems? No   Do you have difficulty concentrating, remembering or making decisions? No       Age-appropriate Screening Schedule:  Refer to the list below for future screening recommendations based on patient's age, sex and/or medical conditions. Orders for these recommended tests are listed in the plan section. The patient has been provided with a written plan.    Health Maintenance   Topic Date Due   • TDAP/TD VACCINES (1 - Tdap) Never done   • ZOSTER VACCINE (1 of 2) Never done   • RSV Vaccine - Adults (1 - 1-dose 75+ series) Never done   • COVID-19 Vaccine (1 - 2024-25 season) Never done   • INFLUENZA VACCINE  10/01/2025   • ANNUAL WELLNESS VISIT  07/15/2026   • LIPID PANEL  07/15/2026   • Pneumococcal Vaccine 50+  Completed              Assessment & Plan     CMS Preventative Services Quick Reference  Risk Factors Identified During Encounter  Immunizations Discussed/Encouraged: Tdap, Influenza, Shingrix, COVID19, and RSV (Respiratory Syncytial Virus)  Inactivity/Sedentary: Patient was advised to exercise at least 150 minutes a week per CDC  recommendations.  Polypharmacy: Medication List reviewed and Medications are appropriate for patient  The above risks/problems have been discussed with the patient.  Follow up actions/plans if indicated are seen below in the Assessment/Plan Section.  Pertinent information has been shared with the patient in the After Visit Summary.    Diagnoses and all orders for this visit:    1. Paroxysmal atrial fibrillation (Primary)    2. Essential hypertension    3. Dyslipidemia    4. Diverticulosis    5. Stage 3 chronic kidney disease, unspecified whether stage 3a or 3b CKD    6. Routine general medical examination at a health care facility  -     CBC (No Diff)  -     Comprehensive Metabolic Panel  -     Lipid Panel  -     PSA Screen  -     TSH  -     Vitamin B12  -     Hemoglobin A1c  -     Cancel: POC Urinalysis Dipstick, Automated    7. Encounter for Medicare annual wellness exam  -     CBC (No Diff)  -     Comprehensive Metabolic Panel  -     Lipid Panel  -     PSA Screen  -     TSH  -     Vitamin B12  -     Hemoglobin A1c  -     Cancel: POC Urinalysis Dipstick, Automated    8. Screening for prostate cancer  -     PSA Screen    9. Abnormal glucose  -     Hemoglobin A1c        Follow Up:   Return in about 4 months (around 11/15/2025) for Recheck.     An After Visit Summary and PPPS were given to the patient.         Historical Data    HME-counseled on diet and exercise  htn-stable off meds, advised goal of 150/80  paf-rate controlled, on NOAC  Hyperlipidemia- labs on lipitor at goal, counseled on diet  djd-stable on tylenol  Thrombocytopenia/anemia-cbc dw patient  TIA-cont rf mod  CKD-recheck noted, advised fluids and NSAIDS avoidance  Abnormal glucose-labs soon  Diverticulosis-increase fiber, asymptomatic  Hx of UGI bleed-would be candidate of [facility] to high risk on NOAC for recurrent bleed  BPH-psa noted, cont flomax     7/15 labs noted and dw patient    Reviewed the following with the patient: advised patient to  avoid alcoholic beverages, encouraged patient to exercise 5-7 days per week for 30 minutes at a time, and ideal body weight discussed with patient.

## 2025-07-16 LAB — HBA1C MFR BLD: 5.4 % (ref 4.8–5.6)

## 2025-07-18 LAB
MC_CV_MDC_IDC_RATE_1: 160
MC_CV_MDC_IDC_ZONE_ID: 1
MDC_IDC_MSMT_BATTERY_REMAINING_LONGEVITY: 48 MO
MDC_IDC_MSMT_BATTERY_REMAINING_PERCENTAGE: 78 %
MDC_IDC_MSMT_BATTERY_STATUS: NORMAL
MDC_IDC_MSMT_LEADCHNL_RA_DTM: NORMAL
MDC_IDC_MSMT_LEADCHNL_RA_IMPEDANCE_VALUE: 540
MDC_IDC_MSMT_LEADCHNL_RA_PACING_THRESHOLD_AMPLITUDE: 0.4
MDC_IDC_MSMT_LEADCHNL_RA_PACING_THRESHOLD_POLARITY: NORMAL
MDC_IDC_MSMT_LEADCHNL_RA_PACING_THRESHOLD_PULSEWIDTH: 0.4
MDC_IDC_MSMT_LEADCHNL_RA_SENSING_INTR_AMPL: 5.1
MDC_IDC_MSMT_LEADCHNL_RV_DTM: NORMAL
MDC_IDC_MSMT_LEADCHNL_RV_IMPEDANCE_VALUE: 677
MDC_IDC_MSMT_LEADCHNL_RV_PACING_THRESHOLD_AMPLITUDE: 1
MDC_IDC_MSMT_LEADCHNL_RV_PACING_THRESHOLD_POLARITY: NORMAL
MDC_IDC_MSMT_LEADCHNL_RV_PACING_THRESHOLD_PULSEWIDTH: 0.4
MDC_IDC_MSMT_LEADCHNL_RV_SENSING_INTR_AMPL: 12
MDC_IDC_PG_IMPLANT_DTM: NORMAL
MDC_IDC_PG_MFG: NORMAL
MDC_IDC_PG_MODEL: NORMAL
MDC_IDC_PG_SERIAL: NORMAL
MDC_IDC_PG_TYPE: NORMAL
MDC_IDC_SESS_DTM: NORMAL
MDC_IDC_SESS_TYPE: NORMAL
MDC_IDC_SET_BRADY_AT_MODE_SWITCH_RATE: 170
MDC_IDC_SET_BRADY_LOWRATE: 70
MDC_IDC_SET_BRADY_MAX_SENSOR_RATE: 130
MDC_IDC_SET_BRADY_MAX_TRACKING_RATE: 130
MDC_IDC_SET_BRADY_MODE: NORMAL
MDC_IDC_SET_BRADY_PAV_DELAY: 220
MDC_IDC_SET_BRADY_SAV_DELAY: 220
MDC_IDC_SET_LEADCHNL_RA_PACING_AMPLITUDE: 2
MDC_IDC_SET_LEADCHNL_RA_PACING_POLARITY: NORMAL
MDC_IDC_SET_LEADCHNL_RA_PACING_PULSEWIDTH: 0.4
MDC_IDC_SET_LEADCHNL_RA_SENSING_POLARITY: NORMAL
MDC_IDC_SET_LEADCHNL_RA_SENSING_SENSITIVITY: 0.25
MDC_IDC_SET_LEADCHNL_RV_PACING_AMPLITUDE: 2
MDC_IDC_SET_LEADCHNL_RV_PACING_POLARITY: NORMAL
MDC_IDC_SET_LEADCHNL_RV_PACING_PULSEWIDTH: 0.4
MDC_IDC_SET_LEADCHNL_RV_SENSING_POLARITY: NORMAL
MDC_IDC_SET_LEADCHNL_RV_SENSING_SENSITIVITY: 0.6
MDC_IDC_SET_ZONE_STATUS: NORMAL
MDC_IDC_SET_ZONE_TYPE: NORMAL
MDC_IDC_STAT_AT_BURDEN_PERCENT: 1
MDC_IDC_STAT_BRADY_RA_PERCENT_PACED: 90
MDC_IDC_STAT_BRADY_RV_PERCENT_PACED: 1

## (undated) DEVICE — MEDI-VAC YANKAUER SUCTION HANDLE W/BULBOUS TIP: Brand: CARDINAL HEALTH

## (undated) DEVICE — INTRO SHEATH ENGAGE W/50 GW .038 9F12

## (undated) DEVICE — SKIN PREP TRAY W/CHG: Brand: MEDLINE INDUSTRIES, INC.

## (undated) DEVICE — GW DIAG .032

## (undated) DEVICE — LIMB HOLDER, WRIST/ANKLE: Brand: DEROYAL

## (undated) DEVICE — HYBRID CO2 TUBING/CAP SET FOR OLYMPUS® SCOPES & CO2 SOURCE: Brand: ERBE

## (undated) DEVICE — LUBE GEL ENDOGLIDE 1.1OZ

## (undated) DEVICE — SOL NACL 0.9PCT 1000ML

## (undated) DEVICE — CONTN GRAD MEAS TRIANG 32OZ BLK

## (undated) DEVICE — ST INF PRI SMRTSTE 20DRP 2VLV 24ML 117

## (undated) DEVICE — CATH EPICARD SYS 17G 8.5FJ 180CM

## (undated) DEVICE — TRY CATH PERICARDIOCENT PERIVAC 8.3F

## (undated) DEVICE — TUBING, SUCTION, 1/4" X 10', STRAIGHT: Brand: MEDLINE

## (undated) DEVICE — SET PRIMARY GRVTY 10DP MALE LL 104IN

## (undated) DEVICE — LEX ELECTRO PHYSIOLOGY: Brand: MEDLINE INDUSTRIES, INC.

## (undated) DEVICE — CAUTERY TIP POLISHER: Brand: DEVON

## (undated) DEVICE — INTRO TEAR AWAY/LVD W/SD PRT 6F 13CM

## (undated) DEVICE — GW PERIPH GUIDERIGHT STD/J/TP PTFE/PCOAT SS 0.032IN 180CM 3M

## (undated) DEVICE — SYR LUERLOK 50ML

## (undated) DEVICE — KT EVAC WND 3SPRNG 400CC W/DRN RND 3/16IN

## (undated) DEVICE — CANN NASL CO2 DIVIDED A/

## (undated) DEVICE — CATH ULTRASND ECHO ACUNAV FOR ACUSON 8F 90CM

## (undated) DEVICE — THE BITE BLOCK MAXI, LATEX FREE STRAP IS USED TO PROTECT THE ENDOSCOPE INSERTION TUBE FROM BEING BITTEN BY THE PATIENT.

## (undated) DEVICE — KT ORCA ORCAPOD DISP STRL

## (undated) DEVICE — IRRIGATOR BULB ASEPTO 60CC STRL

## (undated) DEVICE — STERILE (15.2 TAPERED TO 7.6 X 183CM) POLYETHYLENE ACCORDION-FOLDED COVER FOR USE WITH SIEMENS ACUNAV ULTRASOUND CATHETER FAMILY CONNECTOR: Brand: SWIFTLINK TRANSDUCER COVER

## (undated) DEVICE — DECANT BG O JET

## (undated) DEVICE — ADULT, W/LG. BACK PAD, RADIOTRANSPARENT ELEMENT AND LEAD WIRE: Brand: DEFIBRILLATION ELECTRODES

## (undated) DEVICE — ST EXT IV SMARTSITE 2VLV SP M LL 5ML IV1

## (undated) DEVICE — 3M™ STERI-STRIP™ REINFORCED ADHESIVE SKIN CLOSURES, R1547, 1/2 IN X 4 IN (12 MM X 100 MM), 6 STRIPS/ENVELOPE: Brand: 3M™ STERI-STRIP™

## (undated) DEVICE — SINGLE-USE POLYPECTOMY SNARE: Brand: SENSATION SHORT THROW

## (undated) DEVICE — DRSNG SURESITE123 4X4.8IN

## (undated) DEVICE — SPNG VERSALON 4X4 4PLY NONSTRL LF BG/200

## (undated) DEVICE — INTRO ACCSR BLNT TP

## (undated) DEVICE — SOL IRR H2O BTL 1000ML STRL

## (undated) DEVICE — SPNG ENDO BEDSIDE TUB ENZYM

## (undated) DEVICE — GRADUATE CONTN 1000ML

## (undated) DEVICE — PENCL E/S HNDSWCH ROCKRBTN HOLSTR 10FT